# Patient Record
Sex: FEMALE | Race: WHITE | Employment: OTHER | URBAN - METROPOLITAN AREA
[De-identification: names, ages, dates, MRNs, and addresses within clinical notes are randomized per-mention and may not be internally consistent; named-entity substitution may affect disease eponyms.]

---

## 2017-03-01 RX ORDER — METOPROLOL SUCCINATE 50 MG/1
TABLET, EXTENDED RELEASE ORAL
Qty: 30 TABLET | Refills: 11 | Status: SHIPPED | OUTPATIENT
Start: 2017-03-01 | End: 2017-03-20

## 2017-03-22 RX ORDER — AMLODIPINE BESYLATE 5 MG/1
TABLET ORAL
Qty: 90 TABLET | Refills: 2 | Status: SHIPPED | OUTPATIENT
Start: 2017-03-22 | End: 2017-08-20 | Stop reason: SDUPTHER

## 2017-05-17 RX ORDER — CARBAMAZEPINE 200 MG
TABLET ORAL
Qty: 180 TABLET | Refills: 3 | Status: SHIPPED | OUTPATIENT
Start: 2017-05-17 | End: 2017-05-22 | Stop reason: SDUPTHER

## 2017-05-22 RX ORDER — CARBAMAZEPINE 200 MG
TABLET ORAL
Qty: 180 TABLET | Refills: 3 | Status: SHIPPED | OUTPATIENT
Start: 2017-05-22 | End: 2018-07-18 | Stop reason: SDUPTHER

## 2017-06-27 RX ORDER — ALUMINUM ZIRCONIUM TRICHLOROHYDREX GLY 0.19 G/G
STICK TOPICAL
Qty: 84 TABLET | Refills: 1 | Status: SHIPPED | OUTPATIENT
Start: 2017-06-27 | End: 2018-02-02

## 2017-08-29 RX ORDER — FLUOXETINE HYDROCHLORIDE 40 MG/1
CAPSULE ORAL
Qty: 90 CAPSULE | Refills: 3 | Status: SHIPPED | OUTPATIENT
Start: 2017-08-29 | End: 2018-09-30 | Stop reason: SDUPTHER

## 2017-09-10 RX ORDER — ATORVASTATIN CALCIUM 80 MG/1
TABLET, FILM COATED ORAL
Qty: 90 TABLET | Refills: 2 | Status: SHIPPED | OUTPATIENT
Start: 2017-09-10 | End: 2020-03-23

## 2017-09-19 RX ORDER — METHOCARBAMOL 750 MG/1
TABLET ORAL
Qty: 12 CAPSULE | Refills: 3 | Status: SHIPPED | OUTPATIENT
Start: 2017-09-19 | End: 2018-10-08 | Stop reason: SDUPTHER

## 2017-09-21 RX ORDER — METHOCARBAMOL 750 MG/1
TABLET ORAL
Qty: 12 CAPSULE | Refills: 3 | Status: SHIPPED | OUTPATIENT
Start: 2017-09-21 | End: 2018-02-02

## 2017-11-02 ENCOUNTER — TELEPHONE (OUTPATIENT)
Dept: INTERNAL MEDICINE | Age: 67
End: 2017-11-02

## 2017-11-02 NOTE — TELEPHONE ENCOUNTER
Since her pulmonary doctor never sent me any information about her it would be more appropriate for him to order it.which he can easily do by himself.

## 2017-11-13 RX ORDER — LEVOTHYROXINE SODIUM 0.1 MG/1
TABLET ORAL
Qty: 90 TABLET | Refills: 3 | Status: SHIPPED | OUTPATIENT
Start: 2017-11-13 | End: 2018-12-06 | Stop reason: SDUPTHER

## 2017-12-18 ENCOUNTER — TELEPHONE (OUTPATIENT)
Dept: INTERNAL MEDICINE | Age: 67
End: 2017-12-18

## 2017-12-21 RX ORDER — ATORVASTATIN CALCIUM 80 MG/1
TABLET, FILM COATED ORAL
Qty: 90 TABLET | Refills: 3 | Status: SHIPPED | OUTPATIENT
Start: 2017-12-21 | End: 2018-02-02

## 2018-02-01 NOTE — PROGRESS NOTES
Awake and orientated to person, place and time. PSYCH: Calm affect. SKIN: Warm and dry. HEENT: Sclera non-icteric, normocephalic, neck supple, no elevation of JVP, normal carotid pulses with no bruits and thyroid normal size. LUNGS:  No increased work of breathing and clear to auscultation, no crackles or wheezing. CARDIOVASCULAR:  Regular rate and rhythm with no murmurs, gallops, rubs, or abnormal heart sounds, normal PMI. The apical impulses not displaced. Heart tones are crisp and normal                                                                                            Cervical veins are not engorged                 JVP less than 8 cm H2O                                                                              The carotid upstroke is normal in amplitude and contour without delay or bruit    ABDOMEN:  Normal bowel sounds, non-distended and non-tender to palpation   EXT: No edema, no calf tenderness. Pulses are present bilaterally.     DATA:    Lab Results   Component Value Date    ALT 18 07/22/2016    AST 23 07/22/2016    ALKPHOS 95 10/28/2015    BILITOT 0.3 10/28/2015     Lab Results   Component Value Date    CREATININE 0.7 09/16/2016    BUN 19 09/16/2016     09/16/2016    K 3.9 09/16/2016     09/16/2016    CO2 26 09/16/2016     Lab Results   Component Value Date    TSH 0.83 10/28/2015    G8DJSKE 5.7 01/17/2012     Lab Results   Component Value Date    WBC 6.0 09/16/2016    HGB 13.6 09/16/2016    HCT 41.6 09/16/2016    MCV 91.3 09/16/2016     09/16/2016     No components found for: CHLPL  Lab Results   Component Value Date    TRIG 157 07/22/2016    TRIG 148 10/28/2015    TRIG 113 08/14/2012     Lab Results   Component Value Date    HDL 90 (A) 07/22/2016     (H) 10/28/2015    HDL 90 (H) 08/14/2012     Lab Results   Component Value Date    LDLCALC 82 07/22/2016    LDLCALC 98 10/28/2015    LDLCALC 93 08/14/2012     Lab Results   Component Value Date    LABVLDL 30 10/28/2015    LABVLDL 23 08/14/2012    LABVLDL 18 01/17/2012       LABS:  2/9/16: Na+ 139 K+ 3.6 chl 103 CO2 28 BUN 19 creatinine 0.78 glu 104    Mg+ 1.9    Radiology Review:  Pertinent images / reports were reviewed as a part of this visit and reveals the following:      Last Angiogram: '04: mid-LAD 40-50%    Last Echo: Feb '05: normal LV systolic function, EF 59%. Trace MR. Early diastolic dysfunction       Last Stress Test: 7/26/16:  Summary    There is normal isotope uptake at stress and rest. There is no clear    evidence of myocardial ischemia or scar. Abnormal clinical response to    Lexiscan.    Minimal anterior soft tissue attenuation reduces the sensitivity of this    test.       Last Angiogram: 8/1/16:  60% LAD at D1 bifurcation--IFR 0.97, FFR 0.91  50% mid cx  Normal LV FXN  IMP: Noncardiac cause for sx likely  RE: BP control, pulmonary evaluation, PFT    8/19/16: PFTs:  INDICATION: Dyspnea. INTERPRETATION: Spirometry showed decreased FVC of 2.09 L or 69% predicted  and decreased FEV1 of 1.83 L or 79% predicted. FEV1/FVC ratio was normal.    Lung volumes showed decreased total lung capacity of 63% predicted.    Diffusion capacity showed decreased DLCO of 67% predicted. Methylcholine  challenge test was a negative study.    IMPRESSION: No obstructive defect on spirometry with negative methylcholine  challenge test. There was some restrictive component seen on lung volumes in  a mild degree. Mild decrease in diffusion capacity also noted.        8/19/16: CT chest:  1. Subtle bilateral upper lobe ground-glass opacities favor atelectasis   rather than an infectious/inflammatory process. 2. 4 mm left lower lobe pulmonary nodule. Assessment:     1. Atherosclerosis of native coronary artery of native heart without angina pectoris   stable: LAD/diag bifurcation 60%, FFR 0.91 - 0.97      2. Shortness of breath   ILD--Dr Chapa    3.  Essential hypertension, benign   ~/78 (Site: Left Arm,

## 2018-02-02 ENCOUNTER — OFFICE VISIT (OUTPATIENT)
Dept: CARDIOLOGY CLINIC | Age: 68
End: 2018-02-02

## 2018-02-02 VITALS
HEIGHT: 63 IN | WEIGHT: 203 LBS | OXYGEN SATURATION: 97 % | SYSTOLIC BLOOD PRESSURE: 122 MMHG | DIASTOLIC BLOOD PRESSURE: 78 MMHG | BODY MASS INDEX: 35.97 KG/M2 | HEART RATE: 73 BPM

## 2018-02-02 DIAGNOSIS — I25.10 ATHEROSCLEROSIS OF NATIVE CORONARY ARTERY OF NATIVE HEART WITHOUT ANGINA PECTORIS: Primary | ICD-10-CM

## 2018-02-02 DIAGNOSIS — E78.5 HYPERLIPIDEMIA, UNSPECIFIED HYPERLIPIDEMIA TYPE: ICD-10-CM

## 2018-02-02 DIAGNOSIS — I10 ESSENTIAL HYPERTENSION, BENIGN: ICD-10-CM

## 2018-02-02 DIAGNOSIS — J84.9 ILD (INTERSTITIAL LUNG DISEASE) (HCC): ICD-10-CM

## 2018-02-02 PROCEDURE — G8598 ASA/ANTIPLAT THER USED: HCPCS | Performed by: INTERNAL MEDICINE

## 2018-02-02 PROCEDURE — G8427 DOCREV CUR MEDS BY ELIG CLIN: HCPCS | Performed by: INTERNAL MEDICINE

## 2018-02-02 PROCEDURE — G8399 PT W/DXA RESULTS DOCUMENT: HCPCS | Performed by: INTERNAL MEDICINE

## 2018-02-02 PROCEDURE — 99214 OFFICE O/P EST MOD 30 MIN: CPT | Performed by: INTERNAL MEDICINE

## 2018-02-02 PROCEDURE — 1036F TOBACCO NON-USER: CPT | Performed by: INTERNAL MEDICINE

## 2018-02-02 PROCEDURE — 4040F PNEUMOC VAC/ADMIN/RCVD: CPT | Performed by: INTERNAL MEDICINE

## 2018-02-02 PROCEDURE — G8417 CALC BMI ABV UP PARAM F/U: HCPCS | Performed by: INTERNAL MEDICINE

## 2018-02-02 PROCEDURE — G8484 FLU IMMUNIZE NO ADMIN: HCPCS | Performed by: INTERNAL MEDICINE

## 2018-02-02 PROCEDURE — 3017F COLORECTAL CA SCREEN DOC REV: CPT | Performed by: INTERNAL MEDICINE

## 2018-02-02 PROCEDURE — 3014F SCREEN MAMMO DOC REV: CPT | Performed by: INTERNAL MEDICINE

## 2018-02-02 PROCEDURE — 1123F ACP DISCUSS/DSCN MKR DOCD: CPT | Performed by: INTERNAL MEDICINE

## 2018-02-02 PROCEDURE — 1090F PRES/ABSN URINE INCON ASSESS: CPT | Performed by: INTERNAL MEDICINE

## 2018-02-02 RX ORDER — MYCOPHENOLATE MOFETIL 250 MG/1
250 CAPSULE ORAL 2 TIMES DAILY
COMMUNITY
Start: 2017-10-26 | End: 2018-10-29 | Stop reason: ALTCHOICE

## 2018-02-08 ENCOUNTER — HOSPITAL ENCOUNTER (OUTPATIENT)
Dept: ULTRASOUND IMAGING | Age: 68
Discharge: HOME | End: 2018-02-08
Attending: INTERNAL MEDICINE | Admitting: INTERNAL MEDICINE

## 2018-02-08 DIAGNOSIS — I25.10 ATHEROSCLEROTIC HEART DISEASE OF NATIVE CORONARY ARTERY WITHOUT ANGINA PECTORIS: ICD-10-CM

## 2018-02-08 DIAGNOSIS — Z13.6 SCREENING FOR AAA (ABDOMINAL AORTIC ANEURYSM): ICD-10-CM

## 2018-02-08 DIAGNOSIS — E78.5 HYPERLIPIDEMIA, UNSPECIFIED HYPERLIPIDEMIA TYPE: ICD-10-CM

## 2018-02-08 DIAGNOSIS — I25.10 ATHEROSCLEROSIS OF NATIVE CORONARY ARTERY OF NATIVE HEART WITHOUT ANGINA PECTORIS: ICD-10-CM

## 2018-02-08 DIAGNOSIS — I10 ESSENTIAL HYPERTENSION, BENIGN: ICD-10-CM

## 2018-03-19 RX ORDER — METOPROLOL SUCCINATE 50 MG/1
TABLET, EXTENDED RELEASE ORAL
Qty: 30 TABLET | Refills: 0 | Status: SHIPPED | OUTPATIENT
Start: 2018-03-19 | End: 2018-03-20 | Stop reason: SDUPTHER

## 2018-03-20 ENCOUNTER — OFFICE VISIT (OUTPATIENT)
Dept: CARDIOLOGY CLINIC | Age: 68
End: 2018-03-20

## 2018-03-20 VITALS
WEIGHT: 204 LBS | HEIGHT: 63 IN | SYSTOLIC BLOOD PRESSURE: 112 MMHG | DIASTOLIC BLOOD PRESSURE: 64 MMHG | BODY MASS INDEX: 36.14 KG/M2 | HEART RATE: 83 BPM | OXYGEN SATURATION: 91 %

## 2018-03-20 DIAGNOSIS — E78.2 MIXED HYPERLIPIDEMIA: ICD-10-CM

## 2018-03-20 DIAGNOSIS — I25.10 ATHEROSCLEROSIS OF NATIVE CORONARY ARTERY OF NATIVE HEART WITHOUT ANGINA PECTORIS: Primary | ICD-10-CM

## 2018-03-20 DIAGNOSIS — R06.02 SHORTNESS OF BREATH: ICD-10-CM

## 2018-03-20 DIAGNOSIS — I10 ESSENTIAL HYPERTENSION, BENIGN: ICD-10-CM

## 2018-03-20 PROCEDURE — 1036F TOBACCO NON-USER: CPT | Performed by: NURSE PRACTITIONER

## 2018-03-20 PROCEDURE — 1090F PRES/ABSN URINE INCON ASSESS: CPT | Performed by: NURSE PRACTITIONER

## 2018-03-20 PROCEDURE — G8598 ASA/ANTIPLAT THER USED: HCPCS | Performed by: NURSE PRACTITIONER

## 2018-03-20 PROCEDURE — 93000 ELECTROCARDIOGRAM COMPLETE: CPT | Performed by: NURSE PRACTITIONER

## 2018-03-20 PROCEDURE — 3017F COLORECTAL CA SCREEN DOC REV: CPT | Performed by: NURSE PRACTITIONER

## 2018-03-20 PROCEDURE — 99214 OFFICE O/P EST MOD 30 MIN: CPT | Performed by: NURSE PRACTITIONER

## 2018-03-20 PROCEDURE — 1123F ACP DISCUSS/DSCN MKR DOCD: CPT | Performed by: NURSE PRACTITIONER

## 2018-03-20 PROCEDURE — G8427 DOCREV CUR MEDS BY ELIG CLIN: HCPCS | Performed by: NURSE PRACTITIONER

## 2018-03-20 PROCEDURE — G8417 CALC BMI ABV UP PARAM F/U: HCPCS | Performed by: NURSE PRACTITIONER

## 2018-03-20 PROCEDURE — 3014F SCREEN MAMMO DOC REV: CPT | Performed by: NURSE PRACTITIONER

## 2018-03-20 PROCEDURE — G8399 PT W/DXA RESULTS DOCUMENT: HCPCS | Performed by: NURSE PRACTITIONER

## 2018-03-20 PROCEDURE — 4040F PNEUMOC VAC/ADMIN/RCVD: CPT | Performed by: NURSE PRACTITIONER

## 2018-03-20 PROCEDURE — G8482 FLU IMMUNIZE ORDER/ADMIN: HCPCS | Performed by: NURSE PRACTITIONER

## 2018-03-20 RX ORDER — METOPROLOL SUCCINATE 50 MG/1
TABLET, EXTENDED RELEASE ORAL
Qty: 30 TABLET | Refills: 5 | Status: SHIPPED | OUTPATIENT
Start: 2018-03-20 | End: 2018-08-01 | Stop reason: SDUPTHER

## 2018-03-23 ENCOUNTER — TELEPHONE (OUTPATIENT)
Dept: CARDIOLOGY CLINIC | Age: 68
End: 2018-03-23

## 2018-03-23 DIAGNOSIS — E78.5 HYPERLIPIDEMIA, UNSPECIFIED HYPERLIPIDEMIA TYPE: ICD-10-CM

## 2018-03-23 DIAGNOSIS — Z01.810 PREOP CARDIOVASCULAR EXAM: ICD-10-CM

## 2018-03-23 DIAGNOSIS — I25.10 ATHEROSCLEROSIS OF NATIVE CORONARY ARTERY OF NATIVE HEART WITHOUT ANGINA PECTORIS: Primary | ICD-10-CM

## 2018-03-23 NOTE — TELEPHONE ENCOUNTER
----- Message from Milka Forrester, [address] [place] sent at 3/23/2018 12:04 PM EDT -----      ----- Message -----  From: Hilaria Espinoza MD  Sent: 3/23/2018  11:34 AM  To: [facility] Staff    Please schedule her for stress echo to evaluate pre-op clearance--See if she is able to walk on treadmilll with modified protocol. If not, could do a dobutamine stress Echo.   OK to proceed if testing is normal.    ----- Message -----  From: Joshua Freire NP  Sent: 3/20/2018   3:59 PM  To: Hilaria Espinoza MD    DB: she's seeking clearance for a Rt THR in April . thanks

## 2018-03-26 ENCOUNTER — TELEPHONE (OUTPATIENT)
Dept: CARDIOLOGY CLINIC | Age: 68
End: 2018-03-26

## 2018-03-26 NOTE — TELEPHONE ENCOUNTER
Pt calling wants to know why she has to schedule for an echo even though she just has one recently. Pt states she doesn't know if insurance will cover another one. Please call to advise,Thank You!

## 2018-04-03 ENCOUNTER — HOSPITAL ENCOUNTER (OUTPATIENT)
Dept: NON INVASIVE DIAGNOSTICS | Age: 68
Discharge: HOME | End: 2018-04-03
Attending: INTERNAL MEDICINE | Admitting: INTERNAL MEDICINE

## 2018-04-03 ENCOUNTER — TELEPHONE (OUTPATIENT)
Dept: CARDIOLOGY CLINIC | Age: 68
End: 2018-04-03

## 2018-04-03 VITALS — WEIGHT: 203.93 LBS | BODY MASS INDEX: 36.12 KG/M2

## 2018-04-03 DIAGNOSIS — I25.10 ATHEROSCLEROTIC HEART DISEASE OF NATIVE CORONARY ARTERY WITHOUT ANGINA PECTORIS: ICD-10-CM

## 2018-04-03 RX ORDER — DOBUTAMINE HYDROCHLORIDE 200 MG/100ML
10 INJECTION INTRAVENOUS CONTINUOUS
Status: DISCONTINUED | OUTPATIENT
Start: 2018-04-03 | End: 2018-04-03 | Stop reason: ALTCHOICE

## 2018-04-03 RX ADMIN — DOBUTAMINE HYDROCHLORIDE 10 MCG/KG/MIN: 200 INJECTION INTRAVENOUS at 15:05

## 2018-04-05 ENCOUNTER — TELEPHONE (OUTPATIENT)
Dept: CARDIOLOGY CLINIC | Age: 68
End: 2018-04-05

## 2018-04-10 ENCOUNTER — OFFICE VISIT (OUTPATIENT)
Dept: INTERNAL MEDICINE | Age: 68
End: 2018-04-10

## 2018-04-10 VITALS
SYSTOLIC BLOOD PRESSURE: 110 MMHG | WEIGHT: 202 LBS | HEIGHT: 63 IN | BODY MASS INDEX: 35.79 KG/M2 | HEART RATE: 66 BPM | DIASTOLIC BLOOD PRESSURE: 70 MMHG

## 2018-04-10 DIAGNOSIS — Z01.818 PRE-OP EXAM: Primary | ICD-10-CM

## 2018-04-10 PROCEDURE — 3014F SCREEN MAMMO DOC REV: CPT | Performed by: INTERNAL MEDICINE

## 2018-04-10 PROCEDURE — 1090F PRES/ABSN URINE INCON ASSESS: CPT | Performed by: INTERNAL MEDICINE

## 2018-04-10 PROCEDURE — G8417 CALC BMI ABV UP PARAM F/U: HCPCS | Performed by: INTERNAL MEDICINE

## 2018-04-10 PROCEDURE — G8427 DOCREV CUR MEDS BY ELIG CLIN: HCPCS | Performed by: INTERNAL MEDICINE

## 2018-04-10 PROCEDURE — 99214 OFFICE O/P EST MOD 30 MIN: CPT | Performed by: INTERNAL MEDICINE

## 2018-04-10 PROCEDURE — 3017F COLORECTAL CA SCREEN DOC REV: CPT | Performed by: INTERNAL MEDICINE

## 2018-04-10 RX ORDER — CIPROFLOXACIN 250 MG/1
250 TABLET, FILM COATED ORAL 2 TIMES DAILY
Qty: 15 TABLET | Refills: 0 | Status: SHIPPED | OUTPATIENT
Start: 2018-04-10 | End: 2018-10-22 | Stop reason: ALTCHOICE

## 2018-04-10 RX ORDER — CIPROFLOXACIN 250 MG/1
250 TABLET, FILM COATED ORAL 2 TIMES DAILY
COMMUNITY
End: 2018-04-10 | Stop reason: SDUPTHER

## 2018-04-10 ASSESSMENT — PATIENT HEALTH QUESTIONNAIRE - PHQ9
1. LITTLE INTEREST OR PLEASURE IN DOING THINGS: 0
SUM OF ALL RESPONSES TO PHQ9 QUESTIONS 1 & 2: 0
2. FEELING DOWN, DEPRESSED OR HOPELESS: 0
SUM OF ALL RESPONSES TO PHQ QUESTIONS 1-9: 0

## 2018-07-18 RX ORDER — CARBAMAZEPINE 200 MG
TABLET ORAL
Qty: 180 TABLET | Refills: 12 | Status: SHIPPED | OUTPATIENT
Start: 2018-07-18 | End: 2019-08-17 | Stop reason: SDUPTHER

## 2018-08-03 ENCOUNTER — OFFICE VISIT (OUTPATIENT)
Dept: CARDIOLOGY CLINIC | Age: 68
End: 2018-08-03

## 2018-08-03 VITALS
DIASTOLIC BLOOD PRESSURE: 82 MMHG | HEART RATE: 98 BPM | HEIGHT: 63 IN | BODY MASS INDEX: 35.44 KG/M2 | WEIGHT: 200 LBS | SYSTOLIC BLOOD PRESSURE: 138 MMHG

## 2018-08-03 DIAGNOSIS — J84.9 ILD (INTERSTITIAL LUNG DISEASE) (HCC): ICD-10-CM

## 2018-08-03 DIAGNOSIS — E78.5 HYPERLIPIDEMIA, UNSPECIFIED HYPERLIPIDEMIA TYPE: ICD-10-CM

## 2018-08-03 DIAGNOSIS — I10 ESSENTIAL HYPERTENSION, BENIGN: ICD-10-CM

## 2018-08-03 DIAGNOSIS — I25.10 ATHEROSCLEROSIS OF NATIVE CORONARY ARTERY OF NATIVE HEART WITHOUT ANGINA PECTORIS: Primary | ICD-10-CM

## 2018-08-03 PROCEDURE — G8598 ASA/ANTIPLAT THER USED: HCPCS | Performed by: INTERNAL MEDICINE

## 2018-08-03 PROCEDURE — 4040F PNEUMOC VAC/ADMIN/RCVD: CPT | Performed by: INTERNAL MEDICINE

## 2018-08-03 PROCEDURE — 1090F PRES/ABSN URINE INCON ASSESS: CPT | Performed by: INTERNAL MEDICINE

## 2018-08-03 PROCEDURE — 1123F ACP DISCUSS/DSCN MKR DOCD: CPT | Performed by: INTERNAL MEDICINE

## 2018-08-03 PROCEDURE — 3017F COLORECTAL CA SCREEN DOC REV: CPT | Performed by: INTERNAL MEDICINE

## 2018-08-03 PROCEDURE — 1036F TOBACCO NON-USER: CPT | Performed by: INTERNAL MEDICINE

## 2018-08-03 PROCEDURE — 1101F PT FALLS ASSESS-DOCD LE1/YR: CPT | Performed by: INTERNAL MEDICINE

## 2018-08-03 PROCEDURE — G8417 CALC BMI ABV UP PARAM F/U: HCPCS | Performed by: INTERNAL MEDICINE

## 2018-08-03 PROCEDURE — G8427 DOCREV CUR MEDS BY ELIG CLIN: HCPCS | Performed by: INTERNAL MEDICINE

## 2018-08-03 PROCEDURE — 99213 OFFICE O/P EST LOW 20 MIN: CPT | Performed by: INTERNAL MEDICINE

## 2018-08-03 PROCEDURE — G8399 PT W/DXA RESULTS DOCUMENT: HCPCS | Performed by: INTERNAL MEDICINE

## 2018-08-03 RX ORDER — METOPROLOL SUCCINATE 50 MG/1
TABLET, EXTENDED RELEASE ORAL
Qty: 30 TABLET | Refills: 4 | Status: SHIPPED | OUTPATIENT
Start: 2018-08-03 | End: 2020-01-07 | Stop reason: SDUPTHER

## 2018-08-03 NOTE — PROGRESS NOTES
0.78 glu 104    Mg+ 1.9    Radiology Review:  Pertinent images / reports were reviewed as a part of this visit and reveals the following:      Last Angiogram: '04: mid-LAD 40-50%    Last Echo: Feb '05: normal LV systolic function, EF 91%. Trace MR. Early diastolic dysfunction       Last Stress Test: 7/26/16:  Summary    There is normal isotope uptake at stress and rest. There is no clear    evidence of myocardial ischemia or scar. Abnormal clinical response to    Lexiscan.    Minimal anterior soft tissue attenuation reduces the sensitivity of this    test.       Last Angiogram: 8/1/16:  60% LAD at D1 bifurcation--IFR 0.97, FFR 0.91  50% mid cx  Normal LV FXN  IMP: Noncardiac cause for sx likely  RE: BP control, pulmonary evaluation, PFT    8/19/16: PFTs:  INDICATION: Dyspnea. INTERPRETATION: Spirometry showed decreased FVC of 2.09 L or 69% predicted  and decreased FEV1 of 1.83 L or 79% predicted. FEV1/FVC ratio was normal.    Lung volumes showed decreased total lung capacity of 63% predicted.    Diffusion capacity showed decreased DLCO of 67% predicted. Methylcholine  challenge test was a negative study.    IMPRESSION: No obstructive defect on spirometry with negative methylcholine  challenge test. There was some restrictive component seen on lung volumes in  a mild degree. Mild decrease in diffusion capacity also noted.        8/19/16: CT chest:  1. Subtle bilateral upper lobe ground-glass opacities favor atelectasis   rather than an infectious/inflammatory process. 2. 4 mm left lower lobe pulmonary nodule. abd ultrasound 2/8/18 no aaa  Assessment:     1. Atherosclerosis of native coronary artery of native heart without angina pectoris   stable: LAD/diag bifurcation 60%, FFR 0.91 - 0.97  4/3/18 stress echo--nl      2. Shortness of breath   ILD--Dr Chapa    3.  Essential hypertension, benign   ~/82 (Site: Right Arm, Position: Sitting, Cuff Size: Medium Adult)   Pulse 98   Ht 5' 3\" (1.6 m)   Wt 200 lb (90.7 kg)   BMI 35.43 kg/m²     4. Mixed hyperlipidemia   ~7/2016 HDL  90 LDL 82. On Lipitor 80 mg        Plan:   LLL  Patient has been advised on the importance of regular exercise of at least 20-30 minutes daily alternating with aerobic and isometric activities. OV X 12 months    Berenda Moritz, M.D., Donna Asper      Scribe Attestation:  I, Kristian Ordonez, am scribing for and in the presence of Allie Macario MD.   Aristeo Bettencourt 08/03/18 2:03 PM   Provider Augie Christopher is working as a scribe for and in the presence of zafar Macario MD). Working as a scribe, Kristian Ordonez may have prepopulated components of this note with my historical  intellectual property under my direct supervision. Any additions to this intellectual property were performed in my presence and at my direction. Furthermore, the content and accuracy of this note have been reviewed by me Allie Macario MD).

## 2018-08-10 LAB
ORGANISM: ABNORMAL
URINE CULTURE, ROUTINE: ABNORMAL
URINE CULTURE, ROUTINE: ABNORMAL

## 2018-09-20 RX ORDER — AMLODIPINE BESYLATE 5 MG/1
TABLET ORAL
Qty: 180 TABLET | Refills: 2 | Status: SHIPPED | OUTPATIENT
Start: 2018-09-20 | End: 2019-08-13 | Stop reason: SDUPTHER

## 2018-09-30 RX ORDER — FLUOXETINE HYDROCHLORIDE 40 MG/1
CAPSULE ORAL
Qty: 90 CAPSULE | Refills: 2 | Status: SHIPPED | OUTPATIENT
Start: 2018-09-30 | End: 2019-08-30 | Stop reason: SDUPTHER

## 2018-10-08 RX ORDER — CHOLECALCIFEROL (VITAMIN D3) 1250 MCG
CAPSULE ORAL
Qty: 12 CAPSULE | Refills: 3 | Status: SHIPPED | OUTPATIENT
Start: 2018-10-08 | End: 2020-01-03

## 2018-10-22 RX ORDER — SODIUM CHLORIDE, SODIUM LACTATE, POTASSIUM CHLORIDE, CALCIUM CHLORIDE 600; 310; 30; 20 MG/100ML; MG/100ML; MG/100ML; MG/100ML
INJECTION, SOLUTION INTRAVENOUS CONTINUOUS
Status: CANCELLED | OUTPATIENT
Start: 2018-10-22

## 2018-10-22 RX ORDER — CEFAZOLIN SODIUM 2 G/100ML
2 INJECTION, SOLUTION INTRAVENOUS
Status: CANCELLED | OUTPATIENT
Start: 2018-10-22 | End: 2018-10-22

## 2018-10-22 RX ORDER — ACETAMINOPHEN 500 MG
1000 TABLET ORAL ONCE
Status: CANCELLED | OUTPATIENT
Start: 2018-10-22

## 2018-10-22 RX ORDER — LIDOCAINE HYDROCHLORIDE 10 MG/ML
0.5 INJECTION, SOLUTION EPIDURAL; INFILTRATION; INTRACAUDAL; PERINEURAL ONCE
Status: CANCELLED | OUTPATIENT
Start: 2018-10-22 | End: 2018-10-22

## 2018-10-22 NOTE — PROGRESS NOTES
Name_______________________________________Printed:____________________  Date and time of [id number]_______________________Kk[place]xua Time:_0600 MAIN_______________   1. Do not eat or drink anything after 12 midnight (or____hours) prior to surgery. This includes no water, chewing gum or mints. Endoscopy patients follow your doctors bowel prep instructions,which may include taking part of prep after midnight. 2. Take the following pills with a small sip of water on the morning of surgery_AMLODIPINE, FLUOXETINE, METOPROLOL, TEGRETOL, LEVOTHYROXINE____________________________________________________   3. Aspirin, Ibuprofen, Advil, Naproxen, Vitamin E and other Anti-inflammatory products should be stopped for 5 days before surgery or as directed by your physician. 4. Check with your Doctor regarding stopping Plavix, Coumadin,Eliquis, Lovenox,Effient,Pradaxa,Xarelto, Fragmin or other blood thinners and follow their instructions. 5. Do not smoke, and do not drink any alcoholic beverages 24 hours prior to surgery. This includes NA Beer. Refrain from the usage of any recreational drugs. 6. You may brush your teeth and gargle the morning of surgery. DO NOT SWALLOW WATER   7. You MUST make arrangements for a responsible adult to stay on site while you are here and take you home after your surgery. You will not be allowed to leave alone or drive yourself home. It is strongly suggested someone stay with you the first 24 hrs. Your surgery will be cancelled if you do not have a ride home. 8. A parent/legal guardian must accompany a child scheduled for surgery and plan to stay at the hospital until the child is discharged. Please do not bring other children with you. 9. Please wear simple, loose fitting clothing to the hospital.  Mandie Perez not bring valuables (money, credit cards, checkbooks, etc.) Do not wear any makeup (including no eye makeup) or nail polish on your fingers or toes.              10. DO NOT wear any

## 2018-10-29 ENCOUNTER — OFFICE VISIT (OUTPATIENT)
Dept: INTERNAL MEDICINE CLINIC | Age: 68
End: 2018-10-29
Payer: MEDICARE

## 2018-10-29 ENCOUNTER — HOSPITAL ENCOUNTER (OUTPATIENT)
Dept: PREADMISSION TESTING | Age: 68
Discharge: HOME | End: 2018-11-02
Payer: MEDICARE

## 2018-10-29 ENCOUNTER — ANESTHESIA EVENT (OUTPATIENT)
Dept: OPERATING ROOM | Age: 68
End: 2018-10-29
Payer: MEDICARE

## 2018-10-29 VITALS
HEIGHT: 63 IN | HEART RATE: 60 BPM | SYSTOLIC BLOOD PRESSURE: 122 MMHG | DIASTOLIC BLOOD PRESSURE: 66 MMHG | WEIGHT: 201 LBS | RESPIRATION RATE: 14 BRPM | BODY MASS INDEX: 35.61 KG/M2

## 2018-10-29 DIAGNOSIS — I10 ESSENTIAL HYPERTENSION, BENIGN: ICD-10-CM

## 2018-10-29 DIAGNOSIS — R73.01 IMPAIRED FASTING GLUCOSE: ICD-10-CM

## 2018-10-29 DIAGNOSIS — R10.2 PELVIC PAIN: ICD-10-CM

## 2018-10-29 DIAGNOSIS — Z12.31 SCREENING MAMMOGRAM, ENCOUNTER FOR: ICD-10-CM

## 2018-10-29 DIAGNOSIS — E78.5 HYPERLIPIDEMIA, UNSPECIFIED HYPERLIPIDEMIA TYPE: ICD-10-CM

## 2018-10-29 DIAGNOSIS — Z01.818 PRE-OP EXAMINATION: Primary | ICD-10-CM

## 2018-10-29 DIAGNOSIS — G40.909 SEIZURE DISORDER (HCC): ICD-10-CM

## 2018-10-29 LAB
A/G RATIO: 1.6 (ref 1.1–2.2)
ABO/RH: NORMAL
ALBUMIN SERPL-MCNC: 4.4 G/DL (ref 3.4–5)
ALP BLD-CCNC: 90 U/L (ref 40–129)
ALT SERPL-CCNC: 25 U/L (ref 10–40)
ANION GAP SERPL CALCULATED.3IONS-SCNC: 13 MMOL/L (ref 3–16)
ANTIBODY SCREEN: NORMAL
AST SERPL-CCNC: 23 U/L (ref 15–37)
BILIRUB SERPL-MCNC: 0.3 MG/DL (ref 0–1)
BUN BLDV-MCNC: 25 MG/DL (ref 7–20)
CALCIUM SERPL-MCNC: 9.2 MG/DL (ref 8.3–10.6)
CHLORIDE BLD-SCNC: 100 MMOL/L (ref 99–110)
CO2: 27 MMOL/L (ref 21–32)
CREAT SERPL-MCNC: 0.7 MG/DL (ref 0.6–1.2)
GFR AFRICAN AMERICAN: >60
GFR NON-AFRICAN AMERICAN: >60
GLOBULIN: 2.8 G/DL
GLUCOSE BLD-MCNC: 121 MG/DL (ref 70–99)
HCT VFR BLD CALC: 42.6 % (ref 36–48)
HEMOGLOBIN: 13.8 G/DL (ref 12–16)
MCH RBC QN AUTO: 30 PG (ref 26–34)
MCHC RBC AUTO-ENTMCNC: 32.5 G/DL (ref 31–36)
MCV RBC AUTO: 92.2 FL (ref 80–100)
PDW BLD-RTO: 16.1 % (ref 12.4–15.4)
PLATELET # BLD: 242 K/UL (ref 135–450)
PMV BLD AUTO: 8.1 FL (ref 5–10.5)
POTASSIUM SERPL-SCNC: 4.5 MMOL/L (ref 3.5–5.1)
RBC # BLD: 4.61 M/UL (ref 4–5.2)
SODIUM BLD-SCNC: 140 MMOL/L (ref 136–145)
TOTAL PROTEIN: 7.2 G/DL (ref 6.4–8.2)
WBC # BLD: 5.6 K/UL (ref 4–11)

## 2018-10-29 PROCEDURE — 86901 BLOOD TYPING SEROLOGIC RH(D): CPT

## 2018-10-29 PROCEDURE — 36415 COLL VENOUS BLD VENIPUNCTURE: CPT

## 2018-10-29 PROCEDURE — G8417 CALC BMI ABV UP PARAM F/U: HCPCS | Performed by: INTERNAL MEDICINE

## 2018-10-29 PROCEDURE — 86850 RBC ANTIBODY SCREEN: CPT

## 2018-10-29 PROCEDURE — 85027 COMPLETE CBC AUTOMATED: CPT

## 2018-10-29 PROCEDURE — 86900 BLOOD TYPING SEROLOGIC ABO: CPT

## 2018-10-29 PROCEDURE — G8482 FLU IMMUNIZE ORDER/ADMIN: HCPCS | Performed by: INTERNAL MEDICINE

## 2018-10-29 PROCEDURE — 80053 COMPREHEN METABOLIC PANEL: CPT

## 2018-10-29 PROCEDURE — 3017F COLORECTAL CA SCREEN DOC REV: CPT | Performed by: INTERNAL MEDICINE

## 2018-10-29 PROCEDURE — 1090F PRES/ABSN URINE INCON ASSESS: CPT | Performed by: INTERNAL MEDICINE

## 2018-10-29 PROCEDURE — 1101F PT FALLS ASSESS-DOCD LE1/YR: CPT | Performed by: INTERNAL MEDICINE

## 2018-10-29 PROCEDURE — 99213 OFFICE O/P EST LOW 20 MIN: CPT | Performed by: INTERNAL MEDICINE

## 2018-10-29 PROCEDURE — G8427 DOCREV CUR MEDS BY ELIG CLIN: HCPCS | Performed by: INTERNAL MEDICINE

## 2018-10-29 PROCEDURE — 93000 ELECTROCARDIOGRAM COMPLETE: CPT | Performed by: INTERNAL MEDICINE

## 2018-10-29 RX ORDER — MYCOPHENOLATE MOFETIL 500 MG/1
1000 TABLET ORAL 2 TIMES DAILY
COMMUNITY
End: 2020-09-17

## 2018-10-29 NOTE — ANESTHESIA PRE PROCEDURE
2    metoprolol succinate (TOPROL XL) 50 MG extended release tablet TAKE ONE TABLET BY MOUTH DAILY 30 tablet 4    TEGRETOL 200 MG tablet TAKE ONE TABLET BY MOUTH TWICE A  tablet 12    mycophenolate (CELLCEPT) 250 MG capsule Take 1,000 mg by mouth 2 times daily      levothyroxine (SYNTHROID) 100 MCG tablet TAKE ONE TABLET BY MOUTH DAILY 90 tablet 3    atorvastatin (LIPITOR) 80 MG tablet TAKE ONE TABLET BY MOUTH DAILY 90 tablet 2    omeprazole (PRILOSEC) 20 MG capsule Take 1 capsule by mouth daily 84 capsule 2    aspirin 81 MG tablet Take 81 mg by mouth daily. Allergies: Allergies   Allergen Reactions    Tramadol      Avoid because Pt is on Tegretol.     Penicillins Rash     Patient states she can take Keflex       Problem List:    Patient Active Problem List   Diagnosis Code    HLD (hyperlipidemia) E78.5    Essential hypertension, benign I10    Coronary atherosclerosis of native coronary artery I25.10    Seizure disorder ([facility] G40.909    Impaired fasting glucose R73.01    Osteoarthritis of hip M16.9    Vitamin D deficiency E55.9    Interstitial lung disease ([facility] J84.9       Past Medical History:        Diagnosis Date    Abnormal stress test *Aug.1, 2016    Coronary angiography by Dr. Laurel Hooper revealed a 60% stenosis of the LAD and  a 50% mid circumflex     Arthritis     Chronic headaches     Eczema     Fibromyalgia     GERD (gastroesophageal reflux disease)     Hyperlipidemia     Hypertension     Impaired fasting glucose     Interstitial lung disease ([facility] 2016    Dr. Rohit Landeros Interstitial lung disease ([facility]     Migraine headache     Osteopenia DEXA - March, 2016    Lumbar T score +1.7 and Hip -1.0 FRAX 7.6/0.6    Osteopenia DEXA-Dec., 2017    Lumbar T score 2.3 and hip T score 0.1    Other screening mammogram July 8, 2013    Benign    Other screening mammogram *March 4, 2016    Benign    Oxygen dependent     O2 @ 3.5 L CONTINUOUS    Pulmonary hypertension

## 2018-10-29 NOTE — PROGRESS NOTES
Thyroid disease     Vitamin D deficiency Oct., 2015    Level - 17    Wears glasses         Past Surgical History:   Procedure Laterality Date    ABDOMINOPLASTY  Jan., 2012    Dr. Namita Lane Bilateral     BRONCHOSCOPY  **Sept.16, 2016    Dr. Francisco Robles  09/16/2016    BRONCHOSCOPY  *Febr.24, 2017    Dr. Marko Morrison - No endobronchial lesions    CARDIAC CATHETERIZATION  Oct., 2004    LAD 50%, circumflex normal, R coronary normal, LVEF 60%.  CARDIAC CATHETERIZATION  *Sept.24, 2018    Dr. Екатерина Abdalla - [facility] - mild pulmonary hypertension    COLONOSCOPY  Oct., 2003 ( 2013 )    Dr. Aislinn Stevens - Negative - repeat 2013    COLONOSCOPY  Jan., 2014 ( 2024 )    Dr. Sherif Najera - moderate diverticulosis.     COSMETIC SURGERY      inner thighs    LIPOSUCTION Bilateral     knees    TOE SURGERY  Oct., 2015    Dr. Augusta Goldstein - left 2nd and 3rd    TOTAL HIP ARTHROPLASTY  July 31, 2013    Dr. Tonja Zurita - [facility]  *April 13, 2018    Dr. Magdalena Renee - right    TUBAL LIGATION          Current Outpatient Prescriptions   Medication Sig Dispense Refill    mycophenolate (CELLCEPT) 500 MG tablet Take 1,000 mg by mouth 2 times daily      Cholecalciferol (VITAMIN D3) 90928 units CAPS TAKE ONE CAPSULE BY MOUTH ONCE WEEKLY 12 capsule 3    FLUoxetine (PROZAC) 40 MG capsule TAKE ONE CAPSULE BY MOUTH DAILY 90 capsule 2    amLODIPine (NORVASC) 5 MG tablet TAKE ONE TABLET BY MOUTH TWICE A  tablet 2    metoprolol succinate (TOPROL XL) 50 MG extended release tablet TAKE ONE TABLET BY MOUTH DAILY 30 tablet 4    TEGRETOL 200 MG tablet TAKE ONE TABLET BY MOUTH TWICE A  tablet 12    levothyroxine (SYNTHROID) 100 MCG tablet TAKE ONE TABLET BY MOUTH DAILY 90 tablet 3    atorvastatin (LIPITOR) 80 MG tablet TAKE ONE TABLET BY MOUTH DAILY 90 tablet 2    omeprazole (PRILOSEC) 20 MG capsule Take 1 capsule by mouth daily 84 capsule 2    aspirin 81 MG tablet Take 81 mg by mouth

## 2018-11-16 ENCOUNTER — ANESTHESIA (OUTPATIENT)
Dept: OPERATING ROOM | Age: 68
End: 2018-11-16
Payer: MEDICARE

## 2018-11-16 ENCOUNTER — HOSPITAL ENCOUNTER (OUTPATIENT)
Age: 68
Discharge: HOME | End: 2018-11-17
Attending: OBSTETRICS & GYNECOLOGY | Admitting: OBSTETRICS & GYNECOLOGY
Payer: MEDICARE

## 2018-11-16 VITALS
SYSTOLIC BLOOD PRESSURE: 142 MMHG | DIASTOLIC BLOOD PRESSURE: 65 MMHG | OXYGEN SATURATION: 100 % | RESPIRATION RATE: 9 BRPM | TEMPERATURE: 97.7 F

## 2018-11-16 DIAGNOSIS — Z90.721 S/P HYSTERECTOMY WITH OOPHORECTOMY: Primary | ICD-10-CM

## 2018-11-16 DIAGNOSIS — Z90.710 S/P HYSTERECTOMY WITH OOPHORECTOMY: Primary | ICD-10-CM

## 2018-11-16 LAB
ABO/RH: NORMAL
ANTIBODY SCREEN: NORMAL

## 2018-11-16 PROCEDURE — 6370000000 HC RX 637 (ALT 250 FOR IP): Performed by: OBSTETRICS & GYNECOLOGY

## 2018-11-16 PROCEDURE — 2580000003 HC RX 258: Performed by: NURSE ANESTHETIST, CERTIFIED REGISTERED

## 2018-11-16 PROCEDURE — 7100000000 HC PACU RECOVERY - FIRST 15 MIN: Performed by: OBSTETRICS & GYNECOLOGY

## 2018-11-16 PROCEDURE — 86900 BLOOD TYPING SEROLOGIC ABO: CPT

## 2018-11-16 PROCEDURE — 3700000001 HC ADD 15 MINUTES (ANESTHESIA): Performed by: OBSTETRICS & GYNECOLOGY

## 2018-11-16 PROCEDURE — 3700000000 HC ANESTHESIA ATTENDED CARE: Performed by: OBSTETRICS & GYNECOLOGY

## 2018-11-16 PROCEDURE — 2709999900 HC NON-CHARGEABLE SUPPLY: Performed by: OBSTETRICS & GYNECOLOGY

## 2018-11-16 PROCEDURE — 2580000003 HC RX 258: Performed by: OBSTETRICS & GYNECOLOGY

## 2018-11-16 PROCEDURE — 3600000009 HC SURGERY ROBOT BASE: Performed by: OBSTETRICS & GYNECOLOGY

## 2018-11-16 PROCEDURE — 86850 RBC ANTIBODY SCREEN: CPT

## 2018-11-16 PROCEDURE — 2500000003 HC RX 250 WO HCPCS: Performed by: OBSTETRICS & GYNECOLOGY

## 2018-11-16 PROCEDURE — 6360000002 HC RX W HCPCS: Performed by: OBSTETRICS & GYNECOLOGY

## 2018-11-16 PROCEDURE — 2500000003 HC RX 250 WO HCPCS: Performed by: NURSE ANESTHETIST, CERTIFIED REGISTERED

## 2018-11-16 PROCEDURE — 2580000003 HC RX 258: Performed by: ANESTHESIOLOGY

## 2018-11-16 PROCEDURE — 3600000019 HC SURGERY ROBOT ADDTL 15MIN: Performed by: OBSTETRICS & GYNECOLOGY

## 2018-11-16 PROCEDURE — S2900 ROBOTIC SURGICAL SYSTEM: HCPCS | Performed by: OBSTETRICS & GYNECOLOGY

## 2018-11-16 PROCEDURE — 6360000002 HC RX W HCPCS: Performed by: NURSE ANESTHETIST, CERTIFIED REGISTERED

## 2018-11-16 PROCEDURE — 6370000000 HC RX 637 (ALT 250 FOR IP)

## 2018-11-16 PROCEDURE — 86901 BLOOD TYPING SEROLOGIC RH(D): CPT

## 2018-11-16 PROCEDURE — 7100000001 HC PACU RECOVERY - ADDTL 15 MIN: Performed by: OBSTETRICS & GYNECOLOGY

## 2018-11-16 PROCEDURE — 88307 TISSUE EXAM BY PATHOLOGIST: CPT

## 2018-11-16 RX ORDER — OXYCODONE HYDROCHLORIDE AND ACETAMINOPHEN 5; 325 MG/1; MG/1
1 TABLET ORAL EVERY 4 HOURS PRN
Status: DISCONTINUED | OUTPATIENT
Start: 2018-11-16 | End: 2018-11-17 | Stop reason: HOSPADM

## 2018-11-16 RX ORDER — MEPERIDINE HYDROCHLORIDE 25 MG/ML
12.5 INJECTION INTRAMUSCULAR; INTRAVENOUS; SUBCUTANEOUS EVERY 5 MIN PRN
Status: DISCONTINUED | OUTPATIENT
Start: 2018-11-16 | End: 2018-11-16 | Stop reason: HOSPADM

## 2018-11-16 RX ORDER — LIDOCAINE HYDROCHLORIDE 10 MG/ML
1 INJECTION, SOLUTION EPIDURAL; INFILTRATION; INTRACAUDAL; PERINEURAL
Status: DISCONTINUED | OUTPATIENT
Start: 2018-11-16 | End: 2018-11-16 | Stop reason: HOSPADM

## 2018-11-16 RX ORDER — CEFAZOLIN SODIUM 2 G/100ML
2 INJECTION, SOLUTION INTRAVENOUS EVERY 8 HOURS
Status: COMPLETED | OUTPATIENT
Start: 2018-11-16 | End: 2018-11-17

## 2018-11-16 RX ORDER — LIDOCAINE HYDROCHLORIDE 20 MG/ML
INJECTION, SOLUTION INFILTRATION; PERINEURAL PRN
Status: DISCONTINUED | OUTPATIENT
Start: 2018-11-16 | End: 2018-11-16 | Stop reason: SDUPTHER

## 2018-11-16 RX ORDER — ACETAMINOPHEN 325 MG/1
650 TABLET ORAL EVERY 4 HOURS PRN
Status: DISCONTINUED | OUTPATIENT
Start: 2018-11-16 | End: 2018-11-17 | Stop reason: HOSPADM

## 2018-11-16 RX ORDER — HYDROCODONE BITARTRATE AND ACETAMINOPHEN 5; 325 MG/1; MG/1
1 TABLET ORAL PRN
Status: DISCONTINUED | OUTPATIENT
Start: 2018-11-16 | End: 2018-11-16 | Stop reason: HOSPADM

## 2018-11-16 RX ORDER — KETOROLAC TROMETHAMINE 30 MG/ML
30 INJECTION, SOLUTION INTRAMUSCULAR; INTRAVENOUS EVERY 6 HOURS PRN
Status: DISCONTINUED | OUTPATIENT
Start: 2018-11-16 | End: 2018-11-17 | Stop reason: HOSPADM

## 2018-11-16 RX ORDER — SODIUM CHLORIDE 9 MG/ML
INJECTION, SOLUTION INTRAVENOUS CONTINUOUS
Status: DISCONTINUED | OUTPATIENT
Start: 2018-11-16 | End: 2018-11-17 | Stop reason: HOSPADM

## 2018-11-16 RX ORDER — HYDROMORPHONE HCL 110MG/55ML
1 PATIENT CONTROLLED ANALGESIA SYRINGE INTRAVENOUS
Status: DISCONTINUED | OUTPATIENT
Start: 2018-11-16 | End: 2018-11-17 | Stop reason: HOSPADM

## 2018-11-16 RX ORDER — FENTANYL CITRATE 50 UG/ML
INJECTION, SOLUTION INTRAMUSCULAR; INTRAVENOUS PRN
Status: DISCONTINUED | OUTPATIENT
Start: 2018-11-16 | End: 2018-11-16 | Stop reason: SDUPTHER

## 2018-11-16 RX ORDER — HYDROCODONE BITARTRATE AND ACETAMINOPHEN 5; 325 MG/1; MG/1
2 TABLET ORAL PRN
Status: DISCONTINUED | OUTPATIENT
Start: 2018-11-16 | End: 2018-11-16 | Stop reason: HOSPADM

## 2018-11-16 RX ORDER — HYDROMORPHONE HCL 110MG/55ML
0.25 PATIENT CONTROLLED ANALGESIA SYRINGE INTRAVENOUS EVERY 5 MIN PRN
Status: DISCONTINUED | OUTPATIENT
Start: 2018-11-16 | End: 2018-11-16 | Stop reason: HOSPADM

## 2018-11-16 RX ORDER — ONDANSETRON 2 MG/ML
INJECTION INTRAMUSCULAR; INTRAVENOUS PRN
Status: DISCONTINUED | OUTPATIENT
Start: 2018-11-16 | End: 2018-11-16 | Stop reason: SDUPTHER

## 2018-11-16 RX ORDER — SODIUM CHLORIDE, SODIUM LACTATE, POTASSIUM CHLORIDE, AND CALCIUM CHLORIDE .6; .31; .03; .02 G/100ML; G/100ML; G/100ML; G/100ML
IRRIGANT IRRIGATION
Status: COMPLETED | OUTPATIENT
Start: 2018-11-16 | End: 2018-11-16

## 2018-11-16 RX ORDER — DEXAMETHASONE SODIUM PHOSPHATE 10 MG/ML
INJECTION, SOLUTION INTRAMUSCULAR; INTRAVENOUS PRN
Status: DISCONTINUED | OUTPATIENT
Start: 2018-11-16 | End: 2018-11-16 | Stop reason: SDUPTHER

## 2018-11-16 RX ORDER — OXYCODONE HYDROCHLORIDE AND ACETAMINOPHEN 5; 325 MG/1; MG/1
2 TABLET ORAL EVERY 4 HOURS PRN
Status: DISCONTINUED | OUTPATIENT
Start: 2018-11-16 | End: 2018-11-17 | Stop reason: HOSPADM

## 2018-11-16 RX ORDER — FENTANYL CITRATE 50 UG/ML
50 INJECTION, SOLUTION INTRAMUSCULAR; INTRAVENOUS EVERY 5 MIN PRN
Status: DISCONTINUED | OUTPATIENT
Start: 2018-11-16 | End: 2018-11-16 | Stop reason: HOSPADM

## 2018-11-16 RX ORDER — ONDANSETRON 2 MG/ML
4 INJECTION INTRAMUSCULAR; INTRAVENOUS EVERY 8 HOURS PRN
Status: DISCONTINUED | OUTPATIENT
Start: 2018-11-16 | End: 2018-11-17 | Stop reason: HOSPADM

## 2018-11-16 RX ORDER — SODIUM CHLORIDE 0.9 % (FLUSH) 0.9 %
10 SYRINGE (ML) INJECTION PRN
Status: DISCONTINUED | OUTPATIENT
Start: 2018-11-16 | End: 2018-11-17 | Stop reason: HOSPADM

## 2018-11-16 RX ORDER — SODIUM CHLORIDE 0.9 % (FLUSH) 0.9 %
10 SYRINGE (ML) INJECTION PRN
Status: DISCONTINUED | OUTPATIENT
Start: 2018-11-16 | End: 2018-11-16 | Stop reason: HOSPADM

## 2018-11-16 RX ORDER — SODIUM CHLORIDE, SODIUM LACTATE, POTASSIUM CHLORIDE, CALCIUM CHLORIDE 600; 310; 30; 20 MG/100ML; MG/100ML; MG/100ML; MG/100ML
INJECTION, SOLUTION INTRAVENOUS CONTINUOUS
Status: DISCONTINUED | OUTPATIENT
Start: 2018-11-16 | End: 2018-11-17 | Stop reason: HOSPADM

## 2018-11-16 RX ORDER — DOCUSATE SODIUM 100 MG/1
100 CAPSULE, LIQUID FILLED ORAL 2 TIMES DAILY
Status: DISCONTINUED | OUTPATIENT
Start: 2018-11-16 | End: 2018-11-17 | Stop reason: HOSPADM

## 2018-11-16 RX ORDER — FENTANYL CITRATE 50 UG/ML
25 INJECTION, SOLUTION INTRAMUSCULAR; INTRAVENOUS EVERY 5 MIN PRN
Status: DISCONTINUED | OUTPATIENT
Start: 2018-11-16 | End: 2018-11-16 | Stop reason: HOSPADM

## 2018-11-16 RX ORDER — EPHEDRINE SULFATE 50 MG/ML
INJECTION INTRAVENOUS PRN
Status: DISCONTINUED | OUTPATIENT
Start: 2018-11-16 | End: 2018-11-16 | Stop reason: SDUPTHER

## 2018-11-16 RX ORDER — DEXTROSE AND SODIUM CHLORIDE 5; .45 G/100ML; G/100ML
INJECTION, SOLUTION INTRAVENOUS CONTINUOUS
Status: DISCONTINUED | OUTPATIENT
Start: 2018-11-16 | End: 2018-11-17 | Stop reason: HOSPADM

## 2018-11-16 RX ORDER — SODIUM CHLORIDE 0.9 % (FLUSH) 0.9 %
10 SYRINGE (ML) INJECTION EVERY 12 HOURS SCHEDULED
Status: DISCONTINUED | OUTPATIENT
Start: 2018-11-16 | End: 2018-11-17 | Stop reason: HOSPADM

## 2018-11-16 RX ORDER — SODIUM CHLORIDE 9 MG/ML
INJECTION, SOLUTION INTRAVENOUS CONTINUOUS PRN
Status: DISCONTINUED | OUTPATIENT
Start: 2018-11-16 | End: 2018-11-16 | Stop reason: SDUPTHER

## 2018-11-16 RX ORDER — DIPHENHYDRAMINE HYDROCHLORIDE 50 MG/ML
12.5 INJECTION INTRAMUSCULAR; INTRAVENOUS
Status: DISCONTINUED | OUTPATIENT
Start: 2018-11-16 | End: 2018-11-16 | Stop reason: HOSPADM

## 2018-11-16 RX ORDER — CEFAZOLIN SODIUM 2 G/100ML
2 INJECTION, SOLUTION INTRAVENOUS
Status: COMPLETED | OUTPATIENT
Start: 2018-11-16 | End: 2018-11-16

## 2018-11-16 RX ORDER — BUPIVACAINE HYDROCHLORIDE AND EPINEPHRINE 5; 5 MG/ML; UG/ML
INJECTION, SOLUTION EPIDURAL; INTRACAUDAL; PERINEURAL
Status: COMPLETED | OUTPATIENT
Start: 2018-11-16 | End: 2018-11-16

## 2018-11-16 RX ORDER — PROPOFOL 10 MG/ML
INJECTION, EMULSION INTRAVENOUS PRN
Status: DISCONTINUED | OUTPATIENT
Start: 2018-11-16 | End: 2018-11-16 | Stop reason: SDUPTHER

## 2018-11-16 RX ORDER — ACETAMINOPHEN 500 MG
1000 TABLET ORAL ONCE
Status: COMPLETED | OUTPATIENT
Start: 2018-11-16 | End: 2018-11-16

## 2018-11-16 RX ORDER — ATROPA BELLADONNA AND OPIUM 16.2; 3 MG/1; MG/1
30 SUPPOSITORY RECTAL EVERY 8 HOURS PRN
Status: DISCONTINUED | OUTPATIENT
Start: 2018-11-16 | End: 2018-11-17 | Stop reason: HOSPADM

## 2018-11-16 RX ORDER — PROMETHAZINE HYDROCHLORIDE 25 MG/ML
6.25 INJECTION, SOLUTION INTRAMUSCULAR; INTRAVENOUS EVERY 30 MIN PRN
Status: DISCONTINUED | OUTPATIENT
Start: 2018-11-16 | End: 2018-11-16 | Stop reason: HOSPADM

## 2018-11-16 RX ORDER — SUCCINYLCHOLINE CHLORIDE 20 MG/ML
INJECTION INTRAMUSCULAR; INTRAVENOUS PRN
Status: DISCONTINUED | OUTPATIENT
Start: 2018-11-16 | End: 2018-11-16 | Stop reason: SDUPTHER

## 2018-11-16 RX ORDER — VECURONIUM BROMIDE 1 MG/ML
INJECTION, POWDER, LYOPHILIZED, FOR SOLUTION INTRAVENOUS PRN
Status: DISCONTINUED | OUTPATIENT
Start: 2018-11-16 | End: 2018-11-16 | Stop reason: SDUPTHER

## 2018-11-16 RX ORDER — NEOSTIGMINE METHYLSULFATE 5 MG/5 ML
SYRINGE (ML) INTRAVENOUS PRN
Status: DISCONTINUED | OUTPATIENT
Start: 2018-11-16 | End: 2018-11-16 | Stop reason: SDUPTHER

## 2018-11-16 RX ORDER — IBUPROFEN 800 MG/1
800 TABLET ORAL EVERY 6 HOURS PRN
Qty: 50 TABLET | Refills: 3 | Status: SHIPPED | OUTPATIENT
Start: 2018-11-16 | End: 2021-08-05

## 2018-11-16 RX ORDER — SODIUM CHLORIDE 0.9 % (FLUSH) 0.9 %
10 SYRINGE (ML) INJECTION EVERY 12 HOURS SCHEDULED
Status: DISCONTINUED | OUTPATIENT
Start: 2018-11-16 | End: 2018-11-16 | Stop reason: HOSPADM

## 2018-11-16 RX ORDER — OXYCODONE HYDROCHLORIDE AND ACETAMINOPHEN 5; 325 MG/1; MG/1
1-2 TABLET ORAL EVERY 6 HOURS PRN
Qty: 28 TABLET | Refills: 0 | Status: SHIPPED | OUTPATIENT
Start: 2018-11-16 | End: 2018-11-23

## 2018-11-16 RX ORDER — MAGNESIUM HYDROXIDE 1200 MG/15ML
LIQUID ORAL CONTINUOUS PRN
Status: COMPLETED | OUTPATIENT
Start: 2018-11-16 | End: 2018-11-16

## 2018-11-16 RX ORDER — HYDROMORPHONE HCL 110MG/55ML
0.5 PATIENT CONTROLLED ANALGESIA SYRINGE INTRAVENOUS EVERY 5 MIN PRN
Status: DISCONTINUED | OUTPATIENT
Start: 2018-11-16 | End: 2018-11-16 | Stop reason: HOSPADM

## 2018-11-16 RX ORDER — GLYCOPYRROLATE 0.2 MG/ML
INJECTION INTRAMUSCULAR; INTRAVENOUS PRN
Status: DISCONTINUED | OUTPATIENT
Start: 2018-11-16 | End: 2018-11-16 | Stop reason: SDUPTHER

## 2018-11-16 RX ORDER — LIDOCAINE HYDROCHLORIDE 10 MG/ML
0.5 INJECTION, SOLUTION EPIDURAL; INFILTRATION; INTRACAUDAL; PERINEURAL ONCE
Status: DISCONTINUED | OUTPATIENT
Start: 2018-11-16 | End: 2018-11-16 | Stop reason: HOSPADM

## 2018-11-16 RX ADMIN — FENTANYL CITRATE 100 MCG: 50 INJECTION, SOLUTION INTRAMUSCULAR; INTRAVENOUS at 09:02

## 2018-11-16 RX ADMIN — PROPOFOL 100 MG: 10 INJECTION, EMULSION INTRAVENOUS at 09:02

## 2018-11-16 RX ADMIN — ONDANSETRON HYDROCHLORIDE 4 MG: 2 SOLUTION INTRAMUSCULAR; INTRAVENOUS at 09:15

## 2018-11-16 RX ADMIN — CEFAZOLIN SODIUM 2 G: 2 INJECTION, SOLUTION INTRAVENOUS at 08:55

## 2018-11-16 RX ADMIN — PHENYLEPHRINE HYDROCHLORIDE 80 MCG: 10 INJECTION INTRAVENOUS at 09:10

## 2018-11-16 RX ADMIN — FENTANYL CITRATE 50 MCG: 50 INJECTION, SOLUTION INTRAMUSCULAR; INTRAVENOUS at 09:35

## 2018-11-16 RX ADMIN — DEXAMETHASONE SODIUM PHOSPHATE 10 MG: 10 INJECTION, SOLUTION INTRAMUSCULAR; INTRAVENOUS at 09:15

## 2018-11-16 RX ADMIN — GLYCOPYRROLATE 0.8 MG: 0.2 INJECTION INTRAMUSCULAR; INTRAVENOUS at 10:04

## 2018-11-16 RX ADMIN — VECURONIUM BROMIDE 5 MG: 1 INJECTION, POWDER, LYOPHILIZED, FOR SOLUTION INTRAVENOUS at 09:11

## 2018-11-16 RX ADMIN — DOCUSATE SODIUM 100 MG: 100 CAPSULE, LIQUID FILLED ORAL at 14:10

## 2018-11-16 RX ADMIN — SUCCINYLCHOLINE CHLORIDE 120 MG: 20 INJECTION, SOLUTION INTRAMUSCULAR; INTRAVENOUS at 09:02

## 2018-11-16 RX ADMIN — PHENYLEPHRINE HYDROCHLORIDE 80 MCG: 10 INJECTION INTRAVENOUS at 09:17

## 2018-11-16 RX ADMIN — PHENYLEPHRINE HYDROCHLORIDE 160 MCG: 10 INJECTION INTRAVENOUS at 09:19

## 2018-11-16 RX ADMIN — SODIUM CHLORIDE: 9 INJECTION, SOLUTION INTRAVENOUS at 07:36

## 2018-11-16 RX ADMIN — PHENYLEPHRINE HYDROCHLORIDE 100 MCG: 10 INJECTION INTRAVENOUS at 10:00

## 2018-11-16 RX ADMIN — HYDROMORPHONE HYDROCHLORIDE 1 MG: 2 INJECTION INTRAMUSCULAR; INTRAVENOUS; SUBCUTANEOUS at 14:09

## 2018-11-16 RX ADMIN — LIDOCAINE HYDROCHLORIDE 80 MG: 20 INJECTION, SOLUTION INFILTRATION; PERINEURAL at 09:02

## 2018-11-16 RX ADMIN — PHENYLEPHRINE HYDROCHLORIDE 80 MCG: 10 INJECTION INTRAVENOUS at 09:08

## 2018-11-16 RX ADMIN — PHENYLEPHRINE HYDROCHLORIDE 100 MCG: 10 INJECTION INTRAVENOUS at 09:57

## 2018-11-16 RX ADMIN — SODIUM CHLORIDE: 9 INJECTION, SOLUTION INTRAVENOUS at 08:30

## 2018-11-16 RX ADMIN — PHENYLEPHRINE HYDROCHLORIDE 80 MCG: 10 INJECTION INTRAVENOUS at 09:15

## 2018-11-16 RX ADMIN — CEFAZOLIN SODIUM 2 G: 2 INJECTION, SOLUTION INTRAVENOUS at 16:40

## 2018-11-16 RX ADMIN — ACETAMINOPHEN 1000 MG: 500 TABLET, FILM COATED ORAL at 07:37

## 2018-11-16 RX ADMIN — DEXTROSE AND SODIUM CHLORIDE: 5; 450 INJECTION, SOLUTION INTRAVENOUS at 22:39

## 2018-11-16 RX ADMIN — GLYCOPYRROLATE 0.2 MG: 0.2 INJECTION INTRAMUSCULAR; INTRAVENOUS at 09:19

## 2018-11-16 RX ADMIN — GLYCOPYRROLATE 0.2 MG: 0.2 INJECTION INTRAMUSCULAR; INTRAVENOUS at 09:02

## 2018-11-16 RX ADMIN — Medication 5 MG: at 10:04

## 2018-11-16 RX ADMIN — Medication 10 ML: at 20:32

## 2018-11-16 RX ADMIN — DEXTROSE AND SODIUM CHLORIDE: 5; 450 INJECTION, SOLUTION INTRAVENOUS at 14:10

## 2018-11-16 RX ADMIN — FENTANYL CITRATE 50 MCG: 50 INJECTION, SOLUTION INTRAMUSCULAR; INTRAVENOUS at 09:25

## 2018-11-16 RX ADMIN — EPHEDRINE SULFATE 10 MG: 50 INJECTION INTRAVENOUS at 09:18

## 2018-11-16 RX ADMIN — EPHEDRINE SULFATE 10 MG: 50 INJECTION INTRAVENOUS at 09:21

## 2018-11-16 RX ADMIN — OXYCODONE AND ACETAMINOPHEN 2 TABLET: 5; 325 TABLET ORAL at 20:30

## 2018-11-16 RX ADMIN — PHENYLEPHRINE HYDROCHLORIDE 200 MCG: 10 INJECTION INTRAVENOUS at 10:03

## 2018-11-16 RX ADMIN — DOCUSATE SODIUM 100 MG: 100 CAPSULE, LIQUID FILLED ORAL at 20:30

## 2018-11-16 ASSESSMENT — PAIN DESCRIPTION - PAIN TYPE
TYPE: SURGICAL PAIN

## 2018-11-16 ASSESSMENT — PULMONARY FUNCTION TESTS
PIF_VALUE: 35
PIF_VALUE: 40
PIF_VALUE: 24
PIF_VALUE: 24
PIF_VALUE: 2
PIF_VALUE: 18
PIF_VALUE: 35
PIF_VALUE: 40
PIF_VALUE: 40
PIF_VALUE: 26
PIF_VALUE: 35
PIF_VALUE: 25
PIF_VALUE: 40
PIF_VALUE: 40
PIF_VALUE: 26
PIF_VALUE: 22
PIF_VALUE: 26
PIF_VALUE: 40
PIF_VALUE: 40
PIF_VALUE: 1
PIF_VALUE: 40
PIF_VALUE: 35
PIF_VALUE: 40
PIF_VALUE: 40
PIF_VALUE: 15
PIF_VALUE: 1
PIF_VALUE: 40
PIF_VALUE: 24
PIF_VALUE: 40
PIF_VALUE: 3
PIF_VALUE: 35
PIF_VALUE: 40
PIF_VALUE: 1
PIF_VALUE: 22
PIF_VALUE: 35
PIF_VALUE: 35
PIF_VALUE: 3
PIF_VALUE: 23
PIF_VALUE: 40
PIF_VALUE: 24
PIF_VALUE: 40
PIF_VALUE: 40
PIF_VALUE: 16
PIF_VALUE: 40
PIF_VALUE: 40
PIF_VALUE: 23
PIF_VALUE: 40
PIF_VALUE: 40
PIF_VALUE: 35
PIF_VALUE: 40
PIF_VALUE: 0
PIF_VALUE: 40
PIF_VALUE: 24
PIF_VALUE: 34
PIF_VALUE: 35
PIF_VALUE: 15
PIF_VALUE: 40
PIF_VALUE: 26
PIF_VALUE: 35
PIF_VALUE: 35
PIF_VALUE: 6
PIF_VALUE: 21
PIF_VALUE: 2
PIF_VALUE: 35
PIF_VALUE: 23
PIF_VALUE: 25
PIF_VALUE: 22
PIF_VALUE: 36
PIF_VALUE: 1
PIF_VALUE: 35
PIF_VALUE: 35
PIF_VALUE: 29
PIF_VALUE: 0
PIF_VALUE: 40
PIF_VALUE: 40
PIF_VALUE: 35
PIF_VALUE: 30
PIF_VALUE: 1
PIF_VALUE: 40
PIF_VALUE: 24
PIF_VALUE: 35
PIF_VALUE: 24
PIF_VALUE: 5
PIF_VALUE: 40
PIF_VALUE: 40

## 2018-11-16 ASSESSMENT — PAIN - FUNCTIONAL ASSESSMENT: PAIN_FUNCTIONAL_ASSESSMENT: 0-10

## 2018-11-16 ASSESSMENT — PAIN SCALES - GENERAL
PAINLEVEL_OUTOF10: 4
PAINLEVEL_OUTOF10: 8
PAINLEVEL_OUTOF10: 8
PAINLEVEL_OUTOF10: 0
PAINLEVEL_OUTOF10: 7
PAINLEVEL_OUTOF10: 5

## 2018-11-16 ASSESSMENT — PAIN DESCRIPTION - LOCATION
LOCATION: ABDOMEN

## 2018-11-16 NOTE — ANESTHESIA POSTPROCEDURE EVALUATION
Department of Anesthesiology  Postprocedure Note    Patient: Lauryn Staley  MRN: [id number]  YOB: 1950  Date of evaluation: 11/16/2018  Time:  10:47 AM     Procedure Summary     Date:  11/16/18 Room / Location:  [facility] OR 08 / [facility] OR    Anesthesia Start:  3645 Anesthesia Stop:  1024    Procedure:  ROBOTIC TOTAL HYSTERECTOMY AND BILATERAL SALPINGO-OOPHORECTOMY (N/A Abdomen) Diagnosis:       (R10.2 PELVIC PAIN)      (D25.9 RIGHT CALCIFIED Thedora Donovan, DESIRES DEFINITVE THERAPY)    Surgeon:  Mimi Milian MD Responsible Provider:  Letitia Stiles MD    Anesthesia Type:  general ASA Status:  2          Anesthesia Type: general    Angelo Phase I: Angelo Score: 8    Angelo Phase II:      Last vitals: Reviewed and per EMR flowsheets.        Anesthesia Post Evaluation    Patient location during evaluation: PACU  Patient participation: complete - patient participated  Level of consciousness: awake and alert  Pain score: 2  Airway patency: patent  Nausea & Vomiting: no vomiting  Complications: no  Cardiovascular status: blood pressure returned to baseline  Respiratory status: acceptable  Hydration status: euvolemic

## 2018-11-16 NOTE — DISCHARGE SUMMARY
every 6 hours as needed for Pain for up to 7 days. .             TEGRETOL 200 MG tablet  TAKE ONE TABLET BY MOUTH TWICE A DAY                  Follow-up with Jaren Diggs in 2 weeks    Signed:  Jaren Diggs  11/16/2018  10:09 AM

## 2018-11-16 NOTE — OP NOTE
Preoperative Diagnosis:pelvic pain, uterine fibroid. Postoperative Diagnosis:  same  Procedure:  Robotic TLh BSO  Anesthesia:  general  EBL:less than 50 ml  UO:clear    Findings: large uterine fibroid on right lower uterine aspect    Indication:    Pt with right lower pelvic pain and back pain radiating down leg desires definitive therapy. Pt aware of risks of the procedure. Procedure in Detail:  Pt was taken to the OR and given anesthesia. She was prepped and draped in the routine sterile fashion in dorsal lithotomy position using Tanner stirrups. Anterior cervical lip was grasped with a single tooth tenaculum and the uterus was sounded to  6 Cm. Cervix was then dilated. Arch uterine manipulator was then inserted into the uterine cavity. Once the uterine manipulator was place, dunn catheter was placed to gravity. Attention was turned to the abdomen. Half marcaine with epinephrine was injected into the 4 incision sites prior to the incision. 8mm incision was made with a blade above the umbilicus and veres needle was inserted into the abominal cavity using the saline hanging drop technique. CO2 gas was insufflated to a pressure of 18.  8mm disposable trocar was then inserted into the abdominal cavity and camera was inserted to confirm proper placement. A 7mm incision was made approx. 10cm lateral to the initial midline incision in the right abdomen, slightly below the unbilicus and the trocar was placed under direct visualization. Same was done for the left side of the abdomen. A 8mm incision was made in the right lateral abdomenapprox 5cm lateral to the midline incision and a disposal trocar was inserted, under direct visualization. The patient was placed under steep trendelenburg position. The robot was then docked without difficulty and the procedure was started at the surgeon's console.     The round ligament was cut with the monopolar cautery and the broad ligament was opened up on both sides.  The IP ligament was then transected with good hemostasis. Uterine vessels were skeletonized on both sides. The bladder peritoneum was cut and taken down from the lower uterine segment. The uterine vessels were then cauterized and cut. Vagina was then entered with the monopolar cautery and the uterus with the cervix was transected from the vaginal.  The uterus and the adnexa were then removed from the pelvis and placed in the vagina to maintain pneumoperitoneum. The vaginal cuff was then closed with a barbed wire suture using care to plicate the cardinal and uterosacral ligaments. Pelvis was carefully irrigated and suctioned. Surgipowder was applied to the cuff. Once hemostasis was obtained the instruments were removed from the abdomen. the skin edges were closed with 4-0 vicryl. Dermabond was applied to the skin edges. The patient tolerated procedure well and was taken to the  in good condition. She received prophylactic antibiotic prior to the procedure.

## 2018-11-17 VITALS
WEIGHT: 211.3 LBS | SYSTOLIC BLOOD PRESSURE: 170 MMHG | OXYGEN SATURATION: 99 % | DIASTOLIC BLOOD PRESSURE: 97 MMHG | HEART RATE: 58 BPM | TEMPERATURE: 98.6 F | HEIGHT: 63 IN | BODY MASS INDEX: 37.44 KG/M2 | RESPIRATION RATE: 14 BRPM

## 2018-11-17 LAB
BASOPHILS ABSOLUTE: 0 K/UL (ref 0–0.2)
BASOPHILS RELATIVE PERCENT: 0.5 %
EOSINOPHILS ABSOLUTE: 0.1 K/UL (ref 0–0.6)
EOSINOPHILS RELATIVE PERCENT: 1.3 %
HCT VFR BLD CALC: 36.2 % (ref 36–48)
HEMOGLOBIN: 11.8 G/DL (ref 12–16)
LYMPHOCYTES ABSOLUTE: 2.2 K/UL (ref 1–5.1)
LYMPHOCYTES RELATIVE PERCENT: 28 %
MCH RBC QN AUTO: 30.7 PG (ref 26–34)
MCHC RBC AUTO-ENTMCNC: 32.5 G/DL (ref 31–36)
MCV RBC AUTO: 94.4 FL (ref 80–100)
MONOCYTES ABSOLUTE: 0.8 K/UL (ref 0–1.3)
MONOCYTES RELATIVE PERCENT: 10 %
NEUTROPHILS ABSOLUTE: 4.8 K/UL (ref 1.7–7.7)
NEUTROPHILS RELATIVE PERCENT: 60.2 %
PDW BLD-RTO: 16.5 % (ref 12.4–15.4)
PLATELET # BLD: 243 K/UL (ref 135–450)
PMV BLD AUTO: 7.3 FL (ref 5–10.5)
RBC # BLD: 3.84 M/UL (ref 4–5.2)
WBC # BLD: 7.9 K/UL (ref 4–11)

## 2018-11-17 PROCEDURE — 2700000000 HC OXYGEN THERAPY PER DAY

## 2018-11-17 PROCEDURE — 36415 COLL VENOUS BLD VENIPUNCTURE: CPT

## 2018-11-17 PROCEDURE — 6360000002 HC RX W HCPCS: Performed by: OBSTETRICS & GYNECOLOGY

## 2018-11-17 PROCEDURE — 85025 COMPLETE CBC W/AUTO DIFF WBC: CPT

## 2018-11-17 PROCEDURE — 6370000000 HC RX 637 (ALT 250 FOR IP): Performed by: OBSTETRICS & GYNECOLOGY

## 2018-11-17 RX ADMIN — HYDROMORPHONE HYDROCHLORIDE 1 MG: 2 INJECTION INTRAMUSCULAR; INTRAVENOUS; SUBCUTANEOUS at 01:11

## 2018-11-17 RX ADMIN — CEFAZOLIN SODIUM 2 G: 2 INJECTION, SOLUTION INTRAVENOUS at 01:05

## 2018-11-17 RX ADMIN — OXYCODONE AND ACETAMINOPHEN 2 TABLET: 5; 325 TABLET ORAL at 08:11

## 2018-11-17 RX ADMIN — DOCUSATE SODIUM 100 MG: 100 CAPSULE, LIQUID FILLED ORAL at 08:08

## 2018-11-17 ASSESSMENT — PAIN DESCRIPTION - PAIN TYPE: TYPE: SURGICAL PAIN

## 2018-11-17 ASSESSMENT — PAIN DESCRIPTION - LOCATION: LOCATION: ABDOMEN

## 2018-11-17 ASSESSMENT — PAIN SCALES - GENERAL
PAINLEVEL_OUTOF10: 8
PAINLEVEL_OUTOF10: 8

## 2018-11-17 NOTE — PROGRESS NOTES
8:12 AM: Morning assessment completed, patient denies needs at this time, call light in reach, will continue to monitor. The care plan and education has been reviewed and mutually agreed upon with the patient. Educated patient on the potential for falls during their hospital stay. Reviewed current fall safety protocol. Reviewed indication for use of bed alarm. Patient verbalized understanding. Patient voiding well. 10:20 AM: Gave d/c instructions with list of active meds and when they are next due. Reviewed discharge instructions at bedside and provided printed copy of same. Pt verbalized understanding of all instructions. Denied additional questions. To home as passenger in private vehicle, taken to vehicle by staff transporter.

## 2018-11-17 NOTE — PROGRESS NOTES
Assessment complete, see flow sheets. Medicated for pain and assisted up to bedside chair. Moving well. Call light in reach.

## 2018-12-06 RX ORDER — LEVOTHYROXINE SODIUM 0.1 MG/1
TABLET ORAL
Qty: 90 TABLET | Refills: 3 | Status: SHIPPED | OUTPATIENT
Start: 2018-12-06 | End: 2020-02-14

## 2018-12-10 RX ORDER — LEVOTHYROXINE SODIUM 0.1 MG/1
TABLET ORAL
Qty: 90 TABLET | Refills: 3 | Status: SHIPPED | OUTPATIENT
Start: 2018-12-10 | End: 2020-02-14 | Stop reason: SDUPTHER

## 2019-01-31 DIAGNOSIS — Z12.31 SCREENING MAMMOGRAM, ENCOUNTER FOR: Primary | ICD-10-CM

## 2019-02-19 RX ORDER — ATORVASTATIN CALCIUM 80 MG/1
TABLET, FILM COATED ORAL
Qty: 90 TABLET | Refills: 3 | Status: SHIPPED | OUTPATIENT
Start: 2019-02-19 | End: 2020-02-18

## 2019-04-22 RX ORDER — METOPROLOL SUCCINATE 50 MG/1
TABLET, EXTENDED RELEASE ORAL
Qty: 90 TABLET | Refills: 1 | Status: SHIPPED | OUTPATIENT
Start: 2019-04-22 | End: 2019-12-04 | Stop reason: SDUPTHER

## 2019-04-24 RX ORDER — METOPROLOL SUCCINATE 50 MG/1
TABLET, EXTENDED RELEASE ORAL
Qty: 90 TABLET | Refills: 1 | Status: SHIPPED | OUTPATIENT
Start: 2019-04-24 | End: 2020-01-07 | Stop reason: SDUPTHER

## 2019-06-25 ENCOUNTER — TELEPHONE (OUTPATIENT)
Dept: CARDIOLOGY CLINIC | Age: 69
End: 2019-06-25

## 2019-08-13 RX ORDER — AMLODIPINE BESYLATE 5 MG/1
TABLET ORAL
Qty: 180 TABLET | Refills: 1 | Status: SHIPPED | OUTPATIENT
Start: 2019-08-13 | End: 2020-02-18

## 2019-08-18 RX ORDER — CARBAMAZEPINE 200 MG
TABLET ORAL
Qty: 60 TABLET | Refills: 11 | Status: SHIPPED | OUTPATIENT
Start: 2019-08-18 | End: 2020-09-05

## 2019-08-27 ENCOUNTER — TELEPHONE (OUTPATIENT)
Dept: INTERNAL MEDICINE CLINIC | Age: 69
End: 2019-08-27

## 2019-08-27 NOTE — TELEPHONE ENCOUNTER
She said we gave her lab orders last year but she lost them and never got lab work done   What labs would you like her to do?

## 2019-08-31 RX ORDER — FLUOXETINE HYDROCHLORIDE 40 MG/1
CAPSULE ORAL
Qty: 90 CAPSULE | Refills: 1 | Status: SHIPPED | OUTPATIENT
Start: 2019-08-31 | End: 2020-02-13

## 2019-12-04 RX ORDER — METOPROLOL SUCCINATE 50 MG/1
TABLET, EXTENDED RELEASE ORAL
Qty: 30 TABLET | Refills: 0 | Status: SHIPPED | OUTPATIENT
Start: 2019-12-04 | End: 2020-01-07 | Stop reason: SDUPTHER

## 2020-01-07 ENCOUNTER — TELEPHONE (OUTPATIENT)
Dept: CARDIOLOGY CLINIC | Age: 70
End: 2020-01-07

## 2020-01-07 RX ORDER — METOPROLOL SUCCINATE 50 MG/1
TABLET, EXTENDED RELEASE ORAL
Qty: 90 TABLET | Refills: 3 | Status: CANCELLED | OUTPATIENT
Start: 2020-01-07

## 2020-01-07 RX ORDER — METOPROLOL SUCCINATE 50 MG/1
TABLET, EXTENDED RELEASE ORAL
Qty: 30 TABLET | Refills: 0 | OUTPATIENT
Start: 2020-01-07

## 2020-01-07 RX ORDER — METOPROLOL SUCCINATE 50 MG/1
TABLET, EXTENDED RELEASE ORAL
Qty: 90 TABLET | Refills: 3 | Status: SHIPPED | OUTPATIENT
Start: 2020-01-07 | End: 2021-02-17 | Stop reason: SDUPTHER

## 2020-02-11 ENCOUNTER — HOSPITAL ENCOUNTER (OUTPATIENT)
Dept: WOMENS IMAGING | Age: 70
Discharge: HOME | End: 2020-02-11
Payer: MEDICARE

## 2020-02-11 PROCEDURE — 77063 BREAST TOMOSYNTHESIS BI: CPT

## 2020-02-13 RX ORDER — FLUOXETINE HYDROCHLORIDE 40 MG/1
CAPSULE ORAL
Qty: 90 CAPSULE | Refills: 3 | Status: SHIPPED | OUTPATIENT
Start: 2020-02-13 | End: 2020-02-17

## 2020-02-14 RX ORDER — LEVOTHYROXINE SODIUM 0.1 MG/1
TABLET ORAL
Qty: 90 TABLET | Refills: 3 | Status: SHIPPED | OUTPATIENT
Start: 2020-02-14 | End: 2021-03-10 | Stop reason: SDUPTHER

## 2020-02-17 RX ORDER — FLUOXETINE HYDROCHLORIDE 40 MG/1
CAPSULE ORAL
Qty: 90 CAPSULE | Refills: 3 | Status: SHIPPED | OUTPATIENT
Start: 2020-02-17 | End: 2020-02-18

## 2020-02-18 ENCOUNTER — OFFICE VISIT (OUTPATIENT)
Dept: CARDIOLOGY CLINIC | Age: 70
End: 2020-02-18
Payer: MEDICARE

## 2020-02-18 VITALS
RESPIRATION RATE: 18 BRPM | HEART RATE: 83 BPM | OXYGEN SATURATION: 90 % | WEIGHT: 213 LBS | SYSTOLIC BLOOD PRESSURE: 138 MMHG | DIASTOLIC BLOOD PRESSURE: 74 MMHG | BODY MASS INDEX: 37.74 KG/M2 | HEIGHT: 63 IN

## 2020-02-18 PROCEDURE — 93000 ELECTROCARDIOGRAM COMPLETE: CPT | Performed by: INTERNAL MEDICINE

## 2020-02-18 PROCEDURE — 3017F COLORECTAL CA SCREEN DOC REV: CPT | Performed by: INTERNAL MEDICINE

## 2020-02-18 PROCEDURE — 1036F TOBACCO NON-USER: CPT | Performed by: INTERNAL MEDICINE

## 2020-02-18 PROCEDURE — G8484 FLU IMMUNIZE NO ADMIN: HCPCS | Performed by: INTERNAL MEDICINE

## 2020-02-18 PROCEDURE — 1123F ACP DISCUSS/DSCN MKR DOCD: CPT | Performed by: INTERNAL MEDICINE

## 2020-02-18 PROCEDURE — 4040F PNEUMOC VAC/ADMIN/RCVD: CPT | Performed by: INTERNAL MEDICINE

## 2020-02-18 PROCEDURE — G8427 DOCREV CUR MEDS BY ELIG CLIN: HCPCS | Performed by: INTERNAL MEDICINE

## 2020-02-18 PROCEDURE — 1090F PRES/ABSN URINE INCON ASSESS: CPT | Performed by: INTERNAL MEDICINE

## 2020-02-18 PROCEDURE — G8399 PT W/DXA RESULTS DOCUMENT: HCPCS | Performed by: INTERNAL MEDICINE

## 2020-02-18 PROCEDURE — G8417 CALC BMI ABV UP PARAM F/U: HCPCS | Performed by: INTERNAL MEDICINE

## 2020-02-18 PROCEDURE — 99214 OFFICE O/P EST MOD 30 MIN: CPT | Performed by: INTERNAL MEDICINE

## 2020-02-18 RX ORDER — FLUOXETINE HYDROCHLORIDE 40 MG/1
CAPSULE ORAL
Qty: 90 CAPSULE | Refills: 3 | Status: SHIPPED
Start: 2020-02-18 | End: 2020-09-17

## 2020-02-18 RX ORDER — LISINOPRIL 10 MG/1
10 TABLET ORAL DAILY
Qty: 90 TABLET | Refills: 3 | Status: SHIPPED | OUTPATIENT
Start: 2020-02-18 | End: 2020-03-19

## 2020-02-18 RX ORDER — FUROSEMIDE 20 MG/1
20 TABLET ORAL SEE ADMIN INSTRUCTIONS
Qty: 30 TABLET | Refills: 5 | Status: SHIPPED | OUTPATIENT
Start: 2020-02-18 | End: 2020-03-19 | Stop reason: SDUPTHER

## 2020-02-18 NOTE — PATIENT INSTRUCTIONS
Start lisinopril 10 mg  daily  Stop norvasc  Lasix 20 mg   Monday Wed Friday  BMP in 2 weeks  Echo  Support hose  Follow up in  4-6 weeks

## 2020-02-18 NOTE — PROGRESS NOTES
[address]   Cardiac Followup    Referring Provider:  Marylee Hertz, MD     Chief Complaint   Patient presents with    Hypertension     swelling in ankles     Medication Refill        History of Present Illness:   Ms Efrain Ayala is a 72 y.o. female seen today with complaints of edema. She has a history of CAD, htn, hchol. On oxgen 24/7 for ILD    She complains of a month of increasing edema, worsening as the day goes on. Has unchanged exertional sob, wears oxygen 24/7. No fever, chills, nonproductive dry cough. No chest pain,palpitations dizziness   Recently was on a cruise and gained weight and has some edema. Patient is compliant  with medication and is tolerating them well without side effects      Past Medical History:   has a past medical history of Abnormal stress test, Arthritis, Chronic headaches, Eczema, Fibromyalgia, GERD (gastroesophageal reflux disease), Hyperlipidemia, Hypertension, Impaired fasting glucose, Interstitial lung disease ([address] Interstitial lung disease ([address] Migraine headache, Osteopenia, Osteopenia, Other screening mammogram, Other screening mammogram, Oxygen dependent, Pulmonary hypertension ([address] Retention of urine, unspecified, Screening mammogram, encounter for, Seizure disorder ([address] Thyroid disease, Vitamin D deficiency, and Wears glasses. Surgical History:   has a past surgical history that includes Appendectomy; Tubal ligation; Colonoscopy (Oct., 2003 ( 2013 )); Abdominoplasty (Jan., 2012); Cosmetic surgery; Liposuction (Bilateral); Cardiac catheterization (Oct., 2004); Breast reduction surgery (Bilateral); Total hip arthroplasty (July 31, 2013); Colonoscopy (Jan., 2014 ( 2024 )); Toe Surgery (Oct., 2015); bronchoscopy (**Sept.16, 2016); bronchoscopy (09/16/2016); bronchoscopy (*Febr.24, 2017); Total hip arthroplasty (*April 13, 2018);  Cardiac catheterization (*Sept.24, 2018); neto and bso (cervix removed) (*Nov., 2018); and pr laparoscopy w tot hysterectuterus <=250 gram  w tube/ovary (N/A, 11/16/2018). Social History:   reports that she has never smoked. She has never used smokeless tobacco. She reports current alcohol use. She reports that she does not use drugs. Family History:  family history includes Cancer (age of onset: 58) in her mother; Heart Disease (age of onset: 76) in her father. Home Medications:  Current Outpatient Medications   Medication Sig Dispense Refill    OXYGEN Inhale 4-5 L/day into the lungs      lisinopril (PRINIVIL;ZESTRIL) 10 MG tablet Take 1 tablet by mouth daily 90 tablet 3    furosemide (LASIX) 20 MG tablet Take 1 tablet by mouth See Admin Instructions Monday Wed Fri 30 tablet 5    FLUoxetine (PROZAC) 40 MG capsule TAKE ONE CAPSULE BY MOUTH DAILY 90 capsule 3    levothyroxine (SYNTHROID) 100 MCG tablet TAKE ONE TABLET BY MOUTH DAILY 90 tablet 3    metoprolol succinate (TOPROL XL) 50 MG extended release tablet Take one tablet by mouth daily 90 tablet 3    Cholecalciferol (VITAMIN D3) 1.25 MG (91681 UT) CAPS TAKE ONE CAPSULE BY MOUTH ONCE WEEKLY 12 capsule 3    TEGRETOL 200 MG tablet TAKE ONE TABLET BY MOUTH TWICE A DAY 60 tablet 11    ibuprofen (ADVIL;MOTRIN) 800 MG tablet Take 1 tablet by mouth every 6 hours as needed for Pain 50 tablet 3    mycophenolate (CELLCEPT) 500 MG tablet Take 1,000 mg by mouth 2 times daily      atorvastatin (LIPITOR) 80 MG tablet TAKE ONE TABLET BY MOUTH DAILY 90 tablet 2    omeprazole (PRILOSEC) 20 MG capsule Take 1 capsule by mouth daily 84 capsule 2    aspirin 81 MG tablet Take 81 mg by mouth daily. No current facility-administered medications for this visit. Allergies:  Tramadol and Penicillins     Review of Systems:   · Constitutional: there has been no unanticipated weight loss. There's been no change in energy level, sleep pattern, or activity level. · Eyes: No visual changes or diplopia. No scleral icterus.   · ENT: No Headaches, hearing loss or

## 2020-03-05 ENCOUNTER — HOSPITAL ENCOUNTER (OUTPATIENT)
Dept: NON INVASIVE DIAGNOSTICS | Age: 70
Discharge: HOME | End: 2020-03-05
Payer: MEDICARE

## 2020-03-05 LAB
LV EF: 53 %
LVEF MODALITY: NORMAL

## 2020-03-05 PROCEDURE — 93306 TTE W/DOPPLER COMPLETE: CPT

## 2020-03-09 ENCOUNTER — TELEPHONE (OUTPATIENT)
Dept: INTERNAL MEDICINE CLINIC | Age: 70
End: 2020-03-09

## 2020-03-09 NOTE — TELEPHONE ENCOUNTER
No I do not. The best thing would be to call the mental health number on the back of her card and they can give her names of people in her neighborhood. The other alternative would be to call the [facility] [place].

## 2020-03-10 LAB
ALBUMIN SERPL-MCNC: 4.3 G/DL (ref 3.5–5.7)
ALP BLD-CCNC: 80 U/L (ref 36–125)
ALT SERPL-CCNC: 16 U/L (ref 7–52)
ANION GAP SERPL CALCULATED.3IONS-SCNC: 9 MMOL/L (ref 3–16)
AST SERPL-CCNC: 16 U/L (ref 13–39)
BILIRUB SERPL-MCNC: 0.4 MG/DL (ref 0–1.5)
BILIRUBIN DIRECT: 0.04 MG/DL (ref 0–0.4)
BILIRUBIN, INDIRECT: 0.36 MG/DL (ref 0–1.1)
BUN BLDV-MCNC: 17 MG/DL (ref 7–25)
CALCIUM SERPL-MCNC: 9.8 MG/DL (ref 8.6–10.3)
CHLORIDE BLD-SCNC: 102 MMOL/L (ref 98–110)
CHOLESTEROL, TOTAL: 210 MG/DL (ref 0–200)
CO2: 29 MMOL/L (ref 21–33)
CREAT SERPL-MCNC: 0.7 MG/DL (ref 0.6–1.3)
GFR, ESTIMATED: 88 SEE NOTE.
GFR, ESTIMATED: >90 SEE NOTE.
GLUCOSE BLD-MCNC: 138 MG/DL (ref 70–100)
HDLC SERPL-MCNC: 74 MG/DL (ref 60–92)
LDL CHOLESTEROL CALCULATED: 102 MG/DL
OSMOLALITY CALCULATION: 294 MOSM/KG (ref 278–305)
POTASSIUM SERPL-SCNC: 4.2 MMOL/L (ref 3.5–5.3)
SODIUM BLD-SCNC: 140 MMOL/L (ref 133–146)
TOTAL PROTEIN: 6.7 G/DL (ref 6.4–8.9)
TRIGL SERPL-MCNC: 172 MG/DL (ref 10–149)

## 2020-03-12 ENCOUNTER — TELEPHONE (OUTPATIENT)
Dept: CARDIOLOGY CLINIC | Age: 70
End: 2020-03-12

## 2020-03-12 RX ORDER — EZETIMIBE 10 MG/1
10 TABLET ORAL DAILY
Qty: 90 TABLET | Refills: 3 | Status: SHIPPED | OUTPATIENT
Start: 2020-03-12 | End: 2020-09-17

## 2020-03-13 RX ORDER — EZETIMIBE 10 MG/1
10 TABLET ORAL DAILY
Qty: 30 TABLET | Refills: 3 | Status: SHIPPED | OUTPATIENT
Start: 2020-03-13 | End: 2020-09-17

## 2020-03-19 ENCOUNTER — TELEPHONE (OUTPATIENT)
Dept: CARDIOLOGY CLINIC | Age: 70
End: 2020-03-19

## 2020-03-19 RX ORDER — FUROSEMIDE 20 MG/1
20 TABLET ORAL DAILY
Qty: 90 TABLET | Refills: 3 | Status: SHIPPED | OUTPATIENT
Start: 2020-03-19 | End: 2021-02-17 | Stop reason: SDUPTHER

## 2020-03-19 RX ORDER — LISINOPRIL 20 MG/1
20 TABLET ORAL DAILY
Qty: 90 TABLET | Refills: 3 | Status: SHIPPED | OUTPATIENT
Start: 2020-03-19 | End: 2021-01-14

## 2020-03-19 NOTE — TELEPHONE ENCOUNTER
Patient called to cancel appointment due to threat of corona virus. Since  Last visit, she has noticed peripheral edema on the days she does not take lasix. No increase in sob. She remains  On oxygen 4lpm fir ILD. Wearing support hose and elevating legs to control edema. Since stopping norvasc and starting lisinopril she had not checked her bp.  However, she did check it while on the phone today and it was 165/82  Per DGB  Increase lisinpril to 20 mg  Daily  Take lasix 20 mg daily  Renal panel in one month  Monitor blood pressure at home, and call next week with readings  Call for any questions, avoid any exposure

## 2020-03-23 ENCOUNTER — TELEPHONE (OUTPATIENT)
Dept: INTERNAL MEDICINE CLINIC | Age: 70
End: 2020-03-23

## 2020-03-23 DIAGNOSIS — R73.09 ELEVATED GLUCOSE: ICD-10-CM

## 2020-03-23 LAB
ANION GAP SERPL CALCULATED.3IONS-SCNC: 13 MMOL/L (ref 3–16)
BUN BLDV-MCNC: 24 MG/DL (ref 7–20)
CALCIUM SERPL-MCNC: 9.5 MG/DL (ref 8.3–10.6)
CHLORIDE BLD-SCNC: 99 MMOL/L (ref 99–110)
CO2: 27 MMOL/L (ref 21–32)
CREAT SERPL-MCNC: 0.6 MG/DL (ref 0.6–1.2)
GFR AFRICAN AMERICAN: >60
GFR NON-AFRICAN AMERICAN: >60
GLUCOSE BLD-MCNC: 137 MG/DL (ref 70–99)
POTASSIUM SERPL-SCNC: 4.3 MMOL/L (ref 3.5–5.1)
SODIUM BLD-SCNC: 139 MMOL/L (ref 136–145)

## 2020-03-23 RX ORDER — ATORVASTATIN CALCIUM 80 MG/1
TABLET, FILM COATED ORAL
Qty: 90 TABLET | Refills: 2 | Status: SHIPPED | OUTPATIENT
Start: 2020-03-23 | End: 2020-09-17 | Stop reason: SDUPTHER

## 2020-03-23 NOTE — TELEPHONE ENCOUNTER
Called and talked to Fidelina. This high blood sugar came about from a lab draw from the cardiologist.  Belia Gutierres if it was fasting. He ws given the message that we would order fasting blood work and then could discuss over the phone the result. Her appointment was cancelled.

## 2020-03-24 LAB
ESTIMATED AVERAGE GLUCOSE: 145.6 MG/DL
HBA1C MFR BLD: 6.7 %

## 2020-03-27 ENCOUNTER — TELEPHONE (OUTPATIENT)
Dept: INTERNAL MEDICINE CLINIC | Age: 70
End: 2020-03-27

## 2020-04-13 ENCOUNTER — TELEPHONE (OUTPATIENT)
Dept: INTERNAL MEDICINE CLINIC | Age: 70
End: 2020-04-13

## 2020-04-13 RX ORDER — METFORMIN HYDROCHLORIDE 500 MG/1
500 TABLET, EXTENDED RELEASE ORAL
COMMUNITY
End: 2020-05-11 | Stop reason: ALTCHOICE

## 2020-05-11 ENCOUNTER — TELEPHONE (OUTPATIENT)
Dept: INTERNAL MEDICINE CLINIC | Age: 70
End: 2020-05-11

## 2020-05-11 RX ORDER — GLIMEPIRIDE 1 MG/1
1 TABLET ORAL
COMMUNITY
End: 2020-09-17 | Stop reason: SDUPTHER

## 2020-05-11 NOTE — TELEPHONE ENCOUNTER
On Metformin  mgs qd  Still having rumbling stomach/diarrhea with it. Is there something else she can take?

## 2020-06-01 LAB — DIABETIC RETINOPATHY: NEGATIVE

## 2020-07-07 LAB
ALBUMIN SERPL-MCNC: 4 G/DL
ALP BLD-CCNC: 70 U/L
ALT SERPL-CCNC: 4.8 U/L
ANION GAP SERPL CALCULATED.3IONS-SCNC: 9 MMOL/L
AST SERPL-CCNC: 21 U/L
BASOPHILS ABSOLUTE: 60 /ΜL
BASOPHILS RELATIVE PERCENT: 0.9 %
BILIRUB SERPL-MCNC: 0.3 MG/DL (ref 0.1–1.4)
BUN BLDV-MCNC: 26 MG/DL
CALCIUM SERPL-MCNC: 9.6 MG/DL
CHLORIDE BLD-SCNC: 102 MMOL/L
CO2: 32 MMOL/L
CREAT SERPL-MCNC: 1.04 MG/DL
EOSINOPHILS ABSOLUTE: 469 /ΜL
EOSINOPHILS RELATIVE PERCENT: 7 %
GFR CALCULATED: NORMAL
GLUCOSE BLD-MCNC: 123 MG/DL
HBA1C MFR BLD: 6.8 % (ref 4–5.6)
HCT VFR BLD CALC: 36.4 % (ref 36–46)
HEMOGLOBIN: 12.2 G/DL (ref 12–16)
LYMPHOCYTES ABSOLUTE: 2573 /ΜL
LYMPHOCYTES RELATIVE PERCENT: 38.4 %
MCH RBC QN AUTO: 31.3 PG
MCHC RBC AUTO-ENTMCNC: 33.6 G/DL
MCV RBC AUTO: 93 FL
MONOCYTES ABSOLUTE: 509 /ΜL
MONOCYTES RELATIVE PERCENT: 7.6 %
NEUTROPHILS ABSOLUTE: 3089 /ΜL
NEUTROPHILS RELATIVE PERCENT: 46.1 %
PLATELET # BLD: 290 K/ΜL
PMV BLD AUTO: 7.7 FL
POTASSIUM SERPL-SCNC: 4.6 MMOL/L
RBC # BLD: 3.91 10^6/ΜL
SODIUM BLD-SCNC: 143 MMOL/L
TOTAL PROTEIN: 6.6
WBC # BLD: 6.7 10^3/ML

## 2020-09-05 RX ORDER — CARBAMAZEPINE 200 MG
TABLET ORAL
Qty: 60 TABLET | Refills: 10 | Status: SHIPPED | OUTPATIENT
Start: 2020-09-05 | End: 2021-05-12 | Stop reason: SDUPTHER

## 2020-09-17 ENCOUNTER — OFFICE VISIT (OUTPATIENT)
Dept: PRIMARY CARE CLINIC | Age: 70
End: 2020-09-17
Payer: MEDICARE

## 2020-09-17 VITALS
OXYGEN SATURATION: 97 % | BODY MASS INDEX: 37.25 KG/M2 | TEMPERATURE: 96.4 F | HEIGHT: 63 IN | DIASTOLIC BLOOD PRESSURE: 82 MMHG | WEIGHT: 210.2 LBS | RESPIRATION RATE: 16 BRPM | SYSTOLIC BLOOD PRESSURE: 124 MMHG | HEART RATE: 70 BPM

## 2020-09-17 PROCEDURE — G0444 DEPRESSION SCREEN ANNUAL: HCPCS | Performed by: NURSE PRACTITIONER

## 2020-09-17 PROCEDURE — 99214 OFFICE O/P EST MOD 30 MIN: CPT | Performed by: NURSE PRACTITIONER

## 2020-09-17 PROCEDURE — G8431 POS CLIN DEPRES SCRN F/U DOC: HCPCS | Performed by: NURSE PRACTITIONER

## 2020-09-17 RX ORDER — GLIMEPIRIDE 1 MG/1
1 TABLET ORAL
Qty: 90 TABLET | Refills: 1 | Status: SHIPPED | OUTPATIENT
Start: 2020-09-17 | End: 2021-03-10 | Stop reason: SDUPTHER

## 2020-09-17 RX ORDER — ATORVASTATIN CALCIUM 80 MG/1
80 TABLET, FILM COATED ORAL DAILY
Qty: 90 TABLET | Refills: 1 | Status: SHIPPED | OUTPATIENT
Start: 2020-09-17 | End: 2021-03-10 | Stop reason: SDUPTHER

## 2020-09-17 RX ORDER — EZETIMIBE 10 MG/1
10 TABLET ORAL DAILY
Qty: 90 TABLET | Refills: 1 | Status: SHIPPED | OUTPATIENT
Start: 2020-09-17 | End: 2021-03-10 | Stop reason: SDUPTHER

## 2020-09-17 RX ORDER — DULOXETIN HYDROCHLORIDE 30 MG/1
30 CAPSULE, DELAYED RELEASE ORAL DAILY
Qty: 30 CAPSULE | Refills: 1 | Status: SHIPPED | OUTPATIENT
Start: 2020-09-17 | End: 2020-10-21 | Stop reason: DRUGHIGH

## 2020-09-17 RX ORDER — CHOLECALCIFEROL (VITAMIN D3) 1250 MCG
CAPSULE ORAL
Qty: 12 CAPSULE | Refills: 3 | Status: SHIPPED | OUTPATIENT
Start: 2020-09-17 | End: 2021-02-15 | Stop reason: SDUPTHER

## 2020-09-17 RX ORDER — AZATHIOPRINE 50 MG/1
50 TABLET ORAL DAILY
COMMUNITY
End: 2021-02-08 | Stop reason: DRUGHIGH

## 2020-09-17 ASSESSMENT — ENCOUNTER SYMPTOMS
DIARRHEA: 0
ABDOMINAL DISTENTION: 0
BLOOD IN STOOL: 0
VOMITING: 0
COUGH: 0
CONSTIPATION: 0
NAUSEA: 0
SHORTNESS OF BREATH: 1
CHEST TIGHTNESS: 0
WHEEZING: 0

## 2020-09-17 ASSESSMENT — PATIENT HEALTH QUESTIONNAIRE - PHQ9
6. FEELING BAD ABOUT YOURSELF - OR THAT YOU ARE A FAILURE OR HAVE LET YOURSELF OR YOUR FAMILY DOWN: 0
9. THOUGHTS THAT YOU WOULD BE BETTER OFF DEAD, OR OF HURTING YOURSELF: 0
SUM OF ALL RESPONSES TO PHQ QUESTIONS 1-9: 12
3. TROUBLE FALLING OR STAYING ASLEEP: 2
2. FEELING DOWN, DEPRESSED OR HOPELESS: 3
4. FEELING TIRED OR HAVING LITTLE ENERGY: 1
SUM OF ALL RESPONSES TO PHQ9 QUESTIONS 1 & 2: 6
8. MOVING OR SPEAKING SO SLOWLY THAT OTHER PEOPLE COULD HAVE NOTICED. OR THE OPPOSITE, BEING SO FIGETY OR RESTLESS THAT YOU HAVE BEEN MOVING AROUND A LOT MORE THAN USUAL: 0
1. LITTLE INTEREST OR PLEASURE IN DOING THINGS: 3
SUM OF ALL RESPONSES TO PHQ QUESTIONS 1-9: 12
5. POOR APPETITE OR OVEREATING: 0
7. TROUBLE CONCENTRATING ON THINGS, SUCH AS READING THE NEWSPAPER OR WATCHING TELEVISION: 3
10. IF YOU CHECKED OFF ANY PROBLEMS, HOW DIFFICULT HAVE THESE PROBLEMS MADE IT FOR YOU TO DO YOUR WORK, TAKE CARE OF THINGS AT HOME, OR GET ALONG WITH OTHER PEOPLE: 1

## 2020-09-17 ASSESSMENT — COLUMBIA-SUICIDE SEVERITY RATING SCALE - C-SSRS
2. HAVE YOU ACTUALLY HAD ANY THOUGHTS OF KILLING YOURSELF?: NO
6. HAVE YOU EVER DONE ANYTHING, STARTED TO DO ANYTHING, OR PREPARED TO DO ANYTHING TO END YOUR LIFE?: NO

## 2020-09-17 NOTE — PROGRESS NOTES
9/17/2020    Chief Complaint   Patient presents with    New Patient       Yoan Pham is a 72 y.o. female, presents today to establish as a new patient, her PCP is retiring at the end of the month. Patient has a long hx of depression and feels Prozac 40 mg is \"no longer helping her after taking for so many years\". Reports sad everyday, no longer enjoys sewing, poor sleep, frequently keila. Feels lung disease contributes to a lot of her depression and anxiety. Denies homicidal/suicidal ideation/intent. Hx of DM type 2, vitamin D deficiency, hyperlipidemia, GERD, HTN, seizure disorder, interstitial lung disease, hypothyroidism, moderate depression. Requesting medication refills for the following glimepiride (AMARYL) 1 MG tablet, Vitamin D3, ZETIA 10 mg, atorvastatin 80 mg. States she has enough of other prescriptions for now, and will follow up in the future for refills when needed. Takes all medications as prescribed, tolerating well, denies any adverse effects. Reports she's planning to have influenza vaccine administered mid-Oct 2020. Review of Systems   Constitutional: Positive for fatigue. Negative for appetite change (reports decreased intake due to SOB with overeating.) and fever. Respiratory: Positive for shortness of breath. Negative for cough, chest tightness and wheezing. Cardiovascular: Negative for chest pain, palpitations and leg swelling. Gastrointestinal: Negative for abdominal distention, blood in stool, constipation, diarrhea, nausea and vomiting. Endocrine: Negative for cold intolerance, heat intolerance, polydipsia, polyphagia and polyuria. Genitourinary: Negative. Musculoskeletal: Negative for arthralgias and myalgias. Skin: Negative. Neurological: Negative for dizziness, light-headedness, numbness and headaches. Psychiatric/Behavioral: Positive for decreased concentration, dysphoric mood and sleep disturbance.  Negative for agitation, behavioral problems, 77        , ASHD, S/P CAB, NIDDM, hyperlipidemia.  Cancer Mother 58        , lung cancer. Vitals:    20 1203   BP: 124/82   Site: Left Upper Arm   Position: Sitting   Cuff Size: Medium Adult   Pulse: 70   Resp: 16   Temp: 96.4 °F (35.8 °C)   SpO2: 97%   Weight: 210 lb 3.2 oz (95.3 kg)   Height: 5' 2.5\" (1.588 m)     Estimated body mass index is 37.83 kg/m² as calculated from the following:    Height as of this encounter: 5' 2.5\" (1.588 m). Weight as of this encounter: 210 lb 3.2 oz (95.3 kg). Physical Exam  Vitals signs and nursing note reviewed. Constitutional:       General: She is not in acute distress. Appearance: Normal appearance. HENT:      Head: Normocephalic. Neck:      Musculoskeletal: Normal range of motion and neck supple. Vascular: No carotid bruit. Cardiovascular:      Rate and Rhythm: Normal rate and regular rhythm. Pulses: Normal pulses. Heart sounds: Normal heart sounds. Pulmonary:      Effort: Pulmonary effort is normal. No respiratory distress. Breath sounds: Normal breath sounds. Comments: Oxygen 5 liters, nasal canula. Abdominal:      General: Bowel sounds are normal. There is no distension. Palpations: Abdomen is soft. Tenderness: There is no right CVA tenderness or left CVA tenderness. Musculoskeletal: Normal range of motion. Right lower leg: No edema. Left lower leg: No edema. Lymphadenopathy:      Cervical: No cervical adenopathy. Skin:     General: Skin is warm and dry. Neurological:      Mental Status: She is alert and oriented to person, place, and time. Psychiatric:         Attention and Perception: Attention normal. She does not perceive auditory or visual hallucinations. Mood and Affect: Affect normal. Mood is anxious and depressed. Speech: Speech normal.         Behavior: Behavior normal. Behavior is cooperative. Thought Content:  Thought content normal. Cognition and Memory: Cognition normal.         ASSESSMENT/PLAN:  1. Type 2 diabetes mellitus without complication, without long-term current use of insulin (HCC)  - Stable. - Continue glimepiride (AMARYL) 1 MG tablet; Take 1 tablet by mouth every morning (before breakfast)  Dispense: 90 tablet; Refill: 1  - Hemoglobin A1C; Future  -  Microalbumin / Creatinine Urine Ratio at next appointment. 2. Seizure disorder ([facility]  - Stable. - Continue Tegretol 200 mg.    3. Vitamin D deficiency  - Stable. - Cholecalciferol (VITAMIN D3) 1.25 MG (00690 UT) CAPS; 1 capsule every 7 days  Dispense: 12 capsule; Refill: 3    4. Hyperlipidemia, unspecified hyperlipidemia type  - Stable. - ezetimibe (ZETIA) 10 MG tablet; Take 1 tablet by mouth daily  Dispense: 90 tablet; Refill: 1  - atorvastatin (LIPITOR) 80 MG tablet; Take 1 tablet by mouth daily  Dispense: 90 tablet; Refill: 1  - Lipid, Fasting; Future    5. Essential hypertension, benign  - Stable. - Continue Lisinopril 20 mg daily. - Continue Lasix 20 mg daily. - Continue Metoprolol Succinate 50 mg daily. - Comprehensive Metabolic Panel, Fasting; Future  - CBC Auto Differential; Future    6. Interstitial lung disease ([facility]  - Stable. - Continue oxygen 4-5 L/day. - Continue Azathioprine (IMURAN) 50 mg (4 tablets daily for a total of 200 mg/day)  - Managed by Dr. Seb Manley    7. Hypothyroidism, unspecified type  - Continue Levothyroxine 100 mcg.  - TSH WITH REFLEX TO FT4; Future    8. Moderate episode of recurrent major depressive disorder (HCC)  - Worsening.  - Positive Screen for Clinical Depression with a Documented Follow-up Plan   - DULoxetine (CYMBALTA) 30 MG extended release capsule; Take 1 capsule by mouth daily  Dispense: 30 capsule; Refill: 1  - Stop Prozac 40 mg. Take one capsule every other day x 1 week. Than one capsule every two days x 1 week, than quit.      9. Positive depression screening-  - See # 8  - Positive Screen for Clinical Depression with a Documented Follow-up Plan     10. Anxiety  -Worsening.  - See # 8  (Cymbalta)    11. GERD  - Stable. - Continue Omeprazole 20 mg daily    Instructed to call office (or send message through [address]) with medication refill request; addressed all medications at appointment today. Return in about 1 month (around 10/17/2020) for depression follow up. -  Microalbumin / Creatinine Urine Ratio at next appointment. - Diabetic foot exam at next appointment  - Annual Wellness Visit at next appointment. Discussed use, benefit, and side effects of prescribed medications. Patient's questions answered and concerns addressed. Patient agrees to plan of care. My scheduled days in the office reviewed with patient, and same day appointments available. Encouraged to use [employer] for communication as needed.      Electronically signed by CESAR Hyman CNP on 9/17/2020 at 5:48 PM

## 2020-09-17 NOTE — PATIENT INSTRUCTIONS
Patient Education        Recovering From Depression: Care Instructions  Your Care Instructions     Taking good care of yourself is important as you recover from depression. In time, your symptoms will fade as your treatment takes hold. Do not give up. Instead, focus your energy on getting better. Your mood will improve. It just takes some time. Focus on things that can help you feel better, such as being with friends and family, eating well, and getting enough rest. But take things slowly. Do not do too much too soon. You will begin to feel better gradually. Follow-up care is a key part of your treatment and safety. Be sure to make and go to all appointments, and call your doctor if you are having problems. It's also a good idea to know your test results and keep a list of the medicines you take. How can you care for yourself at home? Be realistic  · If you have a large task to do, break it up into smaller steps you can handle, and just do what you can. · You may want to put off important decisions until your depression has lifted. If you have plans that will have a major impact on your life, such as marriage, divorce, or a job change, try to wait a bit. Talk it over with friends and loved ones who can help you look at the overall picture first.  · Reaching out to people for help is important. Do not isolate yourself. Let your family and friends help you. Find someone you can trust and confide in, and talk to that person. · Be patient, and be kind to yourself. Remember that depression is not your fault and is not something you can overcome with willpower alone. Treatment is necessary for depression, just like for any other illness. Feeling better takes time, and your mood will improve little by little. Stay active  · Stay busy and get outside. Take a walk, or try some other light exercise. · Talk with your doctor about an exercise program. Exercise can help with mild depression.   · Go to a movie or concert. Take part in a Nond[employer]ational activity or other social gathering. Go to a [employer] game. · Ask a friend to have dinner with you. Take care of yourself  · Eat a balanced diet with plenty of fresh fruits and vegetables, whole grains, and lean protein. If you have lost your appetite, eat small snacks rather than large meals. · Avoid drinking alcohol or using illegal drugs. Do not take medicines that have not been prescribed for you. They may interfere with medicines you may be taking for depression, or they may make your depression worse. · Take your medicines exactly as they are prescribed. You may start to feel better within 1 to 3 weeks of taking antidepressant medicine. But it can take as many as 6 to 8 weeks to see more improvement. If you have questions or concerns about your medicines, or if you do not notice any improvement by 3 weeks, talk to your doctor. · If you have any side effects from your medicine, tell your doctor. Antidepressants can make you feel tired, dizzy, or nervous. Some people have dry mouth, constipation, headaches, sexual problems, or diarrhea. Many of these side effects are mild and will go away on their own after you have been taking the medicine for a few weeks. Some may last longer. Talk to your doctor if side effects are bothering you too much. You might be able to try a different medicine. · Get enough sleep. If you have problems sleeping:  ? Go to bed at the same time every night, and get up at the same time every morning. ? Keep your bedroom dark and quiet. ? Do not exercise after 5:00 p.m.  ? Avoid drinks with caffeine after 5:00 p.m. · Avoid sleeping pills unless they are prescribed by the doctor treating your depression. Sleeping pills may make you groggy during the day, and they may interact with other medicine you are taking. · If you have any other illnesses, such as diabetes, heart disease, or high blood pressure, make sure to continue with your treatment.  Tell your doctor about all of the medicines you take, including those with or without a prescription. · Keep the numbers for these national suicide hotlines: 7-409-320-TALK (4-664.988.6628) and 4-955-YNFEUJA (3-862.836.4758). If you or someone you know talks about suicide or feeling hopeless, get help right away. When should you call for help? [id number] anytime you think you may need emergency care. For example, call if:  · You feel like hurting yourself or someone else. · Someone you know has depression and is about to attempt or is attempting suicide. Call your doctor now or seek immediate medical care if:  · You hear voices. · Someone you know has depression and:  ? Starts to give away his or her possessions. ? Uses illegal drugs or drinks alcohol heavily. ? Talks or writes about death, including writing suicide notes or talking about guns, knives, or pills. ? Starts to spend a lot of time alone. ? Acts very aggressively or suddenly appears calm. Watch closely for changes in your health, and be sure to contact your doctor if:  · You do not get better as expected. Where can you learn more? Go to https://Eat.TrackaPhone. org and sign in to your Radico account. Enter U364 in the [facility] box to learn more about \"Recovering From Depression: Care Instructions. \"     If you do not have an account, please click on the \"Sign Up Now\" link. Current as of: January 31, 2020               Content Version: 12.5  © 2006-2020 Healthwise, Incorporated. Care instructions adapted under license by [facility] ([facility]). If you have questions about a medical condition or this instruction, always ask your healthcare professional. [place] [address] any warranty or liability for your use of this information. Patient Education        Noninsulin Medicines for Type 2 Diabetes: Care Instructions  Overview     There are different types of noninsulin medicines for diabetes.  Each works in a care is a key part of your treatment and safety. Be sure to make and go to all appointments, and call your doctor if you are having problems. It's also a good idea to know your test results and keep a list of the medicines you take. How can you care for yourself at home? · Eat a healthy diet. Get some exercise each day. This may help you to reduce how much medicine you need. · Do not take other prescription or over-the-counter medicines, vitamins, herbal products, or supplements without talking to your doctor first. Some medicines for type 2 diabetes can cause problems with other medicines or supplements. · Tell your doctor if you plan to get pregnant. Some of these drugs are not safe for pregnant women. · Be safe with medicines. Take your medicines exactly as prescribed. Meglitinides and sulfonylureas can cause your blood sugar to drop very low. Call your doctor if you think you are having a problem with your medicine. · Check your blood sugar often. You can use a glucose monitor. Keeping track can help you know how certain foods, activities, and medicines affect your blood sugar. And it can help you keep your blood sugar from getting so low that it's not safe. When should you call for help? [id number] anytime you think you may need emergency care. For example, call if:  · You passed out (lost consciousness). · You are confused or cannot think clearly. · Your blood sugar is very high or very low. Watch closely for changes in your health, and be sure to contact your doctor if:  · Your blood sugar stays outside the level your doctor set for you. · You have any problems. Where can you learn more? Go to https://LoveSpacetong.Zeta Interactive. org and sign in to your Capstory account. Enter H153 in the [employer] box to learn more about \"Noninsulin Medicines for Type 2 Diabetes: Care Instructions. \"     If you do not have an account, please click on the \"Sign Up Now\" link.   Current as of: December 20, 2019               Content Version: 12.5  © 3186-2633 Healthwise, Incorporated. Care instructions adapted under license by [facility] ([facility]). If you have questions about a medical condition or this instruction, always ask your healthcare professional. [address] any warranty or liability for your use of this information. Patient Education        Learning About Meal Planning for Diabetes  Why plan your meals? Meal planning can be a key part of managing diabetes. Planning meals and snacks with the right balance of carbohydrate, protein, and fat can help you keep your blood sugar at the target level you set with your doctor. You don't have to eat special foods. You can eat what your family eats, including sweets once in a while. But you do have to pay attention to how often you eat and how much you eat of certain foods. You may want to work with a dietitian or a certified diabetes educator. He or she can give you tips and meal ideas and can answer your questions about meal planning. This health professional can also help you reach a healthy weight if that is one of your goals. What plan is right for you? Your dietitian or diabetes educator may suggest that you start with the plate format or carbohydrate counting. The plate format  The plate format is a simple way to help you manage how you eat. You plan meals by learning how much space each food should take on a plate. Using the plate format helps you spread carbohydrate throughout the day. It can make it easier to keep your blood sugar level within your target range. It also helps you see if you're eating healthy portion sizes. To use the plate format, you put non-starchy vegetables on half your plate. Add meat or meat substitutes on one-quarter of the plate. Put a grain or starchy vegetable (such as brown rice or a potato) on the final quarter of the plate.  You can add a small piece of fruit and some low-fat or fat-free milk or yogurt, depending on your carbohydrate goal for each meal.  Here are some tips for using the plate format:  · Make sure that you are not using an oversized plate. A 9-inch plate is best. Many restaurants use larger plates. · Get used to using the plate format at home. Then you can use it when you eat out. · Write down your questions about using the plate format. Talk to your doctor, a dietitian, or a diabetes educator about your concerns. Carbohydrate counting  With carbohydrate counting, you plan meals based on the amount of carbohydrate in each food. Carbohydrate raises blood sugar higher and more quickly than any other nutrient. It is found in desserts, breads and cereals, and fruit. It's also found in starchy vegetables such as potatoes and corn, grains such as rice and pasta, and milk and yogurt. Spreading carbohydrate throughout the day helps keep your blood sugar levels within your target range. Your daily amount depends on several things, including your weight, how active you are, which diabetes medicines you take, and what your goals are for your blood sugar levels. A registered dietitian or diabetes educator can help you plan how much carbohydrate to include in each meal and snack. A guideline for your daily amount of carbohydrate is:  · 45 to 60 grams at each meal. That's about the same as 3 to 4 carbohydrate servings. · 15 to 20 grams at each snack. That's about the same as 1 carbohydrate serving. The Nutrition Facts label on packaged foods tells you how much carbohydrate is in a serving of the food. First, look at the serving size on the food label. Is that the amount you eat in a serving? All of the nutrition information on a food label is based on that serving size. So if you eat more or less than that, you'll need to adjust the other numbers. Total carbohydrate is the next thing you need to look for on the label.  If you count carbohydrate servings, one serving of carbohydrate is 15 grams.  For foods that don't come with labels, such as fresh fruits and vegetables, you'll need a guide that lists carbohydrate in these foods. Ask your doctor, dietitian, or diabetes educator about books or other nutrition guides you can use. If you take insulin, you need to know how many grams of carbohydrate are in a meal. This lets you know how much rapid-acting insulin to take before you eat. If you use an insulin pump, you get a constant rate of insulin during the day. So the pump must be programmed at meals to give you extra insulin to cover the rise in blood sugar after meals. When you know how much carbohydrate you will eat, you can take the right amount of insulin. Or, if you always use the same amount of insulin, you need to make sure that you eat the same amount of carbohydrate at meals. If you need more help to understand carbohydrate counting and food labels, ask your doctor, dietitian, or diabetes educator. How do you get started with meal planning? Here are some tips to get started:  · Plan your meals a week at a time. Don't forget to include snacks too. · Use cookbooks or online recipes to plan several main meals. Plan some quick meals for busy nights. You also can double some recipes that freeze well. Then you can save half for other busy nights when you don't have time to cook. · Make sure you have the ingredients you need for your recipes. If you're running low on basic items, put these items on your shopping list too. · List foods that you use to make breakfasts, lunches, and snacks. List plenty of fruits and vegetables. · Post this list on the refrigerator. Add to it as you think of more things you need. · Take the list to the store to do your weekly shopping. Follow-up care is a key part of your treatment and safety. Be sure to make and go to all appointments, and call your doctor if you are having problems.  It's also a good idea to know your test results and keep a list of the questions about a medical condition or this instruction, always ask your healthcare professional. [address] any warranty or liability for your use of this information. Patient Education        Type 2 Diabetes: Care Instructions  Your Care Instructions    Type 2 diabetes is a disease that develops when the body's tissues cannot use insulin properly. Over time, the pancreas cannot make enough insulin. Insulin is a hormone that helps the body's cells use sugar (glucose) for energy. It also helps the body store extra sugar in muscle, fat, and liver cells. Without insulin, the sugar cannot get into the cells to do its work. It stays in the blood instead. This can cause high blood sugar levels. A person has diabetes when the blood sugar stays too high too much of the time. Over time, diabetes can lead to diseases of the heart, blood vessels, nerves, kidneys, and eyes. You may be able to control your blood sugar by losing weight, eating a healthy diet, and getting daily exercise. You may also have to take insulin or other diabetes medicine. Follow-up care is a key part of your treatment and safety. Be sure to make and go to all appointments. Call your doctor if you are having problems. It's also a good idea to know your test results and keep a list of the medicines you take. How can you care for yourself at home? · Keep your blood sugar at a target level (which you set with your doctor). ? Eat a good diet that spreads carbohydrate throughout the day. Carbohydrate--the body's main source of fuel--affects blood sugar more than any other nutrient. Carbohydrate is in fruits, vegetables, milk, and yogurt. It also is in breads, cereals, vegetables such as potatoes and corn, and sugary foods such as candy and cakes. ? Aim for 30 minutes of exercise on most, preferably all, days of the week. Walking is a good choice.  You also may want to do other activities, such as running, swimming, cycling, or playing tennis or team sports. If your doctor says it's okay, do muscle-strengthening exercises at least 2 times a week. ? Take your medicines exactly as prescribed. Call your doctor if you think you are having a problem with your medicine. You will get more details on the specific medicines your doctor prescribes. · Check your blood sugar as often as your doctor recommends. It is important to keep track of any symptoms you have, such as low blood sugar. Also tell your doctor if you have any changes in your activities, diet, or insulin use. · Talk to your doctor before you start taking aspirin every day. Aspirin can help certain people lower their risk of a heart attack or stroke. But taking aspirin isn't right for everyone, because it can cause serious bleeding. · Do not smoke. If you need help quitting, talk to your doctor about stop-smoking programs and medicines. These can increase your chances of quitting for good. · Keep your cholesterol and blood pressure at normal levels. You may need to take one or more medicines to reach your goals. Take them exactly as directed. Do not stop or change a medicine without talking to your doctor first.  When should you call for help? [id number] anytime you think you may need emergency care. For example, call if:  · You passed out (lost consciousness), or you suddenly become very sleepy or confused. (You may have very low blood sugar.)  Call your doctor now or seek immediate medical care if:  · Your blood sugar is 300 mg/dL or is higher than the level your doctor has set for you. · You have symptoms of low blood sugar, such as:  ? Sweating. ? Feeling nervous, shaky, and weak. ? Extreme hunger and slight nausea. ? Dizziness and headache.  ? Blurred vision. ? Confusion. Watch closely for changes in your health, and be sure to contact your doctor if:  · You often have problems controlling your blood sugar.   · You have symptoms of long-term diabetes problems, such as:  ? New vision changes. ? New pain, numbness, or tingling in your hands or feet. ? Skin problems. Where can you learn more? Go to https://ArcSightpekirbyChoozOn (d.b.a. Blue Kangaroo).WizIQ. org and sign in to your [employer] account. Enter C553 in the [facility] box to learn more about \"Type 2 Diabetes: Care Instructions. \"     If you do not have an account, please click on the \"Sign Up Now\" link. Current as of: December 20, 2019               Content Version: 12.5  © 8913-3588 [employer]. Care instructions adapted under license by [facility] [place]note 2] [address] If you have questions about a medical condition or this instruction, always ask your healthcare professional. [address] any warranty or liability for your use of this information. Patient Education        DASH Diet: Care Instructions  Your Care Instructions     The DASH diet is an eating plan that can help lower your blood pressure. DASH stands for Dietary Approaches to Stop Hypertension. Hypertension is high blood pressure. The DASH diet focuses on eating foods that are high in calcium, potassium, and magnesium. These nutrients can lower blood pressure. The foods that are highest in these nutrients are fruits, vegetables, low-fat dairy products, nuts, seeds, and legumes. But taking calcium, potassium, and magnesium supplements instead of eating foods that are high in those nutrients does not have the same effect. The DASH diet also includes whole grains, fish, and poultry. The DASH diet is one of several lifestyle changes your doctor may recommend to lower your high blood pressure. Your doctor may also want you to decrease the amount of sodium in your diet. Lowering sodium while following the DASH diet can lower blood pressure even further than just the DASH diet alone. Follow-up care is a key part of your treatment and safety. Be sure to make and go to all appointments, and call your doctor if you are having problems. It's also a good idea to know your test results and keep a list of the medicines you take. How can you care for yourself at home? Following the DASH diet  · Eat 4 to 5 servings of fruit each day. A serving is 1 medium-sized piece of fruit, ½ cup chopped or canned fruit, 1/4 cup dried fruit, or 4 ounces (½ cup) of fruit juice. Choose fruit more often than fruit juice. · Eat 4 to 5 servings of vegetables each day. A serving is 1 cup of lettuce or raw leafy vegetables, ½ cup of chopped or cooked vegetables, or 4 ounces (½ cup) of vegetable juice. Choose vegetables more often than vegetable juice. · Get 2 to 3 servings of low-fat and fat-free dairy each day. A serving is 8 ounces of milk, 1 cup of yogurt, or 1 ½ ounces of cheese. · Eat 6 to 8 servings of grains each day. A serving is 1 slice of bread, 1 ounce of dry cereal, or ½ cup of cooked rice, pasta, or cooked cereal. Try to choose whole-grain products as much as possible. · Limit lean meat, poultry, and fish to 2 servings each day. A serving is 3 ounces, about the size of a deck of cards. · Eat 4 to 5 servings of nuts, seeds, and legumes (cooked dried beans, lentils, and split peas) each week. A serving is 1/3 cup of nuts, 2 tablespoons of seeds, or ½ cup of cooked beans or peas. · Limit fats and oils to 2 to 3 servings each day. A serving is 1 teaspoon of vegetable oil or 2 tablespoons of salad dressing. · Limit sweets and added sugars to 5 servings or less a week. A serving is 1 tablespoon jelly or jam, ½ cup sorbet, or 1 cup of lemonade. · Eat less than 2,300 milligrams (mg) of sodium a day. If you limit your sodium to 1,500 mg a day, you can lower your blood pressure even more. Tips for success  · Start small. Do not try to make dramatic changes to your diet all at once. You might feel that you are missing out on your favorite foods and then be more likely to not follow the plan. Make small changes, and stick with them.  Once those changes become habit, add a few more changes. · Try some of the following:  ? Make it a goal to eat a fruit or vegetable at every meal and at snacks. This will make it easy to get the recommended amount of fruits and vegetables each day. ? Try yogurt topped with fruit and nuts for a snack or healthy dessert. ? Add lettuce, tomato, cucumber, and onion to sandwiches. ? Combine a ready-made pizza crust with low-fat mozzarella cheese and lots of vegetable toppings. Try using tomatoes, squash, spinach, broccoli, carrots, cauliflower, and onions. ? Have a variety of cut-up vegetables with a low-fat dip as an appetizer instead of chips and dip. ? Sprinkle sunflower seeds or chopped almonds over salads. Or try adding chopped walnuts or almonds to cooked vegetables. ? Try some vegetarian meals using beans and peas. Add garbanzo or kidney beans to salads. Make burritos and tacos with mashed mcneal beans or black beans. Where can you learn more? Go to https://SmarTotspepicewmxHero.Green & Grow. org and sign in to your [employer] account. Enter D430 in the [employer] box to learn more about \"DASH Diet: Care Instructions. \"     If you do not have an account, please click on the \"Sign Up Now\" link. Current as of: December 16, 2019               Content Version: 12.5  © 9832-1507 [email or web address]. Care instructions adapted under license by [facility] ([facility]). If you have questions about a medical condition or this instruction, always ask your healthcare professional. [place] [address] any warranty or liability for your use of this information. Patient Education        High Cholesterol: Care Instructions  Your Care Instructions     Cholesterol is a type of fat in your blood. It is needed for many body functions, such as making new cells. Cholesterol is made by your body. It also comes from food you eat. High cholesterol means that you have too much of the fat in your blood.  This raises your risk of a heart attack and stroke. LDL and HDL are part of your total cholesterol. LDL is the \"bad\" cholesterol. High LDL can raise your risk for heart disease, heart attack, and stroke. HDL is the \"good\" cholesterol. It helps clear bad cholesterol from the body. High HDL is linked with a lower risk of heart disease, heart attack, and stroke. Your cholesterol levels help your doctor find out your risk for having a heart attack or stroke. You and your doctor can talk about whether you need to lower your risk and what treatment is best for you. A heart-healthy lifestyle along with medicines can help lower your cholesterol and your risk. The way you choose to lower your risk will depend on how high your risk is for heart attack and stroke. It will also depend on how you feel about taking medicines. Follow-up care is a key part of your treatment and safety. Be sure to make and go to all appointments, and call your doctor if you are having problems. It's also a good idea to know your test results and keep a list of the medicines you take. How can you care for yourself at home? · Eat a variety of foods every day. Good choices include fruits, vegetables, whole grains (like oatmeal), dried beans and peas, nuts and seeds, soy products (like tofu), and fat-free or low-fat dairy products. · Replace butter, margarine, and hydrogenated or partially hydrogenated oils with olive and canola oils. (Canola oil margarine without trans fat is fine.)  · Replace red meat with fish, poultry, and soy protein (like tofu). · Limit processed and packaged foods like chips, crackers, and cookies. · Bake, broil, or steam foods. Don't navarro them. · Be physically active. Get at least 30 minutes of exercise on most days of the week. Walking is a good choice. You also may want to do other activities, such as running, swimming, cycling, or playing tennis or team sports.   · Stay at a healthy weight or lose weight by making the changes in eating and physical activity listed above. Losing just a small amount of weight, even 5 to 10 pounds, can reduce your risk for having a heart attack or stroke. · Do not smoke. When should you call for help? Watch closely for changes in your health, and be sure to contact your doctor if:  · You need help making lifestyle changes. · You have questions about your medicine. Where can you learn more? Go to https://AIT BiosciencepeZephyr HealthewPolarizonics.iRise. org and sign in to your what3words account. Enter Z118 in the [employer] box to learn more about \"High Cholesterol: Care Instructions. \"     If you do not have an account, please click on the \"Sign Up Now\" link. Current as of: December 16, 2019               Content Version: 12.5  © 3920-8966 Healthwise, WeLab. Care instructions adapted under license by [facility] ([facility]). If you have questions about a medical condition or this instruction, always ask your healthcare professional. [place] [address] any warranty or liability for your use of this information. Patient Education        High Blood Pressure: Care Instructions  Overview     It's normal for blood pressure to go up and down throughout the day. But if it stays up, you have high blood pressure. Another name for high blood pressure is hypertension. Despite what a lot of people think, high blood pressure usually doesn't cause headaches or make you feel dizzy or lightheaded. It usually has no symptoms. But it does increase your risk of stroke, heart attack, and other problems. You and your doctor will talk about your risks of these problems based on your blood pressure. Your doctor will give you a goal for your blood pressure. Your goal will be based on your health and your age. Lifestyle changes, such as eating healthy and being active, are always important to help lower blood pressure.  You might also take medicine to reach your blood pressure goal.  Follow-up care is a key part of your treatment and safety. Be sure to make and go to all appointments, and call your doctor if you are having problems. It's also a good idea to know your test results and keep a list of the medicines you take. How can you care for yourself at home? Medical treatment  · If you stop taking your medicine, your blood pressure will go back up. You may take one or more types of medicine to lower your blood pressure. Be safe with medicines. Take your medicine exactly as prescribed. Call your doctor if you think you are having a problem with your medicine. · Talk to your doctor before you start taking aspirin every day. Aspirin can help certain people lower their risk of a heart attack or stroke. But taking aspirin isn't right for everyone, because it can cause serious bleeding. · See your doctor regularly. You may need to see the doctor more often at first or until your blood pressure comes down. · If you are taking blood pressure medicine, talk to your doctor before you take decongestants or anti-inflammatory medicine, such as ibuprofen. Some of these medicines can raise blood pressure. · Learn how to check your blood pressure at home. Lifestyle changes  · Stay at a healthy weight. This is especially important if you put on weight around the waist. Losing even 10 pounds can help you lower your blood pressure. · If your doctor recommends it, get more exercise. Walking is a good choice. Bit by bit, increase the amount you walk every day. Try for at least 30 minutes on most days of the week. You also may want to swim, bike, or do other activities. · Avoid or limit alcohol. Talk to your doctor about whether you can drink any alcohol. · Try to limit how much sodium you eat to less than 2,300 milligrams (mg) a day. Your doctor may ask you to try to eat less than 1,500 mg a day. · Eat plenty of fruits (such as bananas and oranges), vegetables, legumes, whole grains, and low-fat dairy products.   · Lower the amount of saturated fat in your diet. Saturated fat is found in animal products such as milk, cheese, and meat. Limiting these foods may help you lose weight and also lower your risk for heart disease. · Do not smoke. Smoking increases your risk for heart attack and stroke. If you need help quitting, talk to your doctor about stop-smoking programs and medicines. These can increase your chances of quitting for good. When should you call for help? Call  911 anytime you think you may need emergency care. This may mean having symptoms that suggest that your blood pressure is causing a serious heart or blood vessel problem. Your blood pressure may be over 180/120. For example, call 911 if:  · You have symptoms of a heart attack. These may include:  ? Chest pain or pressure, or a strange feeling in the chest.  ? Sweating. ? Shortness of breath. ? Nausea or vomiting. ? Pain, pressure, or a strange feeling in the back, neck, jaw, or upper belly or in one or both shoulders or arms. ? Lightheadedness or sudden weakness. ? A fast or irregular heartbeat. · You have symptoms of a stroke. These may include:  ? Sudden numbness, tingling, weakness, or loss of movement in your face, arm, or leg, especially on only one side of your body. ? Sudden vision changes. ? Sudden trouble speaking. ? Sudden confusion or trouble understanding simple statements. ? Sudden problems with walking or balance. ? A sudden, severe headache that is different from past headaches. · You have severe back or belly pain. Do not wait until your blood pressure comes down on its own. Get help right away. Call your doctor now or seek immediate care if:  · Your blood pressure is much higher than normal (such as 180/120 or higher), but you don't have symptoms. · You think high blood pressure is causing symptoms, such as:  ? Severe headache.  ? Blurry vision.   Watch closely for changes in your health, and be sure to contact your doctor if:  · Your blood pressure measures higher than your doctor recommends at least 2 times. That means the top number is higher or the bottom number is higher, or both. · You think you may be having side effects from your blood pressure medicine. Where can you learn more? Go to https://luis m.Vocus Communications. org and sign in to your Tablefinder account. Enter Z288 in the Reimage box to learn more about \"High Blood Pressure: Care Instructions. \"     If you do not have an account, please click on the \"Sign Up Now\" link. Current as of: December 16, 2019               Content Version: 12.5  © 0015-4971 [employer]. Care instructions adapted under license by [facility] ([facility]). If you have questions about a medical condition or this instruction, always ask your healthcare professional. [address] any warranty or liability for your use of this information. Patient Education        Hypothyroidism: Care Instructions  Your Care Instructions     When you have hypothyroidism, your body doesn't make enough thyroid hormone. This hormone helps your body use energy. If your thyroid level is low, you may feel tired, be constipated, have an increase in your blood pressure, or have dry skin or memory problems. You may also get cold easily, even when it is warm. Women with low thyroid levels may have heavy menstrual periods. A blood test to find your thyroid-stimulating hormone (TSH) level is used to check for hypothyroidism. A high TSH level may mean that you have it. The treatment for hypothyroidism is thyroid hormone pills. You should start to feel better in 1 to 2 weeks. Most people need treatment for the rest of their lives. You will need regular visits with your doctor to make sure you are doing well and that you have the right dose of medicine. Follow-up care is a key part of your treatment and safety. Be sure to make and go to all appointments, and call your doctor if you are having problems.  It's also a good idea to know your test results and keep a list of the medicines you take. How can you care for yourself at home? · Take your thyroid hormone medicine exactly as prescribed. Call your doctor if you think you are having a problem with your medicine. Most people do not have side effects if they take the right amount of medicine regularly. ? Take the medicine 30 minutes before breakfast, and do not take it with calcium, vitamins, or iron. ? Do not take extra doses of your thyroid medicine. It will not help you get better any faster, and it may cause side effects. ? If you forget to take a dose, do NOT take a double dose of medicine. Take your usual dose the next day. · Tell your doctor about all prescription, herbal, or over-the-counter products you take. · Take care of yourself. Eat a healthy diet, get enough sleep, and get regular exercise. When should you call for help? [id number] anytime you think you may need emergency care. For example, call if:  · You passed out (lost consciousness). · You have severe trouble breathing. · You have a very slow heartbeat (less than 60 beats a minute). · You have a low body temperature (95°F or below). Call your doctor now or seek immediate medical care if:  · You feel tired, sluggish, or weak. · You have trouble remembering things or concentrating. · You do not begin to feel better 2 weeks after starting your medicine. Watch closely for changes in your health, and be sure to contact your doctor if you have any problems. Where can you learn more? Go to https://Mayberry MediagauriSkeleton Technologies.Handle. org and sign in to your Locish account. Enter X948 in the [facility] box to learn more about \"Hypothyroidism: Care Instructions. \"     If you do not have an account, please click on the \"Sign Up Now\" link. Current as of: July 29, 2019               Content Version: 12.5  © 8402-8310 Healthwise, Incorporated.    Care instructions adapted under license by [facility] ([facility]). If you have questions about a medical condition or this instruction, always ask your healthcare professional. [place] [address] any warranty or liability for your use of this information.

## 2020-09-28 ENCOUNTER — HOSPITAL ENCOUNTER (OUTPATIENT)
Age: 70
Setting detail: OBSERVATION
Discharge: HOME | End: 2020-09-29
Attending: EMERGENCY MEDICINE | Admitting: INTERNAL MEDICINE
Payer: MEDICARE

## 2020-09-28 ENCOUNTER — APPOINTMENT (OUTPATIENT)
Dept: GENERAL RADIOLOGY | Age: 70
End: 2020-09-28
Payer: MEDICARE

## 2020-09-28 ENCOUNTER — TELEPHONE (OUTPATIENT)
Dept: CARDIOLOGY CLINIC | Age: 70
End: 2020-09-28

## 2020-09-28 PROBLEM — R07.9 CHEST PAIN, RULE OUT ACUTE MYOCARDIAL INFARCTION: Status: ACTIVE | Noted: 2020-09-28

## 2020-09-28 LAB
ANION GAP SERPL CALCULATED.3IONS-SCNC: 11 MMOL/L (ref 3–16)
BASOPHILS ABSOLUTE: 0 K/UL (ref 0–0.2)
BASOPHILS RELATIVE PERCENT: 0.8 %
BUN BLDV-MCNC: 21 MG/DL (ref 7–20)
CALCIUM SERPL-MCNC: 9.5 MG/DL (ref 8.3–10.6)
CHLORIDE BLD-SCNC: 102 MMOL/L (ref 99–110)
CO2: 27 MMOL/L (ref 21–32)
CREAT SERPL-MCNC: 0.9 MG/DL (ref 0.6–1.2)
EOSINOPHILS ABSOLUTE: 0.5 K/UL (ref 0–0.6)
EOSINOPHILS RELATIVE PERCENT: 8.2 %
GFR AFRICAN AMERICAN: >60
GFR NON-AFRICAN AMERICAN: >60
GLUCOSE BLD-MCNC: 129 MG/DL (ref 70–99)
GLUCOSE BLD-MCNC: 131 MG/DL (ref 70–99)
HCT VFR BLD CALC: 39.2 % (ref 36–48)
HEMOGLOBIN: 12.8 G/DL (ref 12–16)
LYMPHOCYTES ABSOLUTE: 2.5 K/UL (ref 1–5.1)
LYMPHOCYTES RELATIVE PERCENT: 40.5 %
MCH RBC QN AUTO: 30.1 PG (ref 26–34)
MCHC RBC AUTO-ENTMCNC: 32.7 G/DL (ref 31–36)
MCV RBC AUTO: 92.2 FL (ref 80–100)
MONOCYTES ABSOLUTE: 0.6 K/UL (ref 0–1.3)
MONOCYTES RELATIVE PERCENT: 9.8 %
NEUTROPHILS ABSOLUTE: 2.5 K/UL (ref 1.7–7.7)
NEUTROPHILS RELATIVE PERCENT: 40.7 %
PDW BLD-RTO: 13.5 % (ref 12.4–15.4)
PERFORMED ON: ABNORMAL
PLATELET # BLD: 274 K/UL (ref 135–450)
PMV BLD AUTO: 7.8 FL (ref 5–10.5)
POTASSIUM SERPL-SCNC: 4.1 MMOL/L (ref 3.5–5.1)
RBC # BLD: 4.25 M/UL (ref 4–5.2)
SODIUM BLD-SCNC: 140 MMOL/L (ref 136–145)
TROPONIN: <0.01 NG/ML
TROPONIN: <0.01 NG/ML
WBC # BLD: 6.1 K/UL (ref 4–11)

## 2020-09-28 PROCEDURE — 85025 COMPLETE CBC W/AUTO DIFF WBC: CPT

## 2020-09-28 PROCEDURE — G0378 HOSPITAL OBSERVATION PER HR: HCPCS

## 2020-09-28 PROCEDURE — 36415 COLL VENOUS BLD VENIPUNCTURE: CPT

## 2020-09-28 PROCEDURE — 93005 ELECTROCARDIOGRAM TRACING: CPT | Performed by: EMERGENCY MEDICINE

## 2020-09-28 PROCEDURE — 94761 N-INVAS EAR/PLS OXIMETRY MLT: CPT

## 2020-09-28 PROCEDURE — 84484 ASSAY OF TROPONIN QUANT: CPT

## 2020-09-28 PROCEDURE — 71046 X-RAY EXAM CHEST 2 VIEWS: CPT

## 2020-09-28 PROCEDURE — 2700000000 HC OXYGEN THERAPY PER DAY

## 2020-09-28 PROCEDURE — 99285 EMERGENCY DEPT VISIT HI MDM: CPT

## 2020-09-28 PROCEDURE — 6370000000 HC RX 637 (ALT 250 FOR IP): Performed by: INTERNAL MEDICINE

## 2020-09-28 PROCEDURE — 6370000000 HC RX 637 (ALT 250 FOR IP): Performed by: NURSE PRACTITIONER

## 2020-09-28 PROCEDURE — 83036 HEMOGLOBIN GLYCOSYLATED A1C: CPT

## 2020-09-28 PROCEDURE — 80048 BASIC METABOLIC PNL TOTAL CA: CPT

## 2020-09-28 RX ORDER — CARBAMAZEPINE 200 MG/1
200 TABLET ORAL 2 TIMES DAILY
Status: DISCONTINUED | OUTPATIENT
Start: 2020-09-28 | End: 2020-09-29 | Stop reason: HOSPADM

## 2020-09-28 RX ORDER — DEXTROSE MONOHYDRATE 50 MG/ML
100 INJECTION, SOLUTION INTRAVENOUS PRN
Status: DISCONTINUED | OUTPATIENT
Start: 2020-09-28 | End: 2020-09-29 | Stop reason: HOSPADM

## 2020-09-28 RX ORDER — FLUOXETINE HYDROCHLORIDE 20 MG/1
40 CAPSULE ORAL DAILY
Status: DISCONTINUED | OUTPATIENT
Start: 2020-09-29 | End: 2020-09-29 | Stop reason: HOSPADM

## 2020-09-28 RX ORDER — PANTOPRAZOLE SODIUM 40 MG/1
40 TABLET, DELAYED RELEASE ORAL
Status: DISCONTINUED | OUTPATIENT
Start: 2020-09-29 | End: 2020-09-29 | Stop reason: HOSPADM

## 2020-09-28 RX ORDER — NITROGLYCERIN 0.4 MG/1
0.4 TABLET SUBLINGUAL EVERY 5 MIN PRN
Status: DISCONTINUED | OUTPATIENT
Start: 2020-09-28 | End: 2020-09-29 | Stop reason: HOSPADM

## 2020-09-28 RX ORDER — NICOTINE POLACRILEX 4 MG
15 LOZENGE BUCCAL PRN
Status: DISCONTINUED | OUTPATIENT
Start: 2020-09-28 | End: 2020-09-29 | Stop reason: HOSPADM

## 2020-09-28 RX ORDER — AZITHROMYCIN 500 MG/1
500 TABLET, FILM COATED ORAL DAILY
COMMUNITY
End: 2021-11-17 | Stop reason: SDUPTHER

## 2020-09-28 RX ORDER — METOPROLOL SUCCINATE 50 MG/1
50 TABLET, EXTENDED RELEASE ORAL DAILY
Status: DISCONTINUED | OUTPATIENT
Start: 2020-09-29 | End: 2020-09-29

## 2020-09-28 RX ORDER — EZETIMIBE 10 MG/1
10 TABLET ORAL DAILY
Status: DISCONTINUED | OUTPATIENT
Start: 2020-09-29 | End: 2020-09-28 | Stop reason: RX

## 2020-09-28 RX ORDER — ASPIRIN 81 MG/1
81 TABLET, CHEWABLE ORAL DAILY
Status: DISCONTINUED | OUTPATIENT
Start: 2020-09-29 | End: 2020-09-29 | Stop reason: HOSPADM

## 2020-09-28 RX ORDER — PREDNISONE 1 MG/1
5 TABLET ORAL DAILY
COMMUNITY

## 2020-09-28 RX ORDER — ACETAMINOPHEN 650 MG/1
650 SUPPOSITORY RECTAL EVERY 6 HOURS PRN
Status: DISCONTINUED | OUTPATIENT
Start: 2020-09-28 | End: 2020-09-29 | Stop reason: HOSPADM

## 2020-09-28 RX ORDER — INSULIN LISPRO 100 [IU]/ML
0-6 INJECTION, SOLUTION INTRAVENOUS; SUBCUTANEOUS
Status: DISCONTINUED | OUTPATIENT
Start: 2020-09-29 | End: 2020-09-29 | Stop reason: HOSPADM

## 2020-09-28 RX ORDER — POLYETHYLENE GLYCOL 3350 17 G/17G
17 POWDER, FOR SOLUTION ORAL DAILY PRN
Status: DISCONTINUED | OUTPATIENT
Start: 2020-09-28 | End: 2020-09-29 | Stop reason: HOSPADM

## 2020-09-28 RX ORDER — ATORVASTATIN CALCIUM 80 MG/1
80 TABLET, FILM COATED ORAL DAILY
Status: DISCONTINUED | OUTPATIENT
Start: 2020-09-29 | End: 2020-09-29 | Stop reason: HOSPADM

## 2020-09-28 RX ORDER — LISINOPRIL 20 MG/1
20 TABLET ORAL DAILY
Status: DISCONTINUED | OUTPATIENT
Start: 2020-09-29 | End: 2020-09-28

## 2020-09-28 RX ORDER — FUROSEMIDE 20 MG/1
20 TABLET ORAL DAILY
Status: DISCONTINUED | OUTPATIENT
Start: 2020-09-29 | End: 2020-09-29 | Stop reason: HOSPADM

## 2020-09-28 RX ORDER — ONDANSETRON 2 MG/ML
4 INJECTION INTRAMUSCULAR; INTRAVENOUS EVERY 6 HOURS PRN
Status: DISCONTINUED | OUTPATIENT
Start: 2020-09-28 | End: 2020-09-29 | Stop reason: HOSPADM

## 2020-09-28 RX ORDER — DEXTROSE MONOHYDRATE 25 G/50ML
12.5 INJECTION, SOLUTION INTRAVENOUS PRN
Status: DISCONTINUED | OUTPATIENT
Start: 2020-09-28 | End: 2020-09-29 | Stop reason: HOSPADM

## 2020-09-28 RX ORDER — SODIUM CHLORIDE 0.9 % (FLUSH) 0.9 %
10 SYRINGE (ML) INJECTION PRN
Status: DISCONTINUED | OUTPATIENT
Start: 2020-09-28 | End: 2020-09-29 | Stop reason: HOSPADM

## 2020-09-28 RX ORDER — INSULIN LISPRO 100 [IU]/ML
0-3 INJECTION, SOLUTION INTRAVENOUS; SUBCUTANEOUS NIGHTLY
Status: DISCONTINUED | OUTPATIENT
Start: 2020-09-28 | End: 2020-09-29 | Stop reason: HOSPADM

## 2020-09-28 RX ORDER — MULTIVITAMIN WITH IRON
250 TABLET ORAL DAILY
COMMUNITY

## 2020-09-28 RX ORDER — PREDNISONE 1 MG/1
5 TABLET ORAL NIGHTLY
Status: DISCONTINUED | OUTPATIENT
Start: 2020-09-28 | End: 2020-09-29 | Stop reason: HOSPADM

## 2020-09-28 RX ORDER — LEVOTHYROXINE SODIUM 0.1 MG/1
100 TABLET ORAL
Status: DISCONTINUED | OUTPATIENT
Start: 2020-09-29 | End: 2020-09-28

## 2020-09-28 RX ORDER — ACETAMINOPHEN 325 MG/1
650 TABLET ORAL EVERY 6 HOURS PRN
Status: DISCONTINUED | OUTPATIENT
Start: 2020-09-28 | End: 2020-09-29 | Stop reason: HOSPADM

## 2020-09-28 RX ORDER — DULOXETIN HYDROCHLORIDE 30 MG/1
30 CAPSULE, DELAYED RELEASE ORAL DAILY
Status: DISCONTINUED | OUTPATIENT
Start: 2020-09-29 | End: 2020-09-29

## 2020-09-28 RX ORDER — FLUOXETINE HYDROCHLORIDE 40 MG/1
40 CAPSULE ORAL DAILY
Status: ON HOLD | COMMUNITY
End: 2020-09-28

## 2020-09-28 RX ORDER — LEVOTHYROXINE SODIUM 0.1 MG/1
100 TABLET ORAL NIGHTLY
Status: DISCONTINUED | OUTPATIENT
Start: 2020-09-28 | End: 2020-09-29 | Stop reason: HOSPADM

## 2020-09-28 RX ORDER — PREDNISONE 1 MG/1
5 TABLET ORAL DAILY
Status: DISCONTINUED | OUTPATIENT
Start: 2020-09-29 | End: 2020-09-28

## 2020-09-28 RX ORDER — SODIUM CHLORIDE 0.9 % (FLUSH) 0.9 %
10 SYRINGE (ML) INJECTION EVERY 12 HOURS SCHEDULED
Status: DISCONTINUED | OUTPATIENT
Start: 2020-09-28 | End: 2020-09-29 | Stop reason: HOSPADM

## 2020-09-28 RX ORDER — PROMETHAZINE HYDROCHLORIDE 25 MG/1
12.5 TABLET ORAL EVERY 6 HOURS PRN
Status: DISCONTINUED | OUTPATIENT
Start: 2020-09-28 | End: 2020-09-29 | Stop reason: HOSPADM

## 2020-09-28 RX ORDER — LISINOPRIL 20 MG/1
20 TABLET ORAL NIGHTLY
Status: DISCONTINUED | OUTPATIENT
Start: 2020-09-28 | End: 2020-09-29 | Stop reason: HOSPADM

## 2020-09-28 RX ADMIN — PREDNISONE 5 MG: 5 TABLET ORAL at 22:40

## 2020-09-28 RX ADMIN — LISINOPRIL 20 MG: 20 TABLET ORAL at 22:40

## 2020-09-28 RX ADMIN — CARBAMAZEPINE 200 MG: 200 TABLET ORAL at 22:40

## 2020-09-28 RX ADMIN — LEVOTHYROXINE SODIUM 100 MCG: 0.1 TABLET ORAL at 22:40

## 2020-09-28 ASSESSMENT — PAIN SCALES - GENERAL: PAINLEVEL_OUTOF10: 0

## 2020-09-28 ASSESSMENT — ENCOUNTER SYMPTOMS
COUGH: 1
GASTROINTESTINAL NEGATIVE: 1
SHORTNESS OF BREATH: 1
EYES NEGATIVE: 1

## 2020-09-28 ASSESSMENT — HEART SCORE: ECG: 1

## 2020-09-28 NOTE — ED NOTES
Pt in from home with complaints of chest pain that woke her from a sleep on Saturday. Pt states the pain was sharp and went throughout her chest to her back and up into her jaw/face. Pt states she also felt SOB when it happened. Pt states she is on 4. 5LPM via [place] which is home oxygen. Pt denies any current CP SOB or fevers. Pt alert and oriented upon arrival to room, placed on telemetry monitoring with ST segment capabilities, states no further needs, will continue to monitor.       175 Jaja Sung RN  09/28/20 2641

## 2020-09-28 NOTE — ED PROVIDER NOTES
[address] [facility] PROVIDER NOTE    Patient Identification  Pt Name: Leopoldo Cora  MRN: [id number]  Vera 1950  Date of evaluation: 9/28/2020  Provider: Jacek Guy MD  PCP: CESAR Capps - CNP    Chief Complaint  Chest Pain (chest pain on Saturday morning.)      HPI  (History provided by patient)  This is a 72 y.o. female who was brought in by self for chest pain. The patient is intermittent chest pain since Saturday morning. Pain is localized to her chest, radiating to her face and jaw. Patient felt lightheaded. She did not have associated shortness of breath or sweating. She describes the pain as sharp. She has seen a cardiologist and had a catheterization in the past.  It has been a few years, although she is not sure how long ago. Her symptoms have improved since onset and she reports feeling better at this time. She denies recent illness. She has not had fever or chills. ROS  10 systems reviewed, pertinent positives/negatives per HPI otherwise noted to be negative.     I have reviewed the following nursing documentation:  Allergies: Tramadol and Penicillins    Past medical history:   Past Medical History:   Diagnosis Date    Abnormal stress test *Aug.1, 2016    Coronary angiography by Dr. Sharon Delgado revealed a 60% stenosis of the LAD and  a 50% mid circumflex     Arthritis     Chronic headaches     Eczema     Fibromyalgia     GERD (gastroesophageal reflux disease)     Hyperlipidemia     Hypertension     Impaired fasting glucose     Interstitial lung disease ([address] 2016    Dr. Jessica Jackson Interstitial lung disease ([address]     Migraine headache     Osteopenia DEXA - March, 2016    Lumbar T score +1.7 and Hip -1.0 FRAX 7.6/0.6    Osteopenia DEXA-Dec., 2017    Lumbar T score 2.3 and hip T score 0.1    Other screening mammogram July 8, 2013    Benign    Other screening mammogram *March 4, 2016    Benign    Oxygen dependent     O2 @ 3.5 L CONTINUOUS    Pulmonary hypertension [facility]     MILD    Retention of urine, unspecified     Screening mammogram, encounter for *February 11, 2020    Benign    Seizure disorder [facility])     Thyroid disease     Type 2 diabetes mellitus without complication [facility] *DZB.59, 2020    FBS - 137 and A 1 C of 6.7    Vitamin D deficiency Oct., 2015    Level - 16    Wears glasses      Past surgical history:   Past Surgical History:   Procedure Laterality Date    ABDOMINOPLASTY  Jan., 2012    Dr. Myles Salazar Bilateral     BRONCHOSCOPY  **Sept.16, 2016    Dr. Witt Beverage  09/16/2016    BRONCHOSCOPY  *Febr.24, 2017    Dr. Luciana Waters - No endobronchial lesions    CARDIAC CATHETERIZATION  Oct., 2004    LAD 50%, circumflex normal, R coronary normal, LVEF 60%.  CARDIAC CATHETERIZATION  *Sept.24, 2018    Dr. Amador Khanna - [facility] - mild pulmonary hypertension    COLONOSCOPY  Oct., 2003 ( 2013 )    Dr. Travis Franklin - Negative - repeat 2013    COLONOSCOPY  Jan., 2014 ( 2024 )    Dr. Kirsty Leroy - moderate diverticulosis.  COSMETIC SURGERY      inner thighs    LIPOSUCTION Bilateral     knees    WI LAPAROSCOPY W TOT HYSTERECTUTERUS <=250 GRAM  W TUBE/OVARY N/A 11/16/2018    ROBOTIC TOTAL HYSTERECTOMY AND BILATERAL SALPINGO-OOPHORECTOMY performed by Poli Harding MD at [address] [place] [address]  *Nov., 2018    Dr. Catherine Lundberg  Oct., 2015    Dr. Dom Gonsalez - left 2nd and 3rd    TOTAL HIP ARTHROPLASTY  July 31, 2013    Dr. Brady Forrest  *April 13, 2018    Dr. Daria Vera - right    TUBAL LIGATION         Home medications:   Previous Medications    ASPIRIN 81 MG TABLET    Take 81 mg by mouth daily.       ATORVASTATIN (LIPITOR) 80 MG TABLET    Take 1 tablet by mouth daily    AZATHIOPRINE (IMURAN) 50 MG TABLET    Take 50 mg by mouth daily 4 tablets daily, for a total of 200 mg daily    AZITHROMYCIN (ZITHROMAX) 500 MG TABLET    Take 500 mg by mouth daily CHOLECALCIFEROL (VITAMIN D3) 1.25 MG (13015 UT) CAPS    1 capsule every 7 days    DICLOFENAC PO    Take 75 mg by mouth 2 times daily    DULOXETINE (CYMBALTA) 30 MG EXTENDED RELEASE CAPSULE    Take 1 capsule by mouth daily    EZETIMIBE (ZETIA) 10 MG TABLET    Take 1 tablet by mouth daily    FLUOXETINE (PROZAC) 40 MG CAPSULE    Take 40 mg by mouth daily    FUROSEMIDE (LASIX) 20 MG TABLET    Take 1 tablet by mouth daily    GLIMEPIRIDE (AMARYL) 1 MG TABLET    Take 1 tablet by mouth every morning (before breakfast)    IBUPROFEN (ADVIL;MOTRIN) 800 MG TABLET    Take 1 tablet by mouth every 6 hours as needed for Pain    LEVOTHYROXINE (SYNTHROID) 100 MCG TABLET    TAKE ONE TABLET BY MOUTH DAILY    LISINOPRIL (PRINIVIL;ZESTRIL) 20 MG TABLET    Take 1 tablet by mouth daily    MAGNESIUM (MAGNESIUM-OXIDE) 250 MG TABS TABLET    Take 250 mg by mouth daily    METOPROLOL SUCCINATE (TOPROL XL) 50 MG EXTENDED RELEASE TABLET    Take one tablet by mouth daily    OMEPRAZOLE (PRILOSEC) 20 MG CAPSULE    Take 1 capsule by mouth daily    OXYGEN    Inhale 4-5 L/day into the lungs    PREDNISONE (DELTASONE) 5 MG TABLET    Take 5 mg by mouth daily    TEGRETOL 200 MG TABLET    TAKE ONE TABLET BY MOUTH TWICE A DAY       Social history:  reports that she has never smoked. She has never used smokeless tobacco. She reports current alcohol use. She reports that she does not use drugs. Family history:    Family History   Problem Relation Age of Onset    Heart Disease Father 76        , ASHD, S/P CAB, NIDDM, hyperlipidemia.  Cancer Mother 58        , lung cancer. Exam  ED Triage Vitals [20 1708]   BP Temp Temp Source Pulse Resp SpO2 Height Weight   (!) 145/82 98.1 °F (36.7 °C) Oral 81 16 93 % 5' 2.5\" (1.588 m) 204 lb (92.5 kg)     Nursing note and vitals reviewed. Constitutional: Well developed, well nourished. Non-toxic in appearance. HENT:      Head: Normocephalic and atraumatic.       Ears: External ears normal.      Nose: Nose normal.     Mouth: Membrane mucosa moist and pink. Eyes: Anicteric sclera. No discharge. Neck: Supple. Trachea midline. Cardiovascular: RRR; no murmurs, rubs, or gallops. Pulmonary/Chest: Effort normal. No respiratory distress. CTAB. No stridor. No wheezes. No rales. Abdominal: Soft. No distension. Musculoskeletal: Moves all extremities. No gross deformity. No lower extremity edema  Neurological: Alert and oriented. Face symmetric. Speech is clear. Skin: Warm and dry. No rash. Psychiatric: Normal mood and affect. Behavior is normal.    EKG  The Ekg interpreted by me in the absence of a cardiologist shows. normal sinus rhythm with a rate of 80  Axis is   Left axis deviation  QTc is  normal  Intervals and Durations are unremarkable. No specific ST-T wave changes appreciated. No evidence of acute ischemia. No significant change from prior EKG dated 2/18/2020      Radiology  XR CHEST (2 VW)   Final Result   Linear opacity in left lung base, which could be due to atelectasis or   pneumonia.              Labs  Results for orders placed or performed during the hospital encounter of 83/11/60   Basic Metabolic Panel   Result Value Ref Range    Sodium 140 136 - 145 mmol/L    Potassium 4.1 3.5 - 5.1 mmol/L    Chloride 102 99 - 110 mmol/L    CO2 27 21 - 32 mmol/L    Anion Gap 11 3 - 16    Glucose 131 (H) 70 - 99 mg/dL    BUN 21 (H) 7 - 20 mg/dL    CREATININE 0.9 0.6 - 1.2 mg/dL    GFR Non-African American >60 >60    GFR African American >60 >60    Calcium 9.5 8.3 - 10.6 mg/dL   Troponin   Result Value Ref Range    Troponin <0.01 <0.01 ng/mL   CBC Auto Differential   Result Value Ref Range    WBC 6.1 4.0 - 11.0 K/uL    RBC 4.25 4.00 - 5.20 M/uL    Hemoglobin 12.8 12.0 - 16.0 g/dL    Hematocrit 39.2 36.0 - 48.0 %    MCV 92.2 80.0 - 100.0 fL    MCH 30.1 26.0 - 34.0 pg    MCHC 32.7 31.0 - 36.0 g/dL    RDW 13.5 12.4 - 15.4 %    Platelets 192 643 - 732 K/uL    MPV 7.8 5.0 - 10.5 fL    Neutrophils % 40.7 % Lymphocytes % 40.5 %    Monocytes % 9.8 %    Eosinophils % 8.2 %    Basophils % 0.8 %    Neutrophils Absolute 2.5 1.7 - 7.7 K/uL    Lymphocytes Absolute 2.5 1.0 - 5.1 K/uL    Monocytes Absolute 0.6 0.0 - 1.3 K/uL    Eosinophils Absolute 0.5 0.0 - 0.6 K/uL    Basophils Absolute 0.0 0.0 - 0.2 K/uL     Previous Records - Cath Report from 8/1/2016  60% LAD at D1 bifurcation--IFR 0.97,  FFR 0.91  50% mid cx  Normal LV FXN    Screenings  Heart Score for chest pain patients  History: Moderately Suspicious  ECG: Non-Specifc repolarization disturbance/LBTB/PM  Patient Age: > 65 years  *Risk factors for Atherosclerotic disease: Hypertension, Hypercholesterolemia, Obesity, Coronary Artery Disease  Risk Factors: > 3 Risk factors or history of atherosclerotic disease*  Troponin: < 1X normal limit  Heart Score Total: 6     MDM and ED Course  Reviewed the patient's records and found her cath report from 2016. She did not have a stent placed at the time, but she did have developing coronary artery disease with 50 to 60% blockage in 2 vessels. With a heart score of 6 and no cardiac work-up since then, the patient needs admission for further evaluation for possible cardiogenic chest pain. I spoke with Brit Spencer, hospitalist. We thoroughly discussed the history, physical exam, laboratory and imaging studies, as well as, emergency department course. Based upon that discussion, we've decided to admit Zulma Banerjee for further observation and evaluation of Ziggy Mckeon's chest pain. As I have deemed necessary from their history, physical, and studies, I have considered and evaluated Zulma Banerjee for the following diagnoses:  PULMONARY EMBOLISM, ACUTE CORONARY SYNDROME, CARDIAC TAMPONADE, PNEUMOTHORAX, PNEUMONIA, PERICARDITIS, AORTIC DISSECTION/ANEURYSM, HEMOTHORAX    Final Impression  1.  Chest pain, unspecified type        Blood pressure 125/74, pulse 69, temperature 98.1 °F (36.7 °C), temperature source Oral, resp. rate 18, height 5' 2.5\" (1.588 m), weight 204 lb (92.5 kg), SpO2 100 %, not currently breastfeeding. Disposition:  Admit      This chart was generated using the [address] [place] [address] dictation system. I created this record but it may contain dictation errors given the limitations of this technology.        Landy Foote MD  10/20/20 7220

## 2020-09-28 NOTE — TELEPHONE ENCOUNTER
Called and spoke to the patient about the message below she stated that she is having no symptoms  Now and is feeling fine.

## 2020-09-28 NOTE — ED PROVIDER NOTES
EKG reviewed by self. Dated today 1. Rate 80. Normal sinus rhythm. Left axis deviation. No change from 2/18/2020.      Hoyle Angelucci, MD  09/28/20 [id number]

## 2020-09-28 NOTE — TELEPHONE ENCOUNTER
states pt has had 2 episodes, last one was Friday 9/25/20 it lasted about 5 min. She had chest pain that radiated to back and jaw. No symptoms today. Please call  to advise.

## 2020-09-28 NOTE — H&P
HOSPITALISTS HISTORY AND PHYSICAL    9/28/2020 7:49 PM    Patient Information:  Irving Nogueira is a 72 y.o. female [id number]  PCP:  CESAR Durham CNP (Tel: [phone] )    Chief complaint:    Chief Complaint   Patient presents with    Chest Pain     chest pain on Saturday morning. History of Present Illness:  Rosendo Sierra is a 72 y.o. female with hx of HLD, DM, and Interstitial Lung Disease on chronic oxygen. Pt presents in ER for chest pain. She states she woke up from sleep at 2am on Saturday morning. She states pain was sharp mid sternum and radiated to her back. The pain then radiated to her jaw bilaterally. Pain last 3 to 5 min and resolved spontaneously. She denies any nausea or diaphoresis with pain. She has not had pain since. She states her  called cardiology and was instructed to go to ER to be evaluated. She had a normal dobutamine stress ECHO 4/3/18      History obtained from patient. REVIEW OF SYSTEMS:   Review of Systems   Constitutional: Negative. HENT: Negative. Eyes: Negative. Respiratory: Positive for cough and shortness of breath. Chronic cough and shortness of breath, no change   Cardiovascular: Positive for chest pain. Gastrointestinal: Negative. Endocrine: Negative. Genitourinary: Negative. Musculoskeletal: Negative. Skin: Negative. Neurological: Negative. Hematological: Negative. Psychiatric/Behavioral: Negative.         Past Medical History:   has a past medical history of Abnormal stress test, Arthritis, Chronic headaches, Eczema, Fibromyalgia, GERD (gastroesophageal reflux disease), Hyperlipidemia, Hypertension, Impaired fasting glucose, Interstitial lung disease ([address] Interstitial lung disease ([address] Migraine headache, Osteopenia, Osteopenia, Other screening mammogram, Other screening mammogram, Oxygen dependent, Pulmonary hypertension ([facility] Retention of urine, unspecified, Screening mammogram, encounter for, Seizure disorder ([facility] [address] Thyroid disease, Type 2 diabetes mellitus without complication ([facility] [address] Vitamin D deficiency, and Wears glasses. Past Surgical History:   has a past surgical history that includes Appendectomy; Tubal ligation; Colonoscopy (Oct., 2003 ( 2013 )); Abdominoplasty (Jan., 2012); Cosmetic surgery; Liposuction (Bilateral); Cardiac catheterization (Oct., 2004); Breast reduction surgery (Bilateral); Total hip arthroplasty (July 31, 2013); Colonoscopy (Jan., 2014 ( 2024 )); Toe Surgery (Oct., 2015); bronchoscopy (**Sept.16, 2016); bronchoscopy (09/16/2016); bronchoscopy (*Febr.24, 2017); Total hip arthroplasty (*April 13, 2018); Cardiac catheterization (*Sept.24, 2018); neto and bso (cervix removed) (*Nov., 2018); and pr laparoscopy w tot hysterectuterus <=250 gram  w tube/ovary (N/A, 11/16/2018). Medications:  No current facility-administered medications on file prior to encounter.       Current Outpatient Medications on File Prior to Encounter   Medication Sig Dispense Refill    DICLOFENAC PO Take 75 mg by mouth 2 times daily      FLUoxetine (PROZAC) 40 MG capsule Take 40 mg by mouth daily      predniSONE (DELTASONE) 5 MG tablet Take 5 mg by mouth daily      azithromycin (ZITHROMAX) 500 MG tablet Take 500 mg by mouth daily      magnesium (MAGNESIUM-OXIDE) 250 MG TABS tablet Take 250 mg by mouth daily      azaTHIOprine (IMURAN) 50 MG tablet Take 50 mg by mouth daily 4 tablets daily, for a total of 200 mg daily      ezetimibe (ZETIA) 10 MG tablet Take 1 tablet by mouth daily 90 tablet 1    atorvastatin (LIPITOR) 80 MG tablet Take 1 tablet by mouth daily 90 tablet 1    Cholecalciferol (VITAMIN D3) 1.25 MG (11484 UT) CAPS 1 capsule every 7 days 12 capsule 3    glimepiride (AMARYL) 1 MG tablet Take 1 tablet by mouth every morning (before breakfast) 90 tablet 1    TEGRETOL 200 MG tablet TAKE ONE TABLET BY MOUTH TWICE A DAY 60 tablet 10    lisinopril (PRINIVIL;ZESTRIL) 20 MG tablet Take 1 tablet by mouth daily 90 tablet 3    furosemide (LASIX) 20 MG tablet Take 1 tablet by mouth daily 90 tablet 3    OXYGEN Inhale 4-5 L/day into the lungs      levothyroxine (SYNTHROID) 100 MCG tablet TAKE ONE TABLET BY MOUTH DAILY 90 tablet 3    metoprolol succinate (TOPROL XL) 50 MG extended release tablet Take one tablet by mouth daily 90 tablet 3    ibuprofen (ADVIL;MOTRIN) 800 MG tablet Take 1 tablet by mouth every 6 hours as needed for Pain 50 tablet 3    omeprazole (PRILOSEC) 20 MG capsule Take 1 capsule by mouth daily 84 capsule 2    aspirin 81 MG tablet Take 81 mg by mouth daily.  DULoxetine (CYMBALTA) 30 MG extended release capsule Take 1 capsule by mouth daily 30 capsule 1       Allergies: Allergies   Allergen Reactions    Tramadol      Avoid because Pt is on Tegretol.  Penicillins Rash     Patient states she can take Keflex        Social History:  Patient Lives with spouse   reports that she has never smoked. She has never used smokeless tobacco. She reports current alcohol use. She reports that she does not use drugs. Family History:  family history includes Cancer (age of onset: 58) in her mother; Heart Disease (age of onset: 76) in her father. ,    Physical Exam:  BP (!) 147/68   Pulse 66   Temp 98.1 °F (36.7 °C) (Oral)   Resp 20   Ht 5' 2.5\" (1.588 m)   Wt 204 lb (92.5 kg)   SpO2 100%   BMI 36.72 kg/m²   Physical Exam  Vitals signs and nursing note reviewed. Constitutional:       General: She is not in acute distress. Appearance: Normal appearance. HENT:      Head: Normocephalic and atraumatic. Mouth/Throat:      Mouth: Mucous membranes are dry. Eyes:      Pupils: Pupils are equal, round, and reactive to light. Neck:      Musculoskeletal: Normal range of motion. Cardiovascular:      Rate and Rhythm: Normal rate and regular rhythm. Pulses: Normal pulses. Heart sounds: Normal heart sounds. No murmur. Pulmonary:      Effort: Pulmonary effort is normal. No respiratory distress. Breath sounds: Normal breath sounds. Abdominal:      General: Abdomen is flat. Bowel sounds are normal. There is no distension. Palpations: Abdomen is soft. Musculoskeletal: Normal range of motion. Right lower leg: Edema present. Left lower leg: Edema present. Skin:     General: Skin is warm and dry. Neurological:      General: No focal deficit present. Mental Status: She is alert and oriented to person, place, and time. Psychiatric:         Mood and Affect: Mood normal.         Behavior: Behavior normal.         Labs:  CBC:   Lab Results   Component Value Date    WBC 6.1 09/28/2020    RBC 4.25 09/28/2020    HGB 12.8 09/28/2020    HCT 39.2 09/28/2020    MCV 92.2 09/28/2020    MCH 30.1 09/28/2020    MCHC 32.7 09/28/2020    RDW 13.5 09/28/2020     09/28/2020    MPV 7.8 09/28/2020     BMP:    Lab Results   Component Value Date     09/28/2020    K 4.1 09/28/2020     09/28/2020    CO2 27 09/28/2020    BUN 21 09/28/2020    CREATININE 0.9 09/28/2020    CALCIUM 9.5 09/28/2020    GFRAA >60 09/28/2020    GFRAA >60 08/14/2012    LABGLOM >60 09/28/2020    GLUCOSE 131 09/28/2020    GLUCOSE 106 01/17/2012     XR CHEST (2 VW)   Final Result   Linear opacity in left lung base, which could be due to atelectasis or   pneumonia. Chest Xray:   EKG:    I visualized CXR images and EKG strips      Problem List  Active Problems:    Chest pain, rule out acute myocardial infarction  Resolved Problems:    * No resolved hospital problems. *        Assessment/Plan:   1. Chest Pain R/O Cardiac source    Pt will be monitored on telemetry overnight. No chest pain at this time. Daily  asa, ACE, BB. Cardiology consult given patient's hx and established  cardiologist.  Serial troponin.      2. Type II DM   Hold oral agents,  Check A1c.   Sliding scale insulin. 3. Hyperlipidemia   Continue statin    4. Interstitial Lung Disease   Stable, continue steroids and imuran. Follows with Dr. Nina Lott      DVT prophylaxis Lovenox  Code status Full  Diet Cardiac, NPO midnight  IV access peripheral   Perez Catheter none    Admit as Observation. I anticipate hospitalization spanning less than two midnights for investigation and treatment of the above medically necessary diagnoses. Please note that some part of this chart was generated using Dragon dictation software. Although every effort was made to ensure the accuracy of this automated transcription, some errors in transcription may have occurred inadvertently. If you may need any clarification, please do not hesitate to contact me through [facility].        CESAR Del Valle CNP    9/28/2020 7:49 PM

## 2020-09-28 NOTE — TELEPHONE ENCOUNTER
She has had two episodes of chest pain, one episode a month ago, one on Friday night  She was awakened with  sharp anterior chest pain, that radiated to her back, then face and jaw, lasting 3-5 minutes, no associated sob, diaphoresis, palpitations. Did feel a little dizzy. This resolved on its own.  She has not been awakened since at night, but does notice this feeling if she walks up steps  She has ILD, has oxygen at 4.5 liters and reports her pulse ox is 95%  Discussed with Dr Duyen Peters to come to the Er  Notified patient--she will come to the ER for further evaluation

## 2020-09-29 VITALS
RESPIRATION RATE: 18 BRPM | SYSTOLIC BLOOD PRESSURE: 126 MMHG | WEIGHT: 209.6 LBS | TEMPERATURE: 97.4 F | HEART RATE: 67 BPM | HEIGHT: 63 IN | DIASTOLIC BLOOD PRESSURE: 82 MMHG | BODY MASS INDEX: 37.14 KG/M2 | OXYGEN SATURATION: 99 %

## 2020-09-29 LAB
ANION GAP SERPL CALCULATED.3IONS-SCNC: 8 MMOL/L (ref 3–16)
BASOPHILS ABSOLUTE: 0 K/UL (ref 0–0.2)
BASOPHILS RELATIVE PERCENT: 0.8 %
BUN BLDV-MCNC: 22 MG/DL (ref 7–20)
CALCIUM SERPL-MCNC: 9.7 MG/DL (ref 8.3–10.6)
CHLORIDE BLD-SCNC: 103 MMOL/L (ref 99–110)
CO2: 31 MMOL/L (ref 21–32)
CREAT SERPL-MCNC: 1 MG/DL (ref 0.6–1.2)
EKG ATRIAL RATE: 80 BPM
EKG DIAGNOSIS: NORMAL
EKG P AXIS: 22 DEGREES
EKG P-R INTERVAL: 158 MS
EKG Q-T INTERVAL: 392 MS
EKG QRS DURATION: 90 MS
EKG QTC CALCULATION (BAZETT): 452 MS
EKG R AXIS: -18 DEGREES
EKG T AXIS: 7 DEGREES
EKG VENTRICULAR RATE: 80 BPM
EOSINOPHILS ABSOLUTE: 0.3 K/UL (ref 0–0.6)
EOSINOPHILS RELATIVE PERCENT: 6.2 %
ESTIMATED AVERAGE GLUCOSE: 139.9 MG/DL
GFR AFRICAN AMERICAN: >60
GFR NON-AFRICAN AMERICAN: 55
GLUCOSE BLD-MCNC: 107 MG/DL (ref 70–99)
GLUCOSE BLD-MCNC: 129 MG/DL (ref 70–99)
GLUCOSE BLD-MCNC: 166 MG/DL (ref 70–99)
HBA1C MFR BLD: 6.5 %
HCT VFR BLD CALC: 37.4 % (ref 36–48)
HEMOGLOBIN: 12.3 G/DL (ref 12–16)
LV EF: 76 %
LVEF MODALITY: NORMAL
LYMPHOCYTES ABSOLUTE: 1.6 K/UL (ref 1–5.1)
LYMPHOCYTES RELATIVE PERCENT: 29.9 %
MCH RBC QN AUTO: 30.4 PG (ref 26–34)
MCHC RBC AUTO-ENTMCNC: 33 G/DL (ref 31–36)
MCV RBC AUTO: 92.3 FL (ref 80–100)
MONOCYTES ABSOLUTE: 0.3 K/UL (ref 0–1.3)
MONOCYTES RELATIVE PERCENT: 5.3 %
NEUTROPHILS ABSOLUTE: 3.1 K/UL (ref 1.7–7.7)
NEUTROPHILS RELATIVE PERCENT: 57.8 %
PDW BLD-RTO: 13.4 % (ref 12.4–15.4)
PERFORMED ON: ABNORMAL
PERFORMED ON: ABNORMAL
PLATELET # BLD: 260 K/UL (ref 135–450)
PMV BLD AUTO: 8 FL (ref 5–10.5)
POTASSIUM REFLEX MAGNESIUM: 5.1 MMOL/L (ref 3.5–5.1)
RBC # BLD: 4.05 M/UL (ref 4–5.2)
SODIUM BLD-SCNC: 142 MMOL/L (ref 136–145)
TROPONIN: <0.01 NG/ML
WBC # BLD: 5.5 K/UL (ref 4–11)

## 2020-09-29 PROCEDURE — 84484 ASSAY OF TROPONIN QUANT: CPT

## 2020-09-29 PROCEDURE — A9502 TC99M TETROFOSMIN: HCPCS | Performed by: INTERNAL MEDICINE

## 2020-09-29 PROCEDURE — 36415 COLL VENOUS BLD VENIPUNCTURE: CPT

## 2020-09-29 PROCEDURE — 85025 COMPLETE CBC W/AUTO DIFF WBC: CPT

## 2020-09-29 PROCEDURE — 2580000003 HC RX 258: Performed by: NURSE PRACTITIONER

## 2020-09-29 PROCEDURE — 94760 N-INVAS EAR/PLS OXIMETRY 1: CPT

## 2020-09-29 PROCEDURE — 93017 CV STRESS TEST TRACING ONLY: CPT | Performed by: INTERNAL MEDICINE

## 2020-09-29 PROCEDURE — 6370000000 HC RX 637 (ALT 250 FOR IP): Performed by: NURSE PRACTITIONER

## 2020-09-29 PROCEDURE — 3430000000 HC RX DIAGNOSTIC RADIOPHARMACEUTICAL: Performed by: INTERNAL MEDICINE

## 2020-09-29 PROCEDURE — 80048 BASIC METABOLIC PNL TOTAL CA: CPT

## 2020-09-29 PROCEDURE — 6360000002 HC RX W HCPCS: Performed by: INTERNAL MEDICINE

## 2020-09-29 PROCEDURE — 78452 HT MUSCLE IMAGE SPECT MULT: CPT | Performed by: INTERNAL MEDICINE

## 2020-09-29 PROCEDURE — 93010 ELECTROCARDIOGRAM REPORT: CPT | Performed by: INTERNAL MEDICINE

## 2020-09-29 PROCEDURE — 2700000000 HC OXYGEN THERAPY PER DAY

## 2020-09-29 PROCEDURE — G0378 HOSPITAL OBSERVATION PER HR: HCPCS

## 2020-09-29 PROCEDURE — 99214 OFFICE O/P EST MOD 30 MIN: CPT | Performed by: INTERNAL MEDICINE

## 2020-09-29 RX ADMIN — ASPIRIN 81 MG: 81 TABLET, CHEWABLE ORAL at 07:53

## 2020-09-29 RX ADMIN — ATORVASTATIN CALCIUM 80 MG: 80 TABLET, FILM COATED ORAL at 07:53

## 2020-09-29 RX ADMIN — FLUOXETINE 40 MG: 20 CAPSULE ORAL at 07:53

## 2020-09-29 RX ADMIN — TETROFOSMIN 30 MILLICURIE: 1.38 INJECTION, POWDER, LYOPHILIZED, FOR SOLUTION INTRAVENOUS at 10:47

## 2020-09-29 RX ADMIN — CARBAMAZEPINE 200 MG: 200 TABLET ORAL at 07:53

## 2020-09-29 RX ADMIN — Medication 10 ML: at 07:53

## 2020-09-29 RX ADMIN — PANTOPRAZOLE SODIUM 40 MG: 40 TABLET, DELAYED RELEASE ORAL at 06:15

## 2020-09-29 RX ADMIN — REGADENOSON 0.4 MG: 0.08 INJECTION, SOLUTION INTRAVENOUS at 10:43

## 2020-09-29 RX ADMIN — TETROFOSMIN 10 MILLICURIE: 1.38 INJECTION, POWDER, LYOPHILIZED, FOR SOLUTION INTRAVENOUS at 09:53

## 2020-09-29 ASSESSMENT — PAIN SCALES - GENERAL
PAINLEVEL_OUTOF10: 0

## 2020-09-29 NOTE — PROGRESS NOTES
Discharge order received, pt verbalized agreement to discharge, disposition to previous residence, no needs for HHC/DME. Action- discharge instructions prepared and given to patient, pt verbalized understanding. Medication information packet given r/t NEW and/or CHANGED prescriptions emphasizing name/purpose/side effects, pt verbalized understanding. Discharge instruction summary: Diet- Cardiac, Activity- as tolerated, Primary Care Physician as followsCESAR Reyna -[phone] f/u appointment within 1 week, immunizations reviewed and N/A, prescription medications filled N/A. Inpatient surgical procedure precautions reviewed: N/A     Pt belongings gathered, IV removed. Disposition is home (no HHC/DME needs), transported with RN, taken to lobby via w/c w/ all personal belongings, no complications.

## 2020-09-29 NOTE — PLAN OF CARE
Problem: Falls - Risk of:  Goal: Will remain free from falls  Description: Will remain free from falls  Outcome: Ongoing     Problem: Safety:  Intervention: Keep Call Light within reach  Note: Encouraged to use call light for assistance as needed     Problem: Daily Care:  Goal: Daily care needs are met  Description: Daily care needs are met  Outcome: Ongoing

## 2020-09-29 NOTE — PROGRESS NOTES
[facility]    9/29/2020 9:36 AM        Name: Faiza Krishnamurthy . Admitted: 9/28/2020  Primary Care Provider: CESAR Ware CNP (Tel: [phone])                        Subjective:  . No acute events overnight. Resting well. Pain control. Diet ok. Labs reviewed  Denies any chest pain sob.      Reviewed interval ancillary notes    Current Medications  regadenoson (LEXISCAN) injection 0.4 mg, ONCE PRN  atorvastatin (LIPITOR) tablet 80 mg, Daily  FLUoxetine (PROZAC) capsule 40 mg, Daily  pantoprazole (PROTONIX) tablet 40 mg, QAM AC  carBAMazepine (TEGRETOL) tablet 200 mg, BID  aspirin chewable tablet 81 mg, Daily  furosemide (LASIX) tablet 20 mg, Daily  sodium chloride flush 0.9 % injection 10 mL, 2 times per day  sodium chloride flush 0.9 % injection 10 mL, PRN  acetaminophen (TYLENOL) tablet 650 mg, Q6H PRN    Or  acetaminophen (TYLENOL) suppository 650 mg, Q6H PRN  polyethylene glycol (GLYCOLAX) packet 17 g, Daily PRN  promethazine (PHENERGAN) tablet 12.5 mg, Q6H PRN    Or  ondansetron (ZOFRAN) injection 4 mg, Q6H PRN  enoxaparin (LOVENOX) injection 40 mg, Daily  nitroGLYCERIN (NITROSTAT) SL tablet 0.4 mg, Q5 Min PRN  insulin lispro (1 Unit Dial) 0-6 Units, TID WC  insulin lispro (1 Unit Dial) 0-3 Units, Nightly  glucose (GLUTOSE) 40 % oral gel 15 g, PRN  dextrose 50 % IV solution, PRN  glucagon (rDNA) injection 1 mg, PRN  dextrose 5 % solution, PRN  levothyroxine (SYNTHROID) tablet 100 mcg, Nightly  lisinopril (PRINIVIL;ZESTRIL) tablet 20 mg, Nightly  predniSONE (DELTASONE) tablet 5 mg, Nightly        Objective:  BP (!) 151/82   Pulse 65   Temp 98.5 °F (36.9 °C) (Oral)   Resp 16   Ht 5' 2.5\" (1.588 m)   Wt 209 lb 9.6 oz (95.1 kg)   SpO2 98%   BMI 37.73 kg/m²     Intake/Output Summary (Last 24 hours) at 9/29/2020 9742  Last data filed at 9/29/2020 0749  Gross per 24 hour   Intake 10 ml   Output --   Net 10 ml      Wt Readings from Last 3 Encounters:   09/28/20 209 lb 9.6 oz (95.1 kg)   09/17/20 210 lb 3.2 oz (95.3 kg)   02/18/20 213 lb (96.6 kg)       General appearance:  Appears comfortable  Eyes: Sclera clear. Pupils equal.  ENT: Moist oral mucosa. Trachea midline, no adenopathy. Cardiovascular: Regular rhythm, normal S1, S2. No murmur. No edema in lower extremities  Respiratory: Not using accessory muscles. Good inspiratory effort. Clear to auscultation bilaterally, no wheeze or crackles. GI: Abdomen soft, no tenderness, not distended, normal bowel sounds  Musculoskeletal: No cyanosis in digits, neck supple  Neurology: CN 2-12 grossly intact. No speech or motor deficits  Psych: Normal affect. Alert and oriented in time, place and person  Skin: Warm, dry, normal turgor    Labs and Tests:  CBC:   Recent Labs     09/28/20  1725 09/29/20  0459   WBC 6.1 5.5   HGB 12.8 12.3    260     BMP:    Recent Labs     09/28/20  1725 09/29/20  0500    142   K 4.1 5.1    103   CO2 27 31   BUN 21* 22*   CREATININE 0.9 1.0   GLUCOSE 131* 166*     Hepatic: No results for input(s): AST, ALT, ALB, BILITOT, ALKPHOS in the last 72 hours. Discussed care with family and patient             Spent 30  minutes with patient and family at bedside and on unit reviewing medical records and labs, spent greater than 50% time counseling patient and family on diagnosis and plan   Problem List  Active Problems:    Chest pain, rule out acute myocardial infarction  Resolved Problems:    * No resolved hospital problems. *       Assessment & Plan:   1. Chest pain  -Await cardiology consult.  -Patient negative.     Diabetes  Hyperlipidemia      Diet: Diet NPO, After Midnight  Code:Full Code  DVT PPX lovenox       Nelia Tian MD   9/29/2020 9:36 AM

## 2020-09-29 NOTE — ED NOTES
Pt report called to Alirio Jeter RN, states no questions or concerns at this time.       [address] [facility], RN  09/28/20 2004

## 2020-09-29 NOTE — CONSULTS
[address] [facility]  [phone]      Chief Complaint   Patient presents with    Chest Pain     chest pain on Saturday morning. History of Present Illness:  Stephani Short is a 72 y.o. patient who presented to the hospital with complaints of chest pain. She awoke yesterday morning with substernal CP, sharp, severe radiating to her back and jaw. Pain was gone after a few minutes. She came to the ED for further evaluation. CP free since arrival. This did happen once before in the past few months. I have been asked to provide consultation regarding further management and testing. She has h/o CAD, Interstitial lung disease requiring continuous oxygen. Past Medical History:   has a past medical history of Abnormal stress test, Arthritis, Chronic headaches, Eczema, Fibromyalgia, GERD (gastroesophageal reflux disease), Hyperlipidemia, Hypertension, Impaired fasting glucose, Interstitial lung disease ([address] Interstitial lung disease ([address] Migraine headache, Osteopenia, Osteopenia, Other screening mammogram, Other screening mammogram, Oxygen dependent, Pulmonary hypertension ([address] Retention of urine, unspecified, Screening mammogram, encounter for, Seizure disorder ([address] Thyroid disease, Type 2 diabetes mellitus without complication ([address] Vitamin D deficiency, and Wears glasses. Surgical History:   has a past surgical history that includes Appendectomy; Tubal ligation; Colonoscopy (Oct., 2003 ( 2013 )); Abdominoplasty (Jan., 2012); Cosmetic surgery; Liposuction (Bilateral); Cardiac catheterization (Oct., 2004); Breast reduction surgery (Bilateral); Total hip arthroplasty (July 31, 2013); Colonoscopy (Jan., 2014 ( 2024 )); Toe Surgery (Oct., 2015); bronchoscopy (**Sept.16, 2016); bronchoscopy (09/16/2016); bronchoscopy (*Febr.24, 2017); Total hip arthroplasty (*April 13, 2018);  Cardiac catheterization (*Sept.24, 2018); neto and bso (cervix removed) (*Nov., 2018); and pr laparoscopy w tot hysterectuterus <=250 gram  w tube/ovary (N/A, 11/16/2018). Social History:   reports that she has never smoked. She has never used smokeless tobacco. She reports current alcohol use. She reports that she does not use drugs. Family History:  family history includes Cancer (age of onset: 58) in her mother; Heart Disease (age of onset: 76) in her father. Home Medications:  Were reviewed and are listed in nursing record. and/or listed below  Prior to Admission medications    Medication Sig Start Date End Date Taking?  Authorizing Provider   predniSONE (DELTASONE) 5 MG tablet Take 5 mg by mouth daily   Yes Historical Provider, MD   azithromycin (ZITHROMAX) 500 MG tablet Take 500 mg by mouth daily   Yes Historical Provider, MD   magnesium (MAGNESIUM-OXIDE) 250 MG TABS tablet Take 250 mg by mouth daily   Yes Historical Provider, MD   azaTHIOprine (IMURAN) 50 MG tablet Take 50 mg by mouth daily 4 tablets daily, for a total of 200 mg daily   Yes Historical Provider, MD   ezetimibe (ZETIA) 10 MG tablet Take 1 tablet by mouth daily 9/17/20  Yes CESAR Sarmiento CNP   atorvastatin (LIPITOR) 80 MG tablet Take 1 tablet by mouth daily 9/17/20 12/16/20 Yes CESAR Sarmiento CNP   Cholecalciferol (VITAMIN D3) 1.25 MG (55846 UT) CAPS 1 capsule every 7 days 9/17/20  Yes CESAR Sarmiento CNP   glimepiride (AMARYL) 1 MG tablet Take 1 tablet by mouth every morning (before breakfast) 9/17/20 12/16/20 Yes CESAR Sarmiento CNP   DULoxetine (CYMBALTA) 30 MG extended release capsule Take 1 capsule by mouth daily 9/17/20  Yes CESAR Sarmiento CNP   TEGRETOL 200 MG tablet TAKE ONE TABLET BY MOUTH TWICE A DAY 9/5/20  Yes Zahira Das MD   lisinopril (PRINIVIL;ZESTRIL) 20 MG tablet Take 1 tablet by mouth daily 3/19/20  Yes Ngozi Boothe MD   furosemide (LASIX) 20 MG tablet Take 1 tablet by mouth daily 3/19/20  Yes Ngozi Boothe MD   OXYGEN Inhale 4-5 L/day into the lungs   Yes Historical Provider, MD   levothyroxine (SYNTHROID) 100 MCG tablet TAKE ONE TABLET BY MOUTH DAILY 2/14/20  Yes DARBY Wilson MD   metoprolol succinate (TOPROL XL) 50 MG extended release tablet Take one tablet by mouth daily 1/7/20  Yes Zakiya Montemayor MD   omeprazole (PRILOSEC) 20 MG capsule Take 1 capsule by mouth daily 10/2/15  Yes DARBY Wilson MD   aspirin 81 MG tablet Take 81 mg by mouth daily.      Yes Historical Provider, MD   DICLOFENAC PO Take 75 mg by mouth 2 times daily    Historical Provider, MD   ibuprofen (ADVIL;MOTRIN) 800 MG tablet Take 1 tablet by mouth every 6 hours as needed for Pain 11/16/18   Shadi Springer MD        Current Medications:  Current Facility-Administered Medications   Medication Dose Route Frequency Provider Last Rate Last Dose    regadenoson (LEXISCAN) injection 0.4 mg  0.4 mg Intravenous ONCE PRN Yumiko Madera MD        atorvastatin (LIPITOR) tablet 80 mg  80 mg Oral Daily Minoo Minialyse, APRN - CNP   80 mg at 09/29/20 0753    FLUoxetine (PROZAC) capsule 40 mg  40 mg Oral Daily Minoo Minick, APRN - CNP   40 mg at 09/29/20 0753    pantoprazole (PROTONIX) tablet 40 mg  40 mg Oral QAM AC Minoo Minick, APRN - CNP   40 mg at 09/29/20 0615    carBAMazepine (TEGRETOL) tablet 200 mg  200 mg Oral BID Minoo Minialyse, APRN - CNP   200 mg at 09/29/20 0753    aspirin chewable tablet 81 mg  81 mg Oral Daily Minoo Minick, APRN - CNP   81 mg at 09/29/20 0753    furosemide (LASIX) tablet 20 mg  20 mg Oral Daily Noe Barba, APRN - CNP   Stopped at 09/29/20 0754    sodium chloride flush 0.9 % injection 10 mL  10 mL Intravenous 2 times per day Minoo Minick, APRN - CNP   10 mL at 09/29/20 0753    sodium chloride flush 0.9 % injection 10 mL  10 mL Intravenous PRN Minoo Minialyse, APRN - CNP        acetaminophen (TYLENOL) tablet 650 mg  650 mg Oral Q6H PRN Minoo Minialyse, APRN - CNP        Or    acetaminophen (TYLENOL) suppository 650 mg  650 mg Rectal Q6H PRN Minoo Minick, APRN - CNP        polyethylene glycol (GLYCOLAX) packet 17 g  17 g Oral Daily PRN Minoo Minick, APRN - CNP        promethazine (PHENERGAN) tablet 12.5 mg  12.5 mg Oral Q6H PRN Minoo Minick, APRN - CNP        Or    ondansetron (ZOFRAN) injection 4 mg  4 mg Intravenous Q6H PRN Minoo Minick, APRN - CNP        enoxaparin (LOVENOX) injection 40 mg  40 mg Subcutaneous Daily Nilo Dominguez, APRN - CNP   Stopped at 09/29/20 0754    nitroGLYCERIN (NITROSTAT) SL tablet 0.4 mg  0.4 mg Sublingual Q5 Min PRN Minoo Minick, APRN - CNP        insulin lispro (1 Unit Dial) 0-6 Units  0-6 Units Subcutaneous TID WC Minoo Minick, APRN - CNP        insulin lispro (1 Unit Dial) 0-3 Units  0-3 Units Subcutaneous Nightly Miono Minick, APRN - CNP        glucose (GLUTOSE) 40 % oral gel 15 g  15 g Oral PRN Minoo Minick, APRN - CNP        dextrose 50 % IV solution  12.5 g Intravenous PRN Minoo Minick, APRN - CNP        glucagon (rDNA) injection 1 mg  1 mg Intramuscular PRN Minoo Minick, APRN - CNP        dextrose 5 % solution  100 mL/hr Intravenous PRN Minoo Minick, APRN - CNP        levothyroxine (SYNTHROID) tablet 100 mcg  100 mcg Oral Nightly Cruz Mendez MD   100 mcg at 09/28/20 2240    lisinopril (PRINIVIL;ZESTRIL) tablet 20 mg  20 mg Oral Nightly Cruz Mendez MD   20 mg at 09/28/20 2240    predniSONE (DELTASONE) tablet 5 mg  5 mg Oral Nightly Cruz Mendez MD   5 mg at 09/28/20 2240        Allergies:  Tramadol and Penicillins     Review of Systems:     · Constitutional: there has been no unanticipated weight loss. There's been no change in energy level, sleep pattern, or activity level. · Eyes: No visual changes or diplopia. No scleral icterus. · ENT: No Headaches, hearing loss or vertigo. No mouth sores or sore throat. · Cardiovascular: Reviewed in HPI  · Respiratory: No cough or wheezing, no sputum production. No hematemesis.     · Gastrointestinal: No abdominal pain, appetite loss, blood in stools. No change in bowel or bladder habits. · Genitourinary: No dysuria, trouble voiding, or hematuria. · Musculoskeletal:  No gait disturbance, weakness or joint complaints. · Integumentary: No rash or pruritis. · Neurological: No headache, diplopia, change in muscle strength, numbness or tingling. No change in gait, balance, coordination, mood, affect, memory, mentation, behavior. · Psychiatric: No anxiety, no depression. · Endocrine: No malaise, fatigue or temperature intolerance. No excessive thirst, fluid intake, or urination. No tremor. · Hematologic/Lymphatic: No abnormal bruising or bleeding, blood clots or swollen lymph nodes. · Allergic/Immunologic: No nasal congestion or hives.   ·     Physical Examination:    Vitals:    09/29/20 0749   BP: (!) 151/82   Pulse: 65   Resp: 16   Temp: 98.5 °F (36.9 °C)   SpO2: 98%    Weight: 209 lb 9.6 oz (95.1 kg)         General Appearance:  Alert, cooperative, no distress, appears stated age   Head:  Normocephalic, without obvious abnormality, atraumatic   Eyes:  PERRL, conjunctiva/corneas clear       Nose: Nares normal, no drainage or sinus tenderness   Throat: Lips, mucosa, and tongue normal   Neck: Supple, symmetrical, trachea midline, no adenopathy, thyroid: not enlarged, symmetric, no tenderness/mass/nodules, no carotid bruit or JVD       Lungs:   Clear to auscultation bilaterally, respirations unlabored   Chest Wall:  No tenderness or deformity   Heart:  Regular rate and rhythm, S1, S2 normal, no murmur, rub or gallop   Abdomen:   Soft, non-tender, bowel sounds active all four quadrants,  no masses, no organomegaly           Extremities: Extremities normal, atraumatic, no cyanosis or edema   Pulses: 2+ and symmetric   Skin: Skin color, texture, turgor normal, no rashes or lesions   Pysch: Normal mood and affect   Neurologic: Normal gross motor and sensory exam.         Labs  CBC:   Lab Results   Component Value Date    WBC 5.5 09/29/2020    RBC Active Problem List   Diagnosis    HLD (hyperlipidemia)    Essential hypertension, benign    Coronary atherosclerosis of native coronary artery    Seizure disorder (HCC)    Impaired fasting glucose    Osteoarthritis of hip    Vitamin D deficiency    Inter[facility]    S/P hysterectomy with oophorectomy    Type 2 diabetes mellitus without complication ([facility]    Chest pain, rule out acute myocardial infarction         Plan:    I had the opportunity to review the clinical symptoms and presentation of Chacho Powell. Assessment/Plan:  Active Problems:    Chest pain, rule out acute myocardial infarction  Plan: atypical angina, h/o nonobstructive CAD by cath. Nml troponin and ecg. Normal nuclear stress test today. Chest pain not cardiac in origin. Cont aspirin, statin, toprol xl. If CP persists consider LH cath in the future. Normal LV size with hyperdynamic systolic function.     Left ventricular ejection fraction of 76%.     There is normal isotope uptake at stress and rest.     There is no evidence of myocardial ischemia or scar. HTN: well controlled. Cont toprol xl, lisinopril, lasix    OK to discharge home today. FU as outpatient. I will address the patient's cardiac risk factors and adjusted pharmacologic treatment as needed. In addition, I have reinforced the need for patient directed risk factor modification. Tobacco use was discussed with the patient and educated on the negative effects. I have asked the patient to not utilize these agents. Thank you for allowing to us to participate in the care or Chacho Powell. Further evaluation will be based upon the patient's clinical course and testing results. All questions and concerns were addressed to the patient/family. Alternatives to my treatment were discussed. The note was completed using EMR.  Every effort was made to ensure accuracy; however, inadvertent computerized transcription errors may be present.     Buffy Barlow MD 9/29/2020 9:00 AM

## 2020-09-29 NOTE — ED NOTES
PT transported to floor by ED tech via wheelchair on portable telemetry and 4.5 LPM via NC. Telemetry confirmed with Eugenio gutierrez, 3A.       [address], RN  09/28/20 2009

## 2020-10-05 NOTE — DISCHARGE SUMMARY
[facility] DISCHARGE SUMMARY    Patient Demographics    Patient. Duy Contreras  Date of Birth. 1950  MRN. [id number]     Primary care provider. CESAR Couch CNP  (Tel: [phone])    Admit date: 9/28/2020    Discharge date (blank if same as Note Date): 9/29/2020  Note Date: 10/5/2020     Reason for Hospitalization. Chief Complaint   Patient presents with    Chest Pain     chest pain on Saturday morning. [facility]. Chest pain   Patient admitted for chest pain. Initial evaluation w as negative for any acute ischemia.  evalauted by cardiology   troponin and ekg negative  Pt was discharged stable       Consults. IP CONSULT TO CARDIOLOGY    Physical examination on discharge day. /82   Pulse 67   Temp 97.4 °F (36.3 °C) (Oral)   Resp 18   Ht 5' 2.5\" (1.588 m)   Wt 209 lb 9.6 oz (95.1 kg)   SpO2 99%   BMI 37.73 kg/m²   General appearance. Alert. Looks comfortable. HEENT. Sclera clear. Moist mucus membranes. Cardiovascular. Regular rate and rhythm, normal S1, S2. No murmur. Respiratory. Not using accessory muscles. Clear to auscultation bilaterally, no wheeze. Gastrointestinal. Abdomen soft, non-tender, not distended, normal bowel sounds  Neurology. Facial symmetry. No speech deficits. Moving all extremities equally. Extremities. No edema in lower extremities. Skin. Warm, dry, normal turgor    Condition at time of discharge stable     Medication instructions provided to patient at discharge. Medication List      CONTINUE taking these medications    aspirin 81 MG tablet  Notes to patient:  Use: Aspirin is used to treat pain, and reduce fever or inflammation. It is sometimes used to treat or prevent heart attacks, strokes, and chest pain (angina).     Side effects: Abdominal or stomach pain, cramping, or burning black, tarry stools bloody or cloudy urine,  constipation atorvastatin 80 MG tablet  Commonly known as:  LIPITOR  Take 1 tablet by mouth daily  Notes to patient:  Use: is used along with a proper diet to help lower \"bad\" cholesterol and fats (such as LDL, triglycerides) and raise \"good\" cholesterol (HDL) in the blood. Side effects:dizziness, fast heartbeat, swelling, or weakness, puffiness or swelling of the eyelids or around the eyes, face, lips, or tongue.       azaTHIOprine 50 MG tablet  Commonly known as:  IMURAN     azithromycin 500 MG tablet  Commonly known as:  ZITHROMAX     DICLOFENAC PO     DULoxetine 30 MG extended release capsule  Commonly known as:  Cymbalta  Take 1 capsule by mouth daily     ezetimibe 10 MG tablet  Commonly known as:  Zetia  Take 1 tablet by mouth daily     furosemide 20 MG tablet  Commonly known as:  LASIX  Take 1 tablet by mouth daily     glimepiride 1 MG tablet  Commonly known as:  AMARYL  Take 1 tablet by mouth every morning (before breakfast)     ibuprofen 800 MG tablet  Commonly known as:  ADVIL;MOTRIN  Take 1 tablet by mouth every 6 hours as needed for Pain     levothyroxine 100 MCG tablet  Commonly known as:  SYNTHROID  TAKE ONE TABLET BY MOUTH DAILY  Notes to patient:  Use: increases thyroid stimulating hormone  Side effects: weight loss, nervousness   For best results: take on an empty stomach, 30 minutes prior to eating     lisinopril 20 MG tablet  Commonly known as:  PRINIVIL;ZESTRIL  Take 1 tablet by mouth daily  Notes to patient:  Use: used to treat high blood pressure    Side effects: Dizziness, lightheadedness, tiredness, headache     magnesium 250 MG Tabs tablet  Commonly known as:  MAGNESIUM-OXIDE     metoprolol succinate 50 MG extended release tablet  Commonly known as:  TOPROL XL  Take one tablet by mouth daily     omeprazole 20 MG delayed release capsule  Commonly known as:  PriLOSEC  Take 1 capsule by mouth daily     OXYGEN     predniSONE 5 MG tablet  Commonly known as:  DELTASONE  Notes to patient:  Use: to treat conditions such as arthritis, blood disorders, breathing problems, severe allergies, skin diseases, cancer, eye problems, and immune system disorders. Side effects: Nausea, vomiting, loss of appetite, heartburn, trouble sleeping, increased sweating, or acne may occur     TEGretol 200 MG tablet  Generic drug:  carBAMazepine  TAKE ONE TABLET BY MOUTH TWICE A DAY  Notes to patient:  Use: used to prevent and control seizures. Side effects: Nausea, vomiting, dizziness, drowsiness, constipation, dry mouth, or unsteadiness may occur     Vitamin D3 1.25 MG (33043 UT) Caps  1 capsule every 7 days            Discharge recommendations given to patient. Follow Up. pcp in 1 week   Disposition. home  Activity. activity as tolerated  Diet: No diet orders on file      Spent 45  minutes in discharge process.     Signed:  Darren Mathur MD     10/5/2020 11:29 AM

## 2020-10-21 ENCOUNTER — OFFICE VISIT (OUTPATIENT)
Dept: PRIMARY CARE CLINIC | Age: 70
End: 2020-10-21
Payer: MEDICARE

## 2020-10-21 VITALS
HEIGHT: 62 IN | BODY MASS INDEX: 38.83 KG/M2 | HEART RATE: 97 BPM | SYSTOLIC BLOOD PRESSURE: 132 MMHG | TEMPERATURE: 98.1 F | OXYGEN SATURATION: 96 % | RESPIRATION RATE: 16 BRPM | DIASTOLIC BLOOD PRESSURE: 82 MMHG | WEIGHT: 211 LBS

## 2020-10-21 PROCEDURE — G0008 ADMIN INFLUENZA VIRUS VAC: HCPCS | Performed by: NURSE PRACTITIONER

## 2020-10-21 PROCEDURE — 90662 IIV NO PRSV INCREASED AG IM: CPT | Performed by: NURSE PRACTITIONER

## 2020-10-21 PROCEDURE — 99214 OFFICE O/P EST MOD 30 MIN: CPT | Performed by: NURSE PRACTITIONER

## 2020-10-21 RX ORDER — BUSPIRONE HYDROCHLORIDE 10 MG/1
10 TABLET ORAL 3 TIMES DAILY PRN
Qty: 90 TABLET | Refills: 1 | Status: SHIPPED | OUTPATIENT
Start: 2020-10-21 | End: 2020-11-20

## 2020-10-21 RX ORDER — DULOXETINE 40 MG/1
40 CAPSULE, DELAYED RELEASE ORAL DAILY
Qty: 30 CAPSULE | Refills: 1 | Status: SHIPPED | OUTPATIENT
Start: 2020-10-21 | End: 2020-11-09 | Stop reason: SDUPTHER

## 2020-10-21 ASSESSMENT — ENCOUNTER SYMPTOMS
CHEST TIGHTNESS: 0
SHORTNESS OF BREATH: 1
WHEEZING: 0

## 2020-10-21 NOTE — PROGRESS NOTES
10/21/2020    Chief Complaint   Patient presents with    1 Month Follow-Up     follow up for depression        Faiza Krishnamurthy is a 72 y.o. female, presents today for depression follow up. Started Cymbalta 30 mg once daily two weeks ago. Patient reports no change in depressive symptoms. Feels the dosage needs to be increased. Plans to start a new quilt for 3year old granddaughter as a self care activity. Requests influenza vaccine today. Review of Systems   Constitutional: Negative for activity change, appetite change and unexpected weight change. Respiratory: Positive for shortness of breath (chronic. Oxygen dependant, 2L). Negative for chest tightness and wheezing. Cardiovascular: Negative for chest pain, palpitations and leg swelling. Musculoskeletal: Negative for arthralgias and myalgias. Neurological: Negative for dizziness, weakness and light-headedness. Psychiatric/Behavioral: Positive for dysphoric mood and sleep disturbance (poor sleep). Negative for agitation, decreased concentration, hallucinations, self-injury and suicidal ideas. The patient is nervous/anxious. The patient is not hyperactive.         Current Outpatient Medications on File Prior to Visit   Medication Sig Dispense Refill    Cymbalta HCI 30 mg  Take 30 mg, 1 capsule daily by mouth      DICLOFENAC PO Take 75 mg by mouth 2 times daily      predniSONE (DELTASONE) 5 MG tablet Take 5 mg by mouth daily      azithromycin (ZITHROMAX) 500 MG tablet Take 500 mg by mouth daily      magnesium (MAGNESIUM-OXIDE) 250 MG TABS tablet Take 250 mg by mouth daily      azaTHIOprine (IMURAN) 50 MG tablet Take 50 mg by mouth daily 4 tablets daily, for a total of 200 mg daily      ezetimibe (ZETIA) 10 MG tablet Take 1 tablet by mouth daily 90 tablet 1    atorvastatin (LIPITOR) 80 MG tablet Take 1 tablet by mouth daily 90 tablet 1    Cholecalciferol (VITAMIN D3) 1.25 MG (68752 UT) CAPS 1 capsule every 7 days 12 capsule 3    mammogram, encounter for *2020    Benign    Seizure disorder [facility])     Thyroid disease     Type 2 diabetes mellitus without complication ([facility] [address] *KSL.2020    FBS - 137 and A 1 C of 6.7    Vitamin D deficiency Oct., 2015    Level - 17    Wears glasses      Past Surgical History:   Procedure Laterality Date    ABDOMINOPLASTY  2012    Dr. Noreen Gonsalez Bilateral     BRONCHOSCOPY  **2016    Dr. Tien Landin  2016    BRONCHOSCOPY  *2017    Dr. Malia Reese - No endobronchial lesions    CARDIAC CATHETERIZATION  Oct., 2004    LAD 50%, circumflex normal, R coronary normal, LVEF 60%.  CARDIAC CATHETERIZATION  *2018    Dr. Huyen Flanagan - [facility] - mild pulmonary hypertension    COLONOSCOPY  Oct., 2003 (  )    Dr. Jonnie Ott - Negative - repeat     COLONOSCOPY  2014 (  )    Dr. Hang Sinha - moderate diverticulosis.  COSMETIC SURGERY      inner thighs    LIPOSUCTION Bilateral     knees    AL LAPAROSCOPY W TOT HYSTERECTUTERUS <=250 GRAM  W TUBE/OVARY N/A 2018    ROBOTIC TOTAL HYSTERECTOMY AND BILATERAL SALPINGO-OOPHORECTOMY performed by Jackie Wylie MD at [place] [address]  *2018    Dr. Nicanor Rodriguez  Oct., 2015    Dr. Jerome Lyn - left 2nd and 3rd    TOTAL HIP ARTHROPLASTY  2013    Dr. Elaina Matias - [facility]  *2018    Dr. Josue Monaco - right    TUBAL LIGATION        Social History     Tobacco Use    Smoking status: Never Smoker    Smokeless tobacco: Never Used   Substance Use Topics    Alcohol use: Yes     Alcohol/week: 0.0 standard drinks     Comment: occasional      Family History   Problem Relation Age of Onset    Heart Disease Father 76        , ASHD, S/P CAB, NIDDM, hyperlipidemia.  Cancer Mother 58        , lung cancer.          Vitals:    10/21/20 0853   BP: 132/82   Site: Left Upper Arm   Position: Sitting   Cuff Size: Large Adult   Pulse: 97   Resp: 16   Temp: 98.1 °F (36.7 °C)   SpO2: 96%   Weight: 211 lb (95.7 kg)   Height: 5' 2\" (1.575 m)     Estimated body mass index is 38.59 kg/m² as calculated from the following:    Height as of this encounter: 5' 2\" (1.575 m). Weight as of this encounter: 211 lb (95.7 kg). Physical Exam  Vitals signs and nursing note reviewed. Constitutional:       General: She is not in acute distress. Appearance: Normal appearance. She is obese. HENT:      Head: Normocephalic. Neck:      Musculoskeletal: Normal range of motion and neck supple. Vascular: No carotid bruit. Cardiovascular:      Rate and Rhythm: Normal rate and regular rhythm. Pulses: Normal pulses. Heart sounds: Normal heart sounds. Pulmonary:      Effort: Pulmonary effort is normal. Prolonged expiration present. No respiratory distress. Comments: Wearing oxygen  Musculoskeletal: Normal range of motion. Right lower leg: No edema. Left lower leg: No edema. Lymphadenopathy:      Cervical: No cervical adenopathy. Skin:     General: Skin is warm and dry. Neurological:      Mental Status: She is alert and oriented to person, place, and time. Psychiatric:         Attention and Perception: Attention normal.         Mood and Affect: Mood is depressed. Speech: Speech normal.         Thought Content: Thought content normal.         Cognition and Memory: Cognition normal.         ASSESSMENT/PLAN:  1. Moderate episode of recurrent major depressive disorder (HCC)  - No change  - Increase DULoxetine HCl 40 MG CPEP; Take 40 mg by mouth daily  Dispense: 30 capsule; Refill: 1  - Instructed patient to hold Duloxetine 30 mg capsule in the event the dosage needs to be increased to 60 mg- would be able to take two (2) 30 mg capsules; patient verbalized understanding. 2. Anxiety  - No change  - Start busPIRone (BUSPAR) 10 MG tablet;  Take 1 tablet by mouth 3 times daily as needed (anxiety)  Dispense: 90 tablet; Refill: 1    3. Influenza vaccine needed  - Administered high dose influenza (see vaccine record). 4. At high risk for falls  - The 30 Second Chair Stand Test was positive for increased falls risk, home safety tips provided. Return in about 1 month (around 11/21/2020) for annual wellness visit and depression and anxiety 1 mo follow up. .     Discussed use, benefit, and side effects of prescribed medications. Patient's questions answered and concerns addressed. Patient agrees to plan of care. My scheduled days in the office reviewed with patient, and same day appointments available. Encouraged to use [employer] for communication as needed.      Electronically signed by CESAR Capps CNP on 10/21/2020 at 9:27 AM

## 2020-10-21 NOTE — PATIENT INSTRUCTIONS
Patient Education        Depression and Chronic Disease: Care Instructions  Your Care Instructions     A chronic disease is one that you have for a long time. Some chronic diseases can be controlled, but they usually cannot be cured. Depression is common in people with chronic diseases, but it often goes unnoticed. Many people have concerns about seeking treatment for a mental health problem. You may think it's a sign of weakness, or you don't want people to know about it. It's important to overcome these reasons for not seeking treatment. Treating depression or anxiety is good for your health. Follow-up care is a key part of your treatment and safety. Be sure to make and go to all appointments, and call your doctor if you are having problems. It's also a good idea to know your test results and keep a list of the medicines you take. How can you care for yourself at home? Watch for symptoms of depression  The symptoms of depression are often subtle at first. You may think they are caused by your disease rather than depression. Or you may think it is normal to be depressed when you have a chronic disease. If you are depressed you may:  · Feel sad or hopeless. · Feel guilty or worthless. · Not enjoy the things you used to enjoy. · Feel hopeless, as though life is not worth living. · Have trouble thinking or remembering. · Have low energy, and you may not eat or sleep well. · Pull away from others. · Think often about death or killing yourself. (Keep the numbers for these national suicide hotlines: 2-704-368-TALK [1-195.741.6627] and 2-815-YTAFKKE [1-210.253.9444]. )  Get treatment  By treating your depression, you can feel more hopeful and have more energy. If you feel better, you may take better care of yourself, so your health may improve. · Talk to your doctor if you have any changes in mood during treatment for your disease. · Ask your doctor for help.  Counseling, antidepressant medicine, or a meals.  · Avoid using illegal drugs or marijuana and drinking alcohol. Do not take medicines that have not been prescribed for you. They may interfere with medicines you may be taking for depression, or they may make your depression worse. · Take your medicines exactly as they are prescribed. You may start to feel better within 1 to 3 weeks of taking antidepressant medicine. But it can take as many as 6 to 8 weeks to see more improvement. If you have questions or concerns about your medicines, or if you do not notice any improvement by 3 weeks, talk to your doctor. · Continue to take your medicine after your symptoms improve. Taking your medicine for at least 6 months after you feel better can help keep you from getting depressed again. If this isn't the first time you have been depressed, your doctor may recommend you to take medicine even longer. · If you have any side effects from your medicine, tell your doctor. Many side effects are mild and will go away on their own after you have been taking the medicine for a few weeks. Some may last longer. Talk to your doctor if side effects are bothering you too much. You might be able to try a different medicine. · Continue counseling. It may help prevent depression from returning, especially if you've had multiple episodes of depression. Talk with your counselor if you are having a hard time attending your sessions or you think the sessions aren't working. Don't just stop going. · Get enough sleep. Talk to your doctor if you are having problems sleeping. · Avoid sleeping pills unless they are prescribed by the doctor treating your depression. Sleeping pills may make you groggy during the day, and they may interact with other medicine you are taking. · If you have any other illnesses, such as diabetes, heart disease, or high blood pressure, make sure to continue with your treatment.  Tell your doctor about all of the medicines you take, including those with or without a prescription. · If you or someone you know talks about suicide, self-harm, or feeling hopeless, get help right away. Call the [address] at 7-018-395-RFTZ (5-122.822.5181) or text HOME to 373794 to access the Crisis Text Line. Consider saving these numbers in your phone. When should you call for help? Call 911 anytime you think you may need emergency care. For example, call if:    · You feel like hurting yourself or someone else.     · Someone you know has depression and is about to attempt or is attempting suicide. Call your doctor now or seek immediate medical care if:    · You hear voices.     · Someone you know has depression and:  ? Starts to give away his or her possessions. ? Uses illegal drugs or drinks alcohol heavily. ? Talks or writes about death, including writing suicide notes or talking about guns, knives, or pills. ? Starts to spend a lot of time alone. ? Acts very aggressively or suddenly appears calm. Watch closely for changes in your health, and be sure to contact your doctor if:    · You do not get better as expected. Where can you learn more? Go to https://FullCircle GeoSocial Networkspepiceweb.Adchemy. org and sign in to your [employer] account. Enter Z369 in the [facility] box to learn more about \"Recovering From Depression: Care Instructions. \"     If you do not have an account, please click on the \"Sign Up Now\" link. Current as of: January 31, 2020               Content Version: 12.6  © 2006-2020 DeskActive, Incorporated. Care instructions adapted under license by [facility] [place] ([facility]). If you have questions about a medical condition or this instruction, always ask your healthcare professional. [place] [address] any warranty or liability for your use of this information.          Patient Education        Preventing Falls: Care Instructions  Your Care Instructions     Getting around your home safely can be a challenge if you have injuries or health problems that make it easy for you to fall. Loose rugs and furniture in walkways are among the dangers for many older people who have problems walking or who have poor eyesight. People who have conditions such as arthritis, osteoporosis, or dementia also have to be careful not to fall. You can make your home safer with a few simple measures. Follow-up care is a key part of your treatment and safety. Be sure to make and go to all appointments, and call your doctor if you are having problems. It's also a good idea to know your test results and keep a list of the medicines you take. How can you care for yourself at home? Taking care of yourself  · You may get dizzy if you do not drink enough water. To prevent dehydration, drink plenty of fluids, enough so that your urine is light yellow or clear like water. Choose water and other caffeine-free clear liquids. If you have kidney, heart, or liver disease and have to limit fluids, talk with your doctor before you increase the amount of fluids you drink. · Exercise regularly to improve your strength, muscle tone, and balance. Walk if you can. Swimming may be a good choice if you cannot walk easily. · Have your vision and hearing checked each year or any time you notice a change. If you have trouble seeing and hearing, you might not be able to avoid objects and could lose your balance. · Know the side effects of the medicines you take. Ask your doctor or pharmacist whether the medicines you take can affect your balance. Sleeping pills or sedatives can affect your balance. · Limit the amount of alcohol you drink. Alcohol can impair your balance and other senses. · Ask your doctor whether calluses or corns on your feet need to be removed. If you wear loose-fitting shoes because of calluses or corns, you can lose your balance and fall. · Talk to your doctor if you have numbness in your feet.   Preventing falls at home  · Remove raised doorway thresholds, throw first.  · Repair loose toilet seats and consider installing a raised toilet seat to make getting on and off the toilet easier. · Keep your bathroom door unlocked while you are in the shower. Where can you learn more? Go to https://chpekarla.WizMeta. org and sign in to your [facility] account. Enter [id number] in the [facility] box to learn more about \"Preventing Falls: Care Instructions. \"     If you do not have an account, please click on the \"Sign Up Now\" link. Current as of: April 15, 2020               Content Version: 12.6  © 5254-0044 Ininal, Fieldglass. Care instructions adapted under license by [facility] ([facility]). If you have questions about a medical condition or this instruction, always ask your healthcare professional. [address] any warranty or liability for your use of this information. Patient Education        Preventing Outdoor Falls: Care Instructions  Your Care Instructions     Worries about falls don't need to keep you indoors. Outdoor activities like walking have big benefits for your health. You will need to watch your step and learn a few safety measures. If you are worried about having a fall outdoors, ask your doctor about exercises, classes, or physical therapy that may help. You can learn ways to gain strength, flexibility, and balance. Ask if it might help to use a cane or walker. You can make your time outdoors safer with a few simple measures. Follow-up care is a key part of your treatment and safety. Be sure to make and go to all appointments, and call your doctor if you are having problems. It's also a good idea to know your test results and keep a list of the medicines you take. How can you prevent falls outdoors? · Wear shoes with firm soles and low heels. If you have to walk on an icy surface, use grippers that can be worn over your shoes in bad weather. · Be extra careful if weather is bad.  Walk on the grass when the sidewalks are slick. If you live in a place that gets snow and ice in the winter, sprinkle salt on slippery stairs and sidewalks. · Be careful getting on or off buses and trains or getting in and out of cars. If handrails are available, use them. · Be careful when you cross the street. Look for crosswalks or places where curb cuts or ramps are present. · Try not to hurry, especially if you are carrying something. · Be cautious in parking lots or garages. There may be curbs or changes in pavement, or the height of the pavement may vary. · Make sure to wear the correct eyeglasses, if you need them. Reading glasses or bifocals can make it harder to see hazards that might be in your way. · If you are walking outdoors for exercise, try to:  ? Walk in well-lighted, well-maintained areas. These include high school or college tracks, shopping malls, and public spaces. ? Walk with a partner. ? Watch out for cracked sidewalks, curbs, changes in the height of the pavement, exposed tree roots, and debris such as fallen leaves or branches. Where can you learn more? Go to https://Real Intent.Spitfire Pharma. org and sign in to your [employer] account. Enter G202 in the [facility] box to learn more about \"Preventing Outdoor Falls: Care Instructions. \"     If you do not have an account, please click on the \"Sign Up Now\" link. Current as of: April 15, 2020               Content Version: 12.6  © 2006-2020 Orbiter, Incorporated. Care instructions adapted under license by [facility] ([facility]). If you have questions about a medical condition or this instruction, always ask your healthcare professional. [place] [address] any warranty or liability for your use of this information.          Patient Education        How to Get Up Safely After a Fall: Care Instructions  Your Care Instructions     If you have injuries, health problems, or other reasons that may make it easy for you to fall at home, it is a good idea to learn how to get up safely after a fall. Learning how to get up correctly can help you avoid making an injury worse. Also, knowing what to do if you cannot get up can help you stay safe until help arrives. Follow-up care is a key part of your treatment and safety. Be sure to make and go to all appointments, and call your doctor if you are having problems. It's also a good idea to know your test results and keep a list of the medicines you take. How can you care for yourself after a fall? If you think you can get up  First lie still for a few minutes and think about how you feel. If your body feels okay and you think you can get up safely, follow the rest of the steps below:  1. Look for a chair or other piece of furniture that is close to you. 2. Roll onto your side and rest. Roll by turning your head in the direction you want to roll, move your shoulder and arm, then hip and leg in the same direction. 3. Lie still for a moment to let your blood pressure adjust.  4. Slowly push your upper body up, lift your head, and take a moment to rest.  5. Slowly get up on your hands and knees, and crawl to the chair or other stable piece of furniture. 6. Put your hands on the chair. 7. Move one foot forward, and place it flat on the floor. Your other leg should be bent with the knee on the floor. 8. Rise slowly, turn your body, and sit in the chair. Stay seated for a bit and think about how you feel. Call for help. Even if you feel okay, let someone know what happened to you. You might not know that you have a serious injury. If you cannot get up  1. If you think you are injured after a fall or you cannot get up, try not to panic. 2. Call out for help. 3. If you have a phone within reach or you have an emergency call device, use it to call for help. 4. If you do not have a phone within reach, try to slide yourself toward it.  If you cannot get to the phone, try to slide toward a door or window or a place where you think you can be heard. 5. Crook or use an object to make noise so someone might hear you. 6. If you can reach something that you can use for a pillow, place it under your head. Try to stay warm by covering yourself with a blanket or clothing while you wait for help. When should you call for help? Call 911 anytime you think you may need emergency care. For example, call if:    · You passed out (lost consciousness).     · You cannot get up after a fall.     · You have severe pain. Call your doctor now or seek immediate medical care if:    · You have new or worse pain.     · You are dizzy or lightheaded.     · You hit your head. Watch closely for changes in your health, and be sure to contact your doctor if:    · You do not get better as expected. Where can you learn more? Go to https://EBS Technologiespepiceweb.Tagent. org and sign in to your ReadOz account. Enter R021 in the [facility] box to learn more about \"How to Get Up Safely After a Fall: Care Instructions. \"     If you do not have an account, please click on the \"Sign Up Now\" link. Current as of: April 15, 2020               Content Version: 12.6  © 2006-2020 Keduo, Incorporated. Care instructions adapted under license by [facility] ([facility]). If you have questions about a medical condition or this instruction, always ask your healthcare professional. [place] [address] any warranty or liability for your use of this information. Patient Education        Anxiety Disorder: Care Instructions  Your Care Instructions     Anxiety is a normal reaction to stress. Difficult situations can cause you to have symptoms such as sweaty palms and a nervous feeling. In an anxiety disorder, the symptoms are far more severe. Constant worry, muscle tension, trouble sleeping, nausea and diarrhea, and other symptoms can make normal daily activities difficult or impossible.  These symptoms may occur for no reason, and they in your house that you are going to do your relaxation exercises. Ask them not to disturb you. · Find a comfortable place, away from all distractions and noise. · Lie down on your back, or sit with your back straight. · Focus on your breathing. Make it slow and steady. · Breathe in through your nose. Breathe out through either your nose or mouth. · Breathe deeply, filling up the area between your navel and your rib cage. Breathe so that your belly goes up and down. · Do not hold your breath. · Breathe like this for 5 to 10 minutes. Notice the feeling of calmness throughout your whole body. As you continue to breathe slowly and deeply, relax by doing the following for another 5 to 10 minutes:  · Tighten and relax each muscle group in your body. You can begin at your toes and work your way up to your head. · Imagine your muscle groups relaxing and becoming heavy. · Empty your mind of all thoughts. · Let yourself relax more and more deeply. · Become aware of the state of calmness that surrounds you. · When your relaxation time is over, you can bring yourself back to alertness by moving your fingers and toes and then your hands and feet and then stretching and moving your entire body. Sometimes people fall asleep during relaxation, but they usually wake up shortly afterward. · Always give yourself time to return to full alertness before you drive a car or do anything that might cause an accident if you are not fully alert. Never play a relaxation tape while you drive a car. When should you call for help? Call 911 anytime you think you may need emergency care. For example, call if:    · You feel you cannot stop from hurting yourself or someone else. Keep the numbers for these national suicide hotlines: 3-309-889-TALK (9-655.954.5864) and 6-217-HCFMXAY (3-144.928.4998). If you or someone you know talks about suicide or feeling hopeless, get help right away.   Watch closely for changes in your health, and be sure to contact your doctor if:    · You have anxiety or fear that affects your life.     · You have symptoms of anxiety that are new or different from those you had before. Where can you learn more? Go to https://Snagstapepicmisterbnb.Bad Donkey Social Company. org and sign in to your Dr. Scribbles account. Enter P754 in the [facility] box to learn more about \"Anxiety Disorder: Care Instructions. \"     If you do not have an account, please click on the \"Sign Up Now\" link. Current as of: January 31, 2020               Content Version: 12.6  © 7730-5322 Udacity, Healcerion. Care instructions adapted under license by [facility] ([facility]). If you have questions about a medical condition or this instruction, always ask your healthcare professional. [address] any warranty or liability for your use of this information. Vaccine Information Sheet, \"Influenza - Inactivated\"  given to Summer Burciaga, or parent/legal guardian of  Summer Burciaga and verbalized understanding. Patient responses:    Have you ever had a reaction to a flu vaccine? No  Are you able to eat eggs without adverse effects? Yes and No  Do you have any current illness? No  Have you ever had Guillian [place] Syndrome? No    Flu vaccine given per order. Please see immunization tab. [place] [facility]

## 2020-10-24 RX ORDER — DULOXETIN HYDROCHLORIDE 20 MG/1
40 CAPSULE, DELAYED RELEASE ORAL DAILY
Qty: 60 CAPSULE | Refills: 0 | Status: SHIPPED
Start: 2020-10-24 | End: 2020-11-09 | Stop reason: DRUGHIGH

## 2020-11-09 RX ORDER — DULOXETINE 40 MG/1
40 CAPSULE, DELAYED RELEASE ORAL DAILY
Qty: 30 CAPSULE | Refills: 1 | Status: SHIPPED
Start: 2020-11-09 | End: 2020-11-11 | Stop reason: DRUGHIGH

## 2020-11-11 ENCOUNTER — OFFICE VISIT (OUTPATIENT)
Dept: PRIMARY CARE CLINIC | Age: 70
End: 2020-11-11
Payer: MEDICARE

## 2020-11-11 VITALS
OXYGEN SATURATION: 91 % | SYSTOLIC BLOOD PRESSURE: 110 MMHG | WEIGHT: 211.6 LBS | HEIGHT: 62 IN | TEMPERATURE: 98.1 F | BODY MASS INDEX: 38.94 KG/M2 | DIASTOLIC BLOOD PRESSURE: 80 MMHG | HEART RATE: 86 BPM

## 2020-11-11 PROCEDURE — 99214 OFFICE O/P EST MOD 30 MIN: CPT | Performed by: NURSE PRACTITIONER

## 2020-11-11 RX ORDER — DULOXETIN HYDROCHLORIDE 60 MG/1
60 CAPSULE, DELAYED RELEASE ORAL DAILY
Qty: 30 CAPSULE | Refills: 2 | Status: SHIPPED | OUTPATIENT
Start: 2020-11-11 | End: 2020-12-11 | Stop reason: SDUPTHER

## 2020-11-11 NOTE — PROGRESS NOTES
11/11/2020    Chief Complaint   Patient presents with    Other     Forgetfulness for 6 mos and getting worse       Summer Burciaga is a 72 y.o. female, presents today accompanied by her  with concern of memory loss and frequently asking the same questions over and over. Also reports \"forgetting what she wants to say\". Concerned memory loss could be related to lack of oxygen, currently wears 5 liters continuously.  noticed changes 6-8 months ago, and has gotten progressively worse over the past couple months. Asking same questions that he initially contributed to \"normal aging\". Last week threw out a new prescription and  had to refill medication. States \"she doesn't have attention to detail\" as she once did. Hx of depression, currents takes Cymbalta 40 mg (20 mg tablet x 2) daily, however reports no change in depressive symptoms. Decreased in interest or pleasure in doing things. Wants to increase dosage again today. Taking medication as prescribed, tolerating well without adverse effects. Poor sleep however patient reports sleep disturbances are more related to the anxiety of note being able to breath than to depression. Review of Systems   Constitutional: Negative for activity change, appetite change and unexpected weight change. Respiratory: Positive for shortness of breath (chronic. Wears 5 liters O2 via nasal cannula. ). Negative for cough, chest tightness and wheezing. Cardiovascular: Negative for chest pain, palpitations and leg swelling. Gastrointestinal: Negative for constipation, diarrhea, nausea and vomiting. Neurological: Negative for dizziness, seizures, light-headedness and headaches. Psychiatric/Behavioral: Positive for confusion, decreased concentration, dysphoric mood and sleep disturbance. Negative for agitation, behavioral problems, hallucinations, self-injury and suicidal ideas. The patient is nervous/anxious. The patient is not hyperactive. Current Outpatient Medications on File Prior to Visit   Medication Sig Dispense Refill    Cymbalta 40 mg Take 1 tablet by mouth daily      busPIRone (BUSPAR) 10 MG tablet Take 1 tablet by mouth 3 times daily as needed (anxiety) 90 tablet 1    DICLOFENAC PO Take 75 mg by mouth 2 times daily      predniSONE (DELTASONE) 5 MG tablet Take 5 mg by mouth daily      azithromycin (ZITHROMAX) 500 MG tablet Take 500 mg by mouth daily      magnesium (MAGNESIUM-OXIDE) 250 MG TABS tablet Take 250 mg by mouth daily      azaTHIOprine (IMURAN) 50 MG tablet Take 50 mg by mouth daily 4 tablets daily, for a total of 200 mg daily      ezetimibe (ZETIA) 10 MG tablet Take 1 tablet by mouth daily 90 tablet 1    atorvastatin (LIPITOR) 80 MG tablet Take 1 tablet by mouth daily 90 tablet 1    Cholecalciferol (VITAMIN D3) 1.25 MG (58062 UT) CAPS 1 capsule every 7 days 12 capsule 3    glimepiride (AMARYL) 1 MG tablet Take 1 tablet by mouth every morning (before breakfast) 90 tablet 1    TEGRETOL 200 MG tablet TAKE ONE TABLET BY MOUTH TWICE A DAY 60 tablet 10    lisinopril (PRINIVIL;ZESTRIL) 20 MG tablet Take 1 tablet by mouth daily 90 tablet 3    furosemide (LASIX) 20 MG tablet Take 1 tablet by mouth daily 90 tablet 3    OXYGEN Inhale 4-5 L/day into the lungs      levothyroxine (SYNTHROID) 100 MCG tablet TAKE ONE TABLET BY MOUTH DAILY 90 tablet 3    metoprolol succinate (TOPROL XL) 50 MG extended release tablet Take one tablet by mouth daily 90 tablet 3    ibuprofen (ADVIL;MOTRIN) 800 MG tablet Take 1 tablet by mouth every 6 hours as needed for Pain 50 tablet 3    omeprazole (PRILOSEC) 20 MG capsule Take 1 capsule by mouth daily 84 capsule 2    aspirin 81 MG tablet Take 81 mg by mouth daily. No current facility-administered medications on file prior to visit. Allergies   Allergen Reactions    Tramadol      Avoid because Pt is on Tegretol.     Penicillins Rash     Patient states she can take Keflex Past Medical History:   Diagnosis Date    Abnormal stress test *Aug.1, 2016    Coronary angiography by Dr. Claudetta Netters revealed a 60% stenosis of the LAD and  a 50% mid circumflex     Arthritis     Chronic headaches     Eczema     Fibromyalgia     GERD (gastroesophageal reflux disease)     Hyperlipidemia     Hypertension     Impaired fasting glucose     Interstitial lung disease ([facility] 2016    Dr. Susan Phipps Interstitial lung disease ([facility]     Migraine headache     Osteopenia DEXA - March, 2016    Lumbar T score +1.7 and Hip -1.0 FRAX 7.6/0.6    Osteopenia DEXA-Dec., 2017    Lumbar T score 2.3 and hip T score 0.1    Other screening mammogram July 8, 2013    Benign    Other screening mammogram *March 4, 2016    Benign    Oxygen dependent     O2 @ 3.5 L CONTINUOUS    Pulmonary hypertension ([facility]     MILD    Retention of urine, unspecified     Screening mammogram, encounter for *February 11, 2020    Benign    Seizure disorder [facility])     Thyroid disease     Type 2 diabetes mellitus without complication ([facility] *YAO.56, 2020    FBS - 137 and A 1 C of 6.7    Vitamin D deficiency Oct., 2015    Level - 17    Wears glasses      Past Surgical History:   Procedure Laterality Date    ABDOMINOPLASTY  Jan., 2012    Dr. Rene Yadav Bilateral     BRONCHOSCOPY  **Sept.16, 2016    Dr. Romero Mccracken  09/16/2016    BRONCHOSCOPY  *Febr.24, 2017    Dr. Marciano Matt - No endobronchial lesions    CARDIAC CATHETERIZATION  Oct., 2004    LAD 50%, circumflex normal, R coronary normal, LVEF 60%.  CARDIAC CATHETERIZATION  *Sept.24, 2018    Dr. Lennie Mancia - [facility] - mild pulmonary hypertension    COLONOSCOPY  Oct., 2003 ( 2013 )    Dr. Chapincito Zuñiga - Negative - repeat 2013    COLONOSCOPY  Jan., 2014 ( 2024 )    Dr. Mikey Rosenberg - moderate diverticulosis.     COSMETIC SURGERY      inner thighs    LIPOSUCTION Bilateral     knees    HI LAPAROSCOPY W TOT HYSTERECTUTERUS <=250 GRAM W TUBE/OVARY N/A 2018    ROBOTIC TOTAL HYSTERECTOMY AND BILATERAL SALPINGO-OOPHORECTOMY performed by Nirali Amin MD at /[facility]  *2018    Dr. Jostin Robledo  Oct., 2015    Dr. Luanne Campbell - left 2nd and 3rd    TOTAL HIP ARTHROPLASTY  2013    Dr. Leida Kingston - [facility]  *2018    Dr. Elaine Ochoa - right    TUBAL LIGATION        Social History     Tobacco Use    Smoking status: Never Smoker    Smokeless tobacco: Never Used   Substance Use Topics    Alcohol use: Yes     Alcohol/week: 0.0 standard drinks     Comment: occasional      Family History   Problem Relation Age of Onset    Heart Disease Father 76        , ASHD, S/P CAB, NIDDM, hyperlipidemia.  Cancer Mother 58        , lung cancer. Vitals:    20 0918   BP: 110/80   Pulse: 86   Temp: 98.1 °F (36.7 °C)   SpO2: 91%   Weight: 211 lb 9.6 oz (96 kg)   Height: 5' 2\" (1.575 m)     Estimated body mass index is 38.7 kg/m² as calculated from the following:    Height as of this encounter: 5' 2\" (1.575 m). Weight as of this encounter: 211 lb 9.6 oz (96 kg). Physical Exam  Vitals signs and nursing note reviewed. Constitutional:       General: She is not in acute distress. Appearance: Normal appearance. HENT:      Head: Normocephalic and atraumatic. Neck:      Musculoskeletal: Normal range of motion and neck supple. Vascular: No carotid bruit. Cardiovascular:      Rate and Rhythm: Normal rate and regular rhythm. Pulses: Normal pulses. Heart sounds: Normal heart sounds. No murmur. No friction rub. No gallop. Pulmonary:      Effort: Pulmonary effort is normal.      Breath sounds: Normal breath sounds. Comments: 5 liters O2  Lymphadenopathy:      Cervical: No cervical adenopathy. Neurological:      Mental Status: She is alert. ASSESSMENT/PLAN:  1. Memory loss  - Juan J Spears MD, Neurology, [place] [address].   - Instructed patient (and ) to call Dr. Ramirez Silence office to schedule an appointment if she does not receive a call from his office. 2. Moderate episode of recurrent major depressive disorder (HCC)  - No change  - Increase DULoxetine (CYMBALTA) from 40 mg to 60 MG extended release capsule; Take 1 capsule by mouth daily  Dispense: 30 capsule; Refill: 2    - Practice self care and daily fresh air for 20 minutes. - Regular exercise program  - Practice self relaxation with music or meditation etc. Phone apps such as [employer]: Mediatation and Relaxation, or Calm. - Call 9-1-1 or go to closest emergency room with any thoughts or intent of harming self or others; Patient verbalizes understanding and agrees to plan. 3. Anxiety  - Stable  - Continue Buspar 10 mg, 1 tablet every 8 hours PRN    At next visit:  - Diabetic foot exam  - Annual Wellness Visit. Return in about 4 weeks (around 12/9/2020) for anxiety and depression follow up & annual wellness visit. Discussed use, benefit, and side effects of prescribed medications. Patient's questions answered and concerns addressed. Patient agrees to plan of care. My scheduled days in the office reviewed with patient, and same day appointments available. Encouraged to use [employer] for communication as needed.      Electronically signed by CESAR Adhikari CNP on 11/12/2020 at 2:52 PM

## 2020-11-11 NOTE — PATIENT INSTRUCTIONS
Patient Education        Recovering From Depression: Care Instructions  Your Care Instructions     Taking good care of yourself is important as you recover from depression. In time, your symptoms will fade as your treatment takes hold. Do not give up. Instead, focus your energy on getting better. Your mood will improve. It just takes some time. Focus on things that can help you feel better, such as being with friends and family, eating well, and getting enough rest. But take things slowly. Do not do too much too soon. You will begin to feel better gradually. Follow-up care is a key part of your treatment and safety. Be sure to make and go to all appointments, and call your doctor if you are having problems. It's also a good idea to know your test results and keep a list of the medicines you take. How can you care for yourself at home? Be realistic  · If you have a large task to do, break it up into smaller steps you can handle, and just do what you can. · You may want to put off important decisions until your depression has lifted. If you have plans that will have a major impact on your life, such as marriage, divorce, or a job change, try to wait a bit. Talk it over with friends and loved ones who can help you look at the overall picture first.  · Reaching out to people for help is important. Do not isolate yourself. Let your family and friends help you. Find someone you can trust and confide in, and talk to that person. · Be patient, and be kind to yourself. Remember that depression is not your fault and is not something you can overcome with willpower alone. Treatment is important for depression, just like for any other illness. Feeling better takes time, and your mood will improve little by little. Stay active  · Stay busy and get outside. Take a walk, or try some other light exercise. · Talk with your doctor about an exercise program. Exercise can help with mild depression.   · Go to a movie or concert. Take part in a Holiness activity or other social gathering. Go to a [employer] game. · Ask a friend to have dinner with you. Take care of yourself  · Eat a balanced diet with plenty of fresh fruits and vegetables, whole grains, and lean protein. If you have lost your appetite, eat small snacks rather than large meals. · Avoid using illegal drugs or marijuana and drinking alcohol. Do not take medicines that have not been prescribed for you. They may interfere with medicines you may be taking for depression, or they may make your depression worse. · Take your medicines exactly as they are prescribed. You may start to feel better within 1 to 3 weeks of taking antidepressant medicine. But it can take as many as 6 to 8 weeks to see more improvement. If you have questions or concerns about your medicines, or if you do not notice any improvement by 3 weeks, talk to your doctor. · Continue to take your medicine after your symptoms improve. Taking your medicine for at least 6 months after you feel better can help keep you from getting depressed again. If this isn't the first time you have been depressed, your doctor may recommend you to take medicine even longer. · If you have any side effects from your medicine, tell your doctor. Many side effects are mild and will go away on their own after you have been taking the medicine for a few weeks. Some may last longer. Talk to your doctor if side effects are bothering you too much. You might be able to try a different medicine. · Continue counseling. It may help prevent depression from returning, especially if you've had multiple episodes of depression. Talk with your counselor if you are having a hard time attending your sessions or you think the sessions aren't working. Don't just stop going. · Get enough sleep. Talk to your doctor if you are having problems sleeping. · Avoid sleeping pills unless they are prescribed by the doctor treating your depression.  Sleeping pills may make you groggy during the day, and they may interact with other medicine you are taking. · If you have any other illnesses, such as diabetes, heart disease, or high blood pressure, make sure to continue with your treatment. Tell your doctor about all of the medicines you take, including those with or without a prescription. · If you or someone you know talks about suicide, self-harm, or feeling hopeless, get help right away. Call the [address] [place] [address] at 9-637-164-IBLR (1-247.697.7053) or text HOME to 644104 to access the Crisis Text Line. Consider saving these numbers in your phone. When should you call for help? Call 291 anytime you think you may need emergency care. For example, call if:    · You feel like hurting yourself or someone else.     · Someone you know has depression and is about to attempt or is attempting suicide. Call your doctor now or seek immediate medical care if:    · You hear voices.     · Someone you know has depression and:  ? Starts to give away his or her possessions. ? Uses illegal drugs or drinks alcohol heavily. ? Talks or writes about death, including writing suicide notes or talking about guns, knives, or pills. ? Starts to spend a lot of time alone. ? Acts very aggressively or suddenly appears calm. Watch closely for changes in your health, and be sure to contact your doctor if:    · You do not get better as expected. Where can you learn more? Go to https://NOVASYS MEDICALeulalioPico-Tesla Magnetic Therapies.HomeAway. org and sign in to your [employer] account. Enter G605 in the [facility] box to learn more about \"Recovering From Depression: Care Instructions. \"     If you do not have an account, please click on the \"Sign Up Now\" link. Current as of: January 31, 2020               Content Version: 12.6  © 5528-8448 [employer]. Care instructions adapted under license by [facility].  If you have questions about a medical condition or this instruction, always ask your healthcare professional. [address] any warranty or liability for your use of this information. Patient Education        Preventing Depression From Coming Back: Care Instructions  Overview     Some people have depression symptoms that come back. Depression often comes and goes during a lifetime. But there are many things you can do to keep it from coming back. Follow-up care is a key part of your treatment and safety. Be sure to make and go to all appointments, and call your doctor if you are having problems. It's also a good idea to know your test results and keep a list of the medicines you take. What do you need to know? Know your risk of depression coming back  Many things can make a person more likely to have depression again. These include having depression symptoms that continue after treatment, a previous episode of depression, and a history of childhood abuse or neglect. It is important to know your risk and to recognize warning signs of depression symptoms returning. Once you know these things, you will be better able to keep it from happening to you. Know the warning signs of depression returning  The two most common signs of depression coming back are:  · Feeling sad or hopeless. · Losing interest in your daily activities. You may have other symptoms, such as:  · You eat more or less than usual.  · You sleep too much or not enough. · You feel restless and unable to sit still. · You feel unable to move. · You feel tired all the time. · You feel unworthy or guilty without an obvious reason. · You have problems concentrating, remembering, or making decisions. · You think often about death or suicide. · You feel angry or have panic attacks. How can you care for yourself at home? · Take your medicine as prescribed. Call your doctor if you have any problems with your medicine.   · Continue to take your medicine after your symptoms improve. Taking your medicine for at least 6 months after you feel better can help keep you from getting depressed again. If your depression keeps coming back, your doctor may recommend you take medicine even longer. · Continue counseling. It may help prevent depression from returning, especially if you've had multiple episodes of depression. Talk with your counselor if you are having a hard time attending your sessions or you think the sessions aren't working. Don't just stop going. · Eat healthy foods. Include fruits, vegetables, beans, and whole grains in your diet each day. · Get regular exercise. Go for a walk or jog, ride your bike, or play sports with friends. · See your doctor right away if you have new symptoms or feel that your depression is coming back. · Keep a regular sleep schedule. Try for 8 hours of sleep a night. · Avoid using illegal drugs or marijuana and drinking alcohol. · If you or someone you know talks about suicide, self-harm, or feeling hopeless, get help right away. Call the [facility] at 1-800-273-talk (2-609.288.2909) or text HOME to 366448 to access the Crisis Text Line. Consider saving these numbers in your phone. When should you call for help? Call 799 anytime you think you may need emergency care. For example, call if:    · You are thinking about suicide or are threatening suicide.     · You feel you cannot stop from hurting yourself or someone else.     · You hear or see things that aren't real.     · You think or speak in a bizarre way that is not like your usual behavior. Call your doctor now or seek immediate medical care if:    · You are drinking a lot of alcohol or using illegal drugs.     · You are talking or writing about death.    Watch closely for changes in your health, and be sure to contact your doctor if:    · You find it hard or it's getting harder to deal with school, a job, family, or friends.     · You think your treatment is not helping or you are not getting better.     · Your symptoms get worse or you get new symptoms.     · You have any problems with your antidepressant medicines, such as side effects, or you are thinking about stopping your medicine.     · You are having manic behavior, such as having very high energy, needing less sleep than normal, or showing risky behavior such as spending money you don't have or abusing others verbally or physically. Where can you learn more? Go to https://[employer]ape[employer]ments.[employer]). org and sign in to your [employer] account. Enter C796 in the [employer] box to learn more about \"Preventing Depression From Coming Back: Care Instructions. \"     If you do not have an account, please click on the \"Sign Up Now\" link. Current as of: January 31, 2020               Content Version: 12.6  © 7265-5644 [employer]. Care instructions adapted under license by [facility] ([facility]). If you have questions about a medical condition or this instruction, always ask your healthcare professional. [place] [address] any warranty or liability for your use of this information.

## 2020-11-12 ASSESSMENT — ENCOUNTER SYMPTOMS
WHEEZING: 0
SHORTNESS OF BREATH: 1
COUGH: 0
DIARRHEA: 0
NAUSEA: 0
CONSTIPATION: 0
VOMITING: 0
CHEST TIGHTNESS: 0

## 2020-12-11 ENCOUNTER — VIRTUAL VISIT (OUTPATIENT)
Dept: PRIMARY CARE CLINIC | Age: 70
End: 2020-12-11
Payer: MEDICARE

## 2020-12-11 PROCEDURE — 1123F ACP DISCUSS/DSCN MKR DOCD: CPT | Performed by: NURSE PRACTITIONER

## 2020-12-11 PROCEDURE — G0439 PPPS, SUBSEQ VISIT: HCPCS | Performed by: NURSE PRACTITIONER

## 2020-12-11 PROCEDURE — 3017F COLORECTAL CA SCREEN DOC REV: CPT | Performed by: NURSE PRACTITIONER

## 2020-12-11 PROCEDURE — 4040F PNEUMOC VAC/ADMIN/RCVD: CPT | Performed by: NURSE PRACTITIONER

## 2020-12-11 RX ORDER — BUSPIRONE HYDROCHLORIDE 10 MG/1
10 TABLET ORAL 3 TIMES DAILY PRN
Qty: 90 TABLET | Refills: 2 | Status: SHIPPED | OUTPATIENT
Start: 2020-12-11 | End: 2021-01-10

## 2020-12-11 RX ORDER — DULOXETIN HYDROCHLORIDE 20 MG/1
CAPSULE, DELAYED RELEASE ORAL
Qty: 30 CAPSULE | Refills: 2 | Status: SHIPPED
Start: 2020-12-11 | End: 2021-01-11 | Stop reason: DRUGHIGH

## 2020-12-11 RX ORDER — DULOXETIN HYDROCHLORIDE 60 MG/1
CAPSULE, DELAYED RELEASE ORAL
Qty: 30 CAPSULE | Refills: 2 | Status: SHIPPED | OUTPATIENT
Start: 2020-12-11 | End: 2021-01-11 | Stop reason: SDUPTHER

## 2020-12-11 ASSESSMENT — PATIENT HEALTH QUESTIONNAIRE - PHQ9
SUM OF ALL RESPONSES TO PHQ QUESTIONS 1-9: 0
SUM OF ALL RESPONSES TO PHQ QUESTIONS 1-9: 0
2. FEELING DOWN, DEPRESSED OR HOPELESS: 0
SUM OF ALL RESPONSES TO PHQ QUESTIONS 1-9: 0
SUM OF ALL RESPONSES TO PHQ9 QUESTIONS 1 & 2: 0
1. LITTLE INTEREST OR PLEASURE IN DOING THINGS: 0
DEPRESSION UNABLE TO ASSESS: FUNCTIONAL CAPACITY MOTIVATION LIMITS ACCURACY

## 2020-12-11 NOTE — PATIENT INSTRUCTIONS
Personalized Preventive Plan for Faiza Krishnamurthy - 12/11/2020  Medicare offers a range of preventive health benefits. Some of the tests and screenings are paid in full while other may be subject to a deductible, co-insurance, and/or copay. Some of these benefits include a comprehensive review of your medical history including lifestyle, illnesses that may run in your family, and various assessments and screenings as appropriate. After reviewing your medical record and screening and assessments performed today your provider may have ordered immunizations, labs, imaging, and/or referrals for you. A list of these orders (if applicable) as well as your Preventive Care list are included within your After Visit Summary for your review. Other Preventive Recommendations:    · A preventive eye exam performed by an eye specialist is recommended every 1-2 years to screen for glaucoma; cataracts, macular degeneration, and other eye disorders. · A preventive dental visit is recommended every 6 months. · Try to get at least 150 minutes of exercise per week or 10,000 steps per day on a pedometer . · Order or download the FREE \"Exercise & Physical Activity: Your Everyday Guide\" from [employer]. Call 0-489.510.2484 or search [employer] online. · You need 7802-6933 mg of calcium and 7605-0519 IU of vitamin D per day. It is possible to meet your calcium requirement with diet alone, but a vitamin D supplement is usually necessary to meet this goal.  · When exposed to the sun, use a sunscreen that protects against both UVA and UVB radiation with an SPF of 30 or greater. Reapply every 2 to 3 hours or after sweating, drying off with a towel, or swimming. · Always wear a seat belt when traveling in a car. Always wear a helmet when riding a bicycle or motorcycle.   Patient Education        Anxiety Disorder: Care Instructions  Your Care Instructions     Anxiety is a normal reaction to stress. Difficult situations can cause you to have symptoms such as sweaty palms and a nervous feeling. In an anxiety disorder, the symptoms are far more severe. Constant worry, muscle tension, trouble sleeping, nausea and diarrhea, and other symptoms can make normal daily activities difficult or impossible. These symptoms may occur for no reason, and they can affect your work, school, or social life. Medicines, counseling, and self-care can all help. Follow-up care is a key part of your treatment and safety. Be sure to make and go to all appointments, and call your doctor if you are having problems. It's also a good idea to know your test results and keep a list of the medicines you take. How can you care for yourself at home? Take medicines exactly as directed. Call your doctor if you think you are having a problem with your medicine. Go to your counseling sessions and follow-up appointments. Recognize and accept your anxiety. Then, when you are in a situation that makes you anxious, say to yourself, \"This is not an emergency. I feel uncomfortable, but I am not in danger. I can keep going even if I feel anxious. \"  Be kind to your body:  Relieve tension with exercise or a massage. Get enough rest.  Avoid alcohol, caffeine, nicotine, and illegal drugs. They can increase your anxiety level and cause sleep problems. Learn and do relaxation techniques. See below for more about these techniques. Engage your mind. Get out and do something you enjoy. Go to a funny movie, or take a walk or hike. Plan your day. Having too much or too little to do can make you anxious. Keep a record of your symptoms. Discuss your fears with a good friend or family member, or join a support group for people with similar problems. Talking to others sometimes relieves stress. Get involved in social groups, or volunteer to help others. Being alone sometimes makes things seem worse than they are.   Get at least 30 minutes of exercise on most days of the week to relieve stress. Walking is a good choice. You also may want to do other activities, such as running, swimming, cycling, or playing tennis or team sports. Relaxation techniques  Do relaxation exercises 10 to 20 minutes a day. You can play soothing, relaxing music while you do them, if you wish. Tell others in your house that you are going to do your relaxation exercises. Ask them not to disturb you. Find a comfortable place, away from all distractions and noise. Lie down on your back, or sit with your back straight. Focus on your breathing. Make it slow and steady. Breathe in through your nose. Breathe out through either your nose or mouth. Breathe deeply, filling up the area between your navel and your rib cage. Breathe so that your belly goes up and down. Do not hold your breath. Breathe like this for 5 to 10 minutes. Notice the feeling of calmness throughout your whole body. As you continue to breathe slowly and deeply, relax by doing the following for another 5 to 10 minutes:  Tighten and relax each muscle group in your body. You can begin at your toes and work your way up to your head. Imagine your muscle groups relaxing and becoming heavy. Empty your mind of all thoughts. Let yourself relax more and more deeply. Become aware of the state of calmness that surrounds you. When your relaxation time is over, you can bring yourself back to alertness by moving your fingers and toes and then your hands and feet and then stretching and moving your entire body. Sometimes people fall asleep during relaxation, but they usually wake up shortly afterward. Always give yourself time to return to full alertness before you drive a car or do anything that might cause an accident if you are not fully alert. Never play a relaxation tape while you drive a car. When should you call for help? Call 911 anytime you think you may need emergency care.  For example, call if:    You feel you cannot stop from hurting yourself or someone else. Keep the numbers for these national suicide hotlines: 3-810-400-TALK (6-250.259.6208) and 4-768-CQTINMF (3-601.894.7348). If you or someone you know talks about suicide or feeling hopeless, get help right away. Watch closely for changes in your health, and be sure to contact your doctor if:    You have anxiety or fear that affects your life.     You have symptoms of anxiety that are new or different from those you had before. Where can you learn more? Go to https://Bikantapepiceweb.Spaceport.io [employer] org and sign in to your MiaSol[employer]© account. Enter P754 in the [employer] box to learn more about \"Anxiety Disorder: Care Instructions. \"     If you do not have an account, please click on the \"Sign Up Now\" link. Current as of: January 31, 2020               Content Version: 12.6  © 2006-2020 [employer]. Care instructions adapted under license by [facility] ([facility]). If you have questions about a medical condition or this instruction, always ask your healthcare professional. [place] [address] any warranty or liability for your use of this information. Patient Education        Recovering From Depression: Care Instructions  Your Care Instructions     Taking good care of yourself is important as you recover from depression. In time, your symptoms will fade as your treatment takes hold. Do not give up. Instead, focus your energy on getting better. Your mood will improve. It just takes some time. Focus on things that can help you feel better, such as being with friends and family, eating well, and getting enough rest. But take things slowly. Do not do too much too soon. You will begin to feel better gradually. Follow-up care is a key part of your treatment and safety. Be sure to make and go to all appointments, and call your doctor if you are having problems.  It's also a good idea to know your test results and keep a list of the you do not notice any improvement by 3 weeks, talk to your doctor. Continue to take your medicine after your symptoms improve. Taking your medicine for at least 6 months after you feel better can help keep you from getting depressed again. If this isn't the first time you have been depressed, your doctor may recommend you to take medicine even longer. If you have any side effects from your medicine, tell your doctor. Many side effects are mild and will go away on their own after you have been taking the medicine for a few weeks. Some may last longer. Talk to your doctor if side effects are bothering you too much. You might be able to try a different medicine. Continue counseling. It may help prevent depression from returning, especially if you've had multiple episodes of depression. Talk with your counselor if you are having a hard time attending your sessions or you think the sessions aren't working. Don't just stop going. Get enough sleep. Talk to your doctor if you are having problems sleeping. Avoid sleeping pills unless they are prescribed by the doctor treating your depression. Sleeping pills may make you groggy during the day, and they may interact with other medicine you are taking. If you have any other illnesses, such as diabetes, heart disease, or high blood pressure, make sure to continue with your treatment. Tell your doctor about all of the medicines you take, including those with or without a prescription. If you or someone you know talks about suicide, self-harm, or feeling hopeless, get help right away. Call the [address] [place] [address] at 1-800-273-talk (0-725.266.1886) or text HOME to 743705 to access the Crisis Text Line. Consider saving these numbers in your phone. When should you call for help? Call 414 anytime you think you may need emergency care.  For example, call if:    You feel like hurting yourself or someone else.     Someone you know has depression and is about to attempt or is attempting suicide. Call your doctor now or seek immediate medical care if:    You hear voices.     Someone you know has depression and:  Starts to give away his or her possessions. Uses illegal drugs or drinks alcohol heavily. Talks or writes about death, including writing suicide notes or talking about guns, knives, or pills. Starts to spend a lot of time alone. Acts very aggressively or suddenly appears calm. Watch closely for changes in your health, and be sure to contact your doctor if:    You do not get better as expected. Where can you learn more? Go to [email or web address] MobilepeMGT [employer].[email or web address]-Careâ€™s. org and sign in to your Bottlenose account. Enter N516 in the [employer] box to learn more about \"Recovering From Depression: Care Instructions. \"     If you do not have an account, please click on the \"Sign Up Now\" link. Current as of: January 31, 2020               Content Version: 12.6  © 4440-2526 [employer]. Care instructions adapted under license by [facility] ([facility]). If you have questions about a medical condition or this instruction, always ask your healthcare professional. [place] [address] any warranty or liability for your use of this information.

## 2020-12-11 NOTE — PROGRESS NOTES
Medicare Annual Wellness Visit  Name: Abel Nevarez Date: 2020   MRN: [id number] Sex: Female   Age: 72 y.o. Ethnicity: Non-/Non    : 1950 Race: Enoc Abler is here for Medicare AWV and follow up for anxiety and depression. Patient is taking Cymbalta 60 mg extended release, 1 capsule daily. At last visit on 2020 Cymbalta dosage was increased from 40 mg daily to 60 mg once daily. Patient states she feels a little bit better, with less depression but feels dosage needs to be increased.  is noticing depression is improving. Patient reports she has more interest and pleasure in doing things she once enjoyed- has a quilt she is working on for 3year old granddaughter. Denies homicidal / suicidal ideation or intent. Poor sleep due to difficulty breathing with hx of interstitial lung disease; 5 liters oxygen continuous via nasal canula. Anxiety has improved with taking Buspar 10 mg, takes BID (was prescribed TID). Tolerating medication well without adverse effects. Requests refill today. Screenings for behavioral, psychosocial and functional/safety risks, and cognitive dysfunction are all negative except as indicated below. These results, as well as other patient data from the [address] form, are documented in Flowsheets linked to this Encounter. Patient has not had diabetic foot exam within the past year; states  has appointment with podiatrist in 2021 and will schedule her visit on same day. Allergies   Allergen Reactions    Tramadol      Avoid because Pt is on Tegretol.  Penicillins Rash     Patient states she can take Keflex       Prior to Visit Medications    Medication Sig Taking?  Authorizing Provider   Buspar 10 mg Take 1 tablet by mouth three times daily     DULoxetine (CYMBALTA) 60 MG extended release capsule Take 1 capsule by mouth daily  CESAR Bloom CNP   DICLOFENAC PO Take 75 mg by mouth 2 (gastroesophageal reflux disease)     Hyperlipidemia     Hypertension     Impaired fasting glucose     Interstitial lung disease ([facility] 2016    Dr. Cydney Lazaro Interstitial lung disease ([facility]     Migraine headache     Osteopenia DEXA - March, 2016    Lumbar T score +1.7 and Hip -1.0 FRAX 7.6/0.6    Osteopenia DEXA-Dec., 2017    Lumbar T score 2.3 and hip T score 0.1    Other screening mammogram July 8, 2013    Benign    Other screening mammogram *March 4, 2016    Benign    Oxygen dependent     O2 @ 3.5 L CONTINUOUS    Pulmonary hypertension ([facility]     MILD    Retention of urine, unspecified     Screening mammogram, encounter for *February 11, 2020    Benign    Seizure disorder [facility])     Thyroid disease     Type 2 diabetes mellitus without complication ([facility] *QTB.55, 2020    FBS - 137 and A 1 C of 6.7    Vitamin D deficiency Oct., 2015    Level - 17    Wears glasses        Past Surgical History:   Procedure Laterality Date    ABDOMINOPLASTY  Jan., 2012    Dr. Rani Hernández Bilateral     BRONCHOSCOPY  **Sept.16, 2016    Dr. Shari Harper  09/16/2016    BRONCHOSCOPY  *Febr.24, 2017    Dr. Colonel Saldivar - No endobronchial lesions    CARDIAC CATHETERIZATION  Oct., 2004    LAD 50%, circumflex normal, R coronary normal, LVEF 60%.  CARDIAC CATHETERIZATION  *Sept.24, 2018    Dr. Claire Dixon - [facility] - mild pulmonary hypertension    COLONOSCOPY  Oct., 2003 ( 2013 )    Dr. Mann Organ - Negative - repeat 2013    COLONOSCOPY  Jan., 2014 ( 2024 )    Dr. Mely Luna - moderate diverticulosis.     COSMETIC SURGERY      inner thighs    LIPOSUCTION Bilateral     knees    ME LAPAROSCOPY W TOT HYSTERECTUTERUS <=250 GRAM  W TUBE/OVARY N/A 11/16/2018    ROBOTIC TOTAL HYSTERECTOMY AND BILATERAL SALPINGO-OOPHORECTOMY performed by Sameer Yousif MD at [facility]  *Nov., 2018    Dr. Leandro Tirado  Oct., 2015    Dr. Uma Bravo - left 2nd and 3rd   [address] [place] [address] ARTHROPLASTY  2013    Dr. Celio Hernandez - Yohan Sood  *2018    Dr. Barbi Harris - right    TUBAL LIGATION           Family History   Problem Relation Age of Onset    Heart Disease Father 76        , ASHD, S/P CAB, NIDDM, hyperlipidemia.  Cancer Mother 58        , lung cancer. CareTeam (Including outside providers/suppliers regularly involved in providing care):   Patient Care Team:  CESAR James CNP as PCP - General (Nurse Practitioner)  CESAR James CNP as PCP - [facility] Empaneled Provider  Nanci Murillo RN as Registered Nurse (General Surgery)  Akhil Barbosa RN as Nurse Navigator    Wt Readings from Last 3 Encounters:   20 211 lb 9.6 oz (96 kg)   10/21/20 211 lb (95.7 kg)   20 209 lb 9.6 oz (95.1 kg)     Patient-Reported Vitals 2020   Patient-Reported Temperature 97.5      There is no height or weight on file to calculate BMI. Based upon direct observation of the patient, evaluation of cognition reveals global memory impairment noted. Patient was referred to [address] [place] [address] [address] Neurology on 2020 and was evaluated by Dr. Dave Monteiro for further evaluation. MRI & EEG performed on 12/10/2020. Memory test scheduled in 2021. Dr. Dave Monteiro is managing. Patient's complete Health Risk Assessment and screening values have been reviewed and are found in Flowsheets. The following problems were reviewed today and where indicated follow up appointments were made and/or referrals ordered. Positive Risk Factor Screenings with Interventions:     Fall Risk:  Timed Up and Go Test > 12 seconds?  (Complete if either Fall Risk answers are Yes): (video visit)  2 or more falls in past year?: (!) yes  Fall with injury in past year?: no  Fall Risk Interventions:    · Home safety tips provided  · Patient declines any further evaluation/treatment for this issue    Depression:  Depression Unable to Assess: Functional capacity motivation limits accuracy  PHQ-2 Score: 0  PHQ-9 Total Score: 0    Severity:1-4 = minimal depression, 5-9 = mild depression, 10-14 = moderate depression, 15-19 = moderately severe depression, 20-27 = severe depression      General Health and ACP:  General  In general, how would you say your health is?: (!) Poor (\"Depression, not feeling well upon waking however is improving with Cymbalta\"). In the past 7 days, have you experienced any of the following? New or Increased Pain, New or Increased Fatigue, Loneliness, Social Isolation, Stress or Anger?: None of These  Do you get the social and emotional support that you need?: Yes  Do you have a Living Will?: Yes  Advance Directives     Power of [facility] Will ACP-Advance Directive ACP-Power of     Not on File [place] on 08/02/16 Filed [address] [place] Risk Interventions:  · Poor self-assessment of health status: patient declines any further evaluation/treatment for this issue    Health Habits/Nutrition:  Health Habits/Nutrition  Do you exercise for at least 20 minutes 2-3 times per week?: (!) No, Unable to exercise due to chronic shortness of breath with interstitial lung disease (wears 5 liters oxygen continuously)   Have you lost any weight without trying in the past 3 months?: No  Do you eat fewer than 2 meals per day?: No  Have you seen a dentist within the past year?: Yes     Health Habits/Nutrition Interventions:  · Nutritional issues:  patient agrees to work toward a weight loss goal of 10 pounds over the next 3 month(s) using the following plan: low carbohydrate diet, educational materials to promote weight loss provided    Safety:  Safety  Do you have working smoke detectors?: Yes  Have all throw rugs been removed or fastened?: (!) No all mat and rugs have rubber bottom.   Do you have non-slip mats or surfaces in all bathtubs/showers?: Yes  Do all of your stairways have a railing or banister?: Yes  Are your doorways, halls and stairs free of clutter?: Yes  Do you always fasten your seatbelt when you are in a car?: Yes  Safety Interventions:  · Home safety tips provided    ADL:  ADLs  In the past 7 days, did you need help from others to perform any of the following everyday activities? Eating, dressing, grooming, bathing, toileting, or walking/balance?: None  In the past 7 days, did you need help from others to take care of any of the following?  Laundry, housekeeping, banking/finances, shopping, telephone use, food preparation, transportation, or taking medications?: (!) Food Preparation  ADL Interventions:  · , Reyna Womack prepares all meals    Personalized Preventive Plan   Current Health Maintenance Status  Immunization History   Administered Date(s) Administered    Influenza, High Dose (Fluzone 65 yrs and older) 09/22/2017, 10/21/2018    Influenza, High-dose, Quadv, 65 yrs +, IM (Fluzone) 10/21/2020    Pneumococcal Conjugate 13-valent ([id number]) 08/03/2016    Pneumococcal Polysaccharide ([id number]) 11/10/2008, 09/22/2017    Td, unspecified formulation 04/07/2011    Zoster Recombinant (Shingrix) 04/09/2019, 06/22/2019        Health Maintenance   Topic Date Due    Diabetic foot exam  01[birth year]20/1961    Lipid screen  07/22/2017    Annual Wellness Visit (AWV)  06/19/2019    Diabetic microalbuminuria test  01/01/2021 (Originally 1/20/1968)    DTaP/Tdap/Td vaccine (1 - Tdap) 04/07/2021 (Originally 1/20/1970)    Breast cancer screen  02/11/2021    A1C test (Diabetic or Prediabetic)  09/28/2021    Potassium monitoring  09/29/2021    Creatinine monitoring  09/29/2021    Diabetic retinal exam  06/01/2022    Colon cancer screen colonoscopy  01/07/2024    DEXA (modify frequency per FRAX score)  Completed    Flu vaccine  Completed    Shingles Vaccine  Completed    Pneumococcal 65+ years Vaccine  Completed    Hepatitis C screen  Completed    Hepatitis A vaccine  Aged Out    Hib vaccine  Aged Out    Meningococcal (ACWY) vaccine  Aged Out Recommendations for Preventive Services Due: see orders and patient instructions/AVS.  . Recommended screening schedule for the next 5-10 years is provided to the patient in written form: see Patient Instructions/AVS.    Carlos was seen today for medicare awv. Diagnoses and all orders for this visit:    Encounter for annual wellness visit (AWV) in Medicare patient    Moderate episode of recurrent major depressive disorder ([facility]  - Improving.  -     Start DULoxetine (CYMBALTA) 20 MG extended release capsule; Take 1 capsule once daily, and 1 capsule of Cymbalta 60 mg for a total of 80 mg per day  -    Continue  DULoxetine (CYMBALTA) 60 MG extended release capsule; Take 1 capsule once daily, and 1 capsule of Cymbalta 20 mg for a total of 80 mg per day  - Consider Cymbalta dosage increase at one month follow up with minimal improvement with daily dose increased to 80 mg daily (max daily dose is 120 mg/day). - Practice self care and daily fresh air for 20 minutes. - Regular exercise program as tolerated. - Practice self relaxation with music or meditation etc. Phone apps such as Lawyer[employer]id: Mediatation and Relaxation, or Calm. - Call 9-1-1 or go to closest emergency room with any thoughts or intent of harming self or others; Patient verbalizes understanding and agrees to plan. Anxiety  - Stable  -     Continue busPIRone (BUSPAR) 10 MG tablet; Take 1 tablet by mouth 3 times daily as needed (anxiety). Patient taking BID, may take TID as needed. Guilherme Noonan is a 72 y.o. female being evaluated by a Virtual Visit (video and audio) encounter to address concerns as mentioned above. A caregiver was present when appropriate. Due to this being a TeleHealth encounter (During [id number]-27 public health emergency), evaluation of the following organ systems was limited: Vitals/Constitutional/EENT/Resp/CV/GI//MS/Neuro/Skin/Heme-Lymph-Imm.   Pursuant to the emergency declaration under the [employer] and the [address] [facility] authority and the Coronavirus Preparedness and [employer] Act, this Virtual Visit was conducted with patient's (and/or legal guardian's) consent, to reduce the patient's risk of exposure to COVID-19 and provide necessary medical care. The patient (and/or legal guardian) has also been advised to contact this office for worsening conditions or problems, and seek emergency medical treatment and/or call 911 if deemed necessary. Patient identification was verified at the start of the visit: Yes    Services were provided through a video synchronous discussion virtually to substitute for in-person clinic visit. Patient and provider were located at their individual homes. --CESAR Ritter CNP on 12/11/2020 at 9:58 AM    An electronic signature was used to authenticate this note.

## 2020-12-17 ENCOUNTER — TELEPHONE (OUTPATIENT)
Dept: PRIMARY CARE CLINIC | Age: 70
End: 2020-12-17

## 2020-12-30 ENCOUNTER — VIRTUAL VISIT (OUTPATIENT)
Dept: PRIMARY CARE CLINIC | Age: 70
End: 2020-12-30
Payer: MEDICARE

## 2020-12-30 PROCEDURE — 99442 PR PHYS/QHP TELEPHONE EVALUATION 11-20 MIN: CPT | Performed by: NURSE PRACTITIONER

## 2020-12-30 PROCEDURE — 4040F PNEUMOC VAC/ADMIN/RCVD: CPT | Performed by: NURSE PRACTITIONER

## 2020-12-30 PROCEDURE — 1123F ACP DISCUSS/DSCN MKR DOCD: CPT | Performed by: NURSE PRACTITIONER

## 2020-12-30 PROCEDURE — G8427 DOCREV CUR MEDS BY ELIG CLIN: HCPCS | Performed by: NURSE PRACTITIONER

## 2020-12-30 PROCEDURE — 1090F PRES/ABSN URINE INCON ASSESS: CPT | Performed by: NURSE PRACTITIONER

## 2020-12-30 PROCEDURE — 3017F COLORECTAL CA SCREEN DOC REV: CPT | Performed by: NURSE PRACTITIONER

## 2020-12-30 PROCEDURE — G8399 PT W/DXA RESULTS DOCUMENT: HCPCS | Performed by: NURSE PRACTITIONER

## 2020-12-30 NOTE — PROGRESS NOTES
 atorvastatin (LIPITOR) 80 MG tablet Take 1 tablet by mouth daily 90 tablet 1    Cholecalciferol (VITAMIN D3) 1.25 MG (47630 UT) CAPS 1 capsule every 7 days 12 capsule 3    glimepiride (AMARYL) 1 MG tablet Take 1 tablet by mouth every morning (before breakfast) 90 tablet 1    TEGRETOL 200 MG tablet TAKE ONE TABLET BY MOUTH TWICE A DAY 60 tablet 10    lisinopril (PRINIVIL;ZESTRIL) 20 MG tablet Take 1 tablet by mouth daily 90 tablet 3    furosemide (LASIX) 20 MG tablet Take 1 tablet by mouth daily 90 tablet 3    OXYGEN Inhale 4-5 L/day into the lungs      levothyroxine (SYNTHROID) 100 MCG tablet TAKE ONE TABLET BY MOUTH DAILY 90 tablet 3    metoprolol succinate (TOPROL XL) 50 MG extended release tablet Take one tablet by mouth daily 90 tablet 3    ibuprofen (ADVIL;MOTRIN) 800 MG tablet Take 1 tablet by mouth every 6 hours as needed for Pain 50 tablet 3    omeprazole (PRILOSEC) 20 MG capsule Take 1 capsule by mouth daily 84 capsule 2    aspirin 81 MG tablet Take 81 mg by mouth daily.  azithromycin (ZITHROMAX) 500 MG tablet Take 500 mg by mouth daily       No current facility-administered medications on file prior to visit.          Past Medical History:   Diagnosis Date    Abnormal stress test *Aug.1, 2016    Coronary angiography by Dr. Lexei Hsu revealed a 60% stenosis of the LAD and  a 50% mid circumflex     Arthritis     Chronic headaches     Eczema     Fibromyalgia     GERD (gastroesophageal reflux disease)     Hyperlipidemia     Hypertension     Impaired fasting glucose     Interstitial lung disease ([facility] [address] 2016    Dr. Mercy Waller Interstitial lung disease ([facility] [address]     Migraine headache     Osteopenia DEXA - March, 2016    Lumbar T score +1.7 and Hip -1.0 FRAX 7.6/0.6    Osteopenia DEXA-Dec., 2017    Lumbar T score 2.3 and hip T score 0.1    Other screening mammogram July 8, 2013    Benign    Other screening mammogram *March 4, 2016    Benign    Oxygen dependent O2 @ 3.5 L CONTINUOUS    Pulmonary hypertension [facility] [address]     MILD    Retention of urine, unspecified     Screening mammogram, encounter for *2020    Benign    [facility])     Thyroid disease     Type 2 diabetes mellitus without complication ([facility] [address] *2020    FBS - 137 and A 1 C of 6.7    Vitamin D deficiency Oct., 2015    Level - 17    Wears glasses        Past Surgical History:   Procedure Laterality Date    ABDOMINOPLASTY  2012    Dr. Mile Pal Bilateral     BRONCHOSCOPY  **2016    Dr. Franky Singh  2016    BRONCHOSCOPY  *2017    Dr. Chika Pedro - No endobronchial lesions    CARDIAC CATHETERIZATION  Oct., 2004    LAD 50%, circumflex normal, R coronary normal, LVEF 60%.  CARDIAC CATHETERIZATION  *2018    Dr. Nadia Garrido - [facility] - mild pulmonary hypertension    COLONOSCOPY  Oct., 2003 (  )    Dr. Nidhi Dielh - Negative - repeat     COLONOSCOPY  2014 (  )    Dr. Key Yarbrough - moderate diverticulosis.  COSMETIC SURGERY      inner thighs    LIPOSUCTION Bilateral     knees    ME LAPAROSCOPY W TOT HYSTERECTUTERUS <=250 GRAM  W TUBE/OVARY N/A 2018    ROBOTIC TOTAL HYSTERECTOMY AND BILATERAL SALPINGO-OOPHORECTOMY performed by Parveen Caotes MD at [place] [address]  *2018    Dr. Wilma Yuan  Oct., 2015    Dr. Mckay Whitman - left 2nd and 3rd    TOTAL HIP ARTHROPLASTY  2013    Dr. Kell Pagan  *2018    Dr. Ramsey Mcdaniel - right    TUBAL LIGATION         Family History   Problem Relation Age of Onset    Heart Disease Father 76        , ASHD, S/P CAB, NIDDM, hyperlipidemia.  Cancer Mother 58        , lung cancer. Allergies   Allergen Reactions    Tramadol      Avoid because Pt is on Tegretol.     Penicillins Rash     Patient states she can take Keflex       Social History     Tobacco Use  Smoking status: Never Smoker    Smokeless tobacco: Never Used   Substance Use Topics    Alcohol use: Yes     Alcohol/week: 0.0 standard drinks     Comment: occasional    Drug use: No          PHYSICAL EXAMINATION:  Vital Signs: (As obtained by patient/caregiver or practitioner observation)  There were no vitals taken for this visit. Patient-Reported Vitals 12/30/2020   Patient-Reported Height 5'2   Patient-Reported Temperature 98.7      Unable to perform a limited examination via virtual visit due to auditory difficulties through D[employer]. me. Visit was conducted via telephone only. ASSESSMENT/PLAN:  1. Close exposure to 2019 novel coronavirus  - COVID-19 Ambulatory; Future   - Call Dr. Alesia Severin office to reschedule memory test, scheduled on 1/5/2020.   - Call 9-1-1 or go to closest emergency with difficulty breathing or worsening of shortness of breath (hx chronic shortness of breath, on continuous oxygen at 5L); patient verbalizes understanding. Return in 5 days (on 1/4/2021) for COVID-19 testing at [facility].     Current Outpatient Medications   Medication Sig Dispense Refill    DULoxetine (CYMBALTA) 20 MG extended release capsule Take 1 capsule once daily, and 1 capsule of Cymbalta 60 mg for a total of 80 mg per day 30 capsule 2    DULoxetine (CYMBALTA) 60 MG extended release capsule Take 1 capsule once daily, and 1 capsule of Cymbalta 20 mg for a total of 80 mg per day 30 capsule 2    busPIRone (BUSPAR) 10 MG tablet Take 1 tablet by mouth 3 times daily as needed (anxiety) 90 tablet 2    DICLOFENAC PO Take 75 mg by mouth 2 times daily      predniSONE (DELTASONE) 5 MG tablet Take 5 mg by mouth daily      magnesium (MAGNESIUM-OXIDE) 250 MG TABS tablet Take 250 mg by mouth daily      azaTHIOprine (IMURAN) 50 MG tablet Take 50 mg by mouth daily 4 tablets daily, for a total of 200 mg daily      ezetimibe (ZETIA) 10 MG tablet Take 1 tablet by mouth daily 90 tablet 1  atorvastatin (LIPITOR) 80 MG tablet Take 1 tablet by mouth daily 90 tablet 1    Cholecalciferol (VITAMIN D3) 1.25 MG (40342 UT) CAPS 1 capsule every 7 days 12 capsule 3    glimepiride (AMARYL) 1 MG tablet Take 1 tablet by mouth every morning (before breakfast) 90 tablet 1    TEGRETOL 200 MG tablet TAKE ONE TABLET BY MOUTH TWICE A DAY 60 tablet 10    lisinopril (PRINIVIL;ZESTRIL) 20 MG tablet Take 1 tablet by mouth daily 90 tablet 3    furosemide (LASIX) 20 MG tablet Take 1 tablet by mouth daily 90 tablet 3    OXYGEN Inhale 4-5 L/day into the lungs      levothyroxine (SYNTHROID) 100 MCG tablet TAKE ONE TABLET BY MOUTH DAILY 90 tablet 3    metoprolol succinate (TOPROL XL) 50 MG extended release tablet Take one tablet by mouth daily 90 tablet 3    ibuprofen (ADVIL;MOTRIN) 800 MG tablet Take 1 tablet by mouth every 6 hours as needed for Pain 50 tablet 3    omeprazole (PRILOSEC) 20 MG capsule Take 1 capsule by mouth daily 84 capsule 2    aspirin 81 MG tablet Take 81 mg by mouth daily.  azithromycin (ZITHROMAX) 500 MG tablet Take 500 mg by mouth daily       No current facility-administered medications for this visit. Shyam Chau is a 72 y.o. female being evaluated by a Virtual Visit (video visit) encounter to address concerns as mentioned above. A caregiver was present when appropriate. Due to this being a TeleHealth encounter (During SPSXA-65 public health emergency), evaluation of the following organ systems was limited: Vitals/Constitutional/EENT/Resp/CV/GI//MS/Neuro/Skin/Heme-Lymph-Imm. Pursuant to the emergency declaration under the [address] [place] [address] and the [employer] Act, this Virtual Visit was conducted with patient's (and/or legal guardian's) consent, to reduce the patient's risk of exposure to COVID-19 and provide necessary medical care. The patient (and/or legal guardian) has also been advised to contact this office for worsening conditions or problems, and seek emergency medical treatment and/or call 911 if deemed necessary. Patient identification was verified at the start of the visit: Yes    Total time spent on this encounter: 10 minutes. Services were provided through a video synchronous discussion virtually to substitute for in-person clinic visit. Patient was located in their home. Provider was located in the office. --CESAR Borges CNP on 12/30/2020 at 3:01 PM    An electronic signature was used to authenticate this note. Adryan Renee

## 2020-12-30 NOTE — PATIENT INSTRUCTIONS
Patient Education        9 Things To Do If You've Been Exposed to COVID-19    Stay home. If you've been exposed, you should stay in isolation for 14 days. Don't go to school, work, or public areas. And don't use public transportation, ride-shares, or taxis unless you have no choice. Leave your home only if you need to get medical care. But call the doctor's office first so they know you're coming, and wear a cloth face cover when you go. Call your doctor. Call your doctor or other health professional to let them know that you've been exposed. They might want you to be tested, or they may have other instructions for you. If you become sick, wear a face cover when you are around other people. It can help stop the spread of the virus when you cough or sneeze. Limit contact with people in your home. If possible, stay in a separate bedroom and use a separate bathroom. Avoid contact with pets and other animals. Cover your mouth and nose with a tissue when you cough or sneeze. Then throw it in the trash right away. Wash your hands often, especially after you cough or sneeze. Use soap and water, and scrub for at least 20 seconds. If soap and water aren't available, use an alcohol-based hand . Don't share personal household items. These include bedding, towels, cups and glasses, and eating utensils. Clean and disinfect your home every day. Use household  or disinfectant wipes or sprays. Take special care to clean things that you grab with your hands. These include doorknobs, remote controls, phones, and handles on your refrigerator and microwave. And don't forget countertops, tabletops, bathrooms, and computer keyboards. Current as of: July 10, 2020               Content Version: 12.6  © 5208-9460 [employer]. Care instructions adapted under license by [facility] ([facility]). If you have questions about a medical condition or this instruction, always ask your healthcare professional. [place] [address] any warranty or liability for your use of this information. Patient Education        10 Things to Do When You Have COVID-19    Stay home. Don't go to school, work, or public areas. And don't use public transportation, ride-shares, or taxis unless you have no choice. Leave your home only if you need to get medical care. But call the doctor's office first so they know you're coming. And wear a cloth face cover. Ask before leaving isolation. Talk with your doctor or other health professional about when it will be safe for you to leave isolation. Wear a cloth face cover when you are around other people. It can help stop the spread of the virus when you cough or sneeze. Limit contact with people in your home. If possible, stay in a separate bedroom and use a separate bathroom. Avoid contact with pets and other animals. If possible, have a friend or family member care for them while you're sick. Cover your mouth and nose with a tissue when you cough or sneeze. Then throw the tissue in the trash right away. Wash your hands often, especially after you cough or sneeze. Use soap and water, and scrub for at least 20 seconds. If soap and water aren't available, use an alcohol-based hand . Don't share personal household items. These include bedding, towels, cups and glasses, and eating utensils. Clean and disinfect your home every day. Use household  or disinfectant wipes or sprays. Take special care to clean things that you grab with your hands. These include doorknobs, remote controls, phones, and handles on your refrigerator and microwave. And don't forget countertops, tabletops, bathrooms, and computer keyboards. Take acetaminophen (Tylenol) to relieve fever and body aches. Read and follow all instructions on the label. Current as of: July 10, 2020               Content Version: 12.6  © 2006-2020 [employer]. Care instructions adapted under license by [facility] ([facility]). If you have questions about a medical condition or this instruction, always ask your healthcare professional. [facility] any warranty or liability for your use of this information. Patient Education        Learning How To Care for Someone Who Has COVID-19  Things to know  Most people who get COVID-19 will recover with time and home care. Here are some things to know if you're caring for someone who's sick. · Treat the symptoms. Common symptoms include a fever, coughing, and feeling short of breath. Urge the person to get extra rest and drink plenty of fluids to replace fluids lost from fever. To reduce a fever, offer acetaminophen (such as Tylenol). It may also help with muscle aches. Read and follow all instructions on the label. · Watch for signs that the illness is getting worse. The person may need medical care if they're getting sicker (for example, if it's hard to breathe). But call the doctor's office before you go. They can tell you what to do. Call 911 or emergency services if the person has any of these symptoms:  ? Severe trouble breathing or shortness of breath. ? Constant pain or pressure in their chest.  ? Confusion, or trouble thinking clearly. ? A blue tint to their lips or face. Some people are more likely to get very sick and need medical care. Call the doctor as soon as symptoms start if the person you're caring for is over 72, smokes, or has a serious health problem, like asthma, heart disease, diabetes, or an immune system problem. · Protect yourself and others. The virus spreads easily from person to person, so take extra care to avoid catching or spreading the infection. ? Keep the sick person away from others as much as you can. § Have the person stay in one room. If you can, give them their own bathroom to use. § Have only one person take care of them. Keep other peopleand petsout of the sickroom. § Have the person wear a cloth face cover around other people. This includes when anyone is in the room with them or if they leave their room (for example, to go to the bathroom). If the face cover makes it harder for the sick person to breathe, the other person should wear a face cover. § Don't share personal items. These include dishes, cups, towels, and bedding. ? Wash your hands often and well. Use soap and water, and scrub for at least 20 seconds. This is especially important after you've been around the sick person or touched things they've touched. If soap and water aren't handy, use a hand  with at least 60% alcohol. ? Avoid touching your mouth, nose, and eyes. ? Take care with the person's laundry. It's okay to wash the sick person's laundry with yours. If you have them, wear disposable gloves when handling their dirty laundry, and wash your hands well after you touch it. Wash items in the warmest water allowed for the fabric type, and dry them completely. ? Clean high-touch items every day and anytime the sick person touches them. These include doorknobs, light switches, toilets, counters, and remote controls. Use a household disinfectant or a homemade bleach solution. (Follow the directions on the label.) If the sick person has their own room, have them disinfect it every day. ? Limit visitors to your home. To help protect family and friends, stay in touch with them only by phone or computer. Current as of: July 10, 2020               Content Version: 12.6  © 2647-0594 [employer]. Care instructions adapted under license by [facility] ([facility]). If you have questions about a medical condition or this instruction, always ask your healthcare professional. [address] any warranty or liability for your use of this information.

## 2021-01-04 ENCOUNTER — OFFICE VISIT (OUTPATIENT)
Dept: PRIMARY CARE CLINIC | Age: 71
End: 2021-01-04
Payer: MEDICARE

## 2021-01-04 DIAGNOSIS — Z20.828 EXPOSURE TO SARS-ASSOCIATED CORONAVIRUS: Primary | ICD-10-CM

## 2021-01-04 PROCEDURE — G8417 CALC BMI ABV UP PARAM F/U: HCPCS | Performed by: NURSE PRACTITIONER

## 2021-01-04 PROCEDURE — 99211 OFF/OP EST MAY X REQ PHY/QHP: CPT | Performed by: NURSE PRACTITIONER

## 2021-01-04 PROCEDURE — G8428 CUR MEDS NOT DOCUMENT: HCPCS | Performed by: NURSE PRACTITIONER

## 2021-01-04 NOTE — PROGRESS NOTES
Mejia Pearson received a viral test for COVID-19. They were educated on isolation and quarantine as appropriate. For any symptoms, they were directed to seek care from their PCP, given contact information to establish with a doctor, directed to an urgent care or the emergency room.

## 2021-01-06 LAB — SARS-COV-2, NAA: NOT DETECTED

## 2021-01-11 ENCOUNTER — VIRTUAL VISIT (OUTPATIENT)
Dept: PRIMARY CARE CLINIC | Age: 71
End: 2021-01-11
Payer: MEDICARE

## 2021-01-11 DIAGNOSIS — F33.1 MODERATE EPISODE OF RECURRENT MAJOR DEPRESSIVE DISORDER (HCC): ICD-10-CM

## 2021-01-11 PROCEDURE — G8427 DOCREV CUR MEDS BY ELIG CLIN: HCPCS | Performed by: NURSE PRACTITIONER

## 2021-01-11 PROCEDURE — G8399 PT W/DXA RESULTS DOCUMENT: HCPCS | Performed by: NURSE PRACTITIONER

## 2021-01-11 PROCEDURE — 99213 OFFICE O/P EST LOW 20 MIN: CPT | Performed by: NURSE PRACTITIONER

## 2021-01-11 PROCEDURE — 3017F COLORECTAL CA SCREEN DOC REV: CPT | Performed by: NURSE PRACTITIONER

## 2021-01-11 PROCEDURE — 4040F PNEUMOC VAC/ADMIN/RCVD: CPT | Performed by: NURSE PRACTITIONER

## 2021-01-11 PROCEDURE — 1090F PRES/ABSN URINE INCON ASSESS: CPT | Performed by: NURSE PRACTITIONER

## 2021-01-11 PROCEDURE — 1123F ACP DISCUSS/DSCN MKR DOCD: CPT | Performed by: NURSE PRACTITIONER

## 2021-01-11 RX ORDER — DULOXETINE 40 MG/1
40 CAPSULE, DELAYED RELEASE ORAL DAILY
Qty: 30 CAPSULE | Refills: 1 | Status: SHIPPED | OUTPATIENT
Start: 2021-01-11 | End: 2021-03-08 | Stop reason: SDUPTHER

## 2021-01-11 RX ORDER — DULOXETIN HYDROCHLORIDE 60 MG/1
CAPSULE, DELAYED RELEASE ORAL
Qty: 30 CAPSULE | Refills: 2 | Status: SHIPPED | OUTPATIENT
Start: 2021-01-11 | End: 2021-03-08 | Stop reason: SDUPTHER

## 2021-01-11 ASSESSMENT — PATIENT HEALTH QUESTIONNAIRE - PHQ9
SUM OF ALL RESPONSES TO PHQ QUESTIONS 1-9: 2
2. FEELING DOWN, DEPRESSED OR HOPELESS: 1
SUM OF ALL RESPONSES TO PHQ QUESTIONS 1-9: 2
SUM OF ALL RESPONSES TO PHQ QUESTIONS 1-9: 2
SUM OF ALL RESPONSES TO PHQ9 QUESTIONS 1 & 2: 2
1. LITTLE INTEREST OR PLEASURE IN DOING THINGS: 1

## 2021-01-11 NOTE — PATIENT INSTRUCTIONS
Patient Education        Depression and Chronic Disease: Care Instructions  Your Care Instructions     A chronic disease is one that you have for a long time. Some chronic diseases can be controlled, but they usually cannot be cured. Depression is common in people with chronic diseases, but it often goes unnoticed. Many people have concerns about seeking treatment for a mental health problem. You may think it's a sign of weakness, or you don't want people to know about it. It's important to overcome these reasons for not seeking treatment. Treating depression or anxiety is good for your health. Follow-up care is a key part of your treatment and safety. Be sure to make and go to all appointments, and call your doctor if you are having problems. It's also a good idea to know your test results and keep a list of the medicines you take. How can you care for yourself at home? Watch for symptoms of depression  The symptoms of depression are often subtle at first. You may think they are caused by your disease rather than depression. Or you may think it is normal to be depressed when you have a chronic disease. If you are depressed you may:  · Feel sad or hopeless. · Feel guilty or worthless. · Not enjoy the things you used to enjoy. · Feel hopeless, as though life is not worth living. · Have trouble thinking or remembering. · Have low energy, and you may not eat or sleep well. · Pull away from others. · Think often about death or killing yourself. (Keep the numbers for these national suicide hotlines: 7-117-257-TALK [1-698.246.7674] and 8-055-BEDHEXR [1-980.471.1041]. )  Get treatment  By treating your depression, you can feel more hopeful and have more energy. If you feel better, you may take better care of yourself, so your health may improve. · Talk to your doctor if you have any changes in mood during treatment for your disease. · Ask your doctor for help.  Counseling, antidepressant medicine, or a combination of the two can help most people with depression. Often a combination works best. Counseling can also help you cope with having a chronic disease. When should you call for help? Call 911 anytime you think you may need emergency care. For example, call if:    · You feel like hurting yourself or someone else.     · Someone you know has depression and is about to attempt or is attempting suicide. Call your doctor now or seek immediate medical care if:    · You hear voices.     · Someone you know has depression and:  ? Starts to give away his or her possessions. ? Uses illegal drugs or drinks alcohol heavily. ? Talks or writes about death, including writing suicide notes or talking about guns, knives, or pills. ? Starts to spend a lot of time alone. ? Acts very aggressively or suddenly appears calm. Watch closely for changes in your health, and be sure to contact your doctor if:    · You do not get better as expected. Where can you learn more? Go to https://FoodEssentials.P&R Labpak. org and sign in to your [employer] account. Enter J391 in the [address] box to learn more about \"Depression and Chronic Disease: Care Instructions. \"     If you do not have an account, please click on the \"Sign Up Now\" link. Current as of: January 31, 2020               Content Version: 12.6  © 0220-8932 [employer]. Care instructions adapted under license by [facility] ([facility]). If you have questions about a medical condition or this instruction, always ask your healthcare professional. [place] [address] any warranty or liability for your use of this information. Patient Education        Recovering From Depression: Care Instructions  Your Care Instructions     Taking good care of yourself is important as you recover from depression. In time, your symptoms will fade as your treatment takes hold. Do not give up. Instead, focus your energy on getting better.   Your mood will improve. It just takes some time. Focus on things that can help you feel better, such as being with friends and family, eating well, and getting enough rest. But take things slowly. Do not do too much too soon. You will begin to feel better gradually. Follow-up care is a key part of your treatment and safety. Be sure to make and go to all appointments, and call your doctor if you are having problems. It's also a good idea to know your test results and keep a list of the medicines you take. How can you care for yourself at home? Be realistic  · If you have a large task to do, break it up into smaller steps you can handle, and just do what you can. · You may want to put off important decisions until your depression has lifted. If you have plans that will have a major impact on your life, such as marriage, divorce, or a job change, try to wait a bit. Talk it over with friends and loved ones who can help you look at the overall picture first.  · Reaching out to people for help is important. Do not isolate yourself. Let your family and friends help you. Find someone you can trust and confide in, and talk to that person. · Be patient, and be kind to yourself. Remember that depression is not your fault and is not something you can overcome with willpower alone. Treatment is important for depression, just like for any other illness. Feeling better takes time, and your mood will improve little by little. Stay active  · Stay busy and get outside. Take a walk, or try some other light exercise. · Talk with your doctor about an exercise program. Exercise can help with mild depression. · Go to a movie or concert. Take part in a Catholic activity or other social gathering. Go to a ball game. · Ask a friend to have dinner with you. Take care of yourself  · Eat a balanced diet with plenty of fresh fruits and vegetables, whole grains, and lean protein.  If you have lost your appetite, eat small snacks rather than large meals.  · Avoid using illegal drugs or marijuana and drinking alcohol. Do not take medicines that have not been prescribed for you. They may interfere with medicines you may be taking for depression, or they may make your depression worse. · Take your medicines exactly as they are prescribed. You may start to feel better within 1 to 3 weeks of taking antidepressant medicine. But it can take as many as 6 to 8 weeks to see more improvement. If you have questions or concerns about your medicines, or if you do not notice any improvement by 3 weeks, talk to your doctor. · Continue to take your medicine after your symptoms improve. Taking your medicine for at least 6 months after you feel better can help keep you from getting depressed again. If this isn't the first time you have been depressed, your doctor may recommend you to take medicine even longer. · If you have any side effects from your medicine, tell your doctor. Many side effects are mild and will go away on their own after you have been taking the medicine for a few weeks. Some may last longer. Talk to your doctor if side effects are bothering you too much. You might be able to try a different medicine. · Continue counseling. It may help prevent depression from returning, especially if you've had multiple episodes of depression. Talk with your counselor if you are having a hard time attending your sessions or you think the sessions aren't working. Don't just stop going. · Get enough sleep. Talk to your doctor if you are having problems sleeping. · Avoid sleeping pills unless they are prescribed by the doctor treating your depression. Sleeping pills may make you groggy during the day, and they may interact with other medicine you are taking. · If you have any other illnesses, such as diabetes, heart disease, or high blood pressure, make sure to continue with your treatment.  Tell your doctor about all of the medicines you take, including those with or without a prescription. · If you or someone you know talks about suicide, self-harm, or feeling hopeless, get help right away. Call the [address] at 7-363-134-IPNE (7-782.650.1303) or text HOME to 077546 to access the Crisis Text Line. Consider saving these numbers in your phone. When should you call for help? Call 911 anytime you think you may need emergency care. For example, call if:    · You feel like hurting yourself or someone else.     · Someone you know has depression and is about to attempt or is attempting suicide. Call your doctor now or seek immediate medical care if:    · You hear voices.     · Someone you know has depression and:  ? Starts to give away his or her possessions. ? Uses illegal drugs or drinks alcohol heavily. ? Talks or writes about death, including writing suicide notes or talking about guns, knives, or pills. ? Starts to spend a lot of time alone. ? Acts very aggressively or suddenly appears calm. Watch closely for changes in your health, and be sure to contact your doctor if:    · You do not get better as expected. Where can you learn more? Go to https://Bright FundspePOWWOW.DerbyJackpot. org and sign in to your [employer] account. Enter G843 in the [place] box to learn more about \"Recovering From Depression: Care Instructions. \"     If you do not have an account, please click on the \"Sign Up Now\" link. Current as of: January 31, 2020               Content Version: 12.6  © 2006-2020 [employer]. Care instructions adapted under license by [facility] ([facility]). If you have questions about a medical condition or this instruction, always ask your healthcare professional. [place] [address] any warranty or liability for your use of this information.

## 2021-01-11 NOTE — PROGRESS NOTES
2021    TELEHEALTH EVALUATION -- Audio/Visual (During IFTIW-97 public health emergency)    Chief Complaint   Patient presents with    Depression     1 month follow up        HPI:    Maximo Mejia (: 1950) has requested an audio/video evaluation for the following concern(s): One month follow up for Depression. Cymbalta dose was increased to 80 mg at last visit 4 weeks ago. Patient sas noticed some improvement of depression but  \"not quite there\". Reports she used sewing machine fir the first time in months with her  encouragement, stating she \"loved it\".  has noticed a change in over all mood, \"less negative\". Denies homicidal or suicidal ideation or intent. Review of Systems:  Gen: Denies fever, chills, headaches. No weight loss. Eating and drinking to baseline. CV:  Denies chest pain or tightness, palpitations. Pulm: Denies shortness of breath, cough. Abd: Denies abdominal pain, change in bowel habits. Psych: Denies tearful mood. Depression slightly improved.      Current Outpatient Medications on File Prior to Visit   Medication Sig Dispense Refill    DULoxetine (CYMBALTA) 20 MG extended release capsule Take 1 capsule once daily, and 1 capsule of Cymbalta 60 mg for a total of 80 mg per day 30 capsule 2    DULoxetine (CYMBALTA) 60 MG extended release capsule Take 1 capsule once daily, and 1 capsule of Cymbalta 20 mg for a total of 80 mg per day 30 capsule 2    DICLOFENAC PO Take 75 mg by mouth 2 times daily      predniSONE (DELTASONE) 5 MG tablet Take 5 mg by mouth daily      azithromycin (ZITHROMAX) 500 MG tablet Take 500 mg by mouth daily      magnesium (MAGNESIUM-OXIDE) 250 MG TABS tablet Take 250 mg by mouth daily      azaTHIOprine (IMURAN) 50 MG tablet Take 50 mg by mouth daily 4 tablets daily, for a total of 200 mg daily      ezetimibe (ZETIA) 10 MG tablet Take 1 tablet by mouth daily 90 tablet 1    Cholecalciferol (VITAMIN D3) 1.25 MG (72261 UT) CAPS 1 capsule every 7 days 12 capsule 3    TEGRETOL 200 MG tablet TAKE ONE TABLET BY MOUTH TWICE A DAY 60 tablet 10    lisinopril (PRINIVIL;ZESTRIL) 20 MG tablet Take 1 tablet by mouth daily 90 tablet 3    furosemide (LASIX) 20 MG tablet Take 1 tablet by mouth daily 90 tablet 3    OXYGEN Inhale 4-5 L/day into the lungs      levothyroxine (SYNTHROID) 100 MCG tablet TAKE ONE TABLET BY MOUTH DAILY 90 tablet 3    metoprolol succinate (TOPROL XL) 50 MG extended release tablet Take one tablet by mouth daily 90 tablet 3    ibuprofen (ADVIL;MOTRIN) 800 MG tablet Take 1 tablet by mouth every 6 hours as needed for Pain 50 tablet 3    omeprazole (PRILOSEC) 20 MG capsule Take 1 capsule by mouth daily 84 capsule 2    aspirin 81 MG tablet Take 81 mg by mouth daily.  atorvastatin (LIPITOR) 80 MG tablet Take 1 tablet by mouth daily 90 tablet 1    glimepiride (AMARYL) 1 MG tablet Take 1 tablet by mouth every morning (before breakfast) 90 tablet 1     No current facility-administered medications on file prior to visit.          Past Medical History:   Diagnosis Date    Abnormal stress test *Aug.1, 2016    Coronary angiography by Dr. Racheal Corado revealed a 60% stenosis of the LAD and  a 50% mid circumflex     Arthritis     Chronic headaches     Eczema     Fibromyalgia     GERD (gastroesophageal reflux disease)     Hyperlipidemia     Hypertension     Impaired fasting glucose     [facility]stitial lung disease [facility] [address] 2016    Dr. Katja Dean Interstitial lung disease ([facility] [address] [facility]     Migraine headache     Osteopenia DEXA - March, 2016    Lumbar T score +1.7 and Hip -1.0 FRAX 7.6/0.6    Osteopenia DEXA-Dec., 2017    Lumbar T score 2.3 and hip T score 0.1    Other screening mammogram July 8, 2013    Benign    Other screening mammogram *March 4, 2016    Benign    Oxygen dependent     O2 @ 3.5 L CONTINUOUS    Pulmonary hypertension ([facility] [address]     MILD    Retention of urine, unspecified     Screening mammogram, encounter for *2020    Benign    Seizure disorder [facility])     Thyroid disease     Type 2 diabetes mellitus without complication ([facility] [address] *DMM.2020    FBS - 137 and A 1 C of 6.7    Vitamin D deficiency Oct., 2015    Level - 17    Wears glasses        Past Surgical History:   Procedure Laterality Date    ABDOMINOPLASTY  2012    Dr. Carolina Cronin Bilateral     BRONCHOSCOPY  **2016    Dr. Macy Melo  2016    BRONCHOSCOPY  *2017    Dr. Abiodun Potter - No endobronchial lesions    CARDIAC CATHETERIZATION  Oct., 2004    LAD 50%, circumflex normal, R coronary normal, LVEF 60%.  CARDIAC CATHETERIZATION  *2018    Dr. Chantelle Georges - [facility] - mild pulmonary hypertension    COLONOSCOPY  Oct., 2003 (  )    Dr. Johnathan Vazquez - Negative - repeat     COLONOSCOPY  2014 (  )    Dr. Timbo Warner - moderate diverticulosis.  COSMETIC SURGERY      inner thighs    LIPOSUCTION Bilateral     knees    [place] LAPAROSCOPY W TOT HYSTERECTUTERUS <=250 GRAM  W TUBE/OVARY N/A 2018    ROBOTIC TOTAL HYSTERECTOMY AND BILATERAL SALPINGO-OOPHORECTOMY performed by Tristin Rodriguez MD at [place] [address]  *2018    Dr. Luisa Beach  Oct., 2015    Dr. Su Roman - left 2nd and 3rd    TOTAL HIP ARTHROPLASTY  2013    Dr. Ruth Edgar  *2018    Dr. Berny Gloria - right    TUBAL LIGATION         Family History   Problem Relation Age of Onset    Heart Disease Father 76        , ASHD, S/P CAB, NIDDM, hyperlipidemia.  Cancer Mother 58        , lung cancer. Allergies   Allergen Reactions    Tramadol      Avoid because Pt is on Tegretol.  Penicillins Rash     Patient states she can take Keflex       Social History     Tobacco Use    Smoking status: Never Smoker    Smokeless tobacco: Never Used   Substance Use Topics    Alcohol use:  Yes     Alcohol/week: 0.0 standard drinks Comment: occasional    Drug use: No          PHYSICAL EXAMINATION:  Vital Signs: (As obtained by patient/caregiver or practitioner observation)  There were no vitals taken for this visit. Patient-Reported Vitals 1/11/2021   Patient-Reported Weight 210lb   Patient-Reported Height 5'2   Patient-Reported Systolic 675   Patient-Reported Diastolic 78   Patient-Reported Pulse 96   Patient-Reported Temperature -        Respiratory rate appears normal    Constitutional: Appears well-developed and well-nourished. No apparent distress    Mental status: Alert and awake. Oriented to person/place/time. Able to follow commands    Eyes: EOM normal. Sclera normal. No discharge visible  HENT: Normocephalic, atraumatic. Neck: No visualized mass   Pulmonary/Chest: Respiratory effort normal.  No visualized signs of difficulty breathing or respiratory distress. Speaking in full sentences without difficulty. Musculoskeletal: Normal range of motion of neck  Neurological: No Facial Asymmetry (Cranial nerve 7 motor function) (limited exam to video visit). No gaze palsy   Skin: No significant exanthematous lesions or discoloration noted on facial skin       Psychiatric: Normal Affect. No Hallucinations. Denies homicidal/suicidal ideation or intent. ASSESSMENT/PLAN:  1. Moderate episode of recurrent major depressive disorder (HCC)  - Improving, not at goal.  - Increase Cymbalta to a total of 100 mg daily (Max daily dose 120 mg). - DULoxetine (CYMBALTA) 60 MG extended release capsule; Take 1 capsule once daily; Refill 2.  - DULoxetine (Cymbalta) 40 mg for a total of 100 mg per day  Dispense: 30 capsule; Refill: 2    -Continue daily self care (sewing)  - Practice self care and daily fresh air for 20 minutes. - Regular exercise program  - Practice self relaxation with music or meditation etc. Phone apps such as Chikka: Mediatation and Relaxation, or Calm.     - Call 9-1-1 or go to closest emergency room with any thoughts or intent of harming self or others; Patient verbalizes understanding and agrees to plan. Return in about 4 weeks (around 2/8/2021) for Depression follow up (Cymbalta increased from 80 mg to 100 mg daily). Current Outpatient Medications   Medication Sig Dispense Refill    DULoxetine (CYMBALTA) 60 MG extended release capsule Take 1 capsule once daily, and capsule of Cymbalta 40 mg for a total of 100 mg per day 30 capsule 2    DULoxetine 40 MG CPEP Take 40 mg by mouth daily 30 capsule 1    DICLOFENAC PO Take 75 mg by mouth 2 times daily      predniSONE (DELTASONE) 5 MG tablet Take 5 mg by mouth daily      azithromycin (ZITHROMAX) 500 MG tablet Take 500 mg by mouth daily      magnesium (MAGNESIUM-OXIDE) 250 MG TABS tablet Take 250 mg by mouth daily      azaTHIOprine (IMURAN) 50 MG tablet Take 50 mg by mouth daily 4 tablets daily, for a total of 200 mg daily      ezetimibe (ZETIA) 10 MG tablet Take 1 tablet by mouth daily 90 tablet 1    Cholecalciferol (VITAMIN D3) 1.25 MG (03482 UT) CAPS 1 capsule every 7 days 12 capsule 3    TEGRETOL 200 MG tablet TAKE ONE TABLET BY MOUTH TWICE A DAY 60 tablet 10    lisinopril (PRINIVIL;ZESTRIL) 20 MG tablet Take 1 tablet by mouth daily 90 tablet 3    furosemide (LASIX) 20 MG tablet Take 1 tablet by mouth daily 90 tablet 3    OXYGEN Inhale 4-5 L/day into the lungs      levothyroxine (SYNTHROID) 100 MCG tablet TAKE ONE TABLET BY MOUTH DAILY 90 tablet 3    metoprolol succinate (TOPROL XL) 50 MG extended release tablet Take one tablet by mouth daily 90 tablet 3    ibuprofen (ADVIL;MOTRIN) 800 MG tablet Take 1 tablet by mouth every 6 hours as needed for Pain 50 tablet 3    omeprazole (PRILOSEC) 20 MG capsule Take 1 capsule by mouth daily 84 capsule 2    aspirin 81 MG tablet Take 81 mg by mouth daily.         atorvastatin (LIPITOR) 80 MG tablet Take 1 tablet by mouth daily 90 tablet 1    glimepiride (AMARYL) 1 MG tablet Take 1 tablet by mouth every morning (before breakfast) 90 tablet 1     No current facility-administered medications for this visit. Shayna Avila is a 72 y.o. female being evaluated by a Virtual Visit (video visit) encounter to address concerns as mentioned above. A caregiver was present when appropriate. Due to this being a TeleHealth encounter ([facility]), evaluation of the following organ systems was limited: Vitals/Constitutional/EENT/Resp/CV/GI//MS/Neuro/Skin/Heme-Lymph-Imm. Pursuant to the emergency declaration under the [facility], [address] [place] [address] authority and the [employer] Act, this Virtual Visit was conducted with patient's (and/or legal guardian's) consent, to reduce the patient's risk of exposure to COVID-19 and provide necessary medical care. The patient (and/or legal guardian) has also been advised to contact this office for worsening conditions or problems, and seek emergency medical treatment and/or call 911 if deemed necessary. Patient identification was verified at the start of the visit: Yes    Total time spent on this encounter: 12 minutes. Services were provided through a video synchronous discussion virtually to substitute for in-person clinic visit. Patient was located in their home. Provider was located in the office. --CESAR Anna CNP on 1/11/2021 at 10:22 AM    An electronic signature was used to authenticate this note. Natacha Alexis

## 2021-01-12 DIAGNOSIS — I25.83 CORONARY ARTERY DISEASE DUE TO LIPID RICH PLAQUE: Primary | ICD-10-CM

## 2021-01-12 DIAGNOSIS — I25.10 CORONARY ARTERY DISEASE DUE TO LIPID RICH PLAQUE: Primary | ICD-10-CM

## 2021-01-13 ENCOUNTER — TELEPHONE (OUTPATIENT)
Dept: PRIMARY CARE CLINIC | Age: 71
End: 2021-01-13

## 2021-01-13 NOTE — TELEPHONE ENCOUNTER
I spoke with Ryann Rosales and he wanted to see if his wife's Matthew Ambrosio Lisinopril 20 mg was being refill and sent over to her pharmacy. Please call patient to let her know.

## 2021-01-14 RX ORDER — LISINOPRIL 20 MG/1
TABLET ORAL
Qty: 90 TABLET | Refills: 0 | Status: SHIPPED | OUTPATIENT
Start: 2021-01-14 | End: 2021-02-17 | Stop reason: SDUPTHER

## 2021-01-14 NOTE — TELEPHONE ENCOUNTER
Left message on voicemail regarding Lisinopril refill. Asked for pt to call office back. Patient needs fasting labs prior to OV with [facility] in Feb. Orders placed. Refill sent in.
Please advise as Patient  is calling back about this medication in another encounter.
Received refill request for lisinopril from [facility] pharmacy.     Last ov:2/18/2020 with D[facility]    Last labs:9/29/2020 bmp    Last Refill:3/19/2020 #90 with 3 refills    Next appointment:recall schedule for [facility] in [place]
[facility] ([facility]):

## 2021-01-21 ENCOUNTER — TELEPHONE (OUTPATIENT)
Dept: PRIMARY CARE CLINIC | Age: 71
End: 2021-01-21

## 2021-01-21 DIAGNOSIS — N28.9 DECREASED RENAL FUNCTION: Primary | ICD-10-CM

## 2021-01-21 NOTE — PROGRESS NOTES
Decreased renal function resulted on 1/13/2021.   - BUN 33 (7-25)  - Creatinine: 1.33 (0.60-1.30)  - GFR: 47     12/8/2020:  - BUN: 22  Creatinine: 0.85  GFR: 81    Renal panel within normal range on  7/7/2020. Plan:  1. Instructed to hydrate x 7 days (64 oz or more of water daily) than repeat renal panel. 2. Nephrology referral if renal panel is abnormal.    Patient and her , Thu Head notified.

## 2021-01-22 ENCOUNTER — HOSPITAL ENCOUNTER (OUTPATIENT)
Age: 71
Discharge: HOME | End: 2021-01-22
Payer: MEDICARE

## 2021-01-22 DIAGNOSIS — I25.10 CORONARY ARTERY DISEASE DUE TO LIPID RICH PLAQUE: ICD-10-CM

## 2021-01-22 DIAGNOSIS — N28.9 DECREASED RENAL FUNCTION: ICD-10-CM

## 2021-01-22 DIAGNOSIS — I25.83 CORONARY ARTERY DISEASE DUE TO LIPID RICH PLAQUE: ICD-10-CM

## 2021-01-22 LAB
ALBUMIN SERPL-MCNC: 4.4 G/DL (ref 3.4–5)
ALT SERPL-CCNC: 12 U/L (ref 10–40)
ANION GAP SERPL CALCULATED.3IONS-SCNC: 15 MMOL/L (ref 3–16)
AST SERPL-CCNC: 18 U/L (ref 15–37)
BUN BLDV-MCNC: 28 MG/DL (ref 7–20)
CALCIUM SERPL-MCNC: 9.6 MG/DL (ref 8.3–10.6)
CHLORIDE BLD-SCNC: 99 MMOL/L (ref 99–110)
CHOLESTEROL, FASTING: 176 MG/DL (ref 0–199)
CO2: 28 MMOL/L (ref 21–32)
CREAT SERPL-MCNC: 0.9 MG/DL (ref 0.6–1.2)
GFR AFRICAN AMERICAN: >60
GFR NON-AFRICAN AMERICAN: >60
GLUCOSE BLD-MCNC: 123 MG/DL (ref 70–99)
HCT VFR BLD CALC: 38 % (ref 36–48)
HDLC SERPL-MCNC: 87 MG/DL (ref 40–60)
HEMOGLOBIN: 12.5 G/DL (ref 12–16)
LDL CHOLESTEROL CALCULATED: 67 MG/DL
MCH RBC QN AUTO: 30 PG (ref 26–34)
MCHC RBC AUTO-ENTMCNC: 33 G/DL (ref 31–36)
MCV RBC AUTO: 91 FL (ref 80–100)
PDW BLD-RTO: 14.4 % (ref 12.4–15.4)
PHOSPHORUS: 4.4 MG/DL (ref 2.5–4.9)
PLATELET # BLD: 233 K/UL (ref 135–450)
PMV BLD AUTO: 8.2 FL (ref 5–10.5)
POTASSIUM SERPL-SCNC: 4.6 MMOL/L (ref 3.5–5.1)
RBC # BLD: 4.17 M/UL (ref 4–5.2)
SODIUM BLD-SCNC: 142 MMOL/L (ref 136–145)
TRIGLYCERIDE, FASTING: 112 MG/DL (ref 0–150)
VLDLC SERPL CALC-MCNC: 22 MG/DL
WBC # BLD: 6.8 K/UL (ref 4–11)

## 2021-01-22 PROCEDURE — 84460 ALANINE AMINO (ALT) (SGPT): CPT

## 2021-01-22 PROCEDURE — 84450 TRANSFERASE (AST) (SGOT): CPT

## 2021-01-22 PROCEDURE — 80061 LIPID PANEL: CPT

## 2021-01-22 PROCEDURE — 36415 COLL VENOUS BLD VENIPUNCTURE: CPT

## 2021-01-22 PROCEDURE — 80069 RENAL FUNCTION PANEL: CPT

## 2021-01-22 PROCEDURE — 85027 COMPLETE CBC AUTOMATED: CPT

## 2021-01-25 NOTE — RESULT ENCOUNTER NOTE
Reviewed. Plan discussed with patient. Hydrate x 1 week than repeat renal panel. Patient's pulmonlogist suggested 3 weeks to repeat; told patient and her  in [address] message she can wait 3 weeks. Will share results with Dr. Rufina Zepeda.

## 2021-02-08 ENCOUNTER — VIRTUAL VISIT (OUTPATIENT)
Dept: PRIMARY CARE CLINIC | Age: 71
End: 2021-02-08
Payer: MEDICARE

## 2021-02-08 DIAGNOSIS — F33.41 RECURRENT MAJOR DEPRESSIVE DISORDER, IN PARTIAL REMISSION (HCC): Primary | ICD-10-CM

## 2021-02-08 DIAGNOSIS — Z87.448 HISTORY OF DECREASED RENAL FUNCTION: ICD-10-CM

## 2021-02-08 PROCEDURE — 3017F COLORECTAL CA SCREEN DOC REV: CPT | Performed by: NURSE PRACTITIONER

## 2021-02-08 PROCEDURE — 1090F PRES/ABSN URINE INCON ASSESS: CPT | Performed by: NURSE PRACTITIONER

## 2021-02-08 PROCEDURE — 1123F ACP DISCUSS/DSCN MKR DOCD: CPT | Performed by: NURSE PRACTITIONER

## 2021-02-08 PROCEDURE — 99213 OFFICE O/P EST LOW 20 MIN: CPT | Performed by: NURSE PRACTITIONER

## 2021-02-08 PROCEDURE — 4040F PNEUMOC VAC/ADMIN/RCVD: CPT | Performed by: NURSE PRACTITIONER

## 2021-02-08 PROCEDURE — G8427 DOCREV CUR MEDS BY ELIG CLIN: HCPCS | Performed by: NURSE PRACTITIONER

## 2021-02-08 PROCEDURE — G8399 PT W/DXA RESULTS DOCUMENT: HCPCS | Performed by: NURSE PRACTITIONER

## 2021-02-08 RX ORDER — CHOLECALCIFEROL (VITAMIN D3) 125 MCG
500 CAPSULE ORAL DAILY
COMMUNITY

## 2021-02-08 RX ORDER — AZATHIOPRINE 50 MG/1
50 TABLET ORAL DAILY
Qty: 30 TABLET | Status: SHIPPED | COMMUNITY
Start: 2021-02-08 | End: 2021-10-13 | Stop reason: SINTOL

## 2021-02-08 NOTE — PROGRESS NOTES
2021    TELEHEALTH EVALUATION -- Audio/Visual (During FFDJV-15 public health emergency)    Chief Complaint   Patient presents with    Depression     4 weeks follow up    Discuss Labs     renal reasult        HPI:    Garland Rob (: 1950) has requested an audio/video evaluation for the following concern(s): 4 week follow up for depression and to discuss renal labs. Patient's , Fabian Ontiveros is with patient on video visit. Depression: Currently taking Cymbalta 100 mg daily (40 mg tablet, and 60 mg tablet). Dosage was increased 4 weeks ago stating she \"feels great! \". Denies depressive symptoms. Denies homicidal, suicidal ideation or intent. Patient is now reading, reporting she was not able to read prior to dosage increase as she would lose interest quickly. Patient has also been sewing more. Does not need refills today; will send [employer] message when needed. BUN, Creatinine, GFR labs discussed:  Decreased renal function on 2021; BUN 33, Creatinine 1.33, GFR: 47. No prior history of decreased renal function. Patient was instructed to hydrate (64 ounces daily of water/flavored water) and renal panel was rechecked. Last Renal Panel on 2021 was within normal range. BUN 24, Creatinine 1.05, GFR 62. All results were scanned into chart. Lab checked at [address] [place] [address] lab; closer to patient's home and faster process compared to a [address] [address] lab. , Fabian Ontiveros prefers the exact GFR reading compared to [address] [address] results of > or < 60. Patient reports history of not drinking enough fluids, but will try to increase daily intake. Discussed Pedialyte favored packets to add to bottle water since she doesn't care for plain water. Occasionally drinks Coke; instructed to avoid Coke and caffeine. Review of Systems:  Gen:  No weight loss. Eating and drinking to baseline. CV:  Denies chest pain or tightness, palpitations. Pulm: + shortness of breath (baseline), 6 liters O2 continuous via nasal cannula. Denies cough. Abd: Denies abdominal pain, change in bowel habits. Psych: Denies depression. + anxiety (patient contributes to chronic lung disease and shortness of breath). Current Outpatient Medications on File Prior to Visit   Medication Sig Dispense Refill    Vitamin B12 tablet Take one tablet by mouth daily      lisinopril (PRINIVIL;ZESTRIL) 20 MG tablet TAKE ONE TABLET BY MOUTH DAILY 90 tablet 0    DULoxetine (CYMBALTA) 60 MG extended release capsule Take 1 capsule once daily, and capsule of Cymbalta 40 mg for a total of 100 mg per day 30 capsule 2    DULoxetine 40 MG CPEP Take 40 mg by mouth daily 30 capsule 1    DICLOFENAC PO Take 75 mg by mouth 2 times daily      predniSONE (DELTASONE) 5 MG tablet Take 5 mg by mouth daily      azithromycin (ZITHROMAX) 500 MG tablet Take 500 mg by mouth daily      magnesium (MAGNESIUM-OXIDE) 250 MG TABS tablet Take 250 mg by mouth daily      azaTHIOprine (IMURAN) 50 MG tablet Take 50 mg by mouth daily 4 tablets daily, for a total of 200 mg daily      ezetimibe (ZETIA) 10 MG tablet Take 1 tablet by mouth daily 90 tablet 1    Cholecalciferol (VITAMIN D3) 1.25 MG (38803 UT) CAPS 1 capsule every 7 days 12 capsule 3    TEGRETOL 200 MG tablet TAKE ONE TABLET BY MOUTH TWICE A DAY 60 tablet 10    furosemide (LASIX) 20 MG tablet Take 1 tablet by mouth daily 90 tablet 3    OXYGEN Inhale 4-5 L/day into the lungs      levothyroxine (SYNTHROID) 100 MCG tablet TAKE ONE TABLET BY MOUTH DAILY 90 tablet 3    metoprolol succinate (TOPROL XL) 50 MG extended release tablet Take one tablet by mouth daily 90 tablet 3    ibuprofen (ADVIL;MOTRIN) 800 MG tablet Take 1 tablet by mouth every 6 hours as needed for Pain 50 tablet 3    omeprazole (PRILOSEC) 20 MG capsule Take 1 capsule by mouth daily 84 capsule 2    aspirin 81 MG tablet Take 81 mg by mouth daily.  atorvastatin (LIPITOR) 80 MG tablet Take 1 tablet by mouth daily 90 tablet 1    glimepiride (AMARYL) 1 MG tablet Take 1 tablet by mouth every morning (before breakfast) 90 tablet 1     No current facility-administered medications on file prior to visit. Past Medical History:   Diagnosis Date    Abnormal stress test *Aug.1, 2016    Coronary angiography by Dr. Olivier Mcclellan revealed a 60% stenosis of the LAD and  a 50% mid circumflex     Arthritis     Chronic headaches     Eczema     Fibromyalgia     GERD (gastroesophageal reflux disease)     Hyperlipidemia     Hypertension     Impaired fasting glucose     Interstitial lung disease ([facility] 2016    Dr. Leeann Paez Interstitial lung disease ([facility]     Migraine headache     Osteopenia DEXA - March, 2016    Lumbar T score +1.7 and Hip -1.0 FRAX 7.6/0.6    Osteopenia DEXA-Dec., 2017    Lumbar T score 2.3 and hip T score 0.1    Other screening mammogram July 8, 2013    Benign    Other screening mammogram *March 4, 2016    Benign    Oxygen dependent     O2 @ 3.5 L CONTINUOUS    Pulmonary hypertension ([facility]     MILD    Retention of urine, unspecified     Screening mammogram, encounter for *February 11, 2020    Benign    Seizure disorder [facility])     Thyroid disease     Type 2 diabetes mellitus without complication ([facility] *XSK.60, 2020    FBS - 137 and A 1 C of 6.7    Vitamin D deficiency Oct., 2015    Level - 17    Wears glasses        Past Surgical History:   Procedure Laterality Date    ABDOMINOPLASTY  Jan., 2012    Dr. Alexsandra Garcia Bilateral     BRONCHOSCOPY  **Sept.16, 2016    Dr. Boston Haskins  09/16/2016    BRONCHOSCOPY  *Febr.24, 2017    Dr. Shea Avalos - No endobronchial lesions    CARDIAC CATHETERIZATION  Oct., 2004    LAD 50%, circumflex normal, R coronary normal, LVEF 60%.      CARDIAC CATHETERIZATION  *Sept.24, 2018    Dr. Karine Gonzalez - [facility] - mild pulmonary hypertension  DULoxetine 40 MG CPEP Take 40 mg by mouth daily 30 capsule 1    predniSONE (DELTASONE) 5 MG tablet Take 5 mg by mouth daily      magnesium (MAGNESIUM-OXIDE) 250 MG TABS tablet Take 250 mg by mouth daily      atorvastatin (LIPITOR) 80 MG tablet Take 1 tablet by mouth daily 90 tablet 1    Cholecalciferol (VITAMIN D3) 1.25 MG (56425 UT) CAPS 1 capsule every 7 days 12 capsule 3    glimepiride (AMARYL) 1 MG tablet Take 1 tablet by mouth every morning (before breakfast) 90 tablet 1    TEGRETOL 200 MG tablet TAKE ONE TABLET BY MOUTH TWICE A DAY 60 tablet 10    furosemide (LASIX) 20 MG tablet Take 1 tablet by mouth daily 90 tablet 3    levothyroxine (SYNTHROID) 100 MCG tablet TAKE ONE TABLET BY MOUTH DAILY 90 tablet 3    metoprolol succinate (TOPROL XL) 50 MG extended release tablet Take one tablet by mouth daily 90 tablet 3    omeprazole (PRILOSEC) 20 MG capsule Take 1 capsule by mouth daily 84 capsule 2    aspirin 81 MG tablet Take 81 mg by mouth daily.  DICLOFENAC PO Take 75 mg by mouth 2 times daily      azithromycin (ZITHROMAX) 500 MG tablet Take 500 mg by mouth daily      ezetimibe (ZETIA) 10 MG tablet Take 1 tablet by mouth daily 90 tablet 1    ibuprofen (ADVIL;MOTRIN) 800 MG tablet Take 1 tablet by mouth every 6 hours as needed for Pain 50 tablet 3     No current facility-administered medications for this visit. Kesha Tran is a 80 y.o. female being evaluated by a Virtual Visit (video visit) encounter to address concerns as mentioned above. A caregiver was present when appropriate. Due to this being a TeleHealth encounter (During BLS-43 public health emergency), evaluation of the following organ systems was limited: Vitals/Constitutional/EENT/Resp/CV/GI//MS/Neuro/Skin/Heme-Lymph-Imm. Pursuant to the emergency declaration under the [address] [place] [address] and the [employer] Act, this Virtual Visit was conducted with patient's (and/or legal guardian's) consent, to reduce the patient's risk of exposure to COVID-19 and provide necessary medical care. The patient (and/or legal guardian) has also been advised to contact this office for worsening conditions or problems, and seek emergency medical treatment and/or call 911 if deemed necessary. Patient identification was verified at the start of the visit: Yes    Total time spent on this encounter: 20 minutes. Services were provided through a video synchronous discussion virtually to substitute for in-person clinic visit. Patient was located in their home. Provider was located in the office. --Rene Govea, CESAR - CNP on 2/8/2021 at 10:56 AM    An electronic signature was used to authenticate this note. Sheryl Shaw

## 2021-02-08 NOTE — PATIENT INSTRUCTIONS
Patient Education        Recovering From Depression: Care Instructions  Your Care Instructions     Taking good care of yourself is important as you recover from depression. In time, your symptoms will fade as your treatment takes hold. Do not give up. Instead, focus your energy on getting better. Your mood will improve. It just takes some time. Focus on things that can help you feel better, such as being with friends and family, eating well, and getting enough rest. But take things slowly. Do not do too much too soon. You will begin to feel better gradually. Follow-up care is a key part of your treatment and safety. Be sure to make and go to all appointments, and call your doctor if you are having problems. It's also a good idea to know your test results and keep a list of the medicines you take. How can you care for yourself at home? Be realistic  · If you have a large task to do, break it up into smaller steps you can handle, and just do what you can. · You may want to put off important decisions until your depression has lifted. If you have plans that will have a major impact on your life, such as marriage, divorce, or a job change, try to wait a bit. Talk it over with friends and loved ones who can help you look at the overall picture first.  · Reaching out to people for help is important. Do not isolate yourself. Let your family and friends help you. Find someone you can trust and confide in, and talk to that person. · Be patient, and be kind to yourself. Remember that depression is not your fault and is not something you can overcome with willpower alone. Treatment is important for depression, just like for any other illness. Feeling better takes time, and your mood will improve little by little. Stay active  · Stay busy and get outside. Take a walk, or try some other light exercise. · Talk with your doctor about an exercise program. Exercise can help with mild depression. · Avoid sleeping pills unless they are prescribed by the doctor treating your depression. Sleeping pills may make you groggy during the day, and they may interact with other medicine you are taking. · If you have any other illnesses, such as diabetes, heart disease, or high blood pressure, make sure to continue with your treatment. Tell your doctor about all of the medicines you take, including those with or without a prescription. · If you or someone you know talks about suicide, self-harm, or feeling hopeless, get help right away. Call the [address] [place] [address] at 8-913-891-YTSF ([phone]) or text HOME to 808597 to access the Crisis Text Line. Consider saving these numbers in your phone. When should you call for help? Call 401 anytime you think you may need emergency care. For example, call if:    · You feel like hurting yourself or someone else.     · Someone you know has depression and is about to attempt or is attempting suicide. Call your doctor now or seek immediate medical care if:    · You hear voices.     · Someone you know has depression and:  ? Starts to give away his or her possessions. ? Uses illegal drugs or drinks alcohol heavily. ? Talks or writes about death, including writing suicide notes or talking about guns, knives, or pills. ? Starts to spend a lot of time alone. ? Acts very aggressively or suddenly appears calm. Watch closely for changes in your health, and be sure to contact your doctor if:    · You do not get better as expected. Where can you learn more? Go to https://[employer]. org and sign in to your [employer] account. Enter J727 in the [employer] box to learn more about \"Recovering From Depression: Care Instructions. \"     If you do not have an account, please click on the \"Sign Up Now\" link. Current as of: January 31, 2020               Content Version: 12.6  © 4591-3919 [employer]. Care instructions adapted under license by [facility] ([facility]). If you have questions about a medical condition or this instruction, always ask your healthcare professional. [address] any warranty or liability for your use of this information.

## 2021-02-15 DIAGNOSIS — E55.9 VITAMIN D DEFICIENCY: ICD-10-CM

## 2021-02-15 RX ORDER — CHOLECALCIFEROL (VITAMIN D3) 1250 MCG
CAPSULE ORAL
Qty: 12 CAPSULE | Refills: 3 | Status: SHIPPED | OUTPATIENT
Start: 2021-02-15 | End: 2022-02-23

## 2021-02-15 NOTE — PROGRESS NOTES
Patient's , Adis Bhatia sent [employer] message requesting Vitamin D3, 1.25 mg ([id number]) to be refilled. Prescription was written by previous PCP, Dr. Denny Evangelista. Patient and her  notified via [employer] the prescription was refilled.

## 2021-02-17 ENCOUNTER — OFFICE VISIT (OUTPATIENT)
Dept: CARDIOLOGY CLINIC | Age: 71
End: 2021-02-17
Payer: MEDICARE

## 2021-02-17 VITALS
SYSTOLIC BLOOD PRESSURE: 118 MMHG | HEIGHT: 63 IN | BODY MASS INDEX: 38.45 KG/M2 | HEART RATE: 94 BPM | OXYGEN SATURATION: 98 % | DIASTOLIC BLOOD PRESSURE: 60 MMHG | WEIGHT: 217 LBS | RESPIRATION RATE: 22 BRPM

## 2021-02-17 DIAGNOSIS — I25.10 ATHEROSCLEROSIS OF NATIVE CORONARY ARTERY OF NATIVE HEART WITHOUT ANGINA PECTORIS: Primary | ICD-10-CM

## 2021-02-17 DIAGNOSIS — E78.5 HYPERLIPIDEMIA, UNSPECIFIED HYPERLIPIDEMIA TYPE: ICD-10-CM

## 2021-02-17 DIAGNOSIS — I10 ESSENTIAL HYPERTENSION, BENIGN: ICD-10-CM

## 2021-02-17 PROCEDURE — 1036F TOBACCO NON-USER: CPT | Performed by: INTERNAL MEDICINE

## 2021-02-17 PROCEDURE — 3017F COLORECTAL CA SCREEN DOC REV: CPT | Performed by: INTERNAL MEDICINE

## 2021-02-17 PROCEDURE — 4040F PNEUMOC VAC/ADMIN/RCVD: CPT | Performed by: INTERNAL MEDICINE

## 2021-02-17 PROCEDURE — G8399 PT W/DXA RESULTS DOCUMENT: HCPCS | Performed by: INTERNAL MEDICINE

## 2021-02-17 PROCEDURE — 1090F PRES/ABSN URINE INCON ASSESS: CPT | Performed by: INTERNAL MEDICINE

## 2021-02-17 PROCEDURE — G8427 DOCREV CUR MEDS BY ELIG CLIN: HCPCS | Performed by: INTERNAL MEDICINE

## 2021-02-17 PROCEDURE — G8417 CALC BMI ABV UP PARAM F/U: HCPCS | Performed by: INTERNAL MEDICINE

## 2021-02-17 PROCEDURE — 1123F ACP DISCUSS/DSCN MKR DOCD: CPT | Performed by: INTERNAL MEDICINE

## 2021-02-17 PROCEDURE — 99214 OFFICE O/P EST MOD 30 MIN: CPT | Performed by: INTERNAL MEDICINE

## 2021-02-17 PROCEDURE — G8484 FLU IMMUNIZE NO ADMIN: HCPCS | Performed by: INTERNAL MEDICINE

## 2021-02-17 RX ORDER — METOPROLOL SUCCINATE 50 MG/1
TABLET, EXTENDED RELEASE ORAL
Qty: 90 TABLET | Refills: 4 | Status: SHIPPED | OUTPATIENT
Start: 2021-02-17 | End: 2022-03-18

## 2021-02-17 RX ORDER — FUROSEMIDE 20 MG/1
20 TABLET ORAL DAILY
Qty: 90 TABLET | Refills: 4 | Status: SHIPPED | OUTPATIENT
Start: 2021-02-17 | End: 2022-02-27 | Stop reason: SDUPTHER

## 2021-02-17 RX ORDER — LISINOPRIL 20 MG/1
TABLET ORAL
Qty: 90 TABLET | Refills: 4 | Status: SHIPPED | OUTPATIENT
Start: 2021-02-17 | End: 2022-03-09 | Stop reason: ALTCHOICE

## 2021-02-17 NOTE — PROGRESS NOTES
[facility]  Cardiac Consult     Referring Provider:  Kindra Simpson APRN - CNP     Chief Complaint   Patient presents with   [facility] Cardiologist     Previously seen Dr Abnea Khoury 1 Year Follow Up    Coronary Artery Disease    Hypertension    Hyperlipidemia        History of Present Illness:   80 y.o. female formally followed by Dr. Jarred Vasquez seen in f/u for mild CAD, hyperlipidemia and HTN. She is on chronic O2 for interstitial lung disease. She was admitted 9/2020 with atypical chest pain. Stress myoview was normal. She is doing fairly well from a cardiac standpoint. She has chronic mild dyspnea with exertion. Some chronic chest tightness. Past Medical History:   has a past medical history of Abnormal stress test, Arthritis, Chronic headaches, Eczema, Fibromyalgia, GERD (gastroesophageal reflux disease), Hyperlipidemia, Hypertension, Impaired fasting glucose, Interstitial lung disease ([address] Interstitial lung disease ([address] Migraine headache, Osteopenia, Osteopenia, Other screening mammogram, Other screening mammogram, Oxygen dependent, Pulmonary hypertension ([address] Retention of urine, unspecified, Screening mammogram, encounter for, Seizure disorder ([address] Thyroid disease, Type 2 diabetes mellitus without complication ([address] Vitamin D deficiency, and Wears glasses. Surgical History:   has a past surgical history that includes Appendectomy; Tubal ligation; Colonoscopy (Oct., 2003 ( 2013 )); Abdominoplasty (Jan., 2012); Cosmetic surgery; Liposuction (Bilateral); Cardiac catheterization (Oct., 2004); Breast reduction surgery (Bilateral); Total hip arthroplasty (July 31, 2013); Colonoscopy (Jan., 2014 ( 2024 )); Toe Surgery (Oct., 2015); bronchoscopy (**Sept.16, 2016); bronchoscopy (09/16/2016); bronchoscopy (*Febr.24, 2017); Total hip arthroplasty (*April 13, 2018);  Cardiac catheterization (*Sept.24, 2018); neto and bso (cervix removed) (*Nov., 2018); and pr laparoscopy w tot hysterectuterus <=250 gram  w tube/ovary (N/A, 11/16/2018). Social History:  Social History     Tobacco Use    Smoking status: Never Smoker    Smokeless tobacco: Never Used   Substance Use Topics    Alcohol use: Yes     Frequency: Monthly or less     Drinks per session: 3 or 4     Comment: occasional        Family History:  family history includes Cancer (age of onset: 58) in her mother; Heart Disease (age of onset: 76) in her father. Allergies:  Tramadol and Penicillins     Home Medications:  Prior to Visit Medications    Medication Sig Taking? Authorizing Provider   lisinopril (PRINIVIL;ZESTRIL) 20 MG tablet TAKE ONE TABLET BY MOUTH DAILY Yes Destiny García MD   furosemide (LASIX) 20 MG tablet Take 1 tablet by mouth daily Yes Destiny García MD   metoprolol succinate (TOPROL XL) 50 MG extended release tablet Take one tablet by mouth daily Yes Destiny García MD   Cholecalciferol (VITAMIN D3) 1.25 MG (07186 UT) CAPS 1 capsule every 7 days Yes CESAR Oneal CNP   azaTHIOprine (IMURAN) 50 MG tablet Take 1 tablet by mouth daily 2 tablets daily, for a total of 100 mg daily.  Yes CESAR Oneal CNP   OXYGEN Inhale 6 L/day into the lungs Yes CESAR Oneal CNP   vitamin B-12 (CYANOCOBALAMIN) 500 MCG tablet Take 500 mcg by mouth daily Yes Historical Provider, MD   DULoxetine (CYMBALTA) 60 MG extended release capsule Take 1 capsule once daily, and capsule of Cymbalta 40 mg for a total of 100 mg per day Yes CESAR Oneal CNP   DULoxetine 40 MG CPEP Take 40 mg by mouth daily Yes CESAR Oneal CNP   DICLOFENAC PO Take 75 mg by mouth 2 times daily Yes Historical Provider, MD   predniSONE (DELTASONE) 5 MG tablet Take 5 mg by mouth daily Yes Historical Provider, MD   azithromycin (ZITHROMAX) 500 MG tablet Take 500 mg by mouth daily Yes Historical Provider, MD   magnesium (MAGNESIUM-OXIDE) 250 MG TABS tablet Take 250 mg by mouth daily Yes Historical Provider, MD   ezetimibe (ZETIA) 10 MG tablet Take 1 tablet by mouth daily Yes CESAR Peguero CNP   atorvastatin (LIPITOR) 80 MG tablet Take 1 tablet by mouth daily Yes CESAR Peguero CNP   glimepiride (AMARYL) 1 MG tablet Take 1 tablet by mouth every morning (before breakfast) Yes CESAR Peguero CNP   TEGRETOL 200 MG tablet TAKE ONE TABLET BY MOUTH TWICE A DAY Yes Oswald Syed MD   levothyroxine (SYNTHROID) 100 MCG tablet TAKE ONE TABLET BY MOUTH DAILY Yes DARBY Randhawa MD   ibuprofen (ADVIL;MOTRIN) 800 MG tablet Take 1 tablet by mouth every 6 hours as needed for Pain Yes Odalis Davies MD   omeprazole (PRILOSEC) 20 MG capsule Take 1 capsule by mouth daily Yes Oswald Syed MD   aspirin 81 MG tablet Take 81 mg by mouth daily. Yes Historical Provider, MD       [x] Medications and dosages reviewed. ROS:  [x]Full ROS obtained and negative except as mentioned in HPI      Physical Examination:    Vitals:    02/17/21 1024   BP: 118/60   Site: Left Upper Arm   Position: Sitting   Cuff Size: Large Adult   Pulse: 94   Resp: 22   SpO2: 98%   Weight: 217 lb (98.4 kg)   Height: 5' 2.5\" (1.588 m)        · GENERAL: elderly female on O2 in NAD  · NEUROLOGICAL: Alert and oriented  · PSYCH: Calm affect  · SKIN: Warm and dry, No visible rash,   · EYES: Pupils equal and round, Sclera non-icteric,   · HENT:  External ears and nose unremarkable, mucus membranes moist  · MUSCULOSKELETAL: Normal cephalic, neck supple  · CAROTID: Normal upstroke, no bruits  · CARDIAC: JVP normal, Normal PMI, regular rate and rhythm, normal S1S2, no murmur, rub, or gallop  · RESPIRATORY: Normal respiratory effort, clear to auscultation bilaterally  · EXTREMITIES: No LE edema  · GASTROINTESTINAL: normal bowel sounds, soft, non-tender, No hepatomegaly     Testing below reviewed    ECHO 3/2020  Summary   -Overall left ventricular systolic function is mildly depressed .   -Ejection fraction is visually estimated to be 50-55%.  E/e'= 9.3 cm.   -There is mild concentric left ventricular hypertrophy.   -There is mild diffuse hypokinesis.   -Indeterminate diastolic function.   -The tip of the anterior leaflet appears to be slightly bulbous, and is   suggestive of some myxomatous change and minimal prolapse. -IVC size is normal (<2.1cm) and collapses > 50% with respiration consistent   with normal RA pressure (3mmHg). MYOVIEW 9/2020   Summary    Normal LV size with hyperdynamic systolic function.    Left ventricular ejection fraction of 76%.    There is normal isotope uptake at stress and rest.    There is no evidence of myocardial ischemia or scar. LABS 1/22/2021  Creat=0.85  LDL=67  ALT/AST=12/18  H/H=12.5/38    CARDIAC CATH 2016     60% LAD at D1 bifurcation--IFR 0.97,  FFR 0.91  50% mid cx  Normal LV FXN    Assessment:       CAD:  Non obstructive CAD 2016  Normal Myoview 9/2021  Stable.  Continue medical therapy    Hyperlipidemia:  Well controlled on lipitor and zetia  LDL=67  Continue current therapy    HTN:  /60 (Site: Left Upper Arm, Position: Sitting, Cuff Size: Large Adult)   Pulse 94   Resp 22   Ht 5' 2.5\" (1.588 m)   Wt 217 lb (98.4 kg)   SpO2 98%   BMI 39.06 kg/m²   Well controlled  Continue current therapy      Plan:  Stable cardia status  Major issue is her lungs  Refill HTN meds-Lisinopril, lopressor,lasix    Thank you for allowing me to participate in the care of this individual.      Lizy Alvarez M.D., [facility] - Preemption

## 2021-03-08 ENCOUNTER — TELEPHONE (OUTPATIENT)
Dept: PRIMARY CARE CLINIC | Age: 71
End: 2021-03-08

## 2021-03-08 DIAGNOSIS — F33.1 MODERATE EPISODE OF RECURRENT MAJOR DEPRESSIVE DISORDER (HCC): ICD-10-CM

## 2021-03-08 RX ORDER — DULOXETIN HYDROCHLORIDE 60 MG/1
CAPSULE, DELAYED RELEASE ORAL
Qty: 30 CAPSULE | Refills: 2 | Status: SHIPPED | OUTPATIENT
Start: 2021-03-08 | End: 2021-05-12 | Stop reason: SDUPTHER

## 2021-03-08 RX ORDER — DULOXETINE 40 MG/1
40 CAPSULE, DELAYED RELEASE ORAL DAILY
Qty: 30 CAPSULE | Refills: 2 | Status: SHIPPED | OUTPATIENT
Start: 2021-03-08 | End: 2021-05-12 | Stop reason: SDUPTHER

## 2021-03-09 DIAGNOSIS — E78.5 HYPERLIPIDEMIA, UNSPECIFIED HYPERLIPIDEMIA TYPE: ICD-10-CM

## 2021-03-09 DIAGNOSIS — I10 ESSENTIAL HYPERTENSION, BENIGN: ICD-10-CM

## 2021-03-09 DIAGNOSIS — E03.9 HYPOTHYROIDISM, UNSPECIFIED TYPE: ICD-10-CM

## 2021-03-09 DIAGNOSIS — E11.9 TYPE 2 DIABETES MELLITUS WITHOUT COMPLICATION, WITHOUT LONG-TERM CURRENT USE OF INSULIN (HCC): ICD-10-CM

## 2021-03-09 LAB
BASOPHILS ABSOLUTE: 0.1 K/UL (ref 0–0.2)
BASOPHILS RELATIVE PERCENT: 0.7 %
EOSINOPHILS ABSOLUTE: 0.4 K/UL (ref 0–0.6)
EOSINOPHILS RELATIVE PERCENT: 4.1 %
HCT VFR BLD CALC: 38.7 % (ref 36–48)
HEMOGLOBIN: 12.7 G/DL (ref 12–16)
LYMPHOCYTES ABSOLUTE: 2.4 K/UL (ref 1–5.1)
LYMPHOCYTES RELATIVE PERCENT: 22 %
MCH RBC QN AUTO: 30.3 PG (ref 26–34)
MCHC RBC AUTO-ENTMCNC: 32.7 G/DL (ref 31–36)
MCV RBC AUTO: 92.6 FL (ref 80–100)
MONOCYTES ABSOLUTE: 0.9 K/UL (ref 0–1.3)
MONOCYTES RELATIVE PERCENT: 8 %
NEUTROPHILS ABSOLUTE: 7 K/UL (ref 1.7–7.7)
NEUTROPHILS RELATIVE PERCENT: 65.2 %
PDW BLD-RTO: 14.7 % (ref 12.4–15.4)
PLATELET # BLD: 260 K/UL (ref 135–450)
PMV BLD AUTO: 8.3 FL (ref 5–10.5)
RBC # BLD: 4.18 M/UL (ref 4–5.2)
WBC # BLD: 10.7 K/UL (ref 4–11)

## 2021-03-09 NOTE — PROGRESS NOTES
Patient is taking Duloxetine 60 mg & 40 mg for total of 100 mg daily. Patient's , Christina Tapia  requests refill of 60 mg tablet. Refill for both 40 mg and 60 mg sent to pharmacy. Patient is taking Levothyroxine 100 mcg daily. Previous PCP prescribed quantity enough for one year. Thyroid labs required to determine if 100 mcg is still the appropriate dosing. Patient to schedule a virtual visit to discuss thyroid labs and Levothyroxine.

## 2021-03-10 ENCOUNTER — VIRTUAL VISIT (OUTPATIENT)
Dept: PRIMARY CARE CLINIC | Age: 71
End: 2021-03-10
Payer: MEDICARE

## 2021-03-10 DIAGNOSIS — F41.0 PANIC ATTACKS: ICD-10-CM

## 2021-03-10 DIAGNOSIS — G40.909 SEIZURE DISORDER (HCC): ICD-10-CM

## 2021-03-10 DIAGNOSIS — E03.9 HYPOTHYROIDISM, UNSPECIFIED TYPE: Primary | ICD-10-CM

## 2021-03-10 DIAGNOSIS — N28.9 DECREASED RENAL FUNCTION: ICD-10-CM

## 2021-03-10 DIAGNOSIS — J84.9 INTERSTITIAL LUNG DISEASE (HCC): ICD-10-CM

## 2021-03-10 DIAGNOSIS — E78.5 HYPERLIPIDEMIA, UNSPECIFIED HYPERLIPIDEMIA TYPE: ICD-10-CM

## 2021-03-10 DIAGNOSIS — E11.9 TYPE 2 DIABETES MELLITUS WITHOUT COMPLICATION, WITHOUT LONG-TERM CURRENT USE OF INSULIN (HCC): ICD-10-CM

## 2021-03-10 LAB
A/G RATIO: 1.4 (ref 1.1–2.2)
ALBUMIN SERPL-MCNC: 4.2 G/DL (ref 3.4–5)
ALP BLD-CCNC: 78 U/L (ref 40–129)
ALT SERPL-CCNC: 14 U/L (ref 10–40)
ANION GAP SERPL CALCULATED.3IONS-SCNC: 18 MMOL/L (ref 3–16)
AST SERPL-CCNC: 19 U/L (ref 15–37)
BILIRUB SERPL-MCNC: <0.2 MG/DL (ref 0–1)
BILIRUBIN DIRECT: <0.2 MG/DL (ref 0–0.3)
BILIRUBIN, INDIRECT: NORMAL MG/DL (ref 0–1)
BUN BLDV-MCNC: 25 MG/DL (ref 7–20)
CALCIUM SERPL-MCNC: 9.5 MG/DL (ref 8.3–10.6)
CHLORIDE BLD-SCNC: 102 MMOL/L (ref 99–110)
CHOLESTEROL, FASTING: 202 MG/DL (ref 0–199)
CO2: 26 MMOL/L (ref 21–32)
CREAT SERPL-MCNC: 1.1 MG/DL (ref 0.6–1.2)
ESTIMATED AVERAGE GLUCOSE: 154.2 MG/DL
GFR AFRICAN AMERICAN: 59
GFR NON-AFRICAN AMERICAN: 49
GLOBULIN: 3.1 G/DL
GLUCOSE FASTING: 143 MG/DL (ref 70–99)
HBA1C MFR BLD: 7 %
HDLC SERPL-MCNC: 82 MG/DL (ref 40–60)
LDL CHOLESTEROL CALCULATED: 88 MG/DL
POTASSIUM SERPL-SCNC: 5 MMOL/L (ref 3.5–5.1)
SODIUM BLD-SCNC: 146 MMOL/L (ref 136–145)
TOTAL PROTEIN: 7.3 G/DL (ref 6.4–8.2)
TRIGLYCERIDE, FASTING: 158 MG/DL (ref 0–150)
TSH REFLEX FT4: 1.15 UIU/ML (ref 0.27–4.2)
VLDLC SERPL CALC-MCNC: 32 MG/DL

## 2021-03-10 PROCEDURE — 4040F PNEUMOC VAC/ADMIN/RCVD: CPT | Performed by: NURSE PRACTITIONER

## 2021-03-10 PROCEDURE — 1123F ACP DISCUSS/DSCN MKR DOCD: CPT | Performed by: NURSE PRACTITIONER

## 2021-03-10 PROCEDURE — 3017F COLORECTAL CA SCREEN DOC REV: CPT | Performed by: NURSE PRACTITIONER

## 2021-03-10 PROCEDURE — 3051F HG A1C>EQUAL 7.0%<8.0%: CPT | Performed by: NURSE PRACTITIONER

## 2021-03-10 PROCEDURE — G8399 PT W/DXA RESULTS DOCUMENT: HCPCS | Performed by: NURSE PRACTITIONER

## 2021-03-10 PROCEDURE — 1090F PRES/ABSN URINE INCON ASSESS: CPT | Performed by: NURSE PRACTITIONER

## 2021-03-10 PROCEDURE — 99214 OFFICE O/P EST MOD 30 MIN: CPT | Performed by: NURSE PRACTITIONER

## 2021-03-10 PROCEDURE — G8427 DOCREV CUR MEDS BY ELIG CLIN: HCPCS | Performed by: NURSE PRACTITIONER

## 2021-03-10 PROCEDURE — 2022F DILAT RTA XM EVC RTNOPTHY: CPT | Performed by: NURSE PRACTITIONER

## 2021-03-10 RX ORDER — ALPRAZOLAM 0.25 MG/1
0.25 TABLET ORAL 2 TIMES DAILY PRN
Qty: 60 TABLET | Refills: 0 | Status: SHIPPED | OUTPATIENT
Start: 2021-03-10 | End: 2021-04-14

## 2021-03-10 RX ORDER — EZETIMIBE 10 MG/1
10 TABLET ORAL DAILY
Qty: 90 TABLET | Refills: 1 | Status: SHIPPED | OUTPATIENT
Start: 2021-03-10 | End: 2021-09-16

## 2021-03-10 RX ORDER — GLIMEPIRIDE 1 MG/1
1 TABLET ORAL
Qty: 90 TABLET | Refills: 1 | Status: SHIPPED | OUTPATIENT
Start: 2021-03-10 | End: 2021-05-12 | Stop reason: SDUPTHER

## 2021-03-10 RX ORDER — ATORVASTATIN CALCIUM 80 MG/1
80 TABLET, FILM COATED ORAL DAILY
Qty: 90 TABLET | Refills: 1 | Status: SHIPPED | OUTPATIENT
Start: 2021-03-10 | End: 2021-05-17 | Stop reason: SDUPTHER

## 2021-03-10 RX ORDER — LEVOTHYROXINE SODIUM 0.1 MG/1
TABLET ORAL
Qty: 90 TABLET | Refills: 1 | Status: SHIPPED | OUTPATIENT
Start: 2021-03-10 | End: 2021-05-12 | Stop reason: SDUPTHER

## 2021-03-10 RX ORDER — CARBAMAZEPINE 200 MG
200 TABLET ORAL 2 TIMES DAILY
Qty: 180 TABLET | Refills: 1 | Status: CANCELLED | OUTPATIENT
Start: 2021-03-10 | End: 2021-06-08

## 2021-03-10 NOTE — PROGRESS NOTES
3/10/2021    TELEHEALTH EVALUATION -- Audio/Visual (During  public health emergency)    Chief Complaint   Patient presents with    Discuss Labs    Medication Refill        HPI:    Ania Henley (: 1950) has requested an audio/video evaluation for the following concern(s): Review lab results, follow up for chronic diseases and medication refills. Hypothyroidism:   Page Andrade is taking Levothyroxine 100 mcg daily as prescribed. She requires refill today. She is tolerating well without adverse effects. She has taken same dosage for several years. She denies fatigue, weight changes, heat/cold intolerance, bowel/skin changes or CVS symptoms. The problem has been stable. Lab Results   Component Value Date    TSHFT4 1.15 2021    TSH 0.83 10/28/2015     Dyslipidemia:   She is taking Zetia 10 mg daily and Lipitor 80 mg nightly- She requires refills of both medications today. Compliance with treatment thus far has been good. She is tolerating medications well without adverse effects. She denies myalgia. The patient does use medications that may worsen dyslipidemias- Prednisone 5 mg and diuretics daily. The patient does not exercises due to chronic lung disease and shortness of breath. She admits to not following diet closely, stating she has recently improved her diet.        Cardiac Risk Factors  Age > 45-male, > 55-female:  YES  +1   Smoking:   NO   Sig. family hx of CHD*:  YES  +1   Hypertension:   YES  +1   Diabetes:   YES  +1   HDL < 35:   NO   HDL > 59:   YES  -1   Total: 5       Lab Results   Component Value Date    CHOL 210 (H) 03/10/2020    CHOL 203 2016    CHOL 228 (H) 10/28/2015     Lab Results   Component Value Date    TRIG 172 (H) 03/10/2020    TRIG 157 2016    TRIG 148 10/28/2015     Lab Results   Component Value Date    HDL 82 (H) 2021    HDL 87 (H) 2021    HDL 74 03/10/2020     Lab Results   Component Value Date    LDLCALC 88 2021    LDLCALC 67 01/22/2021    LDLCALC 102 03/10/2020     Lab Results   Component Value Date    LABVLDL 32 03/09/2021    LABVLDL 22 01/22/2021    LABVLDL 30 10/28/2015    VLDL 31 07/22/2016     Diabetes Type 2 without complications:  She is taking Glimepiride 1 mg daily before breakfast- refills needed today. Her blood sugar typically runs between 120s-130s. She has recently started to watch carbohydrates more closely, admitting to eating deserts from [facility]. Lab Results   Component Value Date    LABA1C 7.0 03/09/2021     Lab Results   Component Value Date    .2 03/09/2021     Glucose, Fasting 143 High   70 - 99 mg/dL Final 03/09/2021 10:10 AM [facility]       Decreased renal function:  BUN 25 High   7 - 20 mg/dL Final 03/09/2021 10:10 AM [facility]   CREATININE 1.1  0.6 - 1.2 mg/dL Final 03/09/2021 10:10 AM [facility]   GFR Non- 49 Abnormal   >60 Final 03/09/2021 10:10 AM [facility]   >60 mL/min/1.73m2 EGFR, calc. for ages 25 and older using the   MDRD formula (not corrected for weight), is valid for stable   renal function. History of decreased renal function with renal repeated after hydrating x 1 week and was within normal range on 1/22/2021. She is no longer drinking Coke and is drinking only water (80 ounces daily). Discussed referral to nephrologist today given she has been staying well hydrated and GFR is low (49); patient and her  are in agreement to referral today. BUN 28 High   7 - 20 mg/dL Final 01/22/2021  9:41 AM [facility]   CREATININE 0.9  0.6 - 1.2 mg/dL Final 01/22/2021  9:41 AM [facility]   GFR Non-African American >60  >60 Final 01/22/2021  9:41 AM [facility]      Interstitial lung disease:   Managed by Dr. Gareth Mascorro; last office visit was 1/13/2021. Patient is wearing continuous oxygen at 6 liters (portable tank), 5.5 liters at home.  She reports new onset of panic attacks, 2-3 per week due to shortness of breath. Seizure disorder:  Patient takes Tegretol 200 mg twice daily as prescribed. No refills needed today; last refill by previous PCP was 9/5/2020, with one year prescription. She tolerating medication well without adverse effects. She denies seizure activities. Review of Systems:  Gen: No weight loss. Eating and drinking to baseline. CV:  Denies chest pain or tightness, palpitations. Pulm: + Chronic shortness of breath. Wear continuous oxygen. Denies cough. Abd: Denies abdominal pain, change in bowel habits. Neuro: Denies dizziness. Denies seizure activity. Denies weakness. Psych: + anxiety. + panic attacks. Current Outpatient Medications on File Prior to Visit   Medication Sig Dispense Refill    DULoxetine (CYMBALTA) 60 MG extended release capsule Take 1 capsule once daily, and capsule of Cymbalta 40 mg for a total of 100 mg per day 30 capsule 2    DULoxetine HCl 40 MG CPEP Take 40 mg by mouth daily Take with Duloxetine 60 mg tablet for a total of 100 mg daily. 30 capsule 2    lisinopril (PRINIVIL;ZESTRIL) 20 MG tablet TAKE ONE TABLET BY MOUTH DAILY 90 tablet 4    furosemide (LASIX) 20 MG tablet Take 1 tablet by mouth daily 90 tablet 4    metoprolol succinate (TOPROL XL) 50 MG extended release tablet Take one tablet by mouth daily 90 tablet 4    Cholecalciferol (VITAMIN D3) 1.25 MG (24024 UT) CAPS 1 capsule every 7 days 12 capsule 3    azaTHIOprine (IMURAN) 50 MG tablet Take 1 tablet by mouth daily 2 tablets daily, for a total of 100 mg daily.  30 tablet     OXYGEN Inhale 6 L/day into the lungs      vitamin B-12 (CYANOCOBALAMIN) 500 MCG tablet Take 500 mcg by mouth daily      DICLOFENAC PO Take 75 mg by mouth 2 times daily      predniSONE (DELTASONE) 5 MG tablet Take 5 mg by mouth daily      azithromycin (ZITHROMAX) 500 MG tablet Take 500 mg by mouth daily      magnesium (MAGNESIUM-OXIDE) 250 MG TABS tablet Take 250 mg by mouth daily      TEGRETOL 200 MG tablet TAKE ONE TABLET BY MOUTH TWICE A DAY 60 tablet 10    ibuprofen (ADVIL;MOTRIN) 800 MG tablet Take 1 tablet by mouth every 6 hours as needed for Pain 50 tablet 3    omeprazole (PRILOSEC) 20 MG capsule Take 1 capsule by mouth daily 84 capsule 2    aspirin 81 MG tablet Take 81 mg by mouth daily. No current facility-administered medications on file prior to visit.          Past Medical History:   Diagnosis Date    Abnormal stress test *Aug.1, 2016    Coronary angiography by Dr. Blanco Hughes revealed a 60% stenosis of the LAD and  a 50% mid circumflex     Arthritis     Chronic headaches     Eczema     Fibromyalgia     GERD (gastroesophageal reflux disease)     Hyperlipidemia     Hypertension     Impaired fasting glucose     Interstitial lung disease ([facility] 2016    Dr. Brenda Merida [facility]     Migraine headache     Osteopenia DEXA - March, 2016    Lumbar T score +1.7 and Hip -1.0 FRAX 7.6/0.6    Osteopenia DEXA-Dec., 2017    Lumbar T score 2.3 and hip T score 0.1    Other screening mammogram July 8, 2013    Benign    Other screening mammogram *March 4, 2016    Benign    Oxygen dependent     O2 @ 3.5 L CONTINUOUS    Pulmonary hypertension ([facility]     MILD    Retention of urine, unspecified     Screening mammogram, encounter for *February 11, 2020    Benign    Seizure disorder [facility])     Thyroid disease     Type 2 diabetes mellitus without complication ([facility] *HFR.87, 2020    FBS - 137 and A 1 C of 6.7    Vitamin D deficiency Oct., 2015    Level - 17    Wears glasses        Past Surgical History:   Procedure Laterality Date    ABDOMINOPLASTY  Jan., 2012    Dr. Cali Jimenez Bilateral     BRONCHOSCOPY  **Sept.16, 2016    Dr. Valerie Gaemz  09/16/2016    BRONCHOSCOPY  *Febr.24, 2017    Dr. Jailene Rodriguez - No endobronchial lesions    CARDIAC CATHETERIZATION  Oct., 2004    LAD 50%, circumflex normal, R coronary normal, LVEF 60%.  CARDIAC CATHETERIZATION  *2018    Dr. Halina Lema - [facility] - mild pulmonary hypertension    COLONOSCOPY  Oct., 2003 (  )    Dr. Charley Wallace - Negative - repeat     COLONOSCOPY  2014 (  )    Dr. Kaylee Villafuerte - moderate diverticulosis.  COSMETIC SURGERY      inner thighs    LIPOSUCTION Bilateral     knees    ID LAPAROSCOPY W TOT HYSTERECTUTERUS <=250 GRAM  W TUBE/OVARY N/A 2018    ROBOTIC TOTAL HYSTERECTOMY AND BILATERAL SALPINGO-OOPHORECTOMY performed by Nkechi Hou MD at [place] [id number]  *2018    Dr. Viky Lima  Oct., 2015    Dr. Shiloh Mccartney - left 2nd and 3rd    TOTAL HIP ARTHROPLASTY  2013    Dr. Rosina Jones  *2018    Dr. Sarah Pena - right    TUBAL LIGATION         Family History   Problem Relation Age of Onset    Heart Disease Father 76        , ASHD, S/P CAB, NIDDM, hyperlipidemia.  Cancer Mother 58        , lung cancer. Allergies   Allergen Reactions    Tramadol      Avoid because Pt is on Tegretol.  Penicillins Rash     Patient states she can take Keflex       Social History     Tobacco Use    Smoking status: Never Smoker    Smokeless tobacco: Never Used   Substance Use Topics    Alcohol use: Yes     Frequency: Monthly or less     Drinks per session: 3 or 4     Comment: occasional    Drug use: No          PHYSICAL EXAMINATION:  Vital Signs: (As obtained by patient/caregiver or practitioner observation)  There were no vitals taken for this visit. Patient-Reported Vitals 3/10/2021   Patient-Reported Weight 211   Patient-Reported Height 5'2.5   Patient-Reported Systolic 403   Patient-Reported Diastolic 82   Patient-Reported Pulse 94   Patient-Reported Temperature -        Respiratory rate appears normal    Constitutional: Appears well-developed and well-nourished. No apparent distress    Mental status: Alert and awake. Oriented to person/place/time.  Able to follow commands    Eyes: EOM normal. Sclera normal. No discharge visible  HENT: Normocephalic, atraumatic. Neck: No visualized mass   Pulmonary/Chest: Respiratory effort normal.  No visualized signs of difficulty breathing or respiratory distress. Speaking in full sentences without difficulty. Continuous oxygen via nasal cannula. Musculoskeletal: Normal range of motion of neck  Neurological: No Facial Asymmetry (Cranial nerve 7 motor function) (limited exam to video visit). No gaze palsy       Skin: No significant exanthematous lesions or discoloration noted on facial skin       Psychiatric: Normal Affect. No Hallucinations          ASSESSMENT/PLAN:  1. Hypothyroidism, unspecified type  - Stable. - Continue levothyroxine (SYNTHROID) 100 MCG tablet; TAKE ONE TABLET BY MOUTH DAILY  Dispense: 90 tablet; Refill: 1  - Repeat TSH, T4 in 6 months. 2. Panic attacks  - New  - Start ALPRAZolam (XANAX) 0.25 MG tablet; Take 1 tablet by mouth 2 times daily as needed for Anxiety for up to 60 days. Dispense: 60 tablet; Refill: 0    3. Hyperlipidemia, unspecified hyperlipidemia type  - Slightly worse   - Cholesterol: 210 (up 17 points), Tri (up 15), LDL: within normal range, 88, HDL 82  - ASCVD Risk: 18.7%  - Continue ezetimibe (ZETIA) 10 MG tablet; Take 1 tablet by mouth daily  Dispense: 90 tablet; Refill: 1  - Continue atorvastatin (LIPITOR) 80 MG tablet; Take 1 tablet by mouth daily  Dispense: 90 tablet; Refill: 1  - Follow diet low in saturated fats, and decrease red meat consumption. Increase fruits and vegetables, and weight loss. 4. Decreased renal function  - New.   - [facility] - Jose Nash MD, Nephrology, [facility]. Patient to schedule appointment, number provided. 5. Seizure disorder ([address]  - Stable. - Continue Tegretol 200 mg twice daily- no refills needed (last refill by previous PCP was 2020, with one year prescription).        6. Type 2 diabetes mellitus without complication, without long-term current use days 12 capsule 3    azaTHIOprine (IMURAN) 50 MG tablet Take 1 tablet by mouth daily 2 tablets daily, for a total of 100 mg daily. 30 tablet     OXYGEN Inhale 6 L/day into the lungs      vitamin B-12 (CYANOCOBALAMIN) 500 MCG tablet Take 500 mcg by mouth daily      DICLOFENAC PO Take 75 mg by mouth 2 times daily      predniSONE (DELTASONE) 5 MG tablet Take 5 mg by mouth daily      azithromycin (ZITHROMAX) 500 MG tablet Take 500 mg by mouth daily      magnesium (MAGNESIUM-OXIDE) 250 MG TABS tablet Take 250 mg by mouth daily      TEGRETOL 200 MG tablet TAKE ONE TABLET BY MOUTH TWICE A DAY 60 tablet 10    ibuprofen (ADVIL;MOTRIN) 800 MG tablet Take 1 tablet by mouth every 6 hours as needed for Pain 50 tablet 3    omeprazole (PRILOSEC) 20 MG capsule Take 1 capsule by mouth daily 84 capsule 2    aspirin 81 MG tablet Take 81 mg by mouth daily. No current facility-administered medications for this visit. Shayna Avila is a 80 y.o. female being evaluated by a Virtual Visit (video visit) encounter to address concerns as mentioned above. A caregiver was present when appropriate. Due to this being a TeleHealth encounter (During [facility]- public [facility] emergency), evaluation of the following organ systems was limited: Vitals/Constitutional/EENT/Resp/CV/GI//MS/Neuro/Skin/Heme-Lymph-Imm. Pursuant to the emergency declaration under the [address] [place] [address], [address] [place] [address] authority and the [employer] Act, this Virtual Visit was conducted with patient's (and/or legal guardian's) consent, to reduce the patient's risk of exposure to COVID-19 and provide necessary medical care. The patient (and/or legal guardian) has also been advised to contact this office for worsening conditions or problems, and seek emergency medical treatment and/or call 911 if deemed necessary.      Patient identification was verified at the start of the visit: Yes    Total time spent on this encounter: 40 minutes. Services were provided through a video synchronous discussion virtually to substitute for in-person clinic visit. Patient was located in their home. Provider was located in the office. --CESAR Farrell CNP on 3/11/2021 at 7:14 AM    An electronic signature was used to authenticate this note. Esteban Cabrera

## 2021-03-11 NOTE — PATIENT INSTRUCTIONS
Patient Education        Learning About Meal Planning for Diabetes  Why plan your meals? Meal planning can be a key part of managing diabetes. Planning meals and snacks with the right balance of carbohydrate, protein, and fat can help you keep your blood sugar at the target level you set with your doctor. You don't have to eat special foods. You can eat what your family eats, including sweets once in a while. But you do have to pay attention to how often you eat and how much you eat of certain foods. You may want to work with a dietitian or a certified diabetes educator. He or she can give you tips and meal ideas and can answer your questions about meal planning. This health professional can also help you reach a healthy weight if that is one of your goals. What plan is right for you? Your dietitian or diabetes educator may suggest that you start with the plate format or carbohydrate counting. The plate format  The plate format is a simple way to help you manage how you eat. You plan meals by learning how much space each food should take on a plate. Using the plate format helps you spread carbohydrate throughout the day. It can make it easier to keep your blood sugar level within your target range. It also helps you see if you're eating healthy portion sizes. To use the plate format, you put non-starchy vegetables on half your plate. Add meat or meat substitutes on one-quarter of the plate. Put a grain or starchy vegetable (such as brown rice or a potato) on the final quarter of the plate. You can add a small piece of fruit and some low-fat or fat-free milk or yogurt, depending on your carbohydrate goal for each meal.  Here are some tips for using the plate format:  · Make sure that you are not using an oversized plate. A 9-inch plate is best. Many restaurants use larger plates. · Get used to using the plate format at home. Then you can use it when you eat out.   · Write down your questions about using the plate format. Talk to your doctor, a dietitian, or a diabetes educator about your concerns. Carbohydrate counting  With carbohydrate counting, you plan meals based on the amount of carbohydrate in each food. Carbohydrate raises blood sugar higher and more quickly than any other nutrient. It is found in desserts, breads and cereals, and fruit. It's also found in starchy vegetables such as potatoes and corn, grains such as rice and pasta, and milk and yogurt. Spreading carbohydrate throughout the day helps keep your blood sugar levels within your target range. Your daily amount depends on several things, including your weight, how active you are, which diabetes medicines you take, and what your goals are for your blood sugar levels. A registered dietitian or diabetes educator can help you plan how much carbohydrate to include in each meal and snack. A guideline for your daily amount of carbohydrate is:  · 45 to 60 grams at each meal. That's about the same as 3 to 4 carbohydrate servings. · 15 to 20 grams at each snack. That's about the same as 1 carbohydrate serving. The Nutrition Facts label on packaged foods tells you how much carbohydrate is in a serving of the food. First, look at the serving size on the food label. Is that the amount you eat in a serving? All of the nutrition information on a food label is based on that serving size. So if you eat more or less than that, you'll need to adjust the other numbers. Total carbohydrate is the next thing you need to look for on the label. If you count carbohydrate servings, one serving of carbohydrate is 15 grams. For foods that don't come with labels, such as fresh fruits and vegetables, you'll need a guide that lists carbohydrate in these foods. Ask your doctor, dietitian, or diabetes educator about books or other nutrition guides you can use.   If you take insulin, you need to know how many grams of carbohydrate are in a meal. This lets you know how much rapid-acting insulin to take before you eat. If you use an insulin pump, you get a constant rate of insulin during the day. So the pump must be programmed at meals to give you extra insulin to cover the rise in blood sugar after meals. When you know how much carbohydrate you will eat, you can take the right amount of insulin. Or, if you always use the same amount of insulin, you need to make sure that you eat the same amount of carbohydrate at meals. If you need more help to understand carbohydrate counting and food labels, ask your doctor, dietitian, or diabetes educator. How can you plan healthy meals? Here are some tips to get started:  · Plan your meals a week at a time. Don't forget to include snacks too. · Use cookbooks or online recipes to plan several main meals. Plan some quick meals for busy nights. You also can double some recipes that freeze well. Then you can save half for other busy nights when you don't have time to cook. · Make sure you have the ingredients you need for your recipes. If you're running low on basic items, put these items on your shopping list too. · List foods that you use to make breakfasts, lunches, and snacks. List plenty of fruits and vegetables. · Post this list on the refrigerator. Add to it as you think of more things you need. · Take the list to the store to do your weekly shopping. Follow-up care is a key part of your treatment and safety. Be sure to make and go to all appointments, and call your doctor if you are having problems. It's also a good idea to know your test results and keep a list of the medicines you take. Where can you learn more? Go to https://nellyewsandra.[employer]. org and sign in to your ByAllAccounts account. Enter G342 in the Simplilearn box to learn more about \"Learning About Meal Planning for Diabetes. \"     If you do not have an account, please click on the \"Sign Up Now\" link.   Current as of: August 31, 2020               Content Version: 12.8  © 2006-2021 Healthwise, [employer]. Care instructions adapted under license by [facility] ([facility]). If you have questions about a medical condition or this instruction, always ask your healthcare professional. [address] any warranty or liability for your use of this information. Patient Education        Diabetes Blood Sugar Emergencies: Your Action Plan  How can you prevent a blood sugar emergency? An important part of living with diabetes is keeping your blood sugar in your target range. You'll need to know what to do if it's too high or too low. Managing your blood sugar levels helps you avoid emergencies. This care sheet will teach you about the signs of high and low blood sugar. It will help you make an action plan with your doctor for when these signs occur. Low blood sugar is more likely to happen if you take certain medicines for diabetes. It can also happen if you skip a meal, drink alcohol, or exercise more than usual.  You may get high blood sugar if you eat differently than you normally do. One example is eating more carbohydrate than usual. Having a cold, the flu, or other sudden illness can also cause high blood sugar levels. Levels can also rise if you miss a dose of medicine. Any change in how you take your medicine may affect your blood sugar level. So it's important to work with your doctor before you make any changes. Track your blood sugar  Work with your doctor to fill in the blank spaces below that apply to you. Track your levels, know your target range, and write down ways you can get your blood sugar back in your target range. A log book can help you track your levels. Take the book to all of your medical appointments. · Check your blood sugar _____ times a day, at these times:________________________________________________.   (For example: Before meals, at bedtime, before exercise, during exercise, other.)  · Your blood sugar target range active, eat or drink 30 grams of carbohydrate. Then check your blood sugar again in 15 minutes. If it's not in your target range, take another 30 grams of carbohydrates. Check your blood sugar again in 15 minutes. Keep doing this until you reach your target. You can then go back to your regular testing schedule. If your symptoms or blood sugar levels are getting worse or have not improved after 15 minutes, seek medical care right away. Here are some examples of quick-sugar foods with 15 grams of carbohydrate:  · 3 or 4 glucose tablets  · 1 tablespoon (3 teaspoons) table sugar  · ½ cup to ¾ cup (4 to 6 ounces) of fruit juice or regular (not diet) soda  · Hard candy (such as 6 Life Savers)  High blood sugar  If you have symptoms of high blood sugar, check your blood sugar. Your goal is to get your level back to your target range. If it's above ______ ( for example, above 250), follow these steps:  · If you missed a dose of your diabetes medicine, take it now. Take only the amount of medicine that you have been prescribed. Do not take more or less medicine. · Give yourself insulin if your doctor has prescribed it for high blood sugar. · Test for ketones, if the doctor told you to do so. If the results of the ketone test show a moderate-to-large amount of ketones, call the doctor for advice. · Wait 30 minutes after you take the extra insulin or the missed medicine. Check your blood sugar again. If your symptoms or blood sugar levels are getting worse or have not improved after taking these steps, seek medical care right away. Follow-up care is a key part of your treatment and safety. Be sure to make and go to all appointments, and call your doctor if you are having problems. It's also a good idea to know your test results and keep a list of the medicines you take. Where can you learn more? Go to https://luis m.Krimmeni Technologies. org and sign in to your Jooxt account.  Enter K957 in the [address] box to learn more about \"Diabetes Blood Sugar Emergencies: Your Action Plan. \"     If you do not have an account, please click on the \"Sign Up Now\" link. Current as of: August 31, 2020               Content Version: 12.8  © 2006-2021 [employer]. Care instructions adapted under license by [email or web address] If you have questions about a medical condition or this instruction, always ask your healthcare professional. [place] [address] any warranty or liability for your use of this information. Patient Education        Learning About Diabetes and Your Teeth  How does diabetes affect your teeth and gums? When you have diabetes, managing blood sugar levels and taking good care of your teeth and gums are both important. When blood sugar levels are high, there's a greater risk for:  · Gum (periodontal) disease. · Tooth decay. · Fungal infections in the mouth, like thrush. · Dry mouth, or xerostomia (say \"zee-ruh-STO-clifford-uh\"). The mouth needs saliva to neutralize the acids in your mouth. These acids can lead to gum disease and tooth decay. Keeping your blood sugar levels in your target range can help prevent problems with the teeth and gums. If you have any problems with your teeth or gums, see your dentist.  How do you care for your teeth and gums when you have diabetes? · Brush your teeth twice a day. · Floss daily. Make sure to press the floss against your teeth and not your gums. · Check each day for areas where your gums might be red or painful. Be sure to let your dentist know of any sores in your mouth. · See your dentist regularly for professional cleaning of your teeth and to look for gum problems. Many dentists recommend getting checkups twice a year. Remind your dentist that you have diabetes before any work is done. · Don't smoke or use smokeless tobacco. Tobacco use with diabetes can lead to a greater risk of severe gum disease.  If you need help quitting, talk to your doctor about stop-smoking programs and medicines. These can increase your chances of quitting for good. Follow-up care is a key part of your treatment and safety. Be sure to make and go to all appointments, and call your doctor if you are having problems. It's also a good idea to know your test results and keep a list of the medicines you take. Where can you learn more? Go to https://NitroPCRpepicewMyRooms Inc..blueKiwi Software. org and sign in to your Inspherion account. Enter U066 in the Talaentia box to learn more about \"Learning About Diabetes and Your Teeth. \"     If you do not have an account, please click on the \"Sign Up Now\" link. Current as of: August 31, 2020               Content Version: 12.8  © 2006-2021 Healthwise, 5minutes. Care instructions adapted under license by [facility] ([facility]). If you have questions about a medical condition or this instruction, always ask your healthcare professional. [place] [address] any warranty or liability for your use of this information. Patient Education        Diabetes and Preventing Falls: Care Instructions  Your Care Instructions     If you are an older adult who has diabetes, you may have a higher risk of falling. Complications of diabetessuch as nerve damage, foot problems, and reduced visionmay increase your risk of a fall. Some of your medicines also may add to your risk. By making your home safer, you can lower your risk of falling. Doing things to prevent diabetes complications may also help to lower your risk. You can make your home safer with a few simple measures. Follow-up care is a key part of your treatment and safety. Be sure to make and go to all appointments, and call your doctor if you are having problems. It's also a good idea to know your test results and keep a list of the medicines you take. How can you care for yourself at home?   Taking care of yourself  · Keep your blood sugar at a target level (which you set with your doctor). · Exercise regularly to improve your strength, muscle tone, and balance. Walk if you can. Swimming may be a good choice if you cannot walk easily. · Have your vision checked as often as your doctor recommends. It is usually once a year or more often if you have eye problems. · Know the side effects of the medicines you take. Ask your doctor or pharmacist whether the medicines you take can affect your balance. Sleeping pills or sedatives can affect your balance. · Limit the amount of alcohol you drink. Alcohol can impair your balance and other senses. · Have your doctor check your feet during each visit. If you have a foot problem, see your doctor. Preventing falls at home  · Remove raised doorway thresholds, throw rugs, and clutter. Repair loose carpet or raised areas in the floor. · Move furniture and electrical cords to keep them out of walking paths. · Use nonskid floor wax, and wipe up spills right away, especially on ceramic tile floors. · If you use a walker or cane, put rubber tips on it. If you use crutches, clean the bottoms of them regularly with an abrasive pad, such as steel wool. · Keep your house well lit, especially [place], and outside walkways. Use night-lights in areas such as hallways and bathrooms. Add extra light switches or use remote switches (such as switches that go on or off when you clap your hands) to make it easier to turn lights on if you have to get up during the night. · Install sturdy handrails on stairways. Put grab bars near your shower, bathtub, and toilet. · Store household items on low shelves so that you do not have to climb or reach high. Or use a reaching device that you can get at a medical supply store. If you have to climb for something, use a step stool with handrails, or ask someone to get it for you. · Keep a cordless phone and a flashlight with new batteries by your bed.  If possible, put a phone in each of the main rooms of your house, or carry a cell phone in case you fall and cannot reach a phone. Or you can wear a device around your neck or wrist. You push a button that sends a signal for help. · Wear low-heeled shoes that fit well and give your feet good support. Use footwear with nonskid soles. Check the heels and soles of your shoes for wear. Repair or replace worn heels or soles. · Do not wear socks without shoes on wood floors. · Walk on the grass when the sidewalks are slippery. If you live in an area that gets snow and ice in the winter, sprinkle salt on slippery steps and sidewalks. Where can you learn more? Go to https://Terressentia.[employer]. org and sign in to your CitizenDish account. Enter D931 in the [employer] box to learn more about \"Diabetes and Preventing Falls: Care Instructions. \"     If you do not have an account, please click on the \"Sign Up Now\" link. Current as of: December 7, 2020               Content Version: 12.8  © 4543-5463 [employer]. Care instructions adapted under license by [facility] ([facility]). If you have questions about a medical condition or this instruction, always ask your healthcare professional. [address] any warranty or liability for your use of this information. Patient Education        Diabetes Foot Health: Care Instructions  Your Care Instructions     When you have diabetes, your feet need extra care and attention. Diabetes can damage the nerve endings and blood vessels in your feet, making you less likely to notice when your feet are injured. Diabetes also limits your body's ability to fight infection and get blood to areas that need it. If you get a minor foot injury, it could become an ulcer or a serious infection. With good foot care, you can prevent most of these problems. Caring for your feet can be quick and easy. Most of the care can be done when you are bathing or getting ready for bed.   Follow-up care is a key part of your treatment and safety. Be sure to make and go to all appointments, and call your doctor if you are having problems. It's also a good idea to know your test results and keep a list of the medicines you take. How can you care for yourself at home? · Keep your blood sugar close to normal by watching what and how much you eat, monitoring blood sugar, taking medicines if prescribed, and getting regular exercise. · Do not smoke. Smoking affects blood flow and can make foot problems worse. If you need help quitting, talk to your doctor about stop-smoking programs and medicines. These can increase your chances of quitting for good. · Eat a diet that is low in fats. High fat intake can cause fat to build up in your blood vessels and decrease blood flow. · Inspect your feet daily for blisters, cuts, cracks, or sores. If you cannot see well, use a mirror or have someone help you. · Take care of your feet:  ? Wash your feet every day. Use warm (not hot) water. Check the water temperature with your wrists or other part of your body, not your feet. ? Dry your feet well. Pat them dry. Do not rub the skin on your feet too hard. Dry well between your toes. If the skin on your feet stays moist, bacteria or a fungus can grow, which can lead to infection. ? Keep your skin soft. Use moisturizing skin cream to keep the skin on your feet soft and prevent calluses and cracks. But do not put the cream between your toes, and stop using any cream that causes a rash. ? Clean underneath your toenails carefully. Do not use a sharp object to clean underneath your toenails. Use the blunt end of a nail file or other rounded tool. ? Trim and file your toenails straight across to prevent ingrown toenails. Use a nail clipper, not scissors. Use an emery board to smooth the edges. · Change socks daily. Socks without seams are best, because seams often rub the feet.  You can find socks for people with diabetes from specialty catalogs. · Look inside your shoes every day for things like gravel or torn linings, which could cause blisters or sores. · Buy shoes that fit well:  ? Look for shoes that have plenty of space around the toes. This helps prevent bunions and blisters. ? Try on shoes while wearing the kind of socks you will usually wear with the shoes. ? Avoid plastic shoes. They may rub your feet and cause blisters. Good shoes should be made of materials that are flexible and breathable, such as leather or cloth. ? Break in new shoes slowly by wearing them for no more than an hour a day for several days. Take extra time to check your feet for red areas, blisters, or other problems after you wear new shoes. · Do not go barefoot. Do not wear sandals, and do not wear shoes with very thin soles. Thin soles are easy to puncture. They also do not protect your feet from hot pavement or cold weather. · Have your doctor check your feet during each visit. If you have a foot problem, see your doctor. Do not try to treat an early foot problem at home. Home remedies or treatments that you can buy without a prescription (such as corn removers) can be harmful. · Always get early treatment for foot problems. A minor irritation can lead to a major problem if not properly cared for early. When should you call for help?    Call your doctor now or seek immediate medical care if:    · You have a foot sore, an ulcer or break in the skin that is not healing after 4 days, bleeding corns or calluses, or an ingrown toenail.     · You have blue or black areas, which can mean bruising or blood flow problems.     · You have peeling skin or tiny blisters between your toes or cracking or oozing of the skin.     · You have a fever for more than 24 hours and a foot sore.     · You have new numbness or tingling in your feet that does not go away after you move your feet or change positions.     · You have unexplained or unusual swelling of the foot or ankle. Watch closely for changes in your health, and be sure to contact your doctor if:    · You cannot do proper foot care. Where can you learn more? Go to https://chpepicewsandra.Verold. org and sign in to your LightPath Apps account. Enter A739 in the ReferMe box to learn more about \"Diabetes Foot Health: Care Instructions. \"     If you do not have an account, please click on the \"Sign Up Now\" link. Current as of: August 31, 2020               Content Version: 12.8  © 2006-2021 [employer]. Care instructions adapted under license by [facility] ([facility]). If you have questions about a medical condition or this instruction, always ask your healthcare professional. [address] any warranty or liability for your use of this information. Patient Education        High Cholesterol: Care Instructions  Your Care Instructions     Cholesterol is a type of fat in your blood. It is needed for many body functions, such as making new cells. Cholesterol is made by your body. It also comes from food you eat. High cholesterol means that you have too much of the fat in your blood. This raises your risk of a heart attack and stroke. LDL and HDL are part of your total cholesterol. LDL is the \"bad\" cholesterol. High LDL can raise your risk for heart disease, heart attack, and stroke. HDL is the \"good\" cholesterol. It helps clear bad cholesterol from the body. High HDL is linked with a lower risk of heart disease, heart attack, and stroke. Your cholesterol levels help your doctor find out your risk for having a heart attack or stroke. You and your doctor can talk about whether you need to lower your risk and what treatment is best for you. A heart-healthy lifestyle along with medicines can help lower your cholesterol and your risk. The way you choose to lower your risk will depend on how high your risk is for heart attack and stroke.  It will also depend on how you feel take a double dose of medicine. Take your usual dose the next day. · Tell your doctor about all prescription, herbal, or over-the-counter products you take. · Take care of yourself. Eat a healthy diet, get enough sleep, and get regular exercise. When should you call for help? Call 911 anytime you think you may need emergency care. For example, call if:    · You passed out (lost consciousness).     · You have severe trouble breathing.     · You have a very slow heartbeat (less than 60 beats a minute).     · You have a low body temperature (95°F or below). Call your doctor now or seek immediate medical care if:    · You feel tired, sluggish, or weak.     · You have trouble remembering things or concentrating.     · You do not begin to feel better 2 weeks after starting your medicine. Watch closely for changes in your health, and be sure to contact your doctor if you have any problems. Where can you learn more? Go to https://Wiser (formerly WisePricer[employer]. org and sign in to your [employer] account. Enter P693 in the Kindara box to learn more about \"Hypothyroidism: Care Instructions. \"     If you do not have an account, please click on the \"Sign Up Now\" link. Current as of: March 31, 2020               Content Version: 12.8  © 2006-2021 [employer]. Care instructions adapted under license by [facility] ([facility]). If you have questions about a medical condition or this instruction, always ask your healthcare professional. [place] [address] any warranty or liability for your use of this information. Patient Education        Panic Attacks: Care Instructions  Overview     During a panic attack, you may have a feeling of intense fear or terror, trouble breathing, chest pain or tightness, heartbeat changes, dizziness, sweating, and shaking. A panic attack starts suddenly and usually lasts from 5 to 20 minutes but may last even longer.  An attack can begin with a stressful event. Or it can happen without a cause. Although panic attacks can cause scary symptoms, you can learn to manage them with self-care, counseling, and medicine. Follow-up care is a key part of your treatment and safety. Be sure to make and go to all appointments, and call your doctor if you are having problems. It's also a good idea to know your test results and keep a list of the medicines you take. How can you care for yourself at home? · Take your medicine exactly as directed. Call your doctor if you think you are having a problem with your medicine. · Go to your counseling sessions and follow-up appointments. · Recognize and accept your anxiety. Then, when you are in a situation that makes you anxious, say to yourself, \"This is not an emergency. I feel uncomfortable, but I am not in danger. I can keep going even if I feel anxious. \"  · Be kind to your body:  ? Relieve tension with exercise or a massage. ? Get enough rest.  ? Avoid alcohol, caffeine, nicotine, and illegal drugs. They can increase your anxiety level, cause sleep problems, or trigger a panic attack. ? Learn and do relaxation techniques. See below for more about these techniques. · Engage your mind. Get out and do something you enjoy. Go to a funny movie, or take a walk or hike. Plan your day. Having too much or too little to do can make you anxious. · Keep a record of your symptoms. Discuss your fears with a good friend or family member, or join a support group for people with similar problems. Talking to others sometimes relieves stress. · Get involved in social groups, or volunteer to help others. Being alone sometimes makes things seem worse than they are. · Get at least 30 minutes of exercise on most days of the week to relieve stress. Walking is a good choice. You also may want to do other activities, such as running, swimming, cycling, or playing tennis or team sports.   Relaxation techniques  Do relaxation exercises for 10 to 20 minutes a day. You can play soothing, relaxing music while you do them, if you wish. · Tell others in your house that you are going to do your relaxation exercises. Ask them not to disturb you. · Find a comfortable place, away from all distractions and noise. · Lie down on your back, or sit with your back straight. · Focus on your breathing. Make it slow and steady. · Breathe in through your nose. Breathe out through either your nose or mouth. · Breathe deeply, filling up the area between your navel and your rib cage. Breathe so that your belly goes up and down. · Do not hold your breath. · Breathe like this for 5 to 10 minutes. Notice the feeling of calmness throughout your whole body. As you continue to breathe slowly and deeply, relax by doing the following for another 5 to 10 minutes:  · Tighten and relax each muscle group in your body. You can begin at your toes and work your way up to your head. · Imagine your muscle groups relaxing and becoming heavy. · Empty your mind of all thoughts. · Let yourself relax more and more deeply. · Become aware of the state of calmness that surrounds you. · When your relaxation time is over, you can bring yourself back to alertness by moving your fingers and toes and then your hands and feet and then stretching and moving your entire body. Sometimes people fall asleep during relaxation, but they usually wake up shortly afterward. · Always give yourself time to return to full alertness before you drive a car or do anything that might cause an accident if you are not fully alert. Never play a relaxation tape while driving a car. When should you call for help? Call 911 anytime you think you may need emergency care. For example, call if:    · You feel you cannot stop from hurting yourself or someone else. Watch closely for changes in your health, and be sure to contact your doctor if:    · Your panic attacks get worse.     · You have new or different anxiety.   · You are not getting better as expected. Where can you learn more? Go to https://chpepiceweb.[place] [employer] org and sign in to your [employer] account. Enter H601 in the Ze-gen box to learn more about \"Panic Attacks: Care Instructions. \"     If you do not have an account, please click on the \"Sign Up Now\" link. Current as of: September 23, 2020               Content Version: 12.8  © 2006-2021 [employer]u. Care instructions adapted under license by [facility] ([facility]). If you have questions about a medical condition or this instruction, always ask your healthcare professional. [place] [address] any warranty or liability for your use of this information. Patient Education        Acute Kidney Injury: Care Instructions  Your Care Instructions     Acute kidney injury (MAKAYLA) is a sudden decrease in kidney function. This can happen over a period of hours, days or, in some cases, weeks. MAKAYLA used to be called acute renal failure, but kidney failure doesn't always happen with MAKAYLA. Common causes of MAKAYLA are dehydration, blood loss, and medicines. When MAKAYLA happens, the kidneys have trouble removing waste and excess fluids from the body. The waste and fluids build up and become harmful. Kidney function may return to normal if the cause of MAKAYLA is treated quickly. Your chance of a full recovery depends on what caused the problem, how quickly the cause was treated, and what other medical problems you have. A machine may be used to help your kidneys remove waste and fluids for a short period of time. This is called dialysis. Follow-up care is a key part of your treatment and safety. Be sure to make and go to all appointments, and call your doctor if you are having problems. It's also a good idea to know your test results and keep a list of the medicines you take. How can you care for yourself at home? · Talk to your doctor about how much fluid you should drink.   · Eat a balanced diet. Talk to your doctor or a dietitian about what type of diet may be best for you. You may need to limit sodium, potassium, and phosphorus. · If you need dialysis, follow the instructions and schedule for dialysis that your doctor gives you. · Do not smoke. Smoking can make your condition worse. If you need help quitting, talk to your doctor about stop-smoking programs and medicines. These can increase your chances of quitting for good. · Do not drink alcohol. · Review all of your medicines with your doctor. Do not take any medicines, including nonsteroidal anti-inflammatory drugs (NSAIDs) such as ibuprofen (Advil, Motrin) or naproxen (Aleve), unless your doctor says it is safe for you to do so. · Make sure that anyone treating you for any health problem knows that you have had MAKAYLA. When should you call for help? Call 911 anytime you think you may need emergency care. For example, call if:    · You passed out (lost consciousness). Call your doctor now or seek immediate medical care if:    · You have new or worse nausea and vomiting.     · You have much less urine than normal, or you have no urine.     · You are feeling confused or cannot think clearly.     · You have new or more blood in your urine.     · You have new swelling.     · You are dizzy or lightheaded, or feel like you may faint. Watch closely for changes in your health, and be sure to contact your doctor if:    · You do not get better as expected. Where can you learn more? Go to https://Pursuit Vasculartong.[employer]. org and sign in to your [employer] account. Enter Y012 in the [employer] box to learn more about \"Acute Kidney Injury: Care Instructions. \"     If you do not have an account, please click on the \"Sign Up Now\" link. Current as of: December 17, 2020               Content Version: 12.8  © 1644-8935 Healthwise, Incorporated. Care instructions adapted under license by [facility] ([facility]).  If you have questions about

## 2021-03-18 DIAGNOSIS — Z87.448 HISTORY OF DECREASED RENAL FUNCTION: ICD-10-CM

## 2021-03-18 DIAGNOSIS — E11.9 TYPE 2 DIABETES MELLITUS WITHOUT COMPLICATION, WITHOUT LONG-TERM CURRENT USE OF INSULIN (HCC): ICD-10-CM

## 2021-03-18 LAB
ALBUMIN SERPL-MCNC: 4.3 G/DL (ref 3.4–5)
ANION GAP SERPL CALCULATED.3IONS-SCNC: 7 MMOL/L (ref 3–16)
BUN BLDV-MCNC: 26 MG/DL (ref 7–20)
CALCIUM SERPL-MCNC: 9.5 MG/DL (ref 8.3–10.6)
CHLORIDE BLD-SCNC: 100 MMOL/L (ref 99–110)
CO2: 32 MMOL/L (ref 21–32)
CREAT SERPL-MCNC: 1 MG/DL (ref 0.6–1.2)
CREATININE URINE: 157.9 MG/DL (ref 28–259)
GFR AFRICAN AMERICAN: >60
GFR NON-AFRICAN AMERICAN: 55
GLUCOSE BLD-MCNC: 175 MG/DL (ref 70–99)
MICROALBUMIN UR-MCNC: 2.8 MG/DL
MICROALBUMIN/CREAT UR-RTO: 17.7 MG/G (ref 0–30)
PHOSPHORUS: 4 MG/DL (ref 2.5–4.9)
POTASSIUM SERPL-SCNC: 5.1 MMOL/L (ref 3.5–5.1)
SODIUM BLD-SCNC: 139 MMOL/L (ref 136–145)

## 2021-04-05 DIAGNOSIS — F33.1 MODERATE EPISODE OF RECURRENT MAJOR DEPRESSIVE DISORDER (HCC): ICD-10-CM

## 2021-04-05 DIAGNOSIS — E11.9 TYPE 2 DIABETES MELLITUS WITHOUT COMPLICATION, WITHOUT LONG-TERM CURRENT USE OF INSULIN (HCC): ICD-10-CM

## 2021-04-05 RX ORDER — GLIMEPIRIDE 1 MG/1
1 TABLET ORAL
Qty: 90 TABLET | Refills: 1 | OUTPATIENT
Start: 2021-04-05 | End: 2021-07-04

## 2021-04-05 RX ORDER — DULOXETIN HYDROCHLORIDE 60 MG/1
CAPSULE, DELAYED RELEASE ORAL
Qty: 30 CAPSULE | Refills: 2 | OUTPATIENT
Start: 2021-04-05

## 2021-04-14 ENCOUNTER — VIRTUAL VISIT (OUTPATIENT)
Dept: PRIMARY CARE CLINIC | Age: 71
End: 2021-04-14
Payer: MEDICARE

## 2021-04-14 DIAGNOSIS — F41.0 PANIC ATTACKS: ICD-10-CM

## 2021-04-14 PROCEDURE — 4040F PNEUMOC VAC/ADMIN/RCVD: CPT | Performed by: NURSE PRACTITIONER

## 2021-04-14 PROCEDURE — 1123F ACP DISCUSS/DSCN MKR DOCD: CPT | Performed by: NURSE PRACTITIONER

## 2021-04-14 PROCEDURE — G8427 DOCREV CUR MEDS BY ELIG CLIN: HCPCS | Performed by: NURSE PRACTITIONER

## 2021-04-14 PROCEDURE — 99213 OFFICE O/P EST LOW 20 MIN: CPT | Performed by: NURSE PRACTITIONER

## 2021-04-14 PROCEDURE — 1090F PRES/ABSN URINE INCON ASSESS: CPT | Performed by: NURSE PRACTITIONER

## 2021-04-14 PROCEDURE — G8399 PT W/DXA RESULTS DOCUMENT: HCPCS | Performed by: NURSE PRACTITIONER

## 2021-04-14 PROCEDURE — 3017F COLORECTAL CA SCREEN DOC REV: CPT | Performed by: NURSE PRACTITIONER

## 2021-04-14 RX ORDER — ALPRAZOLAM 0.5 MG/1
0.5 TABLET ORAL 2 TIMES DAILY PRN
Qty: 60 TABLET | Refills: 0 | Status: SHIPPED | OUTPATIENT
Start: 2021-04-14 | End: 2021-05-12 | Stop reason: SDUPTHER

## 2021-04-14 NOTE — PROGRESS NOTES
2021    TELEHEALTH EVALUATION -- Audio/Visual (During PJLWX-52 public health emergency)    Chief Complaint   Patient presents with    Anxiety     4 weeks follow up        HPI:    Sheryle Lacy (: 1950) has requested an audio/video evaluation for the following concern(s): 4 week follow up of anxiety. At patient's last appointment Xanax 0.25 mg, 1 tablet 2 times daily as needed was initiated for anxiety/panic attacks. Patient reports Xanax is helping \"a little bit\". She is predominantly taking 1 tablet before bed. She has experienced ~4 panic attacks in last 4 weeks. Xanax was \"mildly helpful, and was better than counting\" as she previously had done during panic attacks. She feels it would be beneficial to increase dosage today. Discussed increasing to 0.5 mg twice daily as needed. She is to take 2 tablets of 0.25 mg dose for a total of 0.5 mg as needed; patient verbalizes understanding. She denies medication side effects. Review of Systems:  Gen: No weight loss. Eating and drinking to baseline. CV:  Denies chest pain or tightness, palpitations. Pulm: + chronic shortness of breath (Oxygen via nasal canula at at times)  Psych: Denies depression. + intermittent anxiety. + panic attacks. Current Outpatient Medications on File Prior to Visit   Medication Sig Dispense Refill    ezetimibe (ZETIA) 10 MG tablet Take 1 tablet by mouth daily 90 tablet 1    atorvastatin (LIPITOR) 80 MG tablet Take 1 tablet by mouth daily 90 tablet 1    levothyroxine (SYNTHROID) 100 MCG tablet TAKE ONE TABLET BY MOUTH DAILY 90 tablet 1    glimepiride (AMARYL) 1 MG tablet Take 1 tablet by mouth every morning (before breakfast) 90 tablet 1    ALPRAZolam (XANAX) 0.25 MG tablet Take 1 tablet by mouth 2 times daily as needed for Anxiety for up to 60 days.  60 tablet 0    DULoxetine (CYMBALTA) 60 MG extended release capsule Take 1 capsule once daily, and capsule of Cymbalta 40 mg for a total of 100 mg per day 30 capsule 2    lisinopril (PRINIVIL;ZESTRIL) 20 MG tablet TAKE ONE TABLET BY MOUTH DAILY 90 tablet 4    furosemide (LASIX) 20 MG tablet Take 1 tablet by mouth daily 90 tablet 4    metoprolol succinate (TOPROL XL) 50 MG extended release tablet Take one tablet by mouth daily 90 tablet 4    Cholecalciferol (VITAMIN D3) 1.25 MG (91050 UT) CAPS 1 capsule every 7 days 12 capsule 3    azaTHIOprine (IMURAN) 50 MG tablet Take 1 tablet by mouth daily 2 tablets daily, for a total of 100 mg daily. 30 tablet     OXYGEN Inhale 6 L/day into the lungs      vitamin B-12 (CYANOCOBALAMIN) 500 MCG tablet Take 500 mcg by mouth daily      DICLOFENAC PO Take 75 mg by mouth 2 times daily      predniSONE (DELTASONE) 5 MG tablet Take 5 mg by mouth daily      azithromycin (ZITHROMAX) 500 MG tablet Take 500 mg by mouth daily      magnesium (MAGNESIUM-OXIDE) 250 MG TABS tablet Take 250 mg by mouth daily      TEGRETOL 200 MG tablet TAKE ONE TABLET BY MOUTH TWICE A DAY 60 tablet 10    ibuprofen (ADVIL;MOTRIN) 800 MG tablet Take 1 tablet by mouth every 6 hours as needed for Pain 50 tablet 3    omeprazole (PRILOSEC) 20 MG capsule Take 1 capsule by mouth daily 84 capsule 2    aspirin 81 MG tablet Take 81 mg by mouth daily.  DULoxetine HCl 40 MG CPEP Take 40 mg by mouth daily Take with Duloxetine 60 mg tablet for a total of 100 mg daily. 30 capsule 2     No current facility-administered medications on file prior to visit.          Past Medical History:   Diagnosis Date    Abnormal stress test *Aug.1, 2016    Coronary angiography by Dr. Alberto Victoria revealed a 60% stenosis of the LAD and  a 50% mid circumflex     Arthritis     Chronic headaches     Eczema     Fibromyalgia     GERD (gastroesophageal reflux disease)     Hyperlipidemia     Hypertension     Impaired fasting glucose     [facility] 2016    Dr. Yvonne Hartman Interstitial lung disease ([facility]     Migraine headache     Osteopenia DEXA - March, 2016 Lumbar T score +1.7 and Hip -1.0 FRAX 7.6/0.6    Osteopenia DEXA-Dec., 2017    Lumbar T score 2.3 and hip T score 0.1    Other screening mammogram 2013    Benign    Other screening mammogram *2016    Benign    Oxygen dependent     O2 @ 3.5 L CONTINUOUS    Pulmonary hypertension ([facility]     MILD    Retention of urine, unspecified     Screening mammogram, encounter for *2020    Benign    [facility])     Thyroid disease     Type 2 diabetes mellitus without complication ([facility] *BJZ.2020    FBS - 137 and A 1 C of 6.7    Vitamin D deficiency Oct., 2015    Level - 17    Wears glasses        Past Surgical History:   Procedure Laterality Date    ABDOMINOPLASTY  2012    Dr. Parrish Bach Bilateral     BRONCHOSCOPY  **2016    Dr. Thomas Mcdonald  2016    BRONCHOSCOPY  *2017    Dr. Chris Sheridan - No endobronchial lesions    CARDIAC CATHETERIZATION  Oct., 2004    LAD 50%, circumflex normal, R coronary normal, LVEF 60%.  CARDIAC CATHETERIZATION  *2018    [facility]. Farrah Jacome - [facility] - mild pulmonary hypertension    COLONOSCOPY  Oct., 2003 (  )    Dr. Juan Carlos Jason - Negative - repeat     COLONOSCOPY  2014 (  )    Dr. Wanda Jackson - moderate diverticulosis.     COSMETIC SURGERY      inner thighs    LIPOSUCTION Bilateral     knees    HI LAPAROSCOPY W TOT HYSTERECTUTERUS <=250 GRAM  W TUBE/OVARY N/A 2018    ROBOTIC TOTAL HYSTERECTOMY AND BILATERAL SALPINGO-OOPHORECTOMY performed by Roni Quiles MD at [place] [address]  *2018    Dr. Frances Segura  Oct., 2015    Dr. Rebeca Vinson - left 2nd and 3rd    TOTAL HIP ARTHROPLASTY  2013    Dr. Nicole Correia - [facility]  *2018    Dr. Sobeida Rapp - right    TUBAL LIGATION         Family History   Problem Relation Age of Onset    Heart Disease Father 76        , ASHD, S/P CAB, NIDDM, hyperlipidemia.  Cancer Mother 58        , lung cancer. Allergies   Allergen Reactions    Tramadol      Avoid because Pt is on Tegretol.  Penicillins Rash     Patient states she can take Keflex       Social History     Tobacco Use    Smoking status: Never Smoker    Smokeless tobacco: Never Used   Substance Use Topics    Alcohol use: Yes     Frequency: Monthly or less     Drinks per session: 3 or 4     Comment: occasional    Drug use: No          PHYSICAL EXAMINATION:  Vital Signs: (As obtained by patient/caregiver or practitioner observation)  There were no vitals taken for this visit. Patient-Reported Vitals 2021   Patient-Reported Weight 212lb   Patient-Reported Height 5'2.5   Patient-Reported Systolic 535   Patient-Reported Diastolic 78   Patient-Reported Pulse -   Patient-Reported Temperature -        Respiratory rate appears normal    Constitutional: Appears well-developed and well-nourished. No apparent distress    Mental status: Alert and awake. Oriented to person/place/time. Able to follow commands    Pulmonary/Chest: Respiratory effort normal.  + oxygen with nasal canula. No visualized signs of difficulty breathing or respiratory distress. Psychiatric: Normal Affect. Patient denies not appear depressed or anxious. ASSESSMENT/PLAN:  1. Panic attacks  - Stable. - Increase ALPRAZolam (XANAX) 0.5 MG tablet; Take 1 tablet by mouth 2 times daily as needed for Anxiety for up to 60 days. Dispense: 60 tablet; Refill: 0        * Dosage increased from 0.25 mg to 0.5 mg.  - Call or send [employer] message for medication refills for May & 2021 if needed. - Practice self care and daily fresh air for 20 minutes. - Regular exercise program (as tolerated)  - Practice self relaxation with music or meditation etc.         Return in about 3 months (around 2021) for Anxiety follow up.     Current Outpatient Medications   Medication Sig Dispense Refill    ALPRAZolam (XANAX) 0.5 MG tablet Take 1 tablet by mouth 2 times daily as needed for Anxiety for up to 60 days. 60 tablet 0    ezetimibe (ZETIA) 10 MG tablet Take 1 tablet by mouth daily 90 tablet 1    atorvastatin (LIPITOR) 80 MG tablet Take 1 tablet by mouth daily 90 tablet 1    levothyroxine (SYNTHROID) 100 MCG tablet TAKE ONE TABLET BY MOUTH DAILY 90 tablet 1    glimepiride (AMARYL) 1 MG tablet Take 1 tablet by mouth every morning (before breakfast) 90 tablet 1    DULoxetine (CYMBALTA) 60 MG extended release capsule Take 1 capsule once daily, and capsule of Cymbalta 40 mg for a total of 100 mg per day 30 capsule 2    lisinopril (PRINIVIL;ZESTRIL) 20 MG tablet TAKE ONE TABLET BY MOUTH DAILY 90 tablet 4    furosemide (LASIX) 20 MG tablet Take 1 tablet by mouth daily 90 tablet 4    metoprolol succinate (TOPROL XL) 50 MG extended release tablet Take one tablet by mouth daily 90 tablet 4    Cholecalciferol (VITAMIN D3) 1.25 MG (20227 UT) CAPS 1 capsule every 7 days 12 capsule 3    azaTHIOprine (IMURAN) 50 MG tablet Take 1 tablet by mouth daily 2 tablets daily, for a total of 100 mg daily. 30 tablet     OXYGEN Inhale 6 L/day into the lungs      vitamin B-12 (CYANOCOBALAMIN) 500 MCG tablet Take 500 mcg by mouth daily      DICLOFENAC PO Take 75 mg by mouth 2 times daily      predniSONE (DELTASONE) 5 MG tablet Take 5 mg by mouth daily      azithromycin (ZITHROMAX) 500 MG tablet Take 500 mg by mouth daily      magnesium (MAGNESIUM-OXIDE) 250 MG TABS tablet Take 250 mg by mouth daily      TEGRETOL 200 MG tablet TAKE ONE TABLET BY MOUTH TWICE A DAY 60 tablet 10    ibuprofen (ADVIL;MOTRIN) 800 MG tablet Take 1 tablet by mouth every 6 hours as needed for Pain 50 tablet 3    omeprazole (PRILOSEC) 20 MG capsule Take 1 capsule by mouth daily 84 capsule 2    aspirin 81 MG tablet Take 81 mg by mouth daily.  DULoxetine HCl 40 MG CPEP Take 40 mg by mouth daily Take with Duloxetine 60 mg tablet for a total of 100 mg daily.  27 capsule 2     No current facility-administered medications for this visit. Leonardo Zuluaga is a 80 y.o. female being evaluated by a Virtual Visit (video visit) encounter to address concerns as mentioned above. A caregiver was present when appropriate. Due to this being a TeleHealth encounter (During OLHJJ-05 public health emergency), evaluation of the following organ systems was limited: Vitals/Constitutional/EENT/Resp/CV/GI//MS/Neuro/Skin/Heme-Lymph-Imm. Pursuant to the emergency declaration under the [address] [place] [address], [address] [place] [address] authority and the [employer] Act, this Virtual Visit was conducted with patient's (and/or legal guardian's) consent, to reduce the patient's risk of exposure to COVID-19 and provide necessary medical care. The patient (and/or legal guardian) has also been advised to contact this office for worsening conditions or problems, and seek emergency medical treatment and/or call 911 if deemed necessary. Patient identification was verified at the start of the visit: Yes    Total time spent on this encounter: 15 minutes. Services were provided through a video synchronous discussion virtually to substitute for in-person clinic visit. Patient was located in their home. Provider was located in the office. --CESAR Musa CNP on 4/14/2021 at 4:39 PM    An electronic signature was used to authenticate this note. [place]

## 2021-04-14 NOTE — PATIENT INSTRUCTIONS
Patient Education        Panic Attacks: Care Instructions  Overview     During a panic attack, you may have a feeling of intense fear or terror, trouble breathing, chest pain or tightness, heartbeat changes, dizziness, sweating, and shaking. A panic attack starts suddenly and usually lasts from 5 to 20 minutes but may last even longer. An attack can begin with a stressful event. Or it can happen without a cause. Although panic attacks can cause scary symptoms, you can learn to manage them with self-care, counseling, and medicine. Follow-up care is a key part of your treatment and safety. Be sure to make and go to all appointments, and call your doctor if you are having problems. It's also a good idea to know your test results and keep a list of the medicines you take. How can you care for yourself at home? · Take your medicine exactly as directed. Call your doctor if you think you are having a problem with your medicine. · Go to your counseling sessions and follow-up appointments. · Recognize and accept your anxiety. Then, when you are in a situation that makes you anxious, say to yourself, \"This is not an emergency. I feel uncomfortable, but I am not in danger. I can keep going even if I feel anxious. \"  · Be kind to your body:  ? Relieve tension with exercise or a massage. ? Get enough rest.  ? Avoid alcohol, caffeine, nicotine, and illegal drugs. They can increase your anxiety level, cause sleep problems, or trigger a panic attack. ? Learn and do relaxation techniques. See below for more about these techniques. · Engage your mind. Get out and do something you enjoy. Go to a funny movie, or take a walk or hike. Plan your day. Having too much or too little to do can make you anxious. · Keep a record of your symptoms. Discuss your fears with a good friend or family member, or join a support group for people with similar problems. Talking to others sometimes relieves stress.   · Get involved in social groups, or volunteer to help others. Being alone sometimes makes things seem worse than they are. · Get at least 30 minutes of exercise on most days of the week to relieve stress. Walking is a good choice. You also may want to do other activities, such as running, swimming, cycling, or playing tennis or team sports. Relaxation techniques  Do relaxation exercises for 10 to 20 minutes a day. You can play soothing, relaxing music while you do them, if you wish. · Tell others in your house that you are going to do your relaxation exercises. Ask them not to disturb you. · Find a comfortable place, away from all distractions and noise. · Lie down on your back, or sit with your back straight. · Focus on your breathing. Make it slow and steady. · Breathe in through your nose. Breathe out through either your nose or mouth. · Breathe deeply, filling up the area between your navel and your rib cage. Breathe so that your belly goes up and down. · Do not hold your breath. · Breathe like this for 5 to 10 minutes. Notice the feeling of calmness throughout your whole body. As you continue to breathe slowly and deeply, relax by doing the following for another 5 to 10 minutes:  · Tighten and relax each muscle group in your body. You can begin at your toes and work your way up to your head. · Imagine your muscle groups relaxing and becoming heavy. · Empty your mind of all thoughts. · Let yourself relax more and more deeply. · Become aware of the state of calmness that surrounds you. · When your relaxation time is over, you can bring yourself back to alertness by moving your fingers and toes and then your hands and feet and then stretching and moving your entire body. Sometimes people fall asleep during relaxation, but they usually wake up shortly afterward. · Always give yourself time to return to full alertness before you drive a car or do anything that might cause an accident if you are not fully alert.  Never play a relaxation tape while driving a car. When should you call for help? Call 911 anytime you think you may need emergency care. For example, call if:    · You feel you cannot stop from hurting yourself or someone else. Watch closely for changes in your health, and be sure to contact your doctor if:    · Your panic attacks get worse.     · You have new or different anxiety.     · You are not getting better as expected. Where can you learn more? Go to https://[employer]walkby. org and sign in to your [employer] account. Enter H601 in the [employer] box to learn more about \"Panic Attacks: Care Instructions. \"     If you do not have an account, please click on the \"Sign Up Now\" link. Current as of: September 23, 2020               Content Version: 12.8  © 4668-4629 [employer]. Care instructions adapted under license by [facility] If you have questions about a medical condition or this instruction, always ask your healthcare professional. [place] [address] any warranty or liability for your use of this information.

## 2021-05-12 ENCOUNTER — OFFICE VISIT (OUTPATIENT)
Dept: PRIMARY CARE CLINIC | Age: 71
End: 2021-05-12
Payer: MEDICARE

## 2021-05-12 VITALS
HEIGHT: 63 IN | BODY MASS INDEX: 37.92 KG/M2 | SYSTOLIC BLOOD PRESSURE: 134 MMHG | HEART RATE: 101 BPM | OXYGEN SATURATION: 92 % | DIASTOLIC BLOOD PRESSURE: 82 MMHG | WEIGHT: 214 LBS

## 2021-05-12 DIAGNOSIS — F33.1 MODERATE EPISODE OF RECURRENT MAJOR DEPRESSIVE DISORDER (HCC): ICD-10-CM

## 2021-05-12 DIAGNOSIS — E11.9 TYPE 2 DIABETES MELLITUS WITHOUT COMPLICATION, WITHOUT LONG-TERM CURRENT USE OF INSULIN (HCC): ICD-10-CM

## 2021-05-12 DIAGNOSIS — E03.9 HYPOTHYROIDISM, UNSPECIFIED TYPE: ICD-10-CM

## 2021-05-12 DIAGNOSIS — K21.9 GASTROESOPHAGEAL REFLUX DISEASE WITHOUT ESOPHAGITIS: ICD-10-CM

## 2021-05-12 DIAGNOSIS — F41.0 PANIC ATTACKS: Primary | ICD-10-CM

## 2021-05-12 DIAGNOSIS — E78.5 HYPERLIPIDEMIA, UNSPECIFIED HYPERLIPIDEMIA TYPE: ICD-10-CM

## 2021-05-12 DIAGNOSIS — F41.9 ANXIETY: ICD-10-CM

## 2021-05-12 DIAGNOSIS — I10 ESSENTIAL HYPERTENSION, BENIGN: ICD-10-CM

## 2021-05-12 DIAGNOSIS — G40.909 SEIZURE DISORDER (HCC): ICD-10-CM

## 2021-05-12 DIAGNOSIS — J84.9 INTERSTITIAL LUNG DISEASE (HCC): ICD-10-CM

## 2021-05-12 DIAGNOSIS — N18.30 STAGE 3 CHRONIC KIDNEY DISEASE, UNSPECIFIED WHETHER STAGE 3A OR 3B CKD (HCC): ICD-10-CM

## 2021-05-12 DIAGNOSIS — Z12.31 ENCOUNTER FOR SCREENING MAMMOGRAM FOR MALIGNANT NEOPLASM OF BREAST: ICD-10-CM

## 2021-05-12 PROCEDURE — 1036F TOBACCO NON-USER: CPT | Performed by: NURSE PRACTITIONER

## 2021-05-12 PROCEDURE — 4040F PNEUMOC VAC/ADMIN/RCVD: CPT | Performed by: NURSE PRACTITIONER

## 2021-05-12 PROCEDURE — 1123F ACP DISCUSS/DSCN MKR DOCD: CPT | Performed by: NURSE PRACTITIONER

## 2021-05-12 PROCEDURE — 3051F HG A1C>EQUAL 7.0%<8.0%: CPT | Performed by: NURSE PRACTITIONER

## 2021-05-12 PROCEDURE — 1090F PRES/ABSN URINE INCON ASSESS: CPT | Performed by: NURSE PRACTITIONER

## 2021-05-12 PROCEDURE — 99214 OFFICE O/P EST MOD 30 MIN: CPT | Performed by: NURSE PRACTITIONER

## 2021-05-12 PROCEDURE — G8417 CALC BMI ABV UP PARAM F/U: HCPCS | Performed by: NURSE PRACTITIONER

## 2021-05-12 PROCEDURE — G8427 DOCREV CUR MEDS BY ELIG CLIN: HCPCS | Performed by: NURSE PRACTITIONER

## 2021-05-12 PROCEDURE — 3017F COLORECTAL CA SCREEN DOC REV: CPT | Performed by: NURSE PRACTITIONER

## 2021-05-12 PROCEDURE — 2022F DILAT RTA XM EVC RTNOPTHY: CPT | Performed by: NURSE PRACTITIONER

## 2021-05-12 PROCEDURE — G8399 PT W/DXA RESULTS DOCUMENT: HCPCS | Performed by: NURSE PRACTITIONER

## 2021-05-12 RX ORDER — DULOXETINE 40 MG/1
40 CAPSULE, DELAYED RELEASE ORAL DAILY
Qty: 90 CAPSULE | Refills: 1 | Status: SHIPPED
Start: 2021-05-12 | End: 2021-11-17 | Stop reason: ALTCHOICE

## 2021-05-12 RX ORDER — ALPRAZOLAM 0.5 MG/1
0.5 TABLET ORAL 2 TIMES DAILY PRN
Qty: 60 TABLET | Refills: 2 | Status: SHIPPED | OUTPATIENT
Start: 2021-05-12 | End: 2021-07-11

## 2021-05-12 RX ORDER — EMPAGLIFLOZIN 10 MG/1
1 TABLET, FILM COATED ORAL DAILY
Qty: 90 TABLET | Refills: 1 | Status: SHIPPED | OUTPATIENT
Start: 2021-05-12 | End: 2021-11-01

## 2021-05-12 RX ORDER — DULOXETIN HYDROCHLORIDE 60 MG/1
CAPSULE, DELAYED RELEASE ORAL
Qty: 90 CAPSULE | Refills: 1 | Status: SHIPPED | OUTPATIENT
Start: 2021-05-12 | End: 2021-11-17

## 2021-05-12 RX ORDER — CARBAMAZEPINE 200 MG
TABLET ORAL
Qty: 180 TABLET | Refills: 1 | Status: SHIPPED | OUTPATIENT
Start: 2021-05-12 | End: 2022-02-28

## 2021-05-12 RX ORDER — LEVOTHYROXINE SODIUM 0.1 MG/1
TABLET ORAL
Qty: 90 TABLET | Refills: 1 | Status: SHIPPED | OUTPATIENT
Start: 2021-05-12 | End: 2022-03-09 | Stop reason: SDUPTHER

## 2021-05-12 RX ORDER — GLIMEPIRIDE 1 MG/1
1 TABLET ORAL
Qty: 90 TABLET | Refills: 1 | Status: SHIPPED | OUTPATIENT
Start: 2021-05-12 | End: 2022-03-09 | Stop reason: SDUPTHER

## 2021-05-12 ASSESSMENT — ENCOUNTER SYMPTOMS
CONSTIPATION: 0
WHEEZING: 0
VOMITING: 0
ABDOMINAL PAIN: 0
NAUSEA: 0
STRIDOR: 0
COUGH: 1
SHORTNESS OF BREATH: 1
ABDOMINAL DISTENTION: 0
DIARRHEA: 0
CHEST TIGHTNESS: 0

## 2021-05-12 NOTE — PATIENT INSTRUCTIONS
the plate format. Talk to your doctor, a dietitian, or a diabetes educator about your concerns. Carbohydrate counting  With carbohydrate counting, you plan meals based on the amount of carbohydrate in each food. Carbohydrate raises blood sugar higher and more quickly than any other nutrient. It is found in desserts, breads and cereals, and fruit. It's also found in starchy vegetables such as potatoes and corn, grains such as rice and pasta, and milk and yogurt. Spreading carbohydrate throughout the day helps keep your blood sugar levels within your target range. Your daily amount depends on several things, including your weight, how active you are, which diabetes medicines you take, and what your goals are for your blood sugar levels. A registered dietitian or diabetes educator can help you plan how much carbohydrate to include in each meal and snack. A guideline for your daily amount of carbohydrate is:  · 45 to 60 grams at each meal. That's about the same as 3 to 4 carbohydrate servings. · 15 to 20 grams at each snack. That's about the same as 1 carbohydrate serving. The Nutrition Facts label on packaged foods tells you how much carbohydrate is in a serving of the food. First, look at the serving size on the food label. Is that the amount you eat in a serving? All of the nutrition information on a food label is based on that serving size. So if you eat more or less than that, you'll need to adjust the other numbers. Total carbohydrate is the next thing you need to look for on the label. If you count carbohydrate servings, one serving of carbohydrate is 15 grams. For foods that don't come with labels, such as fresh fruits and vegetables, you'll need a guide that lists carbohydrate in these foods. Ask your doctor, dietitian, or diabetes educator about books or other nutrition guides you can use.   If you take insulin, you need to know how many grams of carbohydrate are in a meal. This lets you know how much rapid-acting insulin to take before you eat. If you use an insulin pump, you get a constant rate of insulin during the day. So the pump must be programmed at meals to give you extra insulin to cover the rise in blood sugar after meals. When you know how much carbohydrate you will eat, you can take the right amount of insulin. Or, if you always use the same amount of insulin, you need to make sure that you eat the same amount of carbohydrate at meals. If you need more help to understand carbohydrate counting and food labels, ask your doctor, dietitian, or diabetes educator. How can you plan healthy meals? Here are some tips to get started:  · Plan your meals a week at a time. Don't forget to include snacks too. · Use cookbooks or online recipes to plan several main meals. Plan some quick meals for busy nights. You also can double some recipes that freeze well. Then you can save half for other busy nights when you don't have time to cook. · Make sure you have the ingredients you need for your recipes. If you're running low on basic items, put these items on your shopping list too. · List foods that you use to make breakfasts, lunches, and snacks. List plenty of fruits and vegetables. · Post this list on the refrigerator. Add to it as you think of more things you need. · Take the list to the store to do your weekly shopping. Follow-up care is a key part of your treatment and safety. Be sure to make and go to all appointments, and call your doctor if you are having problems. It's also a good idea to know your test results and keep a list of the medicines you take. Where can you learn more? Go to https://Quickcueeulalioewsandra.C3Nano. org and sign in to your [employer] account. Enter H729 in the [employer] box to learn more about \"Learning About Meal Planning for Diabetes. \"     If you do not have an account, please click on the \"Sign Up Now\" link.   Current as of: August 31, 2020               Content Version: 12.8  © 2006-2021 Healthwise, "[employer]. Care instructions adapted under license by [facility] ([facility]). If you have questions about a medical condition or this instruction, always ask your healthcare professional. [address] any warranty or liability for your use of this information. Patient Education        Diabetes Blood Sugar Emergencies: Your Action Plan  How can you prevent a blood sugar emergency? An important part of living with diabetes is keeping your blood sugar in your target range. You'll need to know what to do if it's too high or too low. Managing your blood sugar levels helps you avoid emergencies. This care sheet will teach you about the signs of high and low blood sugar. It will help you make an action plan with your doctor for when these signs occur. Low blood sugar is more likely to happen if you take certain medicines for diabetes. It can also happen if you skip a meal, drink alcohol, or exercise more than usual.  You may get high blood sugar if you eat differently than you normally do. One example is eating more carbohydrate than usual. Having a cold, the flu, or other sudden illness can also cause high blood sugar levels. Levels can also rise if you miss a dose of medicine. Any change in how you take your medicine may affect your blood sugar level. So it's important to work with your doctor before you make any changes. Track your blood sugar  Work with your doctor to fill in the blank spaces below that apply to you. Track your levels, know your target range, and write down ways you can get your blood sugar back in your target range. A log book can help you track your levels. Take the book to all of your medical appointments. · Check your blood sugar _____ times a day, at these times:________________________________________________.   (For example: Before meals, at bedtime, before exercise, during exercise, other.)  · Your blood sugar target range before a meal is ___________________. Your blood sugar target range after a meal is _______________________. · Do this___________________________________________________to get your blood sugar back within your safe range if your blood sugar results are _________________________________________. (For example: Less than 70 or above 250 mg/dL.)  Call your doctor when your blood sugar results are ___________________________________. (For example: Less than 70 or above 250 mg/dL.)  What are the symptoms of low and high blood sugar? Common symptoms of low blood sugar are sweating and feeling shaky, weak, hungry, or confused. Symptoms can start quickly. Common symptoms of high blood sugar are feeling very thirsty or very hungry. You may also pass urine more often than usual. You may have blurry vision and may lose weight without trying. But some people may have high or low blood sugar without having any symptoms. That's a good reason to check your blood sugar on a regular schedule. What should you do if you have symptoms? Work with your doctor to fill in the blank spaces below that apply to you. Low blood sugar  If you have symptoms of low blood sugar, check your blood sugar. If it's below _____ ( for example, below 70), eat or drink a quick-sugar food that has about 15 grams of carbohydrate. Your goal is to get your level back to your safe range. Check your blood sugar again 15 minutes later. If it's still not in your target range, take another 15 grams of carbohydrate and check your blood sugar again in 15 minutes. Repeat this until you reach your target. Then go back to your regular testing schedule. Children usually need less than 15 grams of carbohydrate. Check with your doctor or diabetes educator for the amount that is right for your child. When you have low blood sugar, it's best to stop or reduce any physical activity until your blood sugar is back in your target range and is stable.  If you must stay Information box to learn more about \"Diabetes Blood Sugar Emergencies: Your Action Plan. \"     If you do not have an account, please click on the \"Sign Up Now\" link. Current as of: August 31, 2020               Content Version: 12.8  © 2006-2021 Healthwise, [email or web address]. Care instructions adapted under license by [facility] ([facility]). If you have questions about a medical condition or this instruction, always ask your healthcare professional. [address] any warranty or liability for your use of this information. Patient Education        Learning About Carbohydrate (Carb) Counting and Eating Out When You Have Diabetes  Why plan your meals? Meal planning can be a key part of managing diabetes. Planning meals and snacks with the right balance of carbohydrate, protein, and fat can help you keep your blood sugar at the target level you set with your doctor. You don't have to eat special foods. You can eat what your family eats, including sweets once in a while. But you do have to pay attention to how often you eat and how much you eat of certain foods. You may want to work with a dietitian or a certified diabetes educator. He or she can give you tips and meal ideas and can answer your questions about meal planning. This health professional can also help you reach a healthy weight if that is one of your goals. What should you know about eating carbs? Managing the amount of carbohydrate (carbs) you eat is an important part of healthy meals when you have diabetes. Carbohydrate is found in many foods. · Learn which foods have carbs. And learn the amounts of carbs in different foods. ? Bread, cereal, pasta, and rice have about 15 grams of carbs in a serving. A serving is 1 slice of bread (1 ounce), ½ cup of cooked cereal, or 1/3 cup of cooked pasta or rice. ? Fruits have 15 grams of carbs in a serving.  A serving is 1 small fresh fruit, such as an apple or orange; ½ of a banana; ½ cup of cooked or canned fruit; ½ cup of fruit juice; 1 cup of melon or raspberries; or 2 tablespoons of dried fruit. ? Milk and no-sugar-added yogurt have 15 grams of carbs in a serving. A serving is 1 cup of milk or 2/3 cup of no-sugar-added yogurt. ? Starchy vegetables have 15 grams of carbs in a serving. A serving is ½ cup of mashed potatoes or sweet potato; 1 cup winter squash; ½ of a small baked potato; ½ cup of cooked beans; or ½ cup cooked corn or green peas. · Learn how much carbs to eat each day and at each meal. A dietitian or CDE can teach you how to keep track of the amount of carbs you eat. This is called carbohydrate counting. · If you are not sure how to count carbohydrate grams, use the Plate Method to plan meals. It is a good, quick way to make sure that you have a balanced meal. It also helps you spread carbs throughout the day. ? Divide your plate by types of foods. Put non-starchy vegetables on half the plate, meat or other protein food on one-quarter of the plate, and a grain or starchy vegetable in the final quarter of the plate. To this you can add a small piece of fruit and 1 cup of milk or yogurt, depending on how many carbs you are supposed to eat at a meal.  · Try to eat about the same amount of carbs at each meal. Do not \"save up\" your daily allowance of carbs to eat at one meal.  · Proteins have very little or no carbs per serving. Examples of proteins are beef, chicken, turkey, fish, eggs, tofu, cheese, cottage cheese, and peanut butter. A serving size of meat is 3 ounces, which is about the size of a deck of cards. Examples of meat substitute serving sizes (equal to 1 ounce of meat) are 1/4 cup of cottage cheese, 1 egg, 1 tablespoon of peanut butter, and ½ cup of tofu. How can you eat out and still eat healthy? · Learn to estimate the serving sizes of foods that have carbohydrate. If you measure food at home, it will be easier to estimate the amount in a serving of restaurant food.   · If the meal you order has too much carbohydrate (such as potatoes, corn, or baked beans), ask to have a low-carbohydrate food instead. Ask for a salad or green vegetables. · If you use insulin, check your blood sugar before and after eating out to help you plan how much to eat in the future. · If you eat more carbohydrate at a meal than you had planned, take a walk or do other exercise. This will help lower your blood sugar. What are some tips for eating healthy? · Limit saturated fat, such as the fat from meat and dairy products. This is a healthy choice because people who have diabetes are at higher risk of heart disease. So choose lean cuts of meat and nonfat or low-fat dairy products. Use olive or canola oil instead of butter or shortening when cooking. · Don't skip meals. Your blood sugar may drop too low if you skip meals and take insulin or certain medicines for diabetes. · Check with your doctor before you drink alcohol. Alcohol can cause your blood sugar to drop too low. Alcohol can also cause a bad reaction if you take certain diabetes medicines. Follow-up care is a key part of your treatment and safety. Be sure to make and go to all appointments, and call your doctor if you are having problems. It's also a good idea to know your test results and keep a list of the medicines you take. Where can you learn more? Go to https://AssetMetrix Corporationtong.Merlin. org and sign in to your [employer] account. Enter V000 in the [facility] box to learn more about \"Learning About Carbohydrate (Carb) Counting and Eating Out When You Have Diabetes. \"     If you do not have an account, please click on the \"Sign Up Now\" link. Current as of: August 31, 2020               Content Version: 12.8  © 2490-3166 Healthwise, Incorporated. Care instructions adapted under license by [facility] ([facility]).  If you have questions about a medical condition or this instruction, always ask your healthcare professional. [employer] disclaims any warranty or liability for your use of this information. Patient Education        Diabetes Foot Health: Care Instructions  Your Care Instructions     When you have diabetes, your feet need extra care and attention. Diabetes can damage the nerve endings and blood vessels in your feet, making you less likely to notice when your feet are injured. Diabetes also limits your body's ability to fight infection and get blood to areas that need it. If you get a minor foot injury, it could become an ulcer or a serious infection. With good foot care, you can prevent most of these problems. Caring for your feet can be quick and easy. Most of the care can be done when you are bathing or getting ready for bed. Follow-up care is a key part of your treatment and safety. Be sure to make and go to all appointments, and call your doctor if you are having problems. It's also a good idea to know your test results and keep a list of the medicines you take. How can you care for yourself at home? · Keep your blood sugar close to normal by watching what and how much you eat, monitoring blood sugar, taking medicines if prescribed, and getting regular exercise. · Do not smoke. Smoking affects blood flow and can make foot problems worse. If you need help quitting, talk to your doctor about stop-smoking programs and medicines. These can increase your chances of quitting for good. · Eat a diet that is low in fats. High fat intake can cause fat to build up in your blood vessels and decrease blood flow. · Inspect your feet daily for blisters, cuts, cracks, or sores. If you cannot see well, use a mirror or have someone help you. · Take care of your feet:  ? Wash your feet every day. Use warm (not hot) water. Check the water temperature with your wrists or other part of your body, not your feet. ? Dry your feet well. Pat them dry. Do not rub the skin on your feet too hard. Dry well between your toes.  If the skin on your feet stays moist, bacteria or a fungus can grow, which can lead to infection. ? Keep your skin soft. Use moisturizing skin cream to keep the skin on your feet soft and prevent calluses and cracks. But do not put the cream between your toes, and stop using any cream that causes a rash. ? Clean underneath your toenails carefully. Do not use a sharp object to clean underneath your toenails. Use the blunt end of a nail file or other rounded tool. ? Trim and file your toenails straight across to prevent ingrown toenails. Use a nail clipper, not scissors. Use an emery board to smooth the edges. · Change socks daily. Socks without seams are best, because seams often rub the feet. You can find socks for people with diabetes from specialty catalogs. · Look inside your shoes every day for things like gravel or torn linings, which could cause blisters or sores. · Buy shoes that fit well:  ? Look for shoes that have plenty of space around the toes. This helps prevent bunions and blisters. ? Try on shoes while wearing the kind of socks you will usually wear with the shoes. ? Avoid plastic shoes. They may rub your feet and cause blisters. Good shoes should be made of materials that are flexible and breathable, such as leather or cloth. ? Break in new shoes slowly by wearing them for no more than an hour a day for several days. Take extra time to check your feet for red areas, blisters, or other problems after you wear new shoes. · Do not go barefoot. Do not wear sandals, and do not wear shoes with very thin soles. Thin soles are easy to puncture. They also do not protect your feet from hot pavement or cold weather. · Have your doctor check your feet during each visit. If you have a foot problem, see your doctor. Do not try to treat an early foot problem at home. Home remedies or treatments that you can buy without a prescription (such as corn removers) can be harmful.   · Always get early treatment for foot problems. A minor irritation can lead to a major problem if not properly cared for early. When should you call for help? Call your doctor now or seek immediate medical care if:    · You have a foot sore, an ulcer or break in the skin that is not healing after 4 days, bleeding corns or calluses, or an ingrown toenail.     · You have blue or black areas, which can mean bruising or blood flow problems.     · You have peeling skin or tiny blisters between your toes or cracking or oozing of the skin.     · You have a fever for more than 24 hours and a foot sore.     · You have new numbness or tingling in your feet that does not go away after you move your feet or change positions.     · You have unexplained or unusual swelling of the foot or ankle. Watch closely for changes in your health, and be sure to contact your doctor if:    · You cannot do proper foot care. Where can you learn more? Go to https://Kaixin001.Barburrito. org and sign in to your [employer] account. Enter A739 in the [employer] box to learn more about \"Diabetes Foot Health: Care Instructions. \"     If you do not have an account, please click on the \"Sign Up Now\" link. Current as of: August 31, 2020               Content Version: 12.8  © 2006-2021 [email or web address]. Care instructions adapted under license by [facility] ([facility]). If you have questions about a medical condition or this instruction, always ask your healthcare professional. [place] [address] any warranty or liability for your use of this information. Patient Education        DASH Diet: Care Instructions  Your Care Instructions     The DASH diet is an eating plan that can help lower your blood pressure. DASH stands for Dietary Approaches to Stop Hypertension. Hypertension is high blood pressure. The DASH diet focuses on eating foods that are high in calcium, potassium, and magnesium. These nutrients can lower blood pressure.  The foods that are highest in these nutrients are fruits, vegetables, low-fat dairy products, nuts, seeds, and legumes. But taking calcium, potassium, and magnesium supplements instead of eating foods that are high in those nutrients does not have the same effect. The DASH diet also includes whole grains, fish, and poultry. The DASH diet is one of several lifestyle changes your doctor may recommend to lower your high blood pressure. Your doctor may also want you to decrease the amount of sodium in your diet. Lowering sodium while following the DASH diet can lower blood pressure even further than just the DASH diet alone. Follow-up care is a key part of your treatment and safety. Be sure to make and go to all appointments, and call your doctor if you are having problems. It's also a good idea to know your test results and keep a list of the medicines you take. How can you care for yourself at home? Following the DASH diet  · Eat 4 to 5 servings of fruit each day. A serving is 1 medium-sized piece of fruit, ½ cup chopped or canned fruit, 1/4 cup dried fruit, or 4 ounces (½ cup) of fruit juice. Choose fruit more often than fruit juice. · Eat 4 to 5 servings of vegetables each day. A serving is 1 cup of lettuce or raw leafy vegetables, ½ cup of chopped or cooked vegetables, or 4 ounces (½ cup) of vegetable juice. Choose vegetables more often than vegetable juice. · Get 2 to 3 servings of low-fat and fat-free dairy each day. A serving is 8 ounces of milk, 1 cup of yogurt, or 1 ½ ounces of cheese. · Eat 6 to 8 servings of grains each day. A serving is 1 slice of bread, 1 ounce of dry cereal, or ½ cup of cooked rice, pasta, or cooked cereal. Try to choose whole-grain products as much as possible. · Limit lean meat, poultry, and fish to 2 servings each day. A serving is 3 ounces, about the size of a deck of cards. · Eat 4 to 5 servings of nuts, seeds, and legumes (cooked dried beans, lentils, and split peas) each week.  A serving is 1/3 cup of nuts, 2 tablespoons of seeds, or ½ cup of cooked beans or peas. · Limit fats and oils to 2 to 3 servings each day. A serving is 1 teaspoon of vegetable oil or 2 tablespoons of salad dressing. · Limit sweets and added sugars to 5 servings or less a week. A serving is 1 tablespoon jelly or jam, ½ cup sorbet, or 1 cup of lemonade. · Eat less than 2,300 milligrams (mg) of sodium a day. If you limit your sodium to 1,500 mg a day, you can lower your blood pressure even more. · Be aware that all of these are the suggested number of servings for people who eat 1,800 to 2,000 calories a day. Your recommended number of servings may be different if you need more or fewer calories. Tips for success  · Start small. Do not try to make dramatic changes to your diet all at once. You might feel that you are missing out on your favorite foods and then be more likely to not follow the plan. Make small changes, and stick with them. Once those changes become habit, add a few more changes. · Try some of the following:  ? Make it a goal to eat a fruit or vegetable at every meal and at snacks. This will make it easy to get the recommended amount of fruits and vegetables each day. ? Try yogurt topped with fruit and nuts for a snack or healthy dessert. ? Add lettuce, tomato, cucumber, and onion to sandwiches. ? Combine a ready-made pizza crust with low-fat mozzarella cheese and lots of vegetable toppings. Try using tomatoes, squash, spinach, broccoli, carrots, cauliflower, and onions. ? Have a variety of cut-up vegetables with a low-fat dip as an appetizer instead of chips and dip. ? Sprinkle sunflower seeds or chopped almonds over salads. Or try adding chopped walnuts or almonds to cooked vegetables. ? Try some vegetarian meals using beans and peas. Add garbanzo or kidney beans to salads. Make burritos and tacos with mashed mcneal beans or black beans. Where can you learn more?   Go to https://chpepiceweb.[email or web address]. org and sign in to your [employer] account. Enter W062 in the [facility] box to learn more about \"DASH Diet: Care Instructions. \"     If you do not have an account, please click on the \"Sign Up Now\" link. Current as of: August 31, 2020               Content Version: 12.8  © 2006-2021 [employer]. Care instructions adapted under license by [facility] ([facility]). If you have questions about a medical condition or this instruction, always ask your healthcare professional. [place] [id number] any warranty or liability for your use of this information. Patient Education        Low Sodium Diet (2,000 Milligram): Care Instructions  Overview     Limiting sodium can be an important part of managing some health problems. The most common source of sodium is salt. People get most of the salt in their diet from canned, prepared, and packaged foods. Fast food and restaurant meals also are very high in sodium. Your doctor will probably limit your sodium to less than 2,000 milligrams (mg) a day. This limit counts all the sodium in prepared and packaged foods and any salt you add to your food. Follow-up care is a key part of your treatment and safety. Be sure to make and go to all appointments, and call your doctor if you are having problems. It's also a good idea to know your test results and keep a list of the medicines you take. How can you care for yourself at home? Read food labels  · Read labels on cans and food packages. The labels tell you how much sodium is in each serving. Make sure that you look at the serving size. If you eat more than the serving size, you have eaten more sodium. · Food labels also tell you the Percent Daily Value for sodium. Choose products with low Percent Daily Values for sodium.   · Be aware that sodium can come in forms other than salt, including monosodium glutamate (MSG), sodium citrate, and sodium bicarbonate (baking soda). MSG is often added to Asian food. When you eat out, you can sometimes ask for food without MSG or added salt. Buy low-sodium foods  · Buy foods that are labeled \"unsalted\" (no salt added), \"sodium-free\" (less than 5 mg of sodium per serving), or \"low-sodium\" (140 mg or less of sodium per serving). Foods labeled \"reduced-sodium\" and \"light sodium\" may still have too much sodium. Be sure to read the label to see how much sodium you are getting. · Buy fresh vegetables, or frozen vegetables without added sauces. Buy low-sodium versions of canned vegetables, soups, and other canned goods. Prepare low-sodium meals  · Cut back on the amount of salt you use in cooking. This will help you adjust to the taste. Do not add salt after cooking. One teaspoon of salt has about 2,300 mg of sodium. · Take the salt shaker off the table. · Flavor your food with garlic, lemon juice, onion, vinegar, herbs, and spices. Do not use soy sauce, lite soy sauce, steak sauce, onion salt, garlic salt, celery salt, or ketchup on your food. · Use low-sodium salad dressings, sauces, and ketchup. Or make your own salad dressings and sauces without adding salt. · Use less salt (or none) when recipes call for it. You can often use half the salt a recipe calls for without losing flavor. Other foods such as rice, pasta, and grains do not need added salt. · Rinse canned vegetables, and cook them in fresh water. This removes somebut not allof the salt. · Avoid water that is naturally high in sodium or that has been treated with water softeners, which add sodium. If you buy bottled water, read the label and choose a sodium-free brand. Avoid high-sodium foods  · Avoid eating:  ? Smoked, cured, salted, and canned meat, fish, and poultry. ? Ham, michelle, hot dogs, and luncheon meats. ? Regular, hard, and processed cheese and regular peanut butter.   ? Crackers with salted tops, and other salted snack foods such as pretzels, chips, and salted popcorn. ? Frozen prepared meals, unless labeled low-sodium. ? Canned and dried soups, broths, and bouillon, unless labeled sodium-free or low-sodium. ? Canned vegetables, unless labeled sodium-free or low-sodium. ? [place] fries, pizza, tacos, and other fast foods. ? Pickles, olives, ketchup, and other condiments, especially soy sauce, unless labeled sodium-free or low-sodium. Where can you learn more? Go to [email or web address] org and sign in to your [employer] account. Enter C151 in the [facility] box to learn more about \"Low Sodium Diet (2,000 Milligram): Care Instructions. \"     If you do not have an account, please click on the \"Sign Up Now\" link. Current as of: December 17, 2020               Content Version: 12.8  © 6784-4086 Healthwise, [facility]. Care instructions adapted under license by [facility] ([facility]). If you have questions about a medical condition or this instruction, always ask your healthcare professional. [place] [address] any warranty or liability for your use of this information.

## 2021-05-12 NOTE — PROGRESS NOTES
5/12/2021    Chief Complaint   Patient presents with    3 Month Follow-Up     renal panel/ depression        Misbah Chew is a 80 y.o. female, presents today for follow up of anxiety, depression, panic attacks and decreased renal function. HPI   Anxiety    Patient presents for 1 month follow up of anxiety and depression. Xanax dosage was increased from 0.25 mg to 0.5 mg to be taken twice daily as needed for anxiety and panic attacks. She is typically taking Xanax mostly in the evening prior to bedtime. She reports taking a couple times in the afternoon when \"felt like a panic attack was coming on\". She reports anxiety and begins to panic when she has \"coughing fits she become short of breath\". Xanax has been helpful in reducing high anxiety during panic attack when taken at onset of symptoms. Medication side effects:  none. Onset of panic attacks was around 2 months ago due to shortness of breath (Needs home oxygen, currently 6 liters/minute via nasal canula (portable tank) and 5.5 liters at home. Depression   She presents for follow up of depression. Current pharmacologic treatment: Cymbalta 100 mg daily (she takes 1 tablet of Cymbalta 60 mg daily & 1 tablet of Cymbalta 40 mg daily for a total of 100 mg). She reports dosage is appropriate and doing well. Patient and her  request 90 day prescription refills. Medication side effects:  dry mouth occassionally. She denies symptoms of depression: anhedonia, depressed mood, difficulty concentrating, fatigue, insomnia, recurrent thoughts of death, suicidal attempt, suicidal thoughts with specific plan and suicidal thoughts without plan. She is not participating in counselling/therapy; she does not feel that would be helpful. Decreased renal function  Patient was evaluated by Dr. Luis Eduardo Botello MD (Nephrologist) on 4/1/2021 and diagnosed with renal osteodystrophy and stage 3 chronic kidney disease and is under his care for this problem. Hypertension and Hyperlipidemia  Managed by Dr. Eliu Tony. She is taking Lisinopril 20 mg daily, Toprol XL 50 mg alvares, Lasix 20 mg daily, Lipitor 80 mg daily and Zetia 10 mg daily. Blood pressure at home last week was 111/60. She is following a low-sodium diet. She has decreased sweets/sugary foods and now eating grilled/baked chicken/fish. She is no longer eating bread. Pulmonologist instructed patient to lose 30 lbs in 3 months for lung transplant. Patient is unsure if she wants to pursue lung transplant. Diabetes  Patient is taking Glimepiride 1 mg once daily as prescribed and tolerating well. She reports daily blood sugar typically runs 115-145. She denies hypoglycemia. She has recently stop eating bread and has greatly decreased carbohydrates in her diet. She is unable to exercise due to chronic shortness of breath. Discussed adding Jardiance 10 mg daily to diabetes regimen. I instructed her to log daily blood sugar to review at next appointment. Hemoglobin A1C 7.0  See comment % Final 03/09/2021  2:45 PM [facility]     Hemoglobin A1C up 1.5 points from Oct. 2020. Hypothyroidism  Patient is taking Levothyroxine 100 mcg daily as prescribed and tolerating well. She denies fatigue, weight changes, heat/cold intolerance, bowel/skin changes or CVS symptoms. The problem has been stable with taking Levothyroxine. She requests refill today. Lab Results   Component Value Date     TSHFT4 1.15 03/09/2021     TSH: 2.660 (within normal range) on 9/22/2020. Seizure disorder  Patient is taking Tegretol 200 mg twice daily as prescribed and tolerating well. She request refill today. She denies seizure activities. Seizure disorder has always been managed by previous PCP (Dr. Hugh Acosta).      Interstitial lung disease:   Managed by Dr. Cathy Ahmadi; last office visit was 1/13/2021. Patient is wearing continuous oxygen at 6 liters (portable tank), 5.5 liters at home.  She is taking prednisone 5 mg day 30 capsule 2    lisinopril (PRINIVIL;ZESTRIL) 20 MG tablet TAKE ONE TABLET BY MOUTH DAILY 90 tablet 4    furosemide (LASIX) 20 MG tablet Take 1 tablet by mouth daily 90 tablet 4    metoprolol succinate (TOPROL XL) 50 MG extended release tablet Take one tablet by mouth daily 90 tablet 4    Cholecalciferol (VITAMIN D3) 1.25 MG (82997 UT) CAPS 1 capsule every 7 days 12 capsule 3    azaTHIOprine (IMURAN) 50 MG tablet Take 1 tablet by mouth daily 2 tablets daily, for a total of 100 mg daily. 30 tablet     OXYGEN Inhale 6 L/day into the lungs      vitamin B-12 (CYANOCOBALAMIN) 500 MCG tablet Take 500 mcg by mouth daily      DICLOFENAC PO Take 75 mg by mouth 2 times daily      predniSONE (DELTASONE) 5 MG tablet Take 5 mg by mouth daily      azithromycin (ZITHROMAX) 500 MG tablet Take 500 mg by mouth daily      magnesium (MAGNESIUM-OXIDE) 250 MG TABS tablet Take 250 mg by mouth daily      TEGRETOL 200 MG tablet TAKE ONE TABLET BY MOUTH TWICE A DAY 60 tablet 10    ibuprofen (ADVIL;MOTRIN) 800 MG tablet Take 1 tablet by mouth every 6 hours as needed for Pain 50 tablet 3    omeprazole (PRILOSEC) 20 MG capsule Take 1 capsule by mouth daily 84 capsule 2    aspirin 81 MG tablet Take 81 mg by mouth daily.  DULoxetine HCl 40 MG CPEP Take 40 mg by mouth daily Take with Duloxetine 60 mg tablet for a total of 100 mg daily. 30 capsule 2     No current facility-administered medications on file prior to visit. Allergies   Allergen Reactions    Tramadol      Avoid because Pt is on Tegretol.     Penicillins Rash     Patient states she can take Keflex     Past Medical History:   Diagnosis Date    Abnormal stress test *Aug.1, 2016    Coronary angiography by Dr. Kati Robles revealed a 60% stenosis of the LAD and  a 50% mid circumflex     Arthritis     Chronic headaches     Eczema     Fibromyalgia     GERD (gastroesophageal reflux disease)     Hyperlipidemia     Hypertension     Impaired fasting glucose right    TUBAL LIGATION        Social History     Tobacco Use    Smoking status: Never Smoker    Smokeless tobacco: Never Used   Substance Use Topics    Alcohol use: Yes     Frequency: Monthly or less     Drinks per session: 3 or 4     Comment: occasional      Family History   Problem Relation Age of Onset    Heart Disease Father 76        , ASHD, S/P CAB, NIDDM, hyperlipidemia.  Cancer Mother 58        , lung cancer. Vitals:    21 1001   BP: 134/82   Site: Left Upper Arm   Position: Sitting   Cuff Size: Large Adult   Pulse: 101   SpO2: 92%   Weight: 214 lb (97.1 kg)   Height: 5' 2.5\" (1.588 m)     Estimated body mass index is 38.52 kg/m² as calculated from the following:    Height as of this encounter: 5' 2.5\" (1.588 m). Weight as of this encounter: 214 lb (97.1 kg). Physical Exam  Constitutional:       General: She is not in acute distress. Appearance: Normal appearance. She is well-groomed. She is obese. Neck:      Musculoskeletal: Neck supple. Vascular: No carotid bruit. Cardiovascular:      Rate and Rhythm: Normal rate and regular rhythm. Pulses: Normal pulses. Dorsalis pedis pulses are 2+ on the right side and 2+ on the left side. Posterior tibial pulses are 2+ on the right side and 2+ on the left side. Heart sounds: Normal heart sounds. No murmur. Pulmonary:      Effort: Pulmonary effort is normal. No respiratory distress. Breath sounds: Normal breath sounds. No stridor. No wheezing or rhonchi. Comments: Wearing oxygen 6 liters via NC  Musculoskeletal: Normal range of motion. Right lower leg: No edema. Left lower leg: No edema. Feet:      Right foot:      Protective Sensation: 10 sites tested. 10 sites sensed. Skin integrity: Skin integrity normal. No ulcer, blister, skin breakdown, erythema, callus, dry skin or fissure.       Toenail Condition: Right toenails are normal.      Left foot:      Protective Sensation: 10 sites tested. 10 sites sensed. Skin integrity: Skin integrity normal. No ulcer, blister, skin breakdown, erythema, callus, dry skin or fissure. Toenail Condition: Left toenails are normal.   Lymphadenopathy:      Cervical: No cervical adenopathy. Skin:     General: Skin is warm. Neurological:      Mental Status: She is alert and oriented to person, place, and time. Psychiatric:         Attention and Perception: Attention normal.         Mood and Affect: Mood and affect normal. Mood is not anxious or depressed. Speech: Speech normal.         Behavior: Behavior normal. Behavior is cooperative. Thought Content: Thought content normal.         ASSESSMENT/PLAN:  1. Panic attacks  - Stable. - Continue ALPRAZolam (XANAX) 0.5 MG tablet; Take 1 tablet by mouth 2 times daily as needed for Anxiety for up to 60 days. Dispense: 60 tablet; Refill: 2    2. Moderate episode of recurrent major depressive disorder (HCC)  - Stable. - Continue DULoxetine (CYMBALTA) 60 MG extended release capsule; Take 1 capsule once daily, and capsule of Cymbalta 40 mg for a total of 100 mg per day  Dispense: 90 capsule; Refill: 1  - Continue DULoxetine HCl 40 MG CPEP; Take 40 mg by mouth daily Take with Duloxetine 60 mg tablet for a total of 100 mg daily. Dispense: 90 capsule; Refill: 1  - Practice self care and daily fresh air for 20 minutes. - Regular exercise program  - Practice self relaxation with music or meditation etc. Phone apps such as Tinypass: Mediatation and Relaxation, or Calm. - Call 9-1-1 or go to closest emergency room with any thoughts or intent of harming self or others; Patient verbalizes understanding and agrees to plan. 3. Anxiety  - Stable. - See # 1 & # 2.    4. Type 2 diabetes mellitus without complication, without long-term current use of insulin ([address]  - Uncontrolled.   - Hemoglobin A1C: 7.0 on 3/9/2021 (up 1.5 points from Oct. 2020)   - Continue glimepiride (AMARYL) 1 MG tablet; Take 1 tablet by mouth every morning (before breakfast)  Dispense: 90 tablet; Refill: 1  - Start empagliflozin (JARDIANCE) 10 MG tablet; Take 1 tablet by mouth daily  Dispense: 90 tablet; Refill: 1  - Check blood sugar daily and record on log provided- will review at one month follow up. - Follow low-carb diet. - Weight loss. 5. Hypothyroidism, unspecified type  - Stable. - Continue Levothyroxine (SYNTHROID) 100 MCG tablet; TAKE ONE TABLET BY MOUTH DAILY  Dispense: 90 tablet; Refill: 1    6. Seizure disorder ([facility]  - Stable. - Continue TEGRETOL 200 MG tablet; TAKE ONE TABLET BY MOUTH TWICE A DAY  Dispense: 180 tablet; Refill: 1    7. Essential hypertension, benign  - Stable. - Managed by Dr. Jens Lucas MD(Cardiologist)  - Continue Lisinopril 20 mg daily, Toprol XL 50 mg alvares, Lasix 20 mg daily   - Follow low-sodium diet. - Check blood pressure daily. 8. Hyperlipidemia, unspecified hyperlipidemia type  - Stable. - Managed by Dr. Jens Lucas MD (Cardiologist)    - Continue Lipitor 80 mg daily and Zetia 10 mg daily.  - Follow low-saturated fat diet     9. Gastroesophageal reflux disease without esophagitis  - Stable. - Continue Omeprazole 20 mg daily before breakfast (OTC)    10. Interstitial lung disease ([facility]  - Stable. - Managed by Dr. Dr. Carlita Gonzalez (last office visit was 1/13/2021)  - Continue continuous oxygen at 6 liters (portable tank), 5.5 liters at home  - Continue prednisone 5 mg daily, Imuran 50 mg once daily and Azithromycin as needed. 11. Stage 3 chronic kidney disease, unspecified whether stage 3a or 3b CKD  - New.   - Managed by Dr. Joshua Billingsley MD    12. Encounter for screening mammogram for malignant neoplasm of breast  - [facility] Digital Screen Bilateral [[id number]]; Future  - Patient to schedule appointment, phone number provided. Return in about 4 weeks (around 6/9/2021) for diabetes follow up. 6 months for all chronic diseases and refills.      Discussed use, benefit, and side effects of prescribed medications. Patient's questions answered and concerns addressed. Patient agrees to plan of care. My scheduled days in the office reviewed with patient, and same day appointments available. Encouraged to use [employer] for communication as needed.      Electronically signed by CESAR Rodriguez CNP on 5/12/2021 at 1:26 PM

## 2021-05-17 DIAGNOSIS — E78.5 HYPERLIPIDEMIA, UNSPECIFIED HYPERLIPIDEMIA TYPE: ICD-10-CM

## 2021-05-17 RX ORDER — ATORVASTATIN CALCIUM 80 MG/1
80 TABLET, FILM COATED ORAL DAILY
Qty: 90 TABLET | Refills: 1 | Status: SHIPPED | OUTPATIENT
Start: 2021-05-17 | End: 2021-11-17 | Stop reason: SDUPTHER

## 2021-05-17 NOTE — PROGRESS NOTES
Patient's  sent Mobitto message requesting Atorvastatin to be refilled. It was not refilled at last appointment on 5/12/2021.

## 2021-06-15 ENCOUNTER — PATIENT MESSAGE (OUTPATIENT)
Dept: PRIMARY CARE CLINIC | Age: 71
End: 2021-06-15

## 2021-06-15 NOTE — TELEPHONE ENCOUNTER
From: Claudy Walls  To: CESAR Deleon CNP  Sent: 6/15/2021 6:19 AM EDT  Subject: Visit Follow-Up Question    Shawn Myers, attached is the blood sugar readings for [facility] before and after starting Jardiance. This seems to have no impact.

## 2021-06-17 ENCOUNTER — OFFICE VISIT (OUTPATIENT)
Dept: PRIMARY CARE CLINIC | Age: 71
End: 2021-06-17
Payer: MEDICARE

## 2021-06-17 VITALS
RESPIRATION RATE: 18 BRPM | HEIGHT: 62 IN | TEMPERATURE: 96.6 F | HEART RATE: 89 BPM | DIASTOLIC BLOOD PRESSURE: 78 MMHG | OXYGEN SATURATION: 98 % | BODY MASS INDEX: 38.46 KG/M2 | SYSTOLIC BLOOD PRESSURE: 130 MMHG | WEIGHT: 209 LBS

## 2021-06-17 DIAGNOSIS — E66.01 CLASS 2 SEVERE OBESITY DUE TO EXCESS CALORIES WITH SERIOUS COMORBIDITY AND BODY MASS INDEX (BMI) OF 38.0 TO 38.9 IN ADULT (HCC): ICD-10-CM

## 2021-06-17 DIAGNOSIS — F41.9 ANXIETY: ICD-10-CM

## 2021-06-17 DIAGNOSIS — E11.9 TYPE 2 DIABETES MELLITUS WITHOUT COMPLICATION, WITHOUT LONG-TERM CURRENT USE OF INSULIN (HCC): Primary | ICD-10-CM

## 2021-06-17 DIAGNOSIS — F51.01 PRIMARY INSOMNIA: ICD-10-CM

## 2021-06-17 DIAGNOSIS — J84.9 INTERSTITIAL LUNG DISEASE (HCC): ICD-10-CM

## 2021-06-17 PROBLEM — E66.812 CLASS 2 SEVERE OBESITY DUE TO EXCESS CALORIES WITH SERIOUS COMORBIDITY AND BODY MASS INDEX (BMI) OF 38.0 TO 38.9 IN ADULT: Status: ACTIVE | Noted: 2021-06-17

## 2021-06-17 LAB — HBA1C MFR BLD: 7.1 %

## 2021-06-17 PROCEDURE — 3017F COLORECTAL CA SCREEN DOC REV: CPT | Performed by: NURSE PRACTITIONER

## 2021-06-17 PROCEDURE — 2022F DILAT RTA XM EVC RTNOPTHY: CPT | Performed by: NURSE PRACTITIONER

## 2021-06-17 PROCEDURE — 83036 HEMOGLOBIN GLYCOSYLATED A1C: CPT | Performed by: NURSE PRACTITIONER

## 2021-06-17 PROCEDURE — 99214 OFFICE O/P EST MOD 30 MIN: CPT | Performed by: NURSE PRACTITIONER

## 2021-06-17 PROCEDURE — G8417 CALC BMI ABV UP PARAM F/U: HCPCS | Performed by: NURSE PRACTITIONER

## 2021-06-17 PROCEDURE — 4040F PNEUMOC VAC/ADMIN/RCVD: CPT | Performed by: NURSE PRACTITIONER

## 2021-06-17 PROCEDURE — 3051F HG A1C>EQUAL 7.0%<8.0%: CPT | Performed by: NURSE PRACTITIONER

## 2021-06-17 PROCEDURE — 1123F ACP DISCUSS/DSCN MKR DOCD: CPT | Performed by: NURSE PRACTITIONER

## 2021-06-17 PROCEDURE — G8399 PT W/DXA RESULTS DOCUMENT: HCPCS | Performed by: NURSE PRACTITIONER

## 2021-06-17 PROCEDURE — 1036F TOBACCO NON-USER: CPT | Performed by: NURSE PRACTITIONER

## 2021-06-17 PROCEDURE — 1090F PRES/ABSN URINE INCON ASSESS: CPT | Performed by: NURSE PRACTITIONER

## 2021-06-17 PROCEDURE — G8427 DOCREV CUR MEDS BY ELIG CLIN: HCPCS | Performed by: NURSE PRACTITIONER

## 2021-06-17 RX ORDER — TRAZODONE HYDROCHLORIDE 50 MG/1
TABLET ORAL
Qty: 90 TABLET | Refills: 0 | Status: SHIPPED | OUTPATIENT
Start: 2021-06-17 | End: 2021-07-22

## 2021-06-17 RX ORDER — BUSPIRONE HYDROCHLORIDE 10 MG/1
10 TABLET ORAL 3 TIMES DAILY PRN
COMMUNITY
Start: 2021-05-17 | End: 2021-10-13 | Stop reason: SDUPTHER

## 2021-06-17 SDOH — ECONOMIC STABILITY: FOOD INSECURITY: WITHIN THE PAST 12 MONTHS, YOU WORRIED THAT YOUR FOOD WOULD RUN OUT BEFORE YOU GOT MONEY TO BUY MORE.: NEVER TRUE

## 2021-06-17 SDOH — ECONOMIC STABILITY: FOOD INSECURITY: WITHIN THE PAST 12 MONTHS, THE FOOD YOU BOUGHT JUST DIDN'T LAST AND YOU DIDN'T HAVE MONEY TO GET MORE.: NEVER TRUE

## 2021-06-17 ASSESSMENT — SOCIAL DETERMINANTS OF HEALTH (SDOH): HOW HARD IS IT FOR YOU TO PAY FOR THE VERY BASICS LIKE FOOD, HOUSING, MEDICAL CARE, AND HEATING?: NOT HARD AT ALL

## 2021-06-17 ASSESSMENT — ENCOUNTER SYMPTOMS
APNEA: 0
WHEEZING: 0
COUGH: 0
SHORTNESS OF BREATH: 1
CHEST TIGHTNESS: 0

## 2021-06-17 NOTE — PATIENT INSTRUCTIONS
Patient Education        Learning About Foot and Toenail Care  Foot and toenail care: Overview  Checking your loved one's feet and keeping them clean and soft can help prevent cracks and infection in the skin. This is especially important for people who have diabetes. Keeping toenails trimmedand polished if that's what the person likesalso helps the person feel well-groomed. If the person you care for has diabetes or has foot problems, such as bad bunions and corns, think about taking them to see a podiatrist. This is a doctor who specializes in the care of the feet. Sometimes a podiatrist will come to the home if the person can't go out for visits. Try to take the person for salon pedicures if that is what they want. It's a chance to get out and see people and continue a favorite activity. You can do basic nail care at home. Usually all you need to do is keep the nails clean and at a safe length. How do you trim someone's toenails? Try to trim the person's nails every week. Or check the nails each week to see if they need to be trimmed. It's easiest to trim nails after the person has had a shower or foot bath. It makes the nails softer and easier to trim. Start by gathering your supplies. You will need toenail clippers and a nail file. You may also need nail polish and nail polish remover. To trim the nails:  1. Wash and dry your hands. You don't need to wear gloves. 2. Use nail polish remover to take off any polish. 3. Hold the person's foot and toe steady with one hand while you trim the nail with your other hand. Trim the nails straight across. Leave the nails a little longer at the corners so that the sharp ends don't cut into the skin. 4. Keep the nails no longer than the tip of the toes. 5. Let the nails dry if they are still damp and soft. 6. Use a nail file to gently smooth the edges of the nails, especially at the corners. They may be sharp after the nails are cut straight.   7. Apply nail polish, if the person wants it. If the person's nails are thick and discolored, it may be safest to have a podiatrist cut them. What else do you need to know? When you're caring for someone's nails, it is important to remember not to trim or cut the cuticles. A minor cut in a cuticle could lead to an infection. Wash the feet daily in the shower or bath or in a basin made for washing feet. It's extra important to wash the feet carefully if the person has diabetes. After washing the feet, dry gently. Put lotion on the feet, especially on the heels. But don't put it between the toes. If the person doesn't have diabetes and you see signs of athlete's foot (such as dry, cracking, or itchy skin between the toes), you can try an over-the-counter medicine. These medicines can kill the fungus that causes athlete's foot. If the problem doesn't go away, talk to the person's doctor. Look every day for cuts or signs of infection, such as pain, swelling, redness, or warmth. If you see any of these signsespecially in someone who has diabetescall the doctor. Where can you learn more? Go to [email or web address]://Ad [email or web address] org and sign in to your CinnaBid account. Enter P209 in the [facility] box to learn more about \"Learning About Foot and Toenail Care. \"     If you do not have an account, please click on the \"Sign Up Now\" link. Current as of: March 17, 2021               Content Version: 12.9  © 0721-8068 Healthwise, Incorporated. Care instructions adapted under license by [facility] ([facility]). If you have questions about a medical condition or this instruction, always ask your healthcare professional. [place] [address] any warranty or liability for your use of this information. Patient Education        Learning About Meal Planning for Diabetes  Why plan your meals? Meal planning can be a key part of managing diabetes.  Planning meals and snacks with the right balance of carbohydrate, protein, and fat can help you keep your blood sugar at the target level you set with your doctor. You don't have to eat special foods. You can eat what your family eats, including sweets once in a while. But you do have to pay attention to how often you eat and how much you eat of certain foods. You may want to work with a dietitian or a certified diabetes educator. He or she can give you tips and meal ideas and can answer your questions about meal planning. This health professional can also help you reach a healthy weight if that is one of your goals. What plan is right for you? Your dietitian or diabetes educator may suggest that you start with the plate format or carbohydrate counting. The plate format  The plate format is a simple way to help you manage how you eat. You plan meals by learning how much space each food should take on a plate. Using the plate format helps you spread carbohydrate throughout the day. It can make it easier to keep your blood sugar level within your target range. It also helps you see if you're eating healthy portion sizes. To use the plate format, you put non-starchy vegetables on half your plate. Add meat or meat substitutes on one-quarter of the plate. Put a grain or starchy vegetable (such as brown rice or a potato) on the final quarter of the plate. You can add a small piece of fruit and some low-fat or fat-free milk or yogurt, depending on your carbohydrate goal for each meal.  Here are some tips for using the plate format:  · Make sure that you are not using an oversized plate. A 9-inch plate is best. Many restaurants use larger plates. · Get used to using the plate format at home. Then you can use it when you eat out. · Write down your questions about using the plate format. Talk to your doctor, a dietitian, or a diabetes educator about your concerns. Carbohydrate counting  With carbohydrate counting, you plan meals based on the amount of carbohydrate in each food. Carbohydrate raises blood sugar higher and more quickly than any other nutrient. It is found in desserts, breads and cereals, and fruit. It's also found in starchy vegetables such as potatoes and corn, grains such as rice and pasta, and milk and yogurt. Spreading carbohydrate throughout the day helps keep your blood sugar levels within your target range. Your daily amount depends on several things, including your weight, how active you are, which diabetes medicines you take, and what your goals are for your blood sugar levels. A registered dietitian or diabetes educator can help you plan how much carbohydrate to include in each meal and snack. A guideline for your daily amount of carbohydrate is:  · 45 to 60 grams at each meal. That's about the same as 3 to 4 carbohydrate servings. · 15 to 20 grams at each snack. That's about the same as 1 carbohydrate serving. The Nutrition Facts label on packaged foods tells you how much carbohydrate is in a serving of the food. First, look at the serving size on the food label. Is that the amount you eat in a serving? All of the nutrition information on a food label is based on that serving size. So if you eat more or less than that, you'll need to adjust the other numbers. Total carbohydrate is the next thing you need to look for on the label. If you count carbohydrate servings, one serving of carbohydrate is 15 grams. For foods that don't come with labels, such as fresh fruits and vegetables, you'll need a guide that lists carbohydrate in these foods. Ask your doctor, dietitian, or diabetes educator about books or other nutrition guides you can use. If you take insulin, you need to know how many grams of carbohydrate are in a meal. This lets you know how much rapid-acting insulin to take before you eat. If you use an insulin pump, you get a constant rate of insulin during the day.  So the pump must be programmed at meals to give you extra insulin to cover the rise in blood sugar after meals. When you know how much carbohydrate you will eat, you can take the right amount of insulin. Or, if you always use the same amount of insulin, you need to make sure that you eat the same amount of carbohydrate at meals. If you need more help to understand carbohydrate counting and food labels, ask your doctor, dietitian, or diabetes educator. How can you plan healthy meals? Here are some tips to get started:  · Plan your meals a week at a time. Don't forget to include snacks too. · Use cookbooks or online recipes to plan several main meals. Plan some quick meals for busy nights. You also can double some recipes that freeze well. Then you can save half for other busy nights when you don't have time to cook. · Make sure you have the ingredients you need for your recipes. If you're running low on basic items, put these items on your shopping list too. · List foods that you use to make breakfasts, lunches, and snacks. List plenty of fruits and vegetables. · Post this list on the refrigerator. Add to it as you think of more things you need. · Take the list to the store to do your weekly shopping. Follow-up care is a key part of your treatment and safety. Be sure to make and go to all appointments, and call your doctor if you are having problems. It's also a good idea to know your test results and keep a list of the medicines you take. Where can you learn more? Go to [email or web address] and sign in to your Grab Media account. Enter M444 in the [facility] box to learn more about \"Learning About Meal Planning for Diabetes. \"     If you do not have an account, please click on the \"Sign Up Now\" link. Current as of: August 31, 2020               Content Version: 12.9  © 0357-8434 Healthwise, Incorporated. Care instructions adapted under license by [facility] ([facility]).  If you have questions about a medical condition or this instruction, always ask your healthcare or ½ cup cooked corn or green peas. · Learn how much carbs to eat each day and at each meal. A dietitian or CDE can teach you how to keep track of the amount of carbs you eat. This is called carbohydrate counting. · If you are not sure how to count carbohydrate grams, use the Plate Method to plan meals. It is a good, quick way to make sure that you have a balanced meal. It also helps you spread carbs throughout the day. ? Divide your plate by types of foods. Put non-starchy vegetables on half the plate, meat or other protein food on one-quarter of the plate, and a grain or starchy vegetable in the final quarter of the plate. To this you can add a small piece of fruit and 1 cup of milk or yogurt, depending on how many carbs you are supposed to eat at a meal.  · Try to eat about the same amount of carbs at each meal. Do not \"save up\" your daily allowance of carbs to eat at one meal.  · Proteins have very little or no carbs per serving. Examples of proteins are beef, chicken, turkey, fish, eggs, tofu, cheese, cottage cheese, and peanut butter. A serving size of meat is 3 ounces, which is about the size of a deck of cards. Examples of meat substitute serving sizes (equal to 1 ounce of meat) are 1/4 cup of cottage cheese, 1 egg, 1 tablespoon of peanut butter, and ½ cup of tofu. How can you eat out and still eat healthy? · Learn to estimate the serving sizes of foods that have carbohydrate. If you measure food at home, it will be easier to estimate the amount in a serving of restaurant food. · If the meal you order has too much carbohydrate (such as potatoes, corn, or baked beans), ask to have a low-carbohydrate food instead. Ask for a salad or green vegetables. · If you use insulin, check your blood sugar before and after eating out to help you plan how much to eat in the future. · If you eat more carbohydrate at a meal than you had planned, take a walk or do other exercise. This will help lower your blood sugar. What are some tips for eating healthy? · Limit saturated fat, such as the fat from meat and dairy products. This is a healthy choice because people who have diabetes are at higher risk of heart disease. So choose lean cuts of meat and nonfat or low-fat dairy products. Use olive or canola oil instead of butter or shortening when cooking. · Don't skip meals. Your blood sugar may drop too low if you skip meals and take insulin or certain medicines for diabetes. · Check with your doctor before you drink alcohol. Alcohol can cause your blood sugar to drop too low. Alcohol can also cause a bad reaction if you take certain diabetes medicines. Follow-up care is a key part of your treatment and safety. Be sure to make and go to all appointments, and call your doctor if you are having problems. It's also a good idea to know your test results and keep a list of the medicines you take. Where can you learn more? Go to [email or web address]://[email or web address].[email or web address] org and sign in to your REQQI account. Enter G060 in the [employer] box to learn more about \"Learning About Carbohydrate (Carb) Counting and Eating Out When You Have Diabetes. \"     If you do not have an account, please click on the \"Sign Up Now\" link. Current as of: August 31, 2020               Content Version: 12.9  © 2006-2021 Healthwise, Incorporated. Care instructions adapted under license by [facility] ([facility]). If you have questions about a medical condition or this instruction, always ask your healthcare professional. [place] [address] any warranty or liability for your use of this information. Patient Education        Insomnia: Care Instructions  Your Care Instructions     Insomnia is the inability to sleep well. It is a common problem for most people at some time. Insomnia may make it hard for you to get to sleep, stay asleep, or sleep as long as you need to. This can make you tired and grouchy during the day.  It can also make you forgetful, less effective at work, and unhappy. Insomnia can be caused by conditions such as depression or anxiety. Pain can also affect your ability to sleep. When these problems are solved, the insomnia usually clears up. But sometimes bad sleep habits can cause insomnia. If insomnia is affecting your work or your enjoyment of life, you can take steps to improve your sleep. Follow-up care is a key part of your treatment and safety. Be sure to make and go to all appointments, and call your doctor if you are having problems. It's also a good idea to know your test results and keep a list of the medicines you take. How can you care for yourself at home? What to avoid   · Do not have drinks with caffeine, such as coffee or black tea, for 8 hours before bed. · Do not smoke or use other types of tobacco near bedtime. Nicotine is a stimulant and can keep you awake. · Avoid drinking alcohol late in the evening, because it can cause you to wake in the middle of the night. · Do not eat a big meal close to bedtime. If you are hungry, eat a light snack. · Do not drink a lot of water close to bedtime, because the need to urinate may wake you up during the night. · Do not read or watch TV in bed. Use the bed only for sleeping and sexual activity. What to try   · Go to bed at the same time every night, and wake up at the same time every morning. Do not take naps during the day. · Keep your bedroom quiet, dark, and cool. · Sleep on a comfortable pillow and mattress. · If watching the clock makes you anxious, turn it facing away from you so you cannot see the time. · If you worry when you lie down, start a worry book. Well before bedtime, write down your worries, and then set the book and your concerns aside. · Try meditation or other relaxation techniques before you go to bed. · If you cannot fall asleep, get up and go to another room until you feel sleepy. Do something relaxing.  Repeat your bedtime routine before you go to bed again. · Make your house quiet and calm about an hour before bedtime. Turn down the lights, turn off the TV, log off the computer, and turn down the volume on music. This can help you relax after a busy day. When should you call for help? Watch closely for changes in your health, and be sure to contact your doctor if:    · Your efforts to improve your sleep do not work.     · Your insomnia gets worse.     · You have been feeling down, depressed, or hopeless or have lost interest in things that you usually enjoy. Where can you learn more? Go to [email or web address] and sign in to your AppRedeem account. Enter P513 in the [employer] box to learn more about \"Insomnia: Care Instructions. \"     If you do not have an account, please click on the \"Sign Up Now\" link. Current as of: August 31, 2020               Content Version: 12.9  © 2006-2021 [employer]. Care instructions adapted under license by [facility] ([facility]). If you have questions about a medical condition or this instruction, always ask your healthcare professional. [place] [address] any warranty or liability for your use of this information. Patient Education        Learning About Sleeping Well  What does sleeping well mean? Sleeping well means getting enough sleep. How much sleep is enough varies among people. The number of hours you sleep is not as important as how you feel when you wake up. If you do not feel refreshed, you probably need more sleep. Another sign of not getting enough sleep is feeling tired during the day. The average total nightly sleep time is 7½ to 8 hours. Healthy adults may need a little more or a little less than this. Why is getting enough sleep important? Getting enough quality sleep is a basic part of good health. When your sleep suffers, your mood and your thoughts can suffer too.  You may find yourself feeling more grumpy or stressed. Not getting enough sleep also can lead to serious problems, including injury, accidents, anxiety, and depression. What might cause poor sleeping? Many things can cause sleep problems, including:  · Stress. Stress can be caused by fear about a single event, such as giving a speech. Or you may have ongoing stress, such as worry about work or school. · Depression, anxiety, and other mental or emotional conditions. · Changes in your sleep habits or surroundings. This includes changes that happen where you sleep, such as noise, light, or sleeping in a different bed. It also includes changes in your sleep pattern, such as having jet lag or working a late shift. · Health problems, such as pain, breathing problems, and restless legs syndrome. · Lack of regular exercise. How can you help yourself? Here are some tips that may help you sleep more soundly and wake up feeling more refreshed. Your sleeping area   · Use your bedroom only for sleeping and sex. A bit of light reading may help you fall asleep. But if it doesn't, do your reading elsewhere in the house. Don't watch TV in bed. · Be sure your bed is big enough to stretch out comfortably, especially if you have a sleep partner. · Keep your bedroom quiet, dark, and cool. Use curtains, blinds, or a sleep mask to block out light. To block out noise, use earplugs, soothing music, or a \"white noise\" machine. Your evening and bedtime routine   · Create a relaxing bedtime routine. You might want to take a warm shower or bath, listen to soothing music, or drink a cup of noncaffeinated tea. · Go to bed at the same time every night. And get up at the same time every morning, even if you feel tired. What to avoid   · Limit caffeine (coffee, tea, caffeinated sodas) during the day, and don't have any for at least 4 to 6 hours before bedtime. · Don't drink alcohol before bedtime. Alcohol can cause you to wake up more often during the night.   · Don't smoke or use tobacco, especially in the evening. Nicotine can keep you awake. · Don't take naps during the day, especially close to bedtime. · Don't lie in bed awake for too long. If you can't fall asleep, or if you wake up in the middle of the night and can't get back to sleep within 15 minutes or so, get out of bed and go to another room until you feel sleepy. · Don't take medicine right before bed that may keep you awake or make you feel hyper or energized. Your doctor can tell you if your medicine may do this and if you can take it earlier in the day. If you can't sleep   · Imagine yourself in a peaceful, pleasant scene. Focus on the details and feelings of being in a place that is relaxing. · Get up and do a quiet or boring activity until you feel sleepy. · Don't drink any liquids after 6 p.m. if you wake up often because you have to go to the bathroom. Where can you learn more? Go to https://[email or web address].Extend [email or web address] org and sign in to your Zoona account. Enter H223 in the [employer] box to learn more about \"Learning About Sleeping Well. \"     If you do not have an account, please click on the \"Sign Up Now\" link. Current as of: September 23, 2020               Content Version: 12.9  © 7427-8356 Healthwise, Incorporated. Care instructions adapted under license by [facility] ([facility]). If you have questions about a medical condition or this instruction, always ask your healthcare professional. [place] [address] any warranty or liability for your use of this information.

## 2021-06-17 NOTE — PROGRESS NOTES
hours per night. She has not taken a sleep aid in the past. Will initiate trazodone 50 mg nightly. Patient instructed to take 30-60 minutes prior to bedtime. Interstitial lung disease  She mentioned she has decided not to proceed with lung transplant at this time due to \"poor odds with age\" and states Jude Tejeda is not able to lose 60 lbs needed for surgery\".  reports recent pulmonary lung function test has decreased. She is on 6 liters of oxygen via nasal cannula. Obesity  Weight is lowing decreasing; she has lost 5 lb in 1 month. Patient is trying to limit calories to [id number] calories daily. She is unable to exercise due to chronic dyspnea. Encouraged to continue counting calories, and tighten up on diabetic diet and continue to avoid deserts, and sugary snacks. Review of Systems   Constitutional: Negative for activity change. Respiratory: Positive for shortness of breath (chronic). Negative for apnea, cough, chest tightness and wheezing. Cardiovascular: Negative for chest pain, palpitations and leg swelling. Endocrine: Negative for polydipsia, polyphagia and polyuria. Musculoskeletal: Negative. Skin: Negative. Neurological: Negative for dizziness, weakness and light-headedness. Psychiatric/Behavioral: Positive for sleep disturbance. Current Outpatient Medications on File Prior to Visit   Medication Sig Dispense Refill    busPIRone (BUSPAR) 10 MG tablet Take 10 mg by mouth 3 times daily as needed      atorvastatin (LIPITOR) 80 MG tablet Take 1 tablet by mouth daily 90 tablet 1    ALPRAZolam (XANAX) 0.5 MG tablet Take 1 tablet by mouth 2 times daily as needed for Anxiety for up to 60 days. 60 tablet 2    DULoxetine (CYMBALTA) 60 MG extended release capsule Take 1 capsule once daily, and capsule of Cymbalta 40 mg for a total of 100 mg per day 90 capsule 1    DULoxetine HCl 40 MG CPEP Take 40 mg by mouth daily Take with Duloxetine 60 mg tablet for a total of 100 mg daily.  80 capsule 1    glimepiride (AMARYL) 1 MG tablet Take 1 tablet by mouth every morning (before breakfast) 90 tablet 1    levothyroxine (SYNTHROID) 100 MCG tablet TAKE ONE TABLET BY MOUTH DAILY 90 tablet 1    TEGRETOL 200 MG tablet TAKE ONE TABLET BY MOUTH TWICE A  tablet 1    empagliflozin (JARDIANCE) 10 MG tablet Take 1 tablet by mouth daily 90 tablet 1    ezetimibe (ZETIA) 10 MG tablet Take 1 tablet by mouth daily 90 tablet 1    lisinopril (PRINIVIL;ZESTRIL) 20 MG tablet TAKE ONE TABLET BY MOUTH DAILY 90 tablet 4    furosemide (LASIX) 20 MG tablet Take 1 tablet by mouth daily 90 tablet 4    metoprolol succinate (TOPROL XL) 50 MG extended release tablet Take one tablet by mouth daily 90 tablet 4    Cholecalciferol (VITAMIN D3) 1.25 MG (41418 UT) CAPS 1 capsule every 7 days 12 capsule 3    azaTHIOprine (IMURAN) 50 MG tablet Take 1 tablet by mouth daily 2 tablets daily, for a total of 100 mg daily. 30 tablet     OXYGEN Inhale 6 L/day into the lungs      vitamin B-12 (CYANOCOBALAMIN) 500 MCG tablet Take 500 mcg by mouth daily      DICLOFENAC PO Take 75 mg by mouth 2 times daily as needed      predniSONE (DELTASONE) 5 MG tablet Take 5 mg by mouth daily      azithromycin (ZITHROMAX) 500 MG tablet Take 500 mg by mouth daily      magnesium (MAGNESIUM-OXIDE) 250 MG TABS tablet Take 250 mg by mouth daily      ibuprofen (ADVIL;MOTRIN) 800 MG tablet Take 1 tablet by mouth every 6 hours as needed for Pain 50 tablet 3    omeprazole (PRILOSEC) 20 MG capsule Take 1 capsule by mouth daily 84 capsule 2    aspirin 81 MG tablet Take 81 mg by mouth daily. No current facility-administered medications on file prior to visit. Allergies   Allergen Reactions    Tramadol      Avoid because Pt is on Tegretol.     Penicillins Rash     Patient states she can take Keflex     Past Medical History:   Diagnosis Date    Abnormal stress test *Aug.1, 2016    Coronary angiography by Dr. Ila Yip revealed a 60% stenosis of the LAD and  a 50% mid circumflex     Arthritis     Chronic headaches     Eczema     Fibromyalgia     GERD (gastroesophageal reflux disease)     Hyperlipidemia     Hypertension     Impaired fasting glucose     Interstitial lung disease ([facility] 2016    Dr. Radha Yi Interstitial lung disease ([facility] [address] [facility]     Migraine headache     Osteopenia DEXA - March, 2016    Lumbar T score +1.7 and Hip -1.0 FRAX 7.6/0.6    Osteopenia DEXA-Dec., 2017    Lumbar T score 2.3 and hip T score 0.1    Other screening mammogram July 8, 2013    Benign    Other screening mammogram *March 4, 2016    Benign    Oxygen dependent     O2 @ 3.5 L CONTINUOUS    Pulmonary hypertension ([address]     MILD    Retention of urine, unspecified     Screening mammogram, encounter for *February 11, 2020    Benign    Seizure disorder [facility])     Thyroid disease     Type 2 diabetes mellitus without complication ([facility] [address] *FVH.92, 2020    FBS - 137 and A 1 C of 6.7    Vitamin D deficiency Oct., 2015    Level - 17    Wears glasses      Past Surgical History:   Procedure Laterality Date    ABDOMINOPLASTY  Jan., 2012    Dr. Lukas Solo Bilateral     BRONCHOSCOPY  **Sept.16, 2016    Dr. Aline Daily  09/16/2016    BRONCHOSCOPY  *Febr.24, 2017    Dr. Kasey Laguerre - No endobronchial lesions    CARDIAC CATHETERIZATION  Oct., 2004    LAD 50%, circumflex normal, R coronary normal, LVEF 60%.  CARDIAC CATHETERIZATION  *Sept.24, 2018    Dr. Esmer Christine - [facility] - mild pulmonary hypertension    COLONOSCOPY  Oct., 2003 ( 2013 )    Dr. Haley Wong - Negative - repeat 2013    COLONOSCOPY  Jan., 2014 ( 2024 )    Dr. Jah Lundberg - moderate diverticulosis.     COSMETIC SURGERY      inner thighs    LIPOSUCTION Bilateral     knees    OR LAPAROSCOPY W TOT HYSTERECTUTERUS <=250 GRAM  W TUBE/OVARY N/A 11/16/2018    ROBOTIC TOTAL HYSTERECTOMY AND BILATERAL SALPINGO-OOPHORECTOMY performed by Abigail Avery MD at [facility]  NORA AND BSO  *2018    Dr. Frances Segura  Oct., 2015    Dr. Rebeca Vinson - left 2nd and 3rd    TOTAL HIP ARTHROPLASTY  2013    Dr. Nicole Correia - [facility]  *2018    Dr. Sobeida Rapp - right    TUBAL LIGATION        Social History     Tobacco Use    Smoking status: Never Smoker    Smokeless tobacco: Never Used   Substance Use Topics    Alcohol use: Yes     Comment: occasional      Family History   Problem Relation Age of Onset    Heart Disease Father 76        , ASHD, S/P CAB, NIDDM, hyperlipidemia.  Cancer Mother 58        , lung cancer. Vitals:    21 0900   BP: 130/78   Pulse: 89   Resp: 18   Temp: 96.6 °F (35.9 °C)   TempSrc: Temporal   SpO2: 98%  Comment: w/ 6 liters of O2   Weight: 209 lb (94.8 kg)   Height: 5' 2\" (1.575 m)     Estimated body mass index is 38.23 kg/m² as calculated from the following:    Height as of this encounter: 5' 2\" (1.575 m). Weight as of this encounter: 209 lb (94.8 kg). Physical Exam  Constitutional:       General: She is not in acute distress. Appearance: Normal appearance. She is obese. Cardiovascular:      Rate and Rhythm: Normal rate and regular rhythm. Pulses: Normal pulses. Heart sounds: Normal heart sounds. Pulmonary:      Effort: Pulmonary effort is normal. No respiratory distress. Breath sounds: Normal breath sounds. No wheezing. Comments: 6 liters oxygen via nasal cannula  Musculoskeletal:      Right lower leg: No edema. Left lower leg: No edema. Skin:     General: Skin is warm. Neurological:      General: No focal deficit present. Mental Status: She is alert and oriented to person, place, and time. Psychiatric:         Mood and Affect: Mood normal.         Behavior: Behavior normal.       Results for POC orders placed in visit on 21   POCT glycosylated hemoglobin (Hb A1C)   Result Value Ref Range    Hemoglobin A1C 7.1 %     ASSESSMENT/PLAN:  1.  Type 2 diabetes mellitus without complication, without long-term current use of insulin (HCC)  - Stable  - Continue Glimepiride 1 mg daily - Will not need refill until Nov. 2021  - Continue Jardiance 10 mg daily - Will not need refill until Nov. 2021  - Consider Registered Dietician visit at [facility] [id number] to check blood sugar daily and log to review at follow up visit. 2. Primary insomnia  - New.  - Start traZODone (DESYREL) 50 MG tablet; Take 1 tablet by mouth nightly. Take 30-60 minutes prior to bedtime. Dispense: 90 tablet; Refill: 0  - Increase dosage to 100 mg if no improvement- patient to call or send [employer] message. 3. Interstitial lung disease (HCC)  - Worsening.  - Managed by Dr. Blayne Gamez  - 6 liters oxygen continuous via nasal cannula. 4. Class 2 severe obesity due to excess calories with serious comorbidity and body mass index (BMI) of 38.0 to 38.9 in adult [facility])  - Improving.  - 5 lb loss in 4 weeks. - Try to increase activity as much as possible with dyspnea. - Continue to lose weight. Return in about 5 months (around 11/17/2021) for Diabetes, Hypertension, Anxiety, Depression, Insomnia follow up. Discussed use, benefit, and side effects of prescribed medications. Patient's questions answered and concerns addressed. Patient agrees to plan of care. My scheduled days in the office reviewed with patient, and same day appointments available. Encouraged to use [employer] for communication as needed.      Electronically signed by CESAR López CNP on 6/17/2021 at 10:01 AM

## 2021-06-18 ENCOUNTER — HOSPITAL ENCOUNTER (OUTPATIENT)
Dept: MAMMOGRAPHY | Age: 71
Discharge: HOME | End: 2021-06-22
Payer: MEDICARE

## 2021-06-18 ENCOUNTER — TELEPHONE (OUTPATIENT)
Dept: PRIMARY CARE CLINIC | Age: 71
End: 2021-06-18

## 2021-06-18 VITALS — BODY MASS INDEX: 37.54 KG/M2 | HEIGHT: 62 IN | WEIGHT: 204 LBS

## 2021-06-18 DIAGNOSIS — Z12.31 VISIT FOR SCREENING MAMMOGRAM: ICD-10-CM

## 2021-06-18 PROCEDURE — 77063 BREAST TOMOSYNTHESIS BI: CPT

## 2021-06-18 NOTE — TELEPHONE ENCOUNTER
Sophie called from [facility] to find out the patient last visit with Dr. Michael Duque.

## 2021-07-14 RX ORDER — BLOOD SUGAR DIAGNOSTIC
1 STRIP MISCELLANEOUS DAILY
Qty: 100 EACH | Refills: 3 | Status: SHIPPED | OUTPATIENT
Start: 2021-07-14 | End: 2022-10-24 | Stop reason: SDUPTHER

## 2021-07-14 RX ORDER — BLOOD-GLUCOSE METER
EACH MISCELLANEOUS
Qty: 1 KIT | Refills: 0 | Status: SHIPPED | OUTPATIENT
Start: 2021-07-14

## 2021-07-22 DIAGNOSIS — F51.01 PRIMARY INSOMNIA: Primary | ICD-10-CM

## 2021-07-22 RX ORDER — TRAZODONE HYDROCHLORIDE 100 MG/1
TABLET ORAL
Qty: 90 TABLET | Refills: 0 | Status: SHIPPED | OUTPATIENT
Start: 2021-07-22 | End: 2021-10-13 | Stop reason: SDUPTHER

## 2021-08-05 ENCOUNTER — VIRTUAL VISIT (OUTPATIENT)
Dept: PRIMARY CARE CLINIC | Age: 71
End: 2021-08-05
Payer: MEDICARE

## 2021-08-05 DIAGNOSIS — J84.9 INTERSTITIAL LUNG DISEASE (HCC): ICD-10-CM

## 2021-08-05 DIAGNOSIS — J06.9 ACUTE URI: Primary | ICD-10-CM

## 2021-08-05 DIAGNOSIS — R19.7 DIARRHEA OF PRESUMED INFECTIOUS ORIGIN: ICD-10-CM

## 2021-08-05 DIAGNOSIS — I10 ESSENTIAL HYPERTENSION, BENIGN: ICD-10-CM

## 2021-08-05 PROCEDURE — 99213 OFFICE O/P EST LOW 20 MIN: CPT | Performed by: INTERNAL MEDICINE

## 2021-08-05 RX ORDER — FLUTICASONE PROPIONATE 50 MCG
1 SPRAY, SUSPENSION (ML) NASAL 2 TIMES DAILY PRN
Qty: 1 BOTTLE | Refills: 1 | Status: SHIPPED | OUTPATIENT
Start: 2021-08-05 | End: 2022-03-09

## 2021-08-05 RX ORDER — LORATADINE 10 MG/1
10 TABLET ORAL DAILY
COMMUNITY
End: 2022-05-17

## 2021-08-05 ASSESSMENT — ENCOUNTER SYMPTOMS
COUGH: 1
TROUBLE SWALLOWING: 0
NAUSEA: 0
BLOOD IN STOOL: 0
WHEEZING: 0
ABDOMINAL PAIN: 0
EYE DISCHARGE: 0
DIARRHEA: 1
EYE PAIN: 0
SINUS PAIN: 0
SORE THROAT: 0
SHORTNESS OF BREATH: 0
VOMITING: 0
RHINORRHEA: 1
SWOLLEN GLANDS: 0
SINUS PRESSURE: 0
CHEST TIGHTNESS: 0

## 2021-08-05 NOTE — PATIENT INSTRUCTIONS
Start fluticasone nose spray 1 spray twice a day each nostril from today. Tylenol or acetaminophen as needed for headache. No furosemide x 3 days. Keep a blood pressure check and lisinopril adjust dosage half to 1 tablet keeping systolic blood pressure from 100-1 30. Fluids as electrolytes are very important with this diarrhea and she has not had any diarrhea today. COVID-19 test done today.  can do COVID-19 testing with [facility].    Keep diabetes in good control. Keep vitamin C and zinc and she already takes vitamin D.    Keep lung specialist follow-up as oxygen has been adjusted by the lung specialist.    Rest of the medications same.

## 2021-08-05 NOTE — PROGRESS NOTES
Leopoldo Cora (:  1950) is a 80 y.o. female,Established patient, here for evaluation of the following chief complaint(s): Concern For COVID-19         ASSESSMENT/PLAN:  1. Acute URI  -     fluticasone (FLONASE) 50 MCG/ACT nasal spray; 1 spray by Each Nostril route 2 times daily as needed for Rhinitis, Disp-1 Bottle, R-1Normal  2. Diarrhea of presumed infectious origin -electrolyte replacement  3. Essential hypertension, benign blood pressure check and adjust medication  4. Interstitial lung disease (HCC)-keep lung specialist follow-up      No follow-ups on file. SUBJECTIVE/OBJECTIVE:  Saturday lunch w family--no one sick. Had covid 19 vaccine and did the COVID-19 testing today at the lab.    7 litres-o2-for 2 wk---99 %--walk at doctor office --dropped to 80s %    Diabetes Mellitus     Home blood glucose log reviewed. Control is GOOD per home record. 103    She  has no symptoms of hypoglycemia. The patient denied polyphagia, polydipsia, or polyuria. The available labs reviewed and analyzed and independent interpretation of the results explained at length.   The last HBA1C and routine lab was Lab Results       Component                Value               Date                       LABA1C                   7.1                 2021            Lab Results       Component                Value               Date                       EAG                      154.2               2021            Lab Results       Component                Value               Date                       NA                       139                 2021                 K                        5.1                 2021                 CL                       100                 2021                 CREATININE               1.0                 2021                 BUN                      26 (H)              2021                 CO2                      32                  2021 GLUF                     143 (H)             03/09/2021                 LABMICR                  2.80 (H)            03/18/2021              Has been taking meds as ordered. She has no side effects from the current diabetes medications. Hypertension    Watching low-sodium diet. Taking blood pressure medications regularly. Blood pressure checked off and on and trying to keep a goal of blood pressure less than 130/85 most of the time. Denies any chest pain / palpitation / shortness of breath / lightheadedness etc.   The available labs reviewed and analyzed and independent interpretation of the results explained at length. Lab Results       Component                Value               Date                       NA                       139                 03/18/2021                 K                        5.1                 03/18/2021                 K                        5.1                 09/29/2020                 CL                       100                 03/18/2021                 CO2                      32                  03/18/2021                 BUN                      26                  03/18/2021                 CREATININE               1.0                 03/18/2021                 GLUCOSE                  175                 03/18/2021                 GLUCOSE                  106                 01/17/2012                 CALCIUM                  9.5                 03/18/2021                URI   This is a new problem. Associated symptoms include coughing, diarrhea, headaches and rhinorrhea. Pertinent negatives include no abdominal pain, chest pain, congestion, ear pain, nausea, rash, sinus pain, sneezing, sore throat, swollen glands, vomiting or wheezing. Review of Systems   Constitutional: Negative for appetite change, chills, fever and unexpected weight change. HENT: Positive for rhinorrhea.  Negative for congestion, ear discharge, ear pain, nosebleeds, Musculoskeletal:   [x] Normal gait with no signs of ataxia         [x] Normal range of motion of neck        [] Abnormal -     Neurological:        [x] No Facial Asymmetry (Cranial nerve 7 motor function) (limited exam due to video visit)          [x] No gaze palsy        [] Abnormal -          Skin:        [x] No significant exanthematous lesions or discoloration noted on facial skin         [] Abnormal -            Psychiatric:       [x] Normal Affect [] Abnormal -        [x] No Hallucinations    Other pertinent observable physical exam findings:-          On this date 8/5/2021 I have spent 21 minutes reviewing previous notes, test results and face to face (virtual) with the patient discussing the diagnosis and importance of compliance with the treatment plan as well as documenting on the day of the visit. Devon Perezs, was evaluated through a synchronous (real-time) audio-video encounter. The patient (or guardian if applicable) is aware that this is a billable service. Verbal consent to proceed has been obtained within the past 12 months. The visit was conducted pursuant to the emergency declaration under the [address] [place] [address] authority and the [employer] Act. Patient identification was verified, and a caregiver was present when appropriate. The patient was located in a state where the provider was credentialed to provide care. An electronic signature was used to authenticate this note.     --Liz Martin MD

## 2021-08-09 ENCOUNTER — TELEPHONE (OUTPATIENT)
Dept: PRIMARY CARE CLINIC | Age: 71
End: 2021-08-09

## 2021-09-13 ENCOUNTER — OFFICE VISIT (OUTPATIENT)
Dept: PRIMARY CARE CLINIC | Age: 71
End: 2021-09-13
Payer: MEDICARE

## 2021-09-13 VITALS
HEART RATE: 93 BPM | OXYGEN SATURATION: 96 % | BODY MASS INDEX: 38.46 KG/M2 | WEIGHT: 209 LBS | SYSTOLIC BLOOD PRESSURE: 130 MMHG | DIASTOLIC BLOOD PRESSURE: 76 MMHG | HEIGHT: 62 IN

## 2021-09-13 DIAGNOSIS — L98.9 SKIN LESION: ICD-10-CM

## 2021-09-13 DIAGNOSIS — N30.00 ACUTE CYSTITIS WITHOUT HEMATURIA: ICD-10-CM

## 2021-09-13 DIAGNOSIS — K57.90 DIVERTICULOSIS: Primary | ICD-10-CM

## 2021-09-13 DIAGNOSIS — N18.30 STAGE 3 CHRONIC KIDNEY DISEASE, UNSPECIFIED WHETHER STAGE 3A OR 3B CKD (HCC): ICD-10-CM

## 2021-09-13 PROCEDURE — G8399 PT W/DXA RESULTS DOCUMENT: HCPCS | Performed by: NURSE PRACTITIONER

## 2021-09-13 PROCEDURE — 4040F PNEUMOC VAC/ADMIN/RCVD: CPT | Performed by: NURSE PRACTITIONER

## 2021-09-13 PROCEDURE — G8427 DOCREV CUR MEDS BY ELIG CLIN: HCPCS | Performed by: NURSE PRACTITIONER

## 2021-09-13 PROCEDURE — G8417 CALC BMI ABV UP PARAM F/U: HCPCS | Performed by: NURSE PRACTITIONER

## 2021-09-13 PROCEDURE — 1090F PRES/ABSN URINE INCON ASSESS: CPT | Performed by: NURSE PRACTITIONER

## 2021-09-13 PROCEDURE — 99214 OFFICE O/P EST MOD 30 MIN: CPT | Performed by: NURSE PRACTITIONER

## 2021-09-13 PROCEDURE — 3017F COLORECTAL CA SCREEN DOC REV: CPT | Performed by: NURSE PRACTITIONER

## 2021-09-13 PROCEDURE — 1036F TOBACCO NON-USER: CPT | Performed by: NURSE PRACTITIONER

## 2021-09-13 PROCEDURE — 1123F ACP DISCUSS/DSCN MKR DOCD: CPT | Performed by: NURSE PRACTITIONER

## 2021-09-13 NOTE — PROGRESS NOTES
9/13/2021    Chief Complaint   Patient presents with    Other     Emergency Department follow up of Adrianna Caballero is a 80 y.o. female, presents today for follow up of diverticulitis. HPI   Diverticulitis  She was examined in the emergency department on 9/7/2021, diagnosed and treated for diverticulitis. She was released same day she presented to hospital and not admitted. She continues to have some fatigue, however she is no longer experiencing  abdominal pain. She completed Flagyl as prescribed. Urinary tract infection  Follow up. Diagnosed in emergency department. She was unable to take Cipro due to medication interaction with Tegretol.  sent [employer] message regarding interaction and I switched Cipro to Bactrim DS twice daily x 5 days. Keep finished antibiotic as prescribed. She denies urinary frequency, urgency, dysuria. She denies vaginal discharge. Suspicious lesion  Reports suspicious skin cancer to external vagina noted by Dr. Susie Forde when patient presented with vaginal itching which she contributed to a vaginal yeast infection. Patient did not have yeast infection and Dr. Susie Forde contribute itching to suspicious lesion. . She is scheduled to see gynecologist again on 9/29/2021 for biopsy. Decreased kidney function  Managed by Dr. Rosio Tyson ([facility]). She is now following low-phosphorus diet as prescribed. She is scheduled in Nov. 2021 for follow up. Review of Systems   Constitutional: Negative for activity change, appetite change, chills, diaphoresis, fever and unexpected weight change. Respiratory: Negative for apnea, cough and wheezing. Shortness of breath: Chronic - related to lung disease. Cardiovascular: Negative for chest pain, palpitations and leg swelling. Gastrointestinal: Negative for constipation, diarrhea, nausea and vomiting.    Genitourinary: Negative for decreased urine volume, difficulty urinating, dysuria, flank pain, frequency, hematuria, pelvic pain, urgency, vaginal bleeding, vaginal discharge and vaginal pain. Musculoskeletal: Negative for arthralgias and myalgias. Skin:        Suspicious lesion to external vagina. Neurological: Negative for dizziness, tremors, weakness and headaches. Psychiatric/Behavioral: Negative for agitation and dysphoric mood. The patient is not nervous/anxious. Current Outpatient Medications on File Prior to Visit   Medication Sig Dispense Refill    loratadine (CLARITIN) 10 MG tablet Take 10 mg by mouth daily      fluticasone (FLONASE) 50 MCG/ACT nasal spray 1 spray by Each Nostril route 2 times daily as needed for Rhinitis 1 Bottle 1    traZODone (DESYREL) 100 MG tablet Take 1 tablet by mouth nightly. Take 30-60 minutes prior to bedtime. 90 tablet 0    Blood Glucose Monitoring Suppl (ONETOUCH VERIO) w/Device KIT Test once daily 1 kit 0    blood glucose test strips (ONETOUCH VERIO) strip 1 each by In Vitro route daily Use to test once daily or as needed 100 each 3    busPIRone (BUSPAR) 10 MG tablet Take 10 mg by mouth 3 times daily as needed      DULoxetine (CYMBALTA) 60 MG extended release capsule Take 1 capsule once daily, and capsule of Cymbalta 40 mg for a total of 100 mg per day 90 capsule 1    levothyroxine (SYNTHROID) 100 MCG tablet TAKE ONE TABLET BY MOUTH DAILY 90 tablet 1    TEGRETOL 200 MG tablet TAKE ONE TABLET BY MOUTH TWICE A  tablet 1    ezetimibe (ZETIA) 10 MG tablet Take 1 tablet by mouth daily 90 tablet 1    furosemide (LASIX) 20 MG tablet Take 1 tablet by mouth daily 90 tablet 4    metoprolol succinate (TOPROL XL) 50 MG extended release tablet Take one tablet by mouth daily 90 tablet 4    Cholecalciferol (VITAMIN D3) 1.25 MG (61938 UT) CAPS 1 capsule every 7 days 12 capsule 3    azaTHIOprine (IMURAN) 50 MG tablet Take 1 tablet by mouth daily 2 tablets daily, for a total of 100 mg daily.  30 tablet     OXYGEN Inhale 6 L/day into the lungs      vitamin B-12 (CYANOCOBALAMIN) 500 MCG tablet Take 500 mcg by mouth daily      DICLOFENAC PO Take 75 mg by mouth 2 times daily as needed      predniSONE (DELTASONE) 5 MG tablet Take 5 mg by mouth daily      azithromycin (ZITHROMAX) 500 MG tablet Take 500 mg by mouth daily      magnesium (MAGNESIUM-OXIDE) 250 MG TABS tablet Take 250 mg by mouth daily      omeprazole (PRILOSEC) 20 MG capsule Take 1 capsule by mouth daily 84 capsule 2    aspirin 81 MG tablet Take 81 mg by mouth daily.  atorvastatin (LIPITOR) 80 MG tablet Take 1 tablet by mouth daily 90 tablet 1    DULoxetine HCl 40 MG CPEP Take 40 mg by mouth daily Take with Duloxetine 60 mg tablet for a total of 100 mg daily. 90 capsule 1    glimepiride (AMARYL) 1 MG tablet Take 1 tablet by mouth every morning (before breakfast) 90 tablet 1    empagliflozin (JARDIANCE) 10 MG tablet Take 1 tablet by mouth daily 90 tablet 1    lisinopril (PRINIVIL;ZESTRIL) 20 MG tablet TAKE ONE TABLET BY MOUTH DAILY (Patient not taking: Reported on 9/13/2021) 90 tablet 4     No current facility-administered medications on file prior to visit. Allergies   Allergen Reactions    Tramadol      Avoid because Pt is on Tegretol.     Penicillins Rash     Patient states she can take Keflex     Past Medical History:   Diagnosis Date    Abnormal stress test *Aug.1, 2016    Coronary angiography by Dr. Jayda Juarez revealed a 60% stenosis of the LAD and  a 50% mid circumflex     Arthritis     Chronic headaches     Eczema     Fibromyalgia     GERD (gastroesophageal reflux disease)     Hyperlipidemia     Hypertension     Impaired fasting glucose     Interstitial lung disease ([facility] [address] 2016    Dr. Melissa Vences Interstitial lung disease ([facility] [address]     Migraine headache     Osteopenia DEXA - March, 2016    Lumbar T score +1.7 and Hip -1.0 FRAX 7.6/0.6    Osteopenia DEXA-Dec., 2017    Lumbar T score 2.3 and hip T score 0.1    Other screening mammogram July 8,     Benign    Other screening mammogram *2016    Benign    Oxygen dependent     O2 @ 3.5 L CONTINUOUS    Pulmonary hypertension ([facility] [address]     MILD    Retention of urine, unspecified     Screening mammogram, encounter for *2020    Benign    Seizure disorder [facility])     Thyroid disease     Type 2 diabetes mellitus without complication ([facility] [address] *KZH.15, 2020    FBS - 137 and A 1 C of 6.7    Vitamin D deficiency Oct., 2015    Level - 17    Wears glasses      Past Surgical History:   Procedure Laterality Date    ABDOMINOPLASTY  2012    Dr. Liss Jackson Bilateral     BRONCHOSCOPY  **2016    Dr. Cristina Rodriguez  2016    BRONCHOSCOPY  *2017    Dr. Olga Daniels - No endobronchial lesions    CARDIAC CATHETERIZATION  Oct., 2004    LAD 50%, circumflex normal, R coronary normal, LVEF 60%.  CARDIAC CATHETERIZATION  *2018    Dr. [facility] - mild pulmonary hypertension    COLONOSCOPY  Oct., 2003 (  )    Dr. Gilberto Alvarez - Negative - repeat     COLONOSCOPY  2014 (  )    Dr. Lis Rosales - moderate diverticulosis.     COSMETIC SURGERY      inner thighs    HYSTERECTOMY      LIPOSUCTION Bilateral     knees    [place] LAPAROSCOPY W TOT HYSTERECTUTERUS <=250 GRAM  W TUBE/OVARY N/A 2018    ROBOTIC TOTAL HYSTERECTOMY AND BILATERAL SALPINGO-OOPHORECTOMY performed by Elver Gabriel MD at /[facility]  *2018    Dr. Rachael Linda  Oct., 2015    Dr. Denise Dixon - left 2nd and 3rd    TOTAL HIP ARTHROPLASTY  2013    Dr. Kale Tillman - [facility] [place]  *2018    Dr. Arlen Cee - right    TUBAL LIGATION        Social History     Tobacco Use    Smoking status: Never Smoker    Smokeless tobacco: Never Used   Substance Use Topics    Alcohol use: Yes     Comment: occasional      Family History   Problem Relation Age of Onset    Heart Disease Father 76        , ASHD, S/P CAB, NIDDM, hyperlipidemia.  Cancer Mother 58        , lung cancer.  Ovarian Cancer Maternal Grandmother         age unknown    Ovarian Cancer Paternal Grandmother         age unknown    Breast Cancer Maternal Aunt         age 48        Vitals:    21 1211   BP: 130/76   Site: Left Upper Arm   Position: Sitting   Cuff Size: Medium Adult   Pulse: 93   SpO2: 96%   Weight: 209 lb (94.8 kg)   Height: 5' 2\" (1.575 m)     Estimated body mass index is 38.23 kg/m² as calculated from the following:    Height as of this encounter: 5' 2\" (1.575 m). Weight as of this encounter: 209 lb (94.8 kg). Physical Exam    ASSESSMENT/PLAN:  1. Diverticulosis  - Improved. - External Referral To Gastroenterology - Harika Zamarripa ([facility], 's gastroenterologist)- patient to schedule appointment. 2. Acute cystitis without hematuria  - Resolved. 3. Stage 3 chronic kidney disease, unspecified whether stage 3a or 3b CKD (HCC)  - Stable. - Managed by Dr. Sahil Cervantes, Nephrology  - Continue low-potassium diet    4. Skin lesion  - New to external vagina  - OB/GYN, Dr. Angelia Millan suspects cancerous lesion  -Keep upcoming appointment with OB/GYN. Return in 2 months (on 2021) for follow up of chronic diseases and medication refills. .     Discussed use, benefit, and side effects of prescribed medications. Patient's questions answered and concerns addressed. Patient agrees to plan of care. My scheduled days in the office reviewed with patient, and same day appointments available. Encouraged to use [employer] for communication as needed. Electronically signed by CESAR Sawyer CNP on 2021 at 11:01 AM       This dictation was generated by voice recognition computer software. Although all attempts are made to edit the dictation for accuracy, there may be errors in the transcription that are not intended.

## 2021-09-13 NOTE — PATIENT INSTRUCTIONS
Patient Education        Diverticulosis: Care Instructions  Your Care Instructions  In diverticulosis, pouches called diverticula form in the wall of the large intestine (colon). The pouches do not cause any pain or other symptoms. Most people who have diverticulosis do not know they have it. But the pouches sometimes bleed, and if they become infected, they can cause pain and other symptoms. When this happens, it is called diverticulitis. Diverticula form when pressure pushes the wall of the colon outward at certain weak points. A diet that is too low in fiber can cause diverticula. Follow-up care is a key part of your treatment and safety. Be sure to make and go to all appointments, and call your doctor if you are having problems. It's also a good idea to know your test results and keep a list of the medicines you take. How can you care for yourself at home? · Include fruits, leafy green vegetables, beans, and whole grains in your diet each day. These foods are high in fiber. · Take a fiber supplement, such as Citrucel or Metamucil, every day if needed. Read and follow all instructions on the label. · Drink plenty of fluids. If you have kidney, heart, or liver disease and have to limit fluids, talk with your doctor before you increase the amount of fluids you drink. · Get at least 30 minutes of exercise on most days of the week. Walking is a good choice. You also may want to do other activities, such as running, swimming, cycling, or playing tennis or team sports. · Cut out foods that cause gas, pain, or other symptoms. When should you call for help?    Call your doctor now or seek immediate medical care if:    · You have belly pain.     · You pass maroon or very bloody stools.     · You have a fever.     · You have nausea and vomiting.     · You have unusual changes in your bowel movements or abdominal swelling.     · You have burning pain when you urinate.     · You have abnormal vaginal discharge.     · You have shoulder pain.     · You have cramping pain that does not get better when you have a bowel movement or pass gas.     · You pass gas or stool from your urethra while urinating. Watch closely for changes in your health, and be sure to contact your doctor if you have any problems. Where can you learn more? Go to [email or web address] and sign in to your [employer] account. Enter Y813 in the [employer] box to learn more about \"Diverticulosis: Care Instructions. \"     If you do not have an account, please click on the \"Sign Up Now\" link. Current as of: February 10, 2021               Content Version: 12.9  © 2006-2021 [employer]. Care instructions adapted under license by [facility] ([facility]). If you have questions about a medical condition or this instruction, always ask your healthcare professional. [place] [address] any warranty or liability for your use of this information. Patient Education        Learning About Diverticulosis and Diverticulitis  What are diverticulosis and diverticulitis? In diverticulosis and diverticulitis, pouches called diverticula form in the wall of the large intestine, or colon. · In diverticulosis, the pouches do not cause any pain or other symptoms. · In diverticulitis, the pouches get inflamed or infected and cause symptoms. Doctors aren't sure what causes these pouches in the colon. But they think that a low-fiber diet may play a role. Without fiber to add bulk to the stool, the colon has to work harder than normal to push the stool forward. The pressure from this may cause pouches to form in weak spots along the colon. Some people with diverticulosis get diverticulitis. But experts don't know why this happens. What are the symptoms? · In diverticulosis, most people don't have symptoms. But pouches sometimes bleed. · In diverticulitis, symptoms may last from a few hours to a week or more.  They include:  ? Belly pain. This is usually in the lower left side. It is sometimes worse when you move. This is the most common symptom. ? Fever and chills. ? Bloating and gas. ? Diarrhea or constipation. ? Nausea and sometimes vomiting.  ? Not feeling like eating. How can you prevent diverticulitis? You may be able to lower your chance of getting diverticulitis. You can do this by taking steps to prevent constipation. · Eat fruits, vegetables, beans, and whole grains every day. These foods are high in fiber. · Drink plenty of fluids. If you have kidney, heart, or liver disease and have to limit fluids, talk with your doctor before you increase the amount of fluids you drink. · Get at least 30 minutes of exercise on most days of the week. Walking is a good choice. You also may want to do other activities, such as running, swimming, cycling, or playing tennis or team sports. · Take a fiber supplement, such as Citrucel or Metamucil, every day if needed. Read and follow all instructions on the label. · Schedule time each day for a bowel movement. Having a daily routine may help. Take your time and do not strain when having a bowel movement. Some people avoid nuts, seeds, berries, and popcorn. They believe that these foods might get trapped in the diverticula and cause pain. But there is no proof that these foods cause diverticulitis or make it worse. How are these problems treated? · The best way to treat diverticulosis is to avoid constipation. · Treatment for diverticulitis includes antibiotics. It often includes a change in your diet. You may need only liquids at first. Your doctor may suggest pain medicines for pain or belly cramps. In some cases, surgery may be needed. Follow-up care is a key part of your treatment and safety. Be sure to make and go to all appointments, and call your doctor if you are having problems.  It's also a good idea to know your test results and keep a list of the medicines you take.  Where can you learn more? Go to [email or web address] and sign in to your boarding passt account. Enter R155 in the [facility] box to learn more about \"Learning About Diverticulosis and Diverticulitis. \"     If you do not have an account, please click on the \"Sign Up Now\" link. Current as of: February 10, 2021               Content Version: 12.9  © 2006-2021 [employer]wise[employer]. Care instructions adapted under license by [facility] ([facility]). If you have questions about a medical condition or this instruction, always ask your healthcare professional. [address] any warranty or liability for your use of this information.

## 2021-09-14 PROBLEM — K57.90 DIVERTICULOSIS: Status: ACTIVE | Noted: 2021-09-14

## 2021-09-14 ASSESSMENT — ENCOUNTER SYMPTOMS
NAUSEA: 0
CONSTIPATION: 0
APNEA: 0
DIARRHEA: 0
WHEEZING: 0
VOMITING: 0
COUGH: 0

## 2021-09-16 DIAGNOSIS — E78.5 HYPERLIPIDEMIA, UNSPECIFIED HYPERLIPIDEMIA TYPE: ICD-10-CM

## 2021-09-16 RX ORDER — EZETIMIBE 10 MG/1
TABLET ORAL
Qty: 90 TABLET | Refills: 1 | Status: SHIPPED | OUTPATIENT
Start: 2021-09-16 | End: 2021-11-17 | Stop reason: SDUPTHER

## 2021-09-16 NOTE — TELEPHONE ENCOUNTER
Patient's  called to say the patient needs a medication refill on her ezetimibe (ZETIA) 10 MG tablet.

## 2021-10-13 ENCOUNTER — OFFICE VISIT (OUTPATIENT)
Dept: PRIMARY CARE CLINIC | Age: 71
End: 2021-10-13
Payer: MEDICARE

## 2021-10-13 VITALS
WEIGHT: 204 LBS | DIASTOLIC BLOOD PRESSURE: 70 MMHG | OXYGEN SATURATION: 92 % | HEIGHT: 62 IN | HEART RATE: 90 BPM | BODY MASS INDEX: 37.54 KG/M2 | SYSTOLIC BLOOD PRESSURE: 118 MMHG

## 2021-10-13 DIAGNOSIS — R43.2 ALTERED TASTE: ICD-10-CM

## 2021-10-13 DIAGNOSIS — J00 COMMON COLD: ICD-10-CM

## 2021-10-13 DIAGNOSIS — E11.9 TYPE 2 DIABETES MELLITUS WITHOUT COMPLICATION, WITHOUT LONG-TERM CURRENT USE OF INSULIN (HCC): Primary | ICD-10-CM

## 2021-10-13 DIAGNOSIS — H69.81 DYSFUNCTION OF RIGHT EUSTACHIAN TUBE: ICD-10-CM

## 2021-10-13 DIAGNOSIS — F51.01 PRIMARY INSOMNIA: ICD-10-CM

## 2021-10-13 DIAGNOSIS — F41.9 ANXIETY: ICD-10-CM

## 2021-10-13 LAB
CHP ED QC CHECK: NORMAL
GLUCOSE BLD-MCNC: 178 MG/DL
HBA1C MFR BLD: 6.6 %

## 2021-10-13 PROCEDURE — 1090F PRES/ABSN URINE INCON ASSESS: CPT | Performed by: NURSE PRACTITIONER

## 2021-10-13 PROCEDURE — G8427 DOCREV CUR MEDS BY ELIG CLIN: HCPCS | Performed by: NURSE PRACTITIONER

## 2021-10-13 PROCEDURE — 83036 HEMOGLOBIN GLYCOSYLATED A1C: CPT | Performed by: NURSE PRACTITIONER

## 2021-10-13 PROCEDURE — 3017F COLORECTAL CA SCREEN DOC REV: CPT | Performed by: NURSE PRACTITIONER

## 2021-10-13 PROCEDURE — G8417 CALC BMI ABV UP PARAM F/U: HCPCS | Performed by: NURSE PRACTITIONER

## 2021-10-13 PROCEDURE — 1036F TOBACCO NON-USER: CPT | Performed by: NURSE PRACTITIONER

## 2021-10-13 PROCEDURE — 2022F DILAT RTA XM EVC RTNOPTHY: CPT | Performed by: NURSE PRACTITIONER

## 2021-10-13 PROCEDURE — 3044F HG A1C LEVEL LT 7.0%: CPT | Performed by: NURSE PRACTITIONER

## 2021-10-13 PROCEDURE — G8399 PT W/DXA RESULTS DOCUMENT: HCPCS | Performed by: NURSE PRACTITIONER

## 2021-10-13 PROCEDURE — 82962 GLUCOSE BLOOD TEST: CPT | Performed by: NURSE PRACTITIONER

## 2021-10-13 PROCEDURE — G8484 FLU IMMUNIZE NO ADMIN: HCPCS | Performed by: NURSE PRACTITIONER

## 2021-10-13 PROCEDURE — 1123F ACP DISCUSS/DSCN MKR DOCD: CPT | Performed by: NURSE PRACTITIONER

## 2021-10-13 PROCEDURE — 99214 OFFICE O/P EST MOD 30 MIN: CPT | Performed by: NURSE PRACTITIONER

## 2021-10-13 PROCEDURE — 4040F PNEUMOC VAC/ADMIN/RCVD: CPT | Performed by: NURSE PRACTITIONER

## 2021-10-13 RX ORDER — FLUTICASONE PROPIONATE 50 MCG
1 SPRAY, SUSPENSION (ML) NASAL DAILY
Qty: 16 G | Refills: 2 | Status: SHIPPED | OUTPATIENT
Start: 2021-10-13 | End: 2022-03-09

## 2021-10-13 RX ORDER — GUAIFENESIN 600 MG/1
1200 TABLET, EXTENDED RELEASE ORAL 2 TIMES DAILY
Qty: 40 TABLET | Refills: 0 | Status: SHIPPED | OUTPATIENT
Start: 2021-10-13 | End: 2021-10-23

## 2021-10-13 RX ORDER — ALPRAZOLAM 0.5 MG/1
0.5 TABLET ORAL 3 TIMES DAILY PRN
COMMUNITY
End: 2021-10-13 | Stop reason: SDUPTHER

## 2021-10-13 RX ORDER — ALPRAZOLAM 0.5 MG/1
0.5 TABLET ORAL 3 TIMES DAILY PRN
Qty: 270 TABLET | Refills: 0 | Status: SHIPPED | OUTPATIENT
Start: 2021-10-13 | End: 2021-11-17 | Stop reason: SDUPTHER

## 2021-10-13 RX ORDER — TRAZODONE HYDROCHLORIDE 100 MG/1
TABLET ORAL
Qty: 90 TABLET | Refills: 1 | Status: SHIPPED | OUTPATIENT
Start: 2021-10-13 | End: 2021-11-17

## 2021-10-13 RX ORDER — BUSPIRONE HYDROCHLORIDE 10 MG/1
TABLET ORAL
Qty: 270 TABLET | Refills: 0 | Status: SHIPPED | OUTPATIENT
Start: 2021-10-13 | End: 2021-11-17 | Stop reason: SDUPTHER

## 2021-10-13 NOTE — PATIENT INSTRUCTIONS

## 2021-10-13 NOTE — PROGRESS NOTES
daily as needed. Review of Systems   Constitutional: Positive for activity change (Lately decreased since onset of illness) and appetite change (Slightly decreased). Negative for chills, fatigue and unexpected weight change. HENT: Positive for congestion, ear pain and rhinorrhea. Negative for ear discharge, hearing loss, nosebleeds, postnasal drip, sneezing and sore throat. Respiratory: Positive for shortness of breath (Chronic). Negative for apnea, cough, chest tightness and wheezing. Cardiovascular: Negative for chest pain, palpitations and leg swelling. Gastrointestinal: Negative for abdominal distention, abdominal pain, constipation, diarrhea, nausea and vomiting. Endocrine: Negative for polydipsia, polyphagia and polyuria. Musculoskeletal: Negative for arthralgias and myalgias. Neurological: Negative for dizziness, tremors, weakness, light-headedness and headaches. Psychiatric/Behavioral: Negative for agitation, confusion, dysphoric mood, self-injury, sleep disturbance and suicidal ideas. The patient is not nervous/anxious. Current Outpatient Medications on File Prior to Visit   Medication Sig Dispense Refill    ezetimibe (ZETIA) 10 MG tablet TAKE ONE TABLET BY MOUTH DAILY 90 tablet 1    loratadine (CLARITIN) 10 MG tablet Take 10 mg by mouth daily      fluticasone (FLONASE) 50 MCG/ACT nasal spray 1 spray by Each Nostril route 2 times daily as needed for Rhinitis 1 Bottle 1    traZODone (DESYREL) 100 MG tablet Take 1 tablet by mouth nightly. Take 30-60 minutes prior to bedtime.  90 tablet 0    Blood Glucose Monitoring Suppl (ONETOUCH VERIO) w/Device KIT Test once daily 1 kit 0    blood glucose test strips (ONETOUCH VERIO) strip 1 each by In Vitro route daily Use to test once daily or as needed 100 each 3    busPIRone (BUSPAR) 10 MG tablet Take 10 mg by mouth 3 times daily as needed      DULoxetine (CYMBALTA) 60 MG extended release capsule Take 1 capsule once daily, and capsule of Cymbalta 40 mg for a total of 100 mg per day 90 capsule 1    levothyroxine (SYNTHROID) 100 MCG tablet TAKE ONE TABLET BY MOUTH DAILY 90 tablet 1    TEGRETOL 200 MG tablet TAKE ONE TABLET BY MOUTH TWICE A  tablet 1    lisinopril (PRINIVIL;ZESTRIL) 20 MG tablet TAKE ONE TABLET BY MOUTH DAILY 90 tablet 4    furosemide (LASIX) 20 MG tablet Take 1 tablet by mouth daily 90 tablet 4    metoprolol succinate (TOPROL XL) 50 MG extended release tablet Take one tablet by mouth daily 90 tablet 4    Cholecalciferol (VITAMIN D3) 1.25 MG (75962 UT) CAPS 1 capsule every 7 days 12 capsule 3    OXYGEN Inhale 6 L/day into the lungs      vitamin B-12 (CYANOCOBALAMIN) 500 MCG tablet Take 500 mcg by mouth daily      DICLOFENAC PO Take 75 mg by mouth 2 times daily as needed      predniSONE (DELTASONE) 5 MG tablet Take 30 mg by mouth daily       azithromycin (ZITHROMAX) 500 MG tablet Take 500 mg by mouth daily      magnesium (MAGNESIUM-OXIDE) 250 MG TABS tablet Take 250 mg by mouth daily      omeprazole (PRILOSEC) 20 MG capsule Take 1 capsule by mouth daily 84 capsule 2    aspirin 81 MG tablet Take 81 mg by mouth daily.  atorvastatin (LIPITOR) 80 MG tablet Take 1 tablet by mouth daily 90 tablet 1    DULoxetine HCl 40 MG CPEP Take 40 mg by mouth daily Take with Duloxetine 60 mg tablet for a total of 100 mg daily. 90 capsule 1    glimepiride (AMARYL) 1 MG tablet Take 1 tablet by mouth every morning (before breakfast) 90 tablet 1    empagliflozin (JARDIANCE) 10 MG tablet Take 1 tablet by mouth daily 90 tablet 1     No current facility-administered medications on file prior to visit. Allergies   Allergen Reactions    Tramadol      Avoid because Pt is on Tegretol.     Penicillins Rash     Patient states she can take Keflex     Past Medical History:   Diagnosis Date    Abnormal stress test *Aug.1, 2016    Coronary angiography by Dr. Felicity Person revealed a 60% stenosis of the LAD and  a 50% mid circumflex     lower leg: No edema. Left lower leg: No edema. Lymphadenopathy:      Cervical: No cervical adenopathy. Skin:     General: Skin is warm. Neurological:      General: No focal deficit present. Mental Status: She is alert and oriented to person, place, and time. Psychiatric:         Mood and Affect: Mood normal.         Behavior: Behavior normal.         ASSESSMENT/PLAN:  1. Anxiety  - Improved. - Continue  busPIRone (BUSPAR) 10 MG tablet; TAKE 1 TABLET BY MOUTH 3 TIMES A DAY  Dispense: 270 tablet; Refill: 0  - Continue ALPRAZolam (XANAX) 0.5 MG tablet; Take 1 tablet by mouth 3 times daily as needed for Anxiety for up to 90 days. Dispense: 270 tablet; Refill: 0    2. Primary insomnia  - Improved. - Continue traZODone (DESYREL) 100 MG tablet; Take 1 tablet by mouth nightly. Take 30-60 minutes prior to bedtime. Dispense: 90 tablet; Refill: 1    3. Type 2 diabetes mellitus without complication, without long-term current use of insulin ([facility])  - Improved  - Hb A1C down from 7.1 to 6.6  - POCT Glucose  - POCT glycosylated hemoglobin (Hb A1C)  - Continue Glimepiride 1 mg daily  - Continue Jardiance 10 mg once daily  - Continue low carb/diabetic diet. 4. Common cold  - Acute  - Start guaiFENesin (MUCINEX) 600 MG extended release tablet; Take 2 tablets by mouth 2 times daily for 10 days  Dispense: 40 tablet; Refill: 0    5. Dysfunction of right eustachian tube  - Acute  - Continue fluticasone (FLONASE) 50 MCG/ACT nasal spray; 1 spray by Each Nostril route daily  Dispense: 16 g; Refill: 2    6. Altered taste  - Acute  - Covid-19 Ambulatory; Future  - Will call with results. Return in about 3 months (around 1/13/2022) for 3 month follow up for anxiety, insomnia or sooner is cold symptoms persist or worsen. Discussed use, benefit, and side effects of prescribed medications. Patient's questions answered and concerns addressed. Patient agrees to plan of care.      My scheduled days in the office reviewed with patient, and same day appointments available. Encouraged to use [employer] for communication as needed. Electronically signed by CESAR Anthony CNP on 11/14/2021 at 1:21 PM       This dictation was generated by voice recognition computer software. Although all attempts are made to edit the dictation for accuracy, there may be errors in the transcription that are not intended.

## 2021-11-01 DIAGNOSIS — E11.9 TYPE 2 DIABETES MELLITUS WITHOUT COMPLICATION, WITHOUT LONG-TERM CURRENT USE OF INSULIN (HCC): ICD-10-CM

## 2021-11-01 RX ORDER — EMPAGLIFLOZIN 10 MG/1
TABLET, FILM COATED ORAL
Qty: 90 TABLET | Refills: 1 | Status: SHIPPED | OUTPATIENT
Start: 2021-11-01 | End: 2022-02-16 | Stop reason: SDUPTHER

## 2021-11-01 NOTE — TELEPHONE ENCOUNTER
Medication:   Requested Prescriptions     Pending Prescriptions Disp Refills    JARDIANCE 10 MG tablet [Pharmacy Med Name: Lori Barth 10 MG TABLET] 90 tablet 1     Sig: TAKE ONE TABLET BY MOUTH DAILY       Last Filled:  5/12/21 #90 1rf    Patient Phone Number: [phone] (home)     Last appt: 10/13/2021   Next appt: 11/1/2021    Last Labs DM:   Lab Results   Component Value Date    LABA1C 6.6 10/13/2021

## 2021-11-14 ASSESSMENT — ENCOUNTER SYMPTOMS
NAUSEA: 0
SHORTNESS OF BREATH: 1
ABDOMINAL DISTENTION: 0
WHEEZING: 0
SORE THROAT: 0
COUGH: 0
DIARRHEA: 0
CHEST TIGHTNESS: 0
APNEA: 0
VOMITING: 0
ABDOMINAL PAIN: 0
CONSTIPATION: 0
RHINORRHEA: 1

## 2021-11-17 ENCOUNTER — OFFICE VISIT (OUTPATIENT)
Dept: PRIMARY CARE CLINIC | Age: 71
End: 2021-11-17
Payer: MEDICARE

## 2021-11-17 VITALS
HEART RATE: 81 BPM | BODY MASS INDEX: 37.73 KG/M2 | OXYGEN SATURATION: 93 % | HEIGHT: 62 IN | DIASTOLIC BLOOD PRESSURE: 78 MMHG | WEIGHT: 205 LBS | SYSTOLIC BLOOD PRESSURE: 130 MMHG

## 2021-11-17 DIAGNOSIS — J84.9 INTERSTITIAL LUNG DISEASE (HCC): ICD-10-CM

## 2021-11-17 DIAGNOSIS — E78.5 HYPERLIPIDEMIA, UNSPECIFIED HYPERLIPIDEMIA TYPE: ICD-10-CM

## 2021-11-17 DIAGNOSIS — F33.1 MODERATE EPISODE OF RECURRENT MAJOR DEPRESSIVE DISORDER (HCC): Primary | ICD-10-CM

## 2021-11-17 DIAGNOSIS — I10 ESSENTIAL HYPERTENSION, BENIGN: ICD-10-CM

## 2021-11-17 DIAGNOSIS — E11.9 TYPE 2 DIABETES MELLITUS WITHOUT COMPLICATION, WITHOUT LONG-TERM CURRENT USE OF INSULIN (HCC): ICD-10-CM

## 2021-11-17 DIAGNOSIS — F41.9 ANXIETY: ICD-10-CM

## 2021-11-17 DIAGNOSIS — F51.01 PRIMARY INSOMNIA: ICD-10-CM

## 2021-11-17 DIAGNOSIS — Z91.81 AT HIGH RISK FOR FALLS: ICD-10-CM

## 2021-11-17 PROCEDURE — G8484 FLU IMMUNIZE NO ADMIN: HCPCS | Performed by: NURSE PRACTITIONER

## 2021-11-17 PROCEDURE — 1123F ACP DISCUSS/DSCN MKR DOCD: CPT | Performed by: NURSE PRACTITIONER

## 2021-11-17 PROCEDURE — 1036F TOBACCO NON-USER: CPT | Performed by: NURSE PRACTITIONER

## 2021-11-17 PROCEDURE — 3017F COLORECTAL CA SCREEN DOC REV: CPT | Performed by: NURSE PRACTITIONER

## 2021-11-17 PROCEDURE — 3044F HG A1C LEVEL LT 7.0%: CPT | Performed by: NURSE PRACTITIONER

## 2021-11-17 PROCEDURE — 2022F DILAT RTA XM EVC RTNOPTHY: CPT | Performed by: NURSE PRACTITIONER

## 2021-11-17 PROCEDURE — 99214 OFFICE O/P EST MOD 30 MIN: CPT | Performed by: NURSE PRACTITIONER

## 2021-11-17 PROCEDURE — G8417 CALC BMI ABV UP PARAM F/U: HCPCS | Performed by: NURSE PRACTITIONER

## 2021-11-17 PROCEDURE — G8427 DOCREV CUR MEDS BY ELIG CLIN: HCPCS | Performed by: NURSE PRACTITIONER

## 2021-11-17 PROCEDURE — G8399 PT W/DXA RESULTS DOCUMENT: HCPCS | Performed by: NURSE PRACTITIONER

## 2021-11-17 PROCEDURE — 4040F PNEUMOC VAC/ADMIN/RCVD: CPT | Performed by: NURSE PRACTITIONER

## 2021-11-17 PROCEDURE — 1090F PRES/ABSN URINE INCON ASSESS: CPT | Performed by: NURSE PRACTITIONER

## 2021-11-17 RX ORDER — ALPRAZOLAM 0.5 MG/1
0.5 TABLET ORAL 3 TIMES DAILY PRN
Qty: 270 TABLET | Refills: 0 | Status: SHIPPED | OUTPATIENT
Start: 2021-11-17 | End: 2022-07-06

## 2021-11-17 RX ORDER — DULOXETIN HYDROCHLORIDE 60 MG/1
CAPSULE, DELAYED RELEASE ORAL
Qty: 180 CAPSULE | Refills: 0 | Status: SHIPPED | OUTPATIENT
Start: 2021-11-17 | End: 2022-02-17 | Stop reason: SDUPTHER

## 2021-11-17 RX ORDER — AZITHROMYCIN 500 MG/1
500 TABLET, FILM COATED ORAL DAILY
Qty: 5 TABLET | Refills: 2 | Status: SHIPPED | OUTPATIENT
Start: 2021-11-17 | End: 2021-11-22

## 2021-11-17 RX ORDER — BUSPIRONE HYDROCHLORIDE 10 MG/1
TABLET ORAL
Qty: 270 TABLET | Refills: 0 | Status: SHIPPED | OUTPATIENT
Start: 2021-11-17 | End: 2022-07-06 | Stop reason: SDUPTHER

## 2021-11-17 RX ORDER — ATORVASTATIN CALCIUM 80 MG/1
80 TABLET, FILM COATED ORAL DAILY
Qty: 90 TABLET | Refills: 1 | Status: SHIPPED | OUTPATIENT
Start: 2021-11-17 | End: 2022-03-09 | Stop reason: SDUPTHER

## 2021-11-17 RX ORDER — TRAZODONE HYDROCHLORIDE 100 MG/1
TABLET ORAL
Qty: 90 TABLET | Refills: 1 | Status: CANCELLED | OUTPATIENT
Start: 2021-11-17

## 2021-11-17 RX ORDER — EZETIMIBE 10 MG/1
TABLET ORAL
Qty: 90 TABLET | Refills: 1 | Status: SHIPPED | OUTPATIENT
Start: 2021-11-17 | End: 2022-04-25

## 2021-11-17 RX ORDER — TRAZODONE HYDROCHLORIDE 150 MG/1
TABLET ORAL
Qty: 90 TABLET | Refills: 1 | Status: SHIPPED | OUTPATIENT
Start: 2021-11-17 | End: 2022-04-18

## 2021-11-17 ASSESSMENT — COLUMBIA-SUICIDE SEVERITY RATING SCALE - C-SSRS
6. HAVE YOU EVER DONE ANYTHING, STARTED TO DO ANYTHING, OR PREPARED TO DO ANYTHING TO END YOUR LIFE?: NO
2. HAVE YOU ACTUALLY HAD ANY THOUGHTS OF KILLING YOURSELF?: NO
1. WITHIN THE PAST MONTH, HAVE YOU WISHED YOU WERE DEAD OR WISHED YOU COULD GO TO SLEEP AND NOT WAKE UP?: NO

## 2021-11-17 ASSESSMENT — PATIENT HEALTH QUESTIONNAIRE - PHQ9
9. THOUGHTS THAT YOU WOULD BE BETTER OFF DEAD, OR OF HURTING YOURSELF: 0
10. IF YOU CHECKED OFF ANY PROBLEMS, HOW DIFFICULT HAVE THESE PROBLEMS MADE IT FOR YOU TO DO YOUR WORK, TAKE CARE OF THINGS AT HOME, OR GET ALONG WITH OTHER PEOPLE: 1
SUM OF ALL RESPONSES TO PHQ QUESTIONS 1-9: 10
5. POOR APPETITE OR OVEREATING: 0
SUM OF ALL RESPONSES TO PHQ QUESTIONS 1-9: 10
2. FEELING DOWN, DEPRESSED OR HOPELESS: 2
4. FEELING TIRED OR HAVING LITTLE ENERGY: 3
8. MOVING OR SPEAKING SO SLOWLY THAT OTHER PEOPLE COULD HAVE NOTICED. OR THE OPPOSITE, BEING SO FIGETY OR RESTLESS THAT YOU HAVE BEEN MOVING AROUND A LOT MORE THAN USUAL: 0
SUM OF ALL RESPONSES TO PHQ9 QUESTIONS 1 & 2: 4
7. TROUBLE CONCENTRATING ON THINGS, SUCH AS READING THE NEWSPAPER OR WATCHING TELEVISION: 3
SUM OF ALL RESPONSES TO PHQ QUESTIONS 1-9: 10
6. FEELING BAD ABOUT YOURSELF - OR THAT YOU ARE A FAILURE OR HAVE LET YOURSELF OR YOUR FAMILY DOWN: 0
1. LITTLE INTEREST OR PLEASURE IN DOING THINGS: 2
3. TROUBLE FALLING OR STAYING ASLEEP: 0

## 2021-11-17 NOTE — PROGRESS NOTES
11/17/2021    Chief Complaint   Patient presents with    Follow-up     diabetes/ hypertension / anxiety / depression       Aislinn Calvo is a 80 y.o. female, presents today for 3 month follow up of Depression, Anxiety, Diabetes, Hypertension, Hyperlipidemia, Insomnia. HPI   Treatment Adherence:   Medication compliance:  compliant all of the time  Diet compliance:  compliant most of the time  Weight trend: 4 lb loss in 2 months  Current exercise: no regular exercise  Barriers: impairment:  physical: oxygen dependant secondary to lung disease    Depression and anxiety  Patient contributes increase in depressive mood and increased anxiety to upcoming Thoracotomy scheduled at [facility] on 11/29/2021 for paralyzed right lung. She will be on a ventilator for a couple days following surgery and also mentioned risk of not coming of vent. She is taking Cymbalta 100 mg daily- will increase to max dose 120 mg. She is taking BuSpar mg 3 times daily and Xanax 0.5 mg 3 times daily as needed- She typically takes twice daily, 3 times only when really anxious. PHQ Scores 11/17/2021 1/11/2021 12/11/2020 9/17/2020 4/10/2018 3/1/2016   PHQ2 Score 4 2 0 6 0 2   PHQ9 Score 10 2 0 12 0 2       Diabetes Mellitus Type 2  Current medications: Glimepiride  1 mg daily before breakfast, Jardiance 10 mg daily  Home blood sugar records: Checks once daily- good  Any episodes of hypoglycemia? no  Eye exam current (within one year): yes  Tobacco history: She  reports that she has never smoked. She has never used smokeless tobacco.   Daily Aspirin? Yes  Known diabetic complications: none    Hypertension  Current medication: Lisinopril 20 mg once daily, metoprolol (Toprol-XL) 50 mg once daily, Lasix 20 mg daily  Home blood pressure monitoring: Yes - Consistently under 136/85. She is adherent to a low sodium diet.    Patient denies chest pain, shortness of breath, headache, lightheadedness, blurred vision, peripheral edema, palpitations, dry cough and fatigue. Denies antihypertensive medication side effects. Hyperlipidemia  No new myalgias or GI upset on atorvastatin (Lipitor) 80 mg, Zetia 10 mg daily. She is adherent to diet low in saturated fats most of the time. Lab Results       Component                Value               Date                       LABA1C                     6.6                   10/13/2021                 LABA1C                     7.1                   06/17/2021                 LABA1C                     7.0                   03/09/2021            Lab Results       Component                Value               Date                       LABMICR                    YES                08/11/2021                 CREATININE              1.6 (H)             08/11/2021            Lab Results       Component                Value               Date                       ALT                             14                   08/11/2021                 AST                             15                   08/11/2021            Lab Results       Component                Value               Date                       CHOL                         210 (H)           03/10/2020                 TRIG                          172 (H)            03/10/2020                 HDL                            82 (H)             03/09/2021                 LDLCALC                  88                    03/09/2021           Insomnia  Patient denies difficulty falling asleep or staying asleep with taking Trazodone 150 mg nightly. Interstitial lung disease  Managed by Dr. Marybel Manning. Currently on continuous 6 liters of oxygen via nasal cannula. She has previously been prescribed by Pulmonologist Azithromycin to be taken as needed; She requests refill today. Diaphragm paralysis  Right thoracotomy diaphragm plication with Dr. Rae Luis scheduled on 11/29/2021 at [address] [place] [address].      On the basis of positive falls risk screening, assessment and plan is as follows: home safety tips provided, Declines PT / balance therapy today- uses wheelchair to conserve energy with lung disease. Uses wheelchair most of the time at home and recently put in a home stairlift. Review of Systems   Constitutional: Negative for activity change, appetite change, fatigue and unexpected weight change. Eyes: Negative for visual disturbance. Respiratory: Positive for shortness of breath (chronic). Negative for apnea, cough and wheezing. Cardiovascular: Negative for chest pain, palpitations and leg swelling. Gastrointestinal: Negative for diarrhea, nausea and vomiting. Endocrine: Negative for polydipsia, polyphagia and polyuria. Genitourinary: Negative. Musculoskeletal: Negative for arthralgias and myalgias. Skin: Negative. Neurological: Negative for dizziness, weakness, light-headedness and headaches. Psychiatric/Behavioral: Positive for dysphoric mood. Negative for self-injury, sleep disturbance and suicidal ideas. The patient is nervous/anxious. Current Outpatient Medications on File Prior to Visit   Medication Sig Dispense Refill    JARDIANCE 10 MG tablet TAKE ONE TABLET BY MOUTH DAILY 90 tablet 1    busPIRone (BUSPAR) 10 MG tablet TAKE 1 TABLET BY MOUTH 3 TIMES A  tablet 0    traZODone (DESYREL) 100 MG tablet Take 1 tablet by mouth nightly. Take 30-60 minutes prior to bedtime. 90 tablet 1    ALPRAZolam (XANAX) 0.5 MG tablet Take 1 tablet by mouth 3 times daily as needed for Anxiety for up to 90 days.  270 tablet 0    ezetimibe (ZETIA) 10 MG tablet TAKE ONE TABLET BY MOUTH DAILY 90 tablet 1    loratadine (CLARITIN) 10 MG tablet Take 10 mg by mouth daily      fluticasone (FLONASE) 50 MCG/ACT nasal spray 1 spray by Each Nostril route 2 times daily as needed for Rhinitis 1 Bottle 1    Blood Glucose Monitoring Suppl (ONETOUCH VERIO) w/Device KIT Test once daily 1 kit 0    blood glucose test strips [facility] take Keflex     Past Medical History:   Diagnosis Date    Abnormal stress test *Aug.1, 2016    Coronary angiography by Dr. Niki Alexander revealed a 60% stenosis of the LAD and  a 50% mid circumflex     Arthritis     Chronic headaches     Eczema     Fibromyalgia     GERD (gastroesophageal reflux disease)     Hyperlipidemia     Hypertension     Impaired fasting glucose     Interstitial lung disease ([facility] [address] 2016    Dr. Jerica Pedraza Interstitial lung disease ([facility] [address] [facility]     Migraine headache     Osteopenia DEXA - March, 2016    Lumbar T score +1.7 and Hip -1.0 FRAX 7.6/0.6    Osteopenia DEXA-Dec., 2017    Lumbar T score 2.3 and hip T score 0.1    Other screening mammogram July 8, 2013    Benign    Other screening mammogram *March 4, 2016    Benign    Oxygen dependent     O2 @ 3.5 L CONTINUOUS    Pulmonary hypertension ([facility] [address]     MILD    Retention of urine, unspecified     Screening mammogram, encounter for *February 11, 2020    Benign    Seizure disorder [facility])     Thyroid disease     Type 2 diabetes mellitus without complication ([facility] [address] *IMM.27, 2020    FBS - 137 and A 1 C of 6.7    Vitamin D deficiency Oct., 2015    Level - 17    Wears glasses      Past Surgical History:   Procedure Laterality Date    ABDOMINOPLASTY  Jan., 2012    Dr. Hugh Douglass Bilateral     BRONCHOSCOPY  **Sept.16, 2016    Dr. Salter Going  09/16/2016    BRONCHOSCOPY  *Febr.24, 2017    Dr. Sun Felton - No endobronchial lesions    CARDIAC CATHETERIZATION  Oct., 2004    LAD 50%, circumflex normal, R coronary normal, LVEF 60%.  CARDIAC CATHETERIZATION  *Sept.24, 2018    Dr. Nikhil Almeida - [facility] - mild pulmonary hypertension    COLONOSCOPY  Oct., 2003 ( 2013 )    Dr. Gretta Rosenbaum - Negative - repeat 2013    COLONOSCOPY  Jan., 2014 ( 2024 )    Dr. Darleen Devi - moderate diverticulosis.     COSMETIC SURGERY      inner thighs    HYSTERECTOMY      LIPOSUCTION Bilateral     knees    SD LAPAROSCOPY W TOT HYSTERECTUTERUS <=250 GRAM  W TUBE/OVARY N/A 2018    ROBOTIC TOTAL HYSTERECTOMY AND BILATERAL SALPINGO-OOPHORECTOMY performed by Yanelis Lynn MD at [place] [address]  *2018    Dr. Luis Larger  Oct., 2015    Dr. Ashley Hernandez - left 2nd and 3rd    TOTAL HIP ARTHROPLASTY  2013    Dr. Bora Pace - [place]  *2018    Dr. Aakash Davies - right    TUBAL LIGATION        Social History     Tobacco Use    Smoking status: Never Smoker    Smokeless tobacco: Never Used   Substance Use Topics    Alcohol use: Yes     Comment: occasional      Family History   Problem Relation Age of Onset    Heart Disease Father 76        , ASHD, S/P CAB, NIDDM, hyperlipidemia.  Cancer Mother 58        , lung cancer.  Ovarian Cancer Maternal Grandmother         age unknown    Ovarian Cancer Paternal Grandmother         age unknown    Breast Cancer Maternal Aunt         age 48        Vitals:    21 0854   BP: 130/78   Site: Left Upper Arm   Position: Sitting   Cuff Size: Medium Adult   Pulse: 81   SpO2: 93%   Weight: 205 lb (93 kg)   Height: 5' 2\" (1.575 m)     Estimated body mass index is 37.49 kg/m² as calculated from the following:    Height as of this encounter: 5' 2\" (1.575 m). Weight as of this encounter: 205 lb (93 kg). Physical Exam  Vitals and nursing note reviewed. Constitutional:       General: She is not in acute distress. Appearance: Normal appearance. She is obese. Neck:      Vascular: No carotid bruit. Cardiovascular:      Rate and Rhythm: Normal rate and regular rhythm. Pulses: Normal pulses. Heart sounds: Normal heart sounds, S1 normal and S2 normal.   Pulmonary:      Effort: Pulmonary effort is normal.      Breath sounds: Normal breath sounds. Comments: 6 L O2 via nasal cannula  Musculoskeletal:         General: Normal range of motion. Cervical back: Normal range of motion and neck supple.       Right lower leg: Fasting; Future  - Start stricter low-saturated fat diet    6. Primary insomnia  - Stable  - Continue traZODone (DESYREL) 150 MG tablet; Take 1 tablet by mouth nightly. Take 30-60 minutes prior to bedtime. Dispense: 90 tablet; Refill: 1    7. Interstitial lung disease ([facility] [address]  - Managed by Dr. Betsy Noriega as needed azithromycin (ZITHROMAX) 500 MG tablet; Take 1 tablet by mouth daily for 5 days  Dispense: 5 tablet; Refill: 2    8. At high risk for falls      Return in about 3 months (around 2/17/2022) for Diabetes, Depression, Anxiety, Hypertension, Dyslipidemia, Insomnia. Discussed use, benefit, and side effects of prescribed medications. Patient's questions answered and concerns addressed. Patient agrees to plan of care. My scheduled days in the office reviewed with patient, and same day appointments available. Encouraged to use [employer] for communication as needed. Electronically signed by CESAR Foley CNP on 11/27/2021 at 2:57 PM       This dictation was generated by voice recognition computer software. Although all attempts are made to edit the dictation for accuracy, there may be errors in the transcription that are not intended.

## 2021-11-22 ENCOUNTER — TELEPHONE (OUTPATIENT)
Dept: CARDIOLOGY CLINIC | Age: 71
End: 2021-11-22

## 2021-11-22 DIAGNOSIS — I25.83 CORONARY ARTERY DISEASE DUE TO LIPID RICH PLAQUE: ICD-10-CM

## 2021-11-22 DIAGNOSIS — Z01.810 PREOP CARDIOVASCULAR EXAM: Primary | ICD-10-CM

## 2021-11-22 DIAGNOSIS — R06.02 SOB (SHORTNESS OF BREATH): ICD-10-CM

## 2021-11-22 DIAGNOSIS — I25.10 CORONARY ARTERY DISEASE DUE TO LIPID RICH PLAQUE: ICD-10-CM

## 2021-11-22 NOTE — TELEPHONE ENCOUNTER
Dr Antonella Christy talked to Dr Cherre Rinne (anesthesiologist) for [address] [place] [address] regarding upcoming surgery. She is having significant SOB. Unclear if it is all due to her lungs or possibly cardiac related. She has a history of non obstructive CAD per [facility]. She is scheduled for a diaphragm plication surgery on 10/40/69. Per [facility]- She will need Lexiscan for cardiac risk stratification. If normal stress test she may proceed with moderate cardiac risk. If abnormal she will need surgery postponed and will need a cardiac cath. Dr Cherre Rinne would like to be contacted after stress test completed with findings.  P- [id number] F- [phone]

## 2021-11-23 ENCOUNTER — HOSPITAL ENCOUNTER (OUTPATIENT)
Dept: NON INVASIVE DIAGNOSTICS | Age: 71
Discharge: HOME | End: 2021-11-23
Payer: MEDICARE

## 2021-11-23 DIAGNOSIS — I25.83 CORONARY ARTERY DISEASE DUE TO LIPID RICH PLAQUE: ICD-10-CM

## 2021-11-23 DIAGNOSIS — R06.02 SOB (SHORTNESS OF BREATH): ICD-10-CM

## 2021-11-23 DIAGNOSIS — I25.10 CORONARY ARTERY DISEASE DUE TO LIPID RICH PLAQUE: ICD-10-CM

## 2021-11-23 DIAGNOSIS — Z01.810 PREOP CARDIOVASCULAR EXAM: ICD-10-CM

## 2021-11-23 LAB
LV EF: 65 %
LVEF MODALITY: NORMAL

## 2021-11-23 PROCEDURE — 93017 CV STRESS TEST TRACING ONLY: CPT | Performed by: INTERNAL MEDICINE

## 2021-11-23 PROCEDURE — 78452 HT MUSCLE IMAGE SPECT MULT: CPT | Performed by: INTERNAL MEDICINE

## 2021-11-23 PROCEDURE — 6360000002 HC RX W HCPCS: Performed by: INTERNAL MEDICINE

## 2021-11-23 PROCEDURE — A9502 TC99M TETROFOSMIN: HCPCS | Performed by: INTERNAL MEDICINE

## 2021-11-23 PROCEDURE — 3430000000 HC RX DIAGNOSTIC RADIOPHARMACEUTICAL: Performed by: INTERNAL MEDICINE

## 2021-11-23 RX ADMIN — TETROFOSMIN 10 MILLICURIE: 1.38 INJECTION, POWDER, LYOPHILIZED, FOR SOLUTION INTRAVENOUS at 09:14

## 2021-11-23 RX ADMIN — TETROFOSMIN 30 MILLICURIE: 1.38 INJECTION, POWDER, LYOPHILIZED, FOR SOLUTION INTRAVENOUS at 10:35

## 2021-11-23 RX ADMIN — REGADENOSON 0.4 MG: 0.08 INJECTION, SOLUTION INTRAVENOUS at 10:40

## 2021-11-23 NOTE — PROGRESS NOTES
Instructed on Lexiscan Stress Test Procedure including possible side effects/ adverse reactions. Patient verbalizes  understanding and denies having any questions. See [address] [place] [address] Rd Cardiology.   Jaya Spencer RN

## 2021-11-23 NOTE — TELEPHONE ENCOUNTER
Patient's stress test is normal and Dr Severo Freire reviewed with patient. He is calling Dr Em Arce to discuss results.

## 2021-11-24 ENCOUNTER — TELEPHONE (OUTPATIENT)
Dept: CARDIOLOGY | Age: 71
End: 2021-11-24

## 2021-11-27 PROBLEM — Z91.81 AT HIGH RISK FOR FALLS: Status: ACTIVE | Noted: 2021-11-27

## 2021-11-27 ASSESSMENT — ENCOUNTER SYMPTOMS
SHORTNESS OF BREATH: 1
DIARRHEA: 0
COUGH: 0
WHEEZING: 0
VOMITING: 0
APNEA: 0
NAUSEA: 0

## 2022-01-06 ENCOUNTER — OFFICE VISIT (OUTPATIENT)
Dept: PRIMARY CARE CLINIC | Age: 72
End: 2022-01-06
Payer: MEDICARE

## 2022-01-06 VITALS
WEIGHT: 206 LBS | SYSTOLIC BLOOD PRESSURE: 120 MMHG | HEIGHT: 62 IN | OXYGEN SATURATION: 97 % | BODY MASS INDEX: 37.91 KG/M2 | HEART RATE: 84 BPM | DIASTOLIC BLOOD PRESSURE: 72 MMHG

## 2022-01-06 DIAGNOSIS — J84.9 INTERSTITIAL LUNG DISEASE (HCC): ICD-10-CM

## 2022-01-06 DIAGNOSIS — Z09 FOLLOW-UP EXAM: Primary | ICD-10-CM

## 2022-01-06 PROCEDURE — G8417 CALC BMI ABV UP PARAM F/U: HCPCS | Performed by: NURSE PRACTITIONER

## 2022-01-06 PROCEDURE — 99213 OFFICE O/P EST LOW 20 MIN: CPT | Performed by: NURSE PRACTITIONER

## 2022-01-06 PROCEDURE — 4040F PNEUMOC VAC/ADMIN/RCVD: CPT | Performed by: NURSE PRACTITIONER

## 2022-01-06 PROCEDURE — 1036F TOBACCO NON-USER: CPT | Performed by: NURSE PRACTITIONER

## 2022-01-06 PROCEDURE — 1090F PRES/ABSN URINE INCON ASSESS: CPT | Performed by: NURSE PRACTITIONER

## 2022-01-06 PROCEDURE — G8399 PT W/DXA RESULTS DOCUMENT: HCPCS | Performed by: NURSE PRACTITIONER

## 2022-01-06 PROCEDURE — 1123F ACP DISCUSS/DSCN MKR DOCD: CPT | Performed by: NURSE PRACTITIONER

## 2022-01-06 PROCEDURE — 3017F COLORECTAL CA SCREEN DOC REV: CPT | Performed by: NURSE PRACTITIONER

## 2022-01-06 PROCEDURE — G8427 DOCREV CUR MEDS BY ELIG CLIN: HCPCS | Performed by: NURSE PRACTITIONER

## 2022-01-06 PROCEDURE — G8484 FLU IMMUNIZE NO ADMIN: HCPCS | Performed by: NURSE PRACTITIONER

## 2022-01-06 RX ORDER — GABAPENTIN 100 MG/1
100 CAPSULE ORAL 3 TIMES DAILY
COMMUNITY
Start: 2021-12-16 | End: 2022-03-09

## 2022-01-06 ASSESSMENT — ENCOUNTER SYMPTOMS
APNEA: 0
SHORTNESS OF BREATH: 1
CHEST TIGHTNESS: 0
COUGH: 0

## 2022-01-06 NOTE — PROGRESS NOTES
1/6/2022    Chief Complaint   Patient presents with   Colt Brandin is a 80 y.o. female, presents today for post-op follow up     HPI   Interstitial Lung Disease / Diaphragm paralysis  Status post Right Thoracotomy Diaphragm Plication on 57/17/7340. States it could be several months up to a year until she notices an improvement in breathing. Right thoracotomy incisions x 2 tender. Unable to wear bra- plans to purchase sports bra. Cardiothoracic surgeon follow up with Dr. Nia Gonzalez is scheduled on 2/15/2021. She is taking Gabapentin 100 mg prescribed  3 times daily for neuralgia. Occasionally takes twice daily. Medication is efficient in decreasing neuralgia and is gradually improving. Pre-surgical oxygen requirement have not changed; She is on 6L oxygen continuous via nasal cannula (transport tank), 7L continuous at home. Obesity  Weight at home was 201 lb today. She has cut food portion sizes down and making better good choices. Reports \"taste is off\" since surgery (some foods no longer taste good). She is working towards weight loss    Review of Systems   Constitutional: Positive for activity change (slowly increasing) and fatigue (slowly improving). Negative for appetite change, chills and fever. Respiratory: Positive for shortness of breath (chronic). Negative for apnea, cough and chest tightness. Cardiovascular: Negative for chest pain, palpitations and leg swelling. Neurological: Negative. Current Outpatient Medications on File Prior to Visit   Medication Sig Dispense Refill    busPIRone (BUSPAR) 10 MG tablet TAKE 1 TABLET BY MOUTH 3 TIMES A  tablet 0    atorvastatin (LIPITOR) 80 MG tablet Take 1 tablet by mouth daily 90 tablet 1    ezetimibe (ZETIA) 10 MG tablet TAKE ONE TABLET BY MOUTH DAILY 90 tablet 1    ALPRAZolam (XANAX) 0.5 MG tablet Take 1 tablet by mouth 3 times daily as needed for Anxiety for up to 90 days.  270 tablet 0    DULoxetine (CYMBALTA) 60 MG extended release capsule Take 2 capsule once daily for a total of 120 mg per day 180 capsule 0    traZODone (DESYREL) 150 MG tablet Take 1 tablet by mouth nightly. Take 30-60 minutes prior to bedtime. 90 tablet 1    JARDIANCE 10 MG tablet TAKE ONE TABLET BY MOUTH DAILY 90 tablet 1    loratadine (CLARITIN) 10 MG tablet Take 10 mg by mouth daily      fluticasone (FLONASE) 50 MCG/ACT nasal spray 1 spray by Each Nostril route 2 times daily as needed for Rhinitis 1 Bottle 1    Blood Glucose Monitoring Suppl (ONETOUCH VERIO) w/Device KIT Test once daily 1 kit 0    blood glucose test strips (ONETOUCH VERIO) strip 1 each by In Vitro route daily Use to test once daily or as needed 100 each 3    levothyroxine (SYNTHROID) 100 MCG tablet TAKE ONE TABLET BY MOUTH DAILY 90 tablet 1    TEGRETOL 200 MG tablet TAKE ONE TABLET BY MOUTH TWICE A  tablet 1    lisinopril (PRINIVIL;ZESTRIL) 20 MG tablet TAKE ONE TABLET BY MOUTH DAILY 90 tablet 4    furosemide (LASIX) 20 MG tablet Take 1 tablet by mouth daily 90 tablet 4    metoprolol succinate (TOPROL XL) 50 MG extended release tablet Take one tablet by mouth daily 90 tablet 4    Cholecalciferol (VITAMIN D3) 1.25 MG (76770 UT) CAPS 1 capsule every 7 days 12 capsule 3    OXYGEN Inhale 6 L/day into the lungs      vitamin B-12 (CYANOCOBALAMIN) 500 MCG tablet Take 500 mcg by mouth daily      DICLOFENAC PO Take 75 mg by mouth 2 times daily as needed      predniSONE (DELTASONE) 5 MG tablet Take 30 mg by mouth daily       magnesium (MAGNESIUM-OXIDE) 250 MG TABS tablet Take 250 mg by mouth daily      omeprazole (PRILOSEC) 20 MG capsule Take 1 capsule by mouth daily 84 capsule 2    aspirin 81 MG tablet Take 81 mg by mouth daily.         fluticasone (FLONASE) 50 MCG/ACT nasal spray 1 spray by Each Nostril route daily 16 g 2    glimepiride (AMARYL) 1 MG tablet Take 1 tablet by mouth every morning (before breakfast) 90 tablet 1     No current facility-administered medications on file prior to visit. Allergies   Allergen Reactions    Tramadol      Avoid because Pt is on Tegretol.  Penicillins Rash     Patient states she can take Keflex     Past Medical History:   Diagnosis Date    Abnormal stress test *Aug.1, 2016    Coronary angiography by Dr. Cornell Reyes revealed a 60% stenosis of the LAD and  a 50% mid circumflex     Arthritis     Chronic headaches     Eczema     Fibromyalgia     GERD (gastroesophageal reflux disease)     Hyperlipidemia     Hypertension     Impaired fasting glucose     Interstitial lung disease ([facility] [address] 2016    Dr. Pugh Blind Interstitial lung disease ([facility] [address]     Migraine headache     Osteopenia DEXA - March, 2016    Lumbar T score +1.7 and Hip -1.0 FRAX 7.6/0.6    Osteopenia DEXA-Dec., 2017    Lumbar T score 2.3 and hip T score 0.1    Other screening mammogram July 8, 2013    Benign    Other screening mammogram *March 4, 2016    Benign    Oxygen dependent     O2 @ 3.5 L CONTINUOUS    Pulmonary hypertension ([facility] [address]     MILD    Retention of urine, unspecified     Screening mammogram, encounter for *February 11, 2020    Benign    Seizure disorder [facility])     Thyroid disease     Type 2 diabetes mellitus without complication ([facility] [address] *XCV.99, 2020    FBS - 137 and A 1 C of 6.7    Vitamin D deficiency Oct., 2015    Level - 17    Wears glasses      Past Surgical History:   Procedure Laterality Date    ABDOMINOPLASTY  Jan., 2012    Dr. Christiana Witt Bilateral     BRONCHOSCOPY  **Sept.16, 2016    Dr. Parikh Bow  09/16/2016    BRONCHOSCOPY  *Febr.24, 2017    Dr. Anish Wray - No endobronchial lesions    CARDIAC CATHETERIZATION  Oct., 2004    LAD 50%, circumflex normal, R coronary normal, LVEF 60%.      CARDIAC CATHETERIZATION  *Sept.24, 2018    Dr. Ninfa Vazquez - C - mild pulmonary hypertension    COLONOSCOPY  Oct., 2003 ( 2013 )    Dr. Ermias Gomez - Negative - repeat 2013  COLONOSCOPY  2014 (  )    Dr. Oren Bejarano - moderate diverticulosis.  COSMETIC SURGERY      inner thighs    HYSTERECTOMY      LIPOSUCTION Bilateral     knees    LA LAPAROSCOPY W TOT HYSTERECTUTERUS <=250 GRAM  W TUBE/OVARY N/A 2018    ROBOTIC TOTAL HYSTERECTOMY AND BILATERAL SALPINGO-OOPHORECTOMY performed by Priyanka Kim MD at [place] [address]  *2018    Dr. Kyara Garibay  Oct., 2015    Dr. Metta Dance - left 2nd and 3rd    TOTAL HIP ARTHROPLASTY  2013    Dr. Rani Ahmadi - [place]  *2018    Dr. Brianda Bynum - right    TUBAL LIGATION        Social History     Tobacco Use    Smoking status: Never Smoker    Smokeless tobacco: Never Used   Substance Use Topics    Alcohol use: Yes     Comment: occasional      Family History   Problem Relation Age of Onset    Heart Disease Father 76        , ASHD, S/P CAB, NIDDM, hyperlipidemia.  Cancer Mother 58        , lung cancer.  Ovarian Cancer Maternal Grandmother         age unknown    Ovarian Cancer Paternal Grandmother         age unknown    Breast Cancer Maternal Aunt         age 48        Vitals:    22 0802   BP: 120/72   Site: Left Upper Arm   Position: Sitting   Cuff Size: Medium Adult   Pulse: 84   SpO2: 97%   Weight: 206 lb (93.4 kg)   Height: 5' 2\" (1.575 m)     Estimated body mass index is 37.68 kg/m² as calculated from the following:    Height as of this encounter: 5' 2\" (1.575 m). Weight as of this encounter: 206 lb (93.4 kg). Physical Exam  Vitals and nursing note reviewed. Constitutional:       Appearance: Normal appearance. She is obese. Cardiovascular:      Rate and Rhythm: Normal rate and regular rhythm. Pulses: Normal pulses. Heart sounds: Normal heart sounds. Pulmonary:      Effort: Pulmonary effort is normal. No respiratory distress. Breath sounds: Normal breath sounds and air entry.       Comments: 6L O2 continuous via nasal cannula  Chest:          Comments: 1. Thoracotomy incision -   2. Chest tube insertion site    Incisions tender, ealing with edges well approximated. No sign of infection. Breast were covered with examination of incisions and  was present. Neurological:      General: No focal deficit present. Mental Status: She is alert and oriented to person, place, and time. Psychiatric:         Mood and Affect: Mood normal.         Behavior: Behavior normal.         ASSESSMENT/PLAN:  1. Follow-up exam  -No abnormalities noted. -Incisions healing without signs of infection.  -Follow-up appointment scheduled with surgeon on 2/15/2022.    2. Interstitial lung disease ([facility] [address]  -No change  -Continue O2  -Continue regularly showed appointments with Dr. Jaimie Yu      Return in 6 weeks (on 2/16/2022) for follow up of diabetes, HTN, hyperlipidemia, anxiety, depresssion, insomnia (already scheduled). Discussed use, benefit, and side effects of prescribed medications. Patient's questions answered and concerns addressed. Patient agrees to plan of care. My scheduled days in the office reviewed with patient, and same day appointments available. Encouraged to use [employer] for communication as needed. Electronically signed by Wenceslao Leyden, APRN - CNP on 1/6/2022 at 4:50 PM       This dictation was generated by voice recognition computer software. Although all attempts are made to edit the dictation for accuracy, there may be errors in the transcription that are not intended.

## 2022-02-13 DIAGNOSIS — F33.1 MODERATE EPISODE OF RECURRENT MAJOR DEPRESSIVE DISORDER (HCC): ICD-10-CM

## 2022-02-16 DIAGNOSIS — E11.9 TYPE 2 DIABETES MELLITUS WITHOUT COMPLICATION, WITHOUT LONG-TERM CURRENT USE OF INSULIN (HCC): ICD-10-CM

## 2022-02-16 NOTE — TELEPHONE ENCOUNTER
Recent Visits  Date Type Provider Dept   01/06/22 Office Visit Saturnino Maradiaga, APRN - CNP [facility]   11/17/21 Office Visit Saturnino Maradiaga, APRN - CNP [facility]   10/13/21 Office Visit Saturnino Maradiaga, APRN - CNP [facility]   09/13/21 Office Visit Saturnino Maradiaga, APRN - [facility]NP [facility]   06/17/21 Office Visit Saturnino Maradiaga, APRN - [facility]NP [facility]   05/12/21 Office Visit Saturnino Maradiaga, APRN - [facility]NP [facility]   01/04/21 Office Visit Rina Spring, APRN (Old) [facility]   11/11/20 Office Visit Saturnino Maradiaga, APRN - CNP [facility]   10/21/20 Office Visit Saturnino Maradiaga, APRN - CNP [facility]   09/17/20 Office Visit Saturnino Maradiaga, APRN - CNP [facility]   Showing recent visits within past 540 days with a meds authorizing provider and meeting all other requirements  Future Appointments  Date Type Provider Dept   03/09/22 Appointment Saturnino Maradiaga, APRN - CNP [facility]   Showing future appointments within next 150 days with a meds authorizing provider and meeting all other requirements

## 2022-02-17 DIAGNOSIS — F33.1 MODERATE EPISODE OF RECURRENT MAJOR DEPRESSIVE DISORDER (HCC): ICD-10-CM

## 2022-02-20 DIAGNOSIS — E55.9 VITAMIN D DEFICIENCY: ICD-10-CM

## 2022-02-23 RX ORDER — METHOCARBAMOL 750 MG/1
TABLET ORAL
Qty: 12 CAPSULE | Refills: 3 | Status: SHIPPED
Start: 2022-02-23 | End: 2022-03-24 | Stop reason: DRUGHIGH

## 2022-02-23 RX ORDER — DULOXETIN HYDROCHLORIDE 60 MG/1
CAPSULE, DELAYED RELEASE ORAL
Qty: 180 CAPSULE | Refills: 0 | Status: SHIPPED
Start: 2022-02-23 | End: 2022-03-09 | Stop reason: ALTCHOICE

## 2022-02-23 RX ORDER — EMPAGLIFLOZIN 10 MG/1
10 TABLET, FILM COATED ORAL DAILY
Qty: 90 TABLET | Refills: 1 | Status: SHIPPED | OUTPATIENT
Start: 2022-02-23 | End: 2022-07-06 | Stop reason: SDUPTHER

## 2022-02-27 DIAGNOSIS — G40.909 SEIZURE DISORDER (HCC): ICD-10-CM

## 2022-02-28 RX ORDER — FUROSEMIDE 20 MG/1
20 TABLET ORAL DAILY
Qty: 90 TABLET | Refills: 4 | Status: SHIPPED | OUTPATIENT
Start: 2022-02-28

## 2022-02-28 RX ORDER — CARBAMAZEPINE 200 MG
TABLET ORAL
Qty: 180 TABLET | Refills: 1 | OUTPATIENT
Start: 2022-02-28

## 2022-02-28 RX ORDER — CARBAMAZEPINE 200 MG
TABLET ORAL
Qty: 180 TABLET | Refills: 1 | Status: SHIPPED | OUTPATIENT
Start: 2022-02-28 | End: 2022-08-29

## 2022-02-28 NOTE — TELEPHONE ENCOUNTER
Received refill request for Furosemide from [address] [place] [address].     Last ov:02/17/2021 [facility]    Last labs:08/11/2021 CMP    Last Refill:02/17/2021 #90 tabs w/ 4 refills    Next appointment:none

## 2022-03-02 RX ORDER — FUROSEMIDE 20 MG/1
TABLET ORAL
Qty: 90 TABLET | Refills: 4 | OUTPATIENT
Start: 2022-03-02

## 2022-03-02 NOTE — TELEPHONE ENCOUNTER
Medication was just sent to the pharmacy     E-Prescribing Status: Receipt confirmed by pharmacy (2/28/2022  8:44 AM EST)

## 2022-03-09 ENCOUNTER — OFFICE VISIT (OUTPATIENT)
Dept: PRIMARY CARE CLINIC | Age: 72
End: 2022-03-09
Payer: MEDICARE

## 2022-03-09 VITALS
BODY MASS INDEX: 38.24 KG/M2 | SYSTOLIC BLOOD PRESSURE: 130 MMHG | OXYGEN SATURATION: 96 % | TEMPERATURE: 98.2 F | WEIGHT: 207.8 LBS | HEIGHT: 62 IN | HEART RATE: 80 BPM | DIASTOLIC BLOOD PRESSURE: 70 MMHG

## 2022-03-09 DIAGNOSIS — F33.1 MODERATE EPISODE OF RECURRENT MAJOR DEPRESSIVE DISORDER (HCC): ICD-10-CM

## 2022-03-09 DIAGNOSIS — N18.30 STAGE 3 CHRONIC KIDNEY DISEASE, UNSPECIFIED WHETHER STAGE 3A OR 3B CKD (HCC): ICD-10-CM

## 2022-03-09 DIAGNOSIS — I10 ESSENTIAL HYPERTENSION, BENIGN: ICD-10-CM

## 2022-03-09 DIAGNOSIS — Z00.00 MEDICARE ANNUAL WELLNESS VISIT, SUBSEQUENT: Primary | ICD-10-CM

## 2022-03-09 DIAGNOSIS — E66.01 CLASS 2 SEVERE OBESITY DUE TO EXCESS CALORIES WITH SERIOUS COMORBIDITY AND BODY MASS INDEX (BMI) OF 38.0 TO 38.9 IN ADULT (HCC): ICD-10-CM

## 2022-03-09 DIAGNOSIS — E11.9 TYPE 2 DIABETES MELLITUS WITHOUT COMPLICATION, WITHOUT LONG-TERM CURRENT USE OF INSULIN (HCC): ICD-10-CM

## 2022-03-09 DIAGNOSIS — K21.9 GASTROESOPHAGEAL REFLUX DISEASE WITHOUT ESOPHAGITIS: ICD-10-CM

## 2022-03-09 DIAGNOSIS — E55.9 VITAMIN D DEFICIENCY: ICD-10-CM

## 2022-03-09 DIAGNOSIS — F41.9 ANXIETY: ICD-10-CM

## 2022-03-09 DIAGNOSIS — E78.5 HYPERLIPIDEMIA, UNSPECIFIED HYPERLIPIDEMIA TYPE: ICD-10-CM

## 2022-03-09 DIAGNOSIS — E03.9 HYPOTHYROIDISM, UNSPECIFIED TYPE: ICD-10-CM

## 2022-03-09 DIAGNOSIS — F51.01 PRIMARY INSOMNIA: ICD-10-CM

## 2022-03-09 LAB — HBA1C MFR BLD: 7.2 %

## 2022-03-09 PROCEDURE — 3017F COLORECTAL CA SCREEN DOC REV: CPT | Performed by: NURSE PRACTITIONER

## 2022-03-09 PROCEDURE — 1123F ACP DISCUSS/DSCN MKR DOCD: CPT | Performed by: NURSE PRACTITIONER

## 2022-03-09 PROCEDURE — 3051F HG A1C>EQUAL 7.0%<8.0%: CPT | Performed by: NURSE PRACTITIONER

## 2022-03-09 PROCEDURE — 4040F PNEUMOC VAC/ADMIN/RCVD: CPT | Performed by: NURSE PRACTITIONER

## 2022-03-09 PROCEDURE — G8484 FLU IMMUNIZE NO ADMIN: HCPCS | Performed by: NURSE PRACTITIONER

## 2022-03-09 PROCEDURE — 83036 HEMOGLOBIN GLYCOSYLATED A1C: CPT | Performed by: NURSE PRACTITIONER

## 2022-03-09 PROCEDURE — G0439 PPPS, SUBSEQ VISIT: HCPCS | Performed by: NURSE PRACTITIONER

## 2022-03-09 PROCEDURE — 99214 OFFICE O/P EST MOD 30 MIN: CPT | Performed by: NURSE PRACTITIONER

## 2022-03-09 RX ORDER — ATORVASTATIN CALCIUM 80 MG/1
80 TABLET, FILM COATED ORAL DAILY
Qty: 90 TABLET | Refills: 1 | Status: SHIPPED | OUTPATIENT
Start: 2022-03-09 | End: 2022-07-06 | Stop reason: SDUPTHER

## 2022-03-09 RX ORDER — LEVOTHYROXINE SODIUM 0.1 MG/1
TABLET ORAL
Qty: 90 TABLET | Refills: 1 | Status: SHIPPED | OUTPATIENT
Start: 2022-03-09 | End: 2022-07-06 | Stop reason: SDUPTHER

## 2022-03-09 RX ORDER — FAMOTIDINE 20 MG/1
1 TABLET, FILM COATED ORAL 2 TIMES DAILY
COMMUNITY
Start: 2021-12-13 | End: 2022-03-09

## 2022-03-09 RX ORDER — PANTOPRAZOLE SODIUM 20 MG/1
20 TABLET, DELAYED RELEASE ORAL 2 TIMES DAILY
Qty: 180 TABLET | Refills: 0 | Status: SHIPPED | OUTPATIENT
Start: 2022-03-09 | End: 2022-05-20

## 2022-03-09 RX ORDER — GLIMEPIRIDE 1 MG/1
1 TABLET ORAL
Qty: 90 TABLET | Refills: 1 | Status: SHIPPED | OUTPATIENT
Start: 2022-03-09 | End: 2022-04-29 | Stop reason: SDUPTHER

## 2022-03-09 ASSESSMENT — PATIENT HEALTH QUESTIONNAIRE - PHQ9
5. POOR APPETITE OR OVEREATING: 0
6. FEELING BAD ABOUT YOURSELF - OR THAT YOU ARE A FAILURE OR HAVE LET YOURSELF OR YOUR FAMILY DOWN: 2
SUM OF ALL RESPONSES TO PHQ QUESTIONS 1-9: 13
2. FEELING DOWN, DEPRESSED OR HOPELESS: 3
4. FEELING TIRED OR HAVING LITTLE ENERGY: 3
SUM OF ALL RESPONSES TO PHQ QUESTIONS 1-9: 13
SUM OF ALL RESPONSES TO PHQ9 QUESTIONS 1 & 2: 6
7. TROUBLE CONCENTRATING ON THINGS, SUCH AS READING THE NEWSPAPER OR WATCHING TELEVISION: 0
3. TROUBLE FALLING OR STAYING ASLEEP: 1
SUM OF ALL RESPONSES TO PHQ QUESTIONS 1-9: 13
1. LITTLE INTEREST OR PLEASURE IN DOING THINGS: 3
8. MOVING OR SPEAKING SO SLOWLY THAT OTHER PEOPLE COULD HAVE NOTICED. OR THE OPPOSITE, BEING SO FIGETY OR RESTLESS THAT YOU HAVE BEEN MOVING AROUND A LOT MORE THAN USUAL: 1
SUM OF ALL RESPONSES TO PHQ QUESTIONS 1-9: 13
9. THOUGHTS THAT YOU WOULD BE BETTER OFF DEAD, OR OF HURTING YOURSELF: 0

## 2022-03-09 NOTE — PATIENT INSTRUCTIONS
Personalized Preventive Plan for Aretha Booker - 3/9/2022  Medicare offers a range of preventive health benefits. Some of the tests and screenings are paid in full while other may be subject to a deductible, co-insurance, and/or copay. Some of these benefits include a comprehensive review of your medical history including lifestyle, illnesses that may run in your family, and various assessments and screenings as appropriate. After reviewing your medical record and screening and assessments performed today your provider may have ordered immunizations, labs, imaging, and/or referrals for you. A list of these orders (if applicable) as well as your Preventive Care list are included within your After Visit Summary for your review. Other Preventive Recommendations:    · A preventive eye exam performed by an eye specialist is recommended every 1-2 years to screen for glaucoma; cataracts, macular degeneration, and other eye disorders. · A preventive dental visit is recommended every 6 months. · Try to get at least 150 minutes of exercise per week or 10,000 steps per day on a pedometer . · Order or download the FREE \"Exercise & Physical Activity: Your Everyday Guide\" from [employer]. Call 3-848.234.7329 or search [employer] online. · You need 0476-6861 mg of calcium and 6600-8359 IU of vitamin D per day. It is possible to meet your calcium requirement with diet alone, but a vitamin D supplement is usually necessary to meet this goal.  · When exposed to the sun, use a sunscreen that protects against both UVA and UVB radiation with an SPF of 30 or greater. Reapply every 2 to 3 hours or after sweating, drying off with a towel, or swimming. · Always wear a seat belt when traveling in a car. Always wear a helmet when riding a bicycle or motorcycle.

## 2022-03-09 NOTE — PROGRESS NOTES
Medicare Annual Wellness Visit and 3-month follow up of chronic diseases with medication refills    Robert Shae is here for Medicare AWV and 3-month follow up Depression, Anxiety, Diabetes, Hypertension, Hyperlipidemia, Hypothyroidism, stage III chronic kidney disease, insomnia. Riki Angel \"Deepak\" is accompanied by her , Mirza Camilo today. He has a list of medications that need refills. Assessment & Plan   Riki Angel was seen today for medicare awv. Diagnoses and all orders for this visit:    Medicare annual wellness visit, subsequent    Severe obesity (BMI 35.0-39. 9) with comorbidity (HCC)    Moderate episode of recurrent major depressive disorder ([facility] [address]  -Uncontrolled  -Start vilazodone HCl 10 & 20 MG KIT; Take as directed on kit. Take with food  -Stop Cymbalta    Type 2 diabetes mellitus without complication, without long-term current use of insulin (HCC)  -Not at goal of hemoglobin A1C ,7.0  -Hemoglobin A1c 7.2 (up from 6.6 at last appointment 4 months ago). - Continue glimepiride (AMARYL) 1 MG tablet; Take 1 tablet by mouth every morning (before breakfast)  - Continue Jardiance 10 mg daily.  - POCT glycosylated hemoglobin (Hb A1C)    Stage 3 chronic kidney disease, unspecified whether stage 3a or 3b CKD (HCC)  -Stable  -Followed by Dr.Sharad Kat Snyder    Class 2 severe obesity due to excess calories with serious comorbidity and body mass index (BMI) of 38.0 to 38.9 in adult ([facility] [address]  - 2 lb gain in 4 months  -Strongly encourage patient to continue to work towards weight loss. Anxiety  -No change  -Continue buspirone (BuSpar) 10 mg 3 times a day. -Continue ALPRAZOLAM 0.5 MG, as needed for panic attack(s)    Essential hypertension, benign  -Controlled  -Followed by cardiologist, Dr. Agustin Crocker  - Continue metoprolol succinate (Toprol-XL) 50 mg once daily  -Continue Lasix 20 mg once daily  - Comprehensive Metabolic Panel, Fasting;  Future    Hypothyroidism, unspecified type  -Stable  -Continue levothyroxine (SYNTHROID) 100 MCG tablet; TAKE ONE TABLET BY MOUTH DAILY  -T4, Free; Future  - TSH; Future    Hyperlipidemia, unspecified hyperlipidemia type  -Stable  -Continue Lipitor 80 mg daily  -Continue Zetia 10 mg  - Lipid, Fasting; Future  - CK; Future  - atorvastatin (LIPITOR) 80 MG tablet; Take 1 tablet by mouth daily    Primary insomnia  -Stable  -Continue trazodone 150 mg nightly    Vitamin D deficiency  -Stable  -Continue vitamin D 50,000 units every 7 days  - Vitamin D 25 Hydroxy; Future    Gastroesophageal reflux disease without esophagitis  -Uncontrolled  -Start pantoprazole (PROTONIX) 20 MG tablet; Take 1 tablet by mouth 2 times daily  Dispense: 180 tablet; Refill: 0  -Stop Pepcid 20 mg twice daily  -Stop omeprazole 20 mg daily       Recommendations for Preventive Services Due: see orders and patient instructions/AVS.  Recommended screening schedule for the next 5-10 years is provided to the patient in written form: see Patient Instructions/AVS.     Return in 3 months (on 6/9/2022) for 3-months for chronic disease management AND 1 year for Medicare Annual Wellness Visit . Subjective   The following chronic problems were also addressed today: Depression, Anxiety, Diabetes, Hypertension, Hyperlipidemia, Insomnia, Hypothyroidism, stage III chronic kidney disease, insomnia, GERD    HPI   Treatment Adherence:   Medication compliance:  compliant all of the time  Diet compliance:  compliant most of the time  Weight trend: 2 lb gain in 4 months  Current exercise: no regular exercise  Barriers: impairment:  physical: oxygen (6 liters nasal cannula) dependant secondary to lung disease     Depression and anxiety  Patient contributes increase in depressive mood and increased anxiety to her 2 daughters being diagnosed with COBOS disease recently.   She is taking Cymbalta 120 mg daily (max dose), BuSpar mg 3 times daily and Xanax 0.5 mg 3 times daily as needed- She typically takes Xanax twice daily, 3 times only when really anxious.      PHQ Scores 3/9/2022 11/17/2021 1/11/2021 12/11/2020 9/17/2020 4/10/2018 3/1/2016   PHQ2 Score 6 4 2 0 6 0 2   PHQ9 Score 13 10 2 0 12 0 2     We discussed depression is not well controlled and has worsened since last visit 4 months ago, we decided together that she would stop Cymbalta as she is taking currently taking the max dose and start Viibryd starter pack. Diabetes Mellitus Type 2  Current medications: Glimepiride  1 mg daily before breakfast, Jardiance 10 mg daily  Home blood sugar records: Checks once daily- good, \" never above 130\"  Any episodes of hypoglycemia? no  Eye exam current (within one year): Had diabetes retinal exam within the past year. Due for annual acuity examination /possible new glasses- 2 years since last visit. Tobacco history: She  reports that she has never smoked. She has never used smokeless tobacco.   Daily Aspirin? Yes  Known diabetic complications: none    Component Ref Range & Units 10/13/21 1406 6/17/21 0914 3/9/21 1445 9/28/20 1721 7/7/20 0911 3/23/20 1103   Hemoglobin A1C % 6.6  7.1  7.0 R, CM  6.5 R, CM  6.8 High  R, CM  6.7 R, CM        Ref Range & Units 3/18/21 1426    Microalbumin, Random Urine <2.0 mg/dL 2.80 High     Creatinine, Ur 28.0 - 259.0 mg/dL 157.9    Microalbumin Creatinine Ratio 0.0 - 30.0 mg/g 17.7         Hypertension  Current medication: metoprolol (Toprol-XL) 50 mg once daily, Lasix 20 mg daily. (Lisinopril 20 mg once daily discontinued by cardiologist, Dr. Genna Sacks)  Home blood pressure monitoring: Yes - Consistently under 136/85. She is adherent to a low sodium diet. Patient denies chest pain, shortness of breath, headache, lightheadedness, blurred vision, peripheral edema, palpitations, dry cough and fatigue. Denies antihypertensive medication side effects.     Hyperlipidemia  No new myalgias or GI upset on atorvastatin (Lipitor) 80 mg, Zetia 10 mg daily.   She is adherent to diet low in saturated fats most of the time.        Lab Results       Component                Value               Date                       LABA1C                      6.6                 10/13/2021                 LABA1C                      7.1                 06/17/2021                 LABA1C                      7.0                 03/09/2021            Lab Results       Component                Value               Date                       LABMICR                   YES                 08/11/2021                 CREATININE              1.6 (H)            08/11/2021            Lab Results       Component                Value               Date                       ALT                             14                   08/11/2021                 AST                            15                   08/11/2021            Lab Results       Component                Value               Date                       CHOL                         210 (H)           03/10/2020                 TRIG                          172 (H)            03/10/2020                 HDL                           82 (H)              03/09/2021                 LDLCALC                  88                    03/09/2021              Insomnia  Patient denies difficulty falling asleep or staying asleep with taking Trazodone 150 mg nightly. Interstitial lung disease  Followed by Dr. Pham Campos. Currently on continuous 6 liters of oxygen via nasal cannula. She has previously been prescribed Azithromycin to be taken as needed by Pulmonologist     GERD   Patient reports frequent heartburn with certain foods despite taking taking omeprazole 20 mg once daily and Pepcid 20 mg twice daily. Discussed starting Protonix 20 mg twice daily, and discontinuing omeprazole and Pepcid; patient and her  are in agreement to this plan. Review of Systems   Constitutional: Negative for activity change, appetite change, fatigue and unexpected weight change.    Eyes: Negative for visual disturbance. Respiratory: Positive for shortness of breath (chronic). Negative for apnea, cough and wheezing. Cardiovascular: Negative for chest pain, palpitations and leg swelling. Gastrointestinal: Negative for diarrhea, nausea and vomiting. Endocrine: Negative for polydipsia, polyphagia and polyuria. Genitourinary: Negative. Musculoskeletal: Negative for arthralgias and myalgias. Skin: Negative. Neurological: Negative for dizziness, weakness, light-headedness and headaches. Psychiatric/Behavioral: Positive for dysphoric mood. Negative for self-injury, sleep disturbance and suicidal ideas. The patient is nervous/anxious. Patient's complete Health Risk Assessment and screening values have been reviewed and are found in Flowsheets. The following problems were reviewed today and where indicated follow up appointments were made and/or referrals ordered.     Positive Risk Factor Screenings with Interventions:    Fall Risk:  2 or more falls in past year?: (!) yes  Fall with injury in past year?: no     Fall Risk Interventions:    · Home safety tips provided  · Home exercises provided to promote strength and balance     Depression:  PHQ-2 Score: 6  PHQ-9 Total Score: 13    Severity:1-4 = minimal depression, 5-9 = mild depression, 10-14 = moderate depression, 15-19 = moderately severe depression, 20-27 = severe depression    Depression Interventions:  · Regular exercise recommended- 3-5 times per week, 30-45 minutes per session  · Medication adjusted- Stopped Cybalta and started Viibryd--follow-up in 1 month            General Health and ACP:  General  In general, how would you say your health is?: Fair  In the past 7 days, have you experienced any of the following: New or Increased Pain, New or Increased Fatigue, Loneliness, Social Isolation, Stress or Anger?: (!) Yes (sometimes stress)  Select all that apply: (!) Stress,Social Isolation  Do you get the social and emotional support that you need?: Yes  Do you have a Living Will?: Yes    Advance Directives     Power of  Living Will ACP-Advance Directive ACP-Power of     Not on File Filed on 08/02/16 Filed Not on File      General Health Risk Interventions:  · Stress: patient's comments regarding reasons for stress and/or anger: daughters were both diagnosed with COBOS  · Anxiety with chronic shortness of breath    Health Habits/Nutrition:     Physical Activity: Inactive    Days of Exercise per Week: 0 days    Minutes of Exercise per Session: 0 min     Have you lost any weight without trying in the past 3 months?: No  Body mass index: (!) 38  Have you seen the dentist within the past year?: Yes    Health Habits/Nutrition Interventions:  · Inadequate physical activity:  patient is not ready to increase his/her physical activity level at this time  · Nutritional issues:  educational materials for healthy, well-balanced diet provided, educational materials to promote weight loss provided    Hearing/Vision:  Do you or your family notice any trouble with your hearing that hasn't been managed with hearing aids?: No  Do you have difficulty driving, watching TV, or doing any of your daily activities because of your eyesight?: (!) Yes (reading)  Have you had an eye exam within the past year?: (!) No  No exam data present    Hearing/Vision Interventions:  · Vision concerns:  patient encouraged to make appointment with his/her eye specialist for vision check.  Goes to [address] yearly for diabetic retinal exam.    Safety:  Do you have working smoke detectors?: Yes  Do you have any tripping hazards - loose or unsecured carpets or rugs?: No  Do you have any tripping hazards - clutter in doorways, halls, or stairs?: No  Do you have either shower bars, grab bars, non-slip mats or non-slip surfaces in your shower or bathtub?: (!) No  Do all of your stairways have a railing or banister?: Yes  Do you always fasten your seatbelt when you are in a car?: Yes    Safety Interventions:  · Home safety tips provided  · Uses shower chair. Does not take baths    ADLs:  In the past 7 days, did you need help from others to perform any of the following everyday activities: Eating, dressing, grooming, bathing, toileting, or walking/balance?: No  In the past 7 days, did you need help from others to take care of any of the following: Laundry, housekeeping, banking/finances, shopping, telephone use, food preparation, transportation, or taking medications?: (!) Yes  Select all that apply: (!) Transportation,Food Preparation,Taking Medications,Banking/Finances,Laundry,Housekeeping,Shopping    ADL Interventions:  · Patient unable to complete tasks above,  is supportive and attends to the activities patient is unable to complete. ·   Results for POC orders placed in visit on 03/09/22   POCT glycosylated hemoglobin (Hb A1C)   Result Value Ref Range    Hemoglobin A1C 7.2 %           Objective   Vitals:    03/09/22 1232   BP: 130/70   Site: Left Upper Arm   Position: Sitting   Cuff Size: Medium Adult   Pulse: 80   Temp: 98.2 °F (36.8 °C)   SpO2: 96%   Weight: 207 lb 12.8 oz (94.3 kg)   Height: 5' 2\" (1.575 m)      Body mass index is 38.01 kg/m². Allergies   Allergen Reactions    Tramadol      Avoid because Pt is on Tegretol.  Penicillins Rash     Patient states she can take Keflex     Prior to Visit Medications    Medication Sig Taking?  Authorizing Provider   famotidine (PEPCID) 20 MG tablet Take 1 tablet by mouth 2 times daily Yes Historical Provider, MD   atorvastatin (LIPITOR) 80 MG tablet Take 1 tablet by mouth daily Yes CESAR Lentz CNP   glimepiride (AMARYL) 1 MG tablet Take 1 tablet by mouth every morning (before breakfast) Yes CESAR Lentz CNP   levothyroxine (SYNTHROID) 100 MCG tablet TAKE ONE TABLET BY MOUTH DAILY Yes CESAR Lentz CNP   TEGRETOL 200 MG tablet TAKE ONE TABLET BY MOUTH TWICE A DAY Yes CESAR Lentz CNP furosemide (LASIX) 20 MG tablet Take 1 tablet by mouth daily Yes La Nena Anne MD   empagliflozin (JARDIANCE) 10 MG tablet Take 1 tablet by mouth daily Yes CESAR Rinaldi CNP   vitamin D (D3-50) 90805 UNIT CAPS TAKE ONE CAPSULE BY MOUTH EVERY 7 DAYS Yes CESAR Rinaldi CNP   busPIRone (BUSPAR) 10 MG tablet TAKE 1 TABLET BY MOUTH 3 TIMES A DAY Yes CESAR Rinaldi CNP   ezetimibe (ZETIA) 10 MG tablet TAKE ONE TABLET BY MOUTH DAILY Yes CESAR Rinaldi CNP   traZODone (DESYREL) 150 MG tablet Take 1 tablet by mouth nightly. Take 30-60 minutes prior to bedtime. Yes CESAR Rinaldi CNP   metoprolol succinate (TOPROL XL) 50 MG extended release tablet Take one tablet by mouth daily Yes La Nena Anne MD   omeprazole (PRILOSEC) 20 MG capsule Take 1 capsule by mouth daily Yes Alexander Chandler MD   gabapentin (NEURONTIN) 100 MG capsule Take 100 mg by mouth 3 times daily. Historical Provider, MD   loratadine (CLARITIN) 10 MG tablet Take 10 mg by mouth daily  Historical Provider, MD   Blood Glucose Monitoring Suppl (Héctor Ports) w/Device KIT Test once daily  CESAR Rinaldi CNP   blood glucose test strips (ONETOUCH VERIO) strip 1 each by In Vitro route daily Use to test once daily or as needed  CESAR Rinaldi CNP   OXYGEN Inhale 6 L/day into the lungs  CESAR Rinaldi CNP   vitamin B-12 (CYANOCOBALAMIN) 500 MCG tablet Take 500 mcg by mouth daily  Historical Provider, MD   predniSONE (DELTASONE) 5 MG tablet Take 30 mg by mouth daily   Historical Provider, MD   magnesium (MAGNESIUM-OXIDE) 250 MG TABS tablet Take 250 mg by mouth daily  Historical Provider, MD   aspirin 81 MG tablet Take 81 mg by mouth daily.     Historical Provider, MD [place] (Including outside providers/suppliers regularly involved in providing care):   Patient Care Team:  CESAR Rinaldi CNP as PCP - General (Nurse Practitioner)  CESAR Rinaldi CNP as PCP - [address] [place] [address] [address] Elsa Plata, RN as Registered Nurse (General Surgery)  Meseret Brito RN as Nurse Navigator    Reviewed and updated this visit:  Tobacco  Allergies  Meds  Problems  Med Hx  Surg Hx  Soc Hx  Fam Hx

## 2022-03-16 DIAGNOSIS — I10 ESSENTIAL HYPERTENSION, BENIGN: ICD-10-CM

## 2022-03-17 DIAGNOSIS — E55.9 VITAMIN D DEFICIENCY: ICD-10-CM

## 2022-03-17 DIAGNOSIS — I10 ESSENTIAL HYPERTENSION, BENIGN: ICD-10-CM

## 2022-03-17 DIAGNOSIS — E78.5 HYPERLIPIDEMIA, UNSPECIFIED HYPERLIPIDEMIA TYPE: ICD-10-CM

## 2022-03-17 DIAGNOSIS — E03.9 HYPOTHYROIDISM, UNSPECIFIED TYPE: ICD-10-CM

## 2022-03-17 LAB
A/G RATIO: 1.6 (ref 1.1–2.2)
ALBUMIN SERPL-MCNC: 4.6 G/DL (ref 3.4–5)
ALP BLD-CCNC: 98 U/L (ref 40–129)
ALT SERPL-CCNC: 17 U/L (ref 10–40)
ANION GAP SERPL CALCULATED.3IONS-SCNC: 17 MMOL/L (ref 3–16)
AST SERPL-CCNC: 20 U/L (ref 15–37)
BILIRUB SERPL-MCNC: <0.2 MG/DL (ref 0–1)
BUN BLDV-MCNC: 23 MG/DL (ref 7–20)
CALCIUM SERPL-MCNC: 10.1 MG/DL (ref 8.3–10.6)
CHLORIDE BLD-SCNC: 95 MMOL/L (ref 99–110)
CHOLESTEROL, FASTING: 190 MG/DL (ref 0–199)
CO2: 29 MMOL/L (ref 21–32)
CREAT SERPL-MCNC: 0.9 MG/DL (ref 0.6–1.2)
GFR AFRICAN AMERICAN: >60
GFR NON-AFRICAN AMERICAN: >60
GLUCOSE FASTING: 161 MG/DL (ref 70–99)
HDLC SERPL-MCNC: 98 MG/DL (ref 40–60)
LDL CHOLESTEROL CALCULATED: 69 MG/DL
POTASSIUM SERPL-SCNC: 4.6 MMOL/L (ref 3.5–5.1)
SODIUM BLD-SCNC: 141 MMOL/L (ref 136–145)
T4 FREE: 1.1 NG/DL (ref 0.9–1.8)
TOTAL CK: 40 U/L (ref 26–192)
TOTAL PROTEIN: 7.4 G/DL (ref 6.4–8.2)
TRIGLYCERIDE, FASTING: 115 MG/DL (ref 0–150)
TSH SERPL DL<=0.05 MIU/L-ACNC: 0.51 UIU/ML (ref 0.27–4.2)
VITAMIN D 25-HYDROXY: 57 NG/ML
VLDLC SERPL CALC-MCNC: 23 MG/DL

## 2022-03-18 RX ORDER — METOPROLOL SUCCINATE 50 MG/1
TABLET, EXTENDED RELEASE ORAL
Qty: 90 TABLET | Refills: 1 | Status: SHIPPED | OUTPATIENT
Start: 2022-03-18 | End: 2022-05-17 | Stop reason: SDUPTHER

## 2022-03-18 NOTE — TELEPHONE ENCOUNTER
Received refill request for Metoprolol from [facility].      Last OV: 02/17/2021 w/ TARAK     Last Refill: 02/17/2021 #90 w/ 4 refills     Next Appointment: 05/17/2022 w/ MARY

## 2022-03-24 ENCOUNTER — OFFICE VISIT (OUTPATIENT)
Dept: PRIMARY CARE CLINIC | Age: 72
End: 2022-03-24
Payer: MEDICARE

## 2022-03-24 VITALS
HEART RATE: 81 BPM | SYSTOLIC BLOOD PRESSURE: 118 MMHG | DIASTOLIC BLOOD PRESSURE: 68 MMHG | BODY MASS INDEX: 37.31 KG/M2 | TEMPERATURE: 97 F | WEIGHT: 204 LBS | OXYGEN SATURATION: 97 %

## 2022-03-24 DIAGNOSIS — F41.9 ANXIETY: ICD-10-CM

## 2022-03-24 DIAGNOSIS — F33.1 MODERATE EPISODE OF RECURRENT MAJOR DEPRESSIVE DISORDER (HCC): Primary | ICD-10-CM

## 2022-03-24 PROCEDURE — 1123F ACP DISCUSS/DSCN MKR DOCD: CPT | Performed by: NURSE PRACTITIONER

## 2022-03-24 PROCEDURE — 99214 OFFICE O/P EST MOD 30 MIN: CPT | Performed by: NURSE PRACTITIONER

## 2022-03-24 PROCEDURE — 1036F TOBACCO NON-USER: CPT | Performed by: NURSE PRACTITIONER

## 2022-03-24 PROCEDURE — G8417 CALC BMI ABV UP PARAM F/U: HCPCS | Performed by: NURSE PRACTITIONER

## 2022-03-24 PROCEDURE — G8484 FLU IMMUNIZE NO ADMIN: HCPCS | Performed by: NURSE PRACTITIONER

## 2022-03-24 PROCEDURE — G8427 DOCREV CUR MEDS BY ELIG CLIN: HCPCS | Performed by: NURSE PRACTITIONER

## 2022-03-24 PROCEDURE — 3017F COLORECTAL CA SCREEN DOC REV: CPT | Performed by: NURSE PRACTITIONER

## 2022-03-24 PROCEDURE — 4040F PNEUMOC VAC/ADMIN/RCVD: CPT | Performed by: NURSE PRACTITIONER

## 2022-03-24 PROCEDURE — G8399 PT W/DXA RESULTS DOCUMENT: HCPCS | Performed by: NURSE PRACTITIONER

## 2022-03-24 PROCEDURE — 1090F PRES/ABSN URINE INCON ASSESS: CPT | Performed by: NURSE PRACTITIONER

## 2022-03-24 ASSESSMENT — ENCOUNTER SYMPTOMS
SHORTNESS OF BREATH: 1
APNEA: 0
WHEEZING: 0
CHEST TIGHTNESS: 0

## 2022-03-24 NOTE — PROGRESS NOTES
3/24/2022    Chief Complaint   Patient presents with    Depression     f/u to medication. Pt states that she just started the new medication about 1.5 weeks ago. No negative changes       Valentin Bourne is a 71 y.o. female, presents today for medication follow up. Patient is accompanied by her , Cleve Maciel today. HPI   Depression and Anxiety  Patient is here for medication follow up. Gavin Chung) started [employer] pack approximately 1.5 weeks ago and Cymbalta gradually discontinued. Denies problems with weaning off Cymbalta. She is also taking Buspar 10 mg 2 times daily (doesn't get up early enough to take 3 doses) and Alprazolam 0.5 mg twice a day. Reports an occasional panic attack, occurring very infrequently. Javier Mercado states she has not noticed a difference in mood or depression since she starting Viibryd 1.5 weeks ago. Adding she hasn't noticed worsening of depression as well. Will reassess depression after taking Viibryd a couple more weeks; Patient's , Cleve Maciel to send [facility] message with depression update. Will need medication refill in approximately 2 weeks; Will increase dosage if needed. Patient and Cleve Maciel agree to plan. She denies homicidal and suicidal ideation and intent ideation and intent. Review of Systems   Constitutional: Negative for activity change, appetite change, fatigue and unexpected weight change. Respiratory: Positive for shortness of breath (chronic). Negative for apnea, chest tightness and wheezing. Cardiovascular: Negative for chest pain, palpitations and leg swelling. Neurological: Negative for dizziness, tremors, syncope, weakness, light-headedness and headaches. Psychiatric/Behavioral: Positive for dysphoric mood (no change). Negative for agitation, decreased concentration, hallucinations, self-injury, sleep disturbance and suicidal ideas. The patient is not nervous/anxious and is not hyperactive.         Current Outpatient Medications on File Prior to Visit   Medication Sig Dispense Refill    metoprolol succinate (TOPROL XL) 50 MG extended release tablet TAKE ONE TABLET BY MOUTH DAILY 90 tablet 1    Vilazodone HCl 10 & 20 MG KIT Take as directed on kit. Take with food 1 kit 0    atorvastatin (LIPITOR) 80 MG tablet Take 1 tablet by mouth daily 90 tablet 1    glimepiride (AMARYL) 1 MG tablet Take 1 tablet by mouth every morning (before breakfast) 90 tablet 1    levothyroxine (SYNTHROID) 100 MCG tablet TAKE ONE TABLET BY MOUTH DAILY 90 tablet 1    pantoprazole (PROTONIX) 20 MG tablet Take 1 tablet by mouth 2 times daily 180 tablet 0    TEGRETOL 200 MG tablet TAKE ONE TABLET BY MOUTH TWICE A  tablet 1    furosemide (LASIX) 20 MG tablet Take 1 tablet by mouth daily 90 tablet 4    empagliflozin (JARDIANCE) 10 MG tablet Take 1 tablet by mouth daily 90 tablet 1    vitamin D (D3-50) 46444 UNIT CAPS TAKE ONE CAPSULE BY MOUTH EVERY 7 DAYS 12 capsule 3    busPIRone (BUSPAR) 10 MG tablet TAKE 1 TABLET BY MOUTH 3 TIMES A  tablet 0    ezetimibe (ZETIA) 10 MG tablet TAKE ONE TABLET BY MOUTH DAILY 90 tablet 1    traZODone (DESYREL) 150 MG tablet Take 1 tablet by mouth nightly. Take 30-60 minutes prior to bedtime. 90 tablet 1    loratadine (CLARITIN) 10 MG tablet Take 10 mg by mouth daily      Blood Glucose Monitoring Suppl (ONETOUCH VERIO) w/Device KIT Test once daily 1 kit 0    blood glucose test strips (ONETOUCH VERIO) strip 1 each by In Vitro route daily Use to test once daily or as needed 100 each 3    OXYGEN Inhale 6 L/day into the lungs      vitamin B-12 (CYANOCOBALAMIN) 500 MCG tablet Take 500 mcg by mouth daily      predniSONE (DELTASONE) 5 MG tablet Take 30 mg by mouth daily       magnesium (MAGNESIUM-OXIDE) 250 MG TABS tablet Take 250 mg by mouth daily      aspirin 81 MG tablet Take 81 mg by mouth daily. No current facility-administered medications on file prior to visit.       Allergies   Allergen Reactions    Tramadol      Avoid because Pt is on Tegretol.  Penicillins Rash     Patient states she can take Keflex     Past Medical History:   Diagnosis Date    Abnormal stress test *Aug.1, 2016    Coronary angiography by Dr. Srinivasa Wahl revealed a 60% stenosis of the LAD and  a 50% mid circumflex     Arthritis     Chronic headaches     Eczema     Fibromyalgia     GERD (gastroesophageal reflux disease)     Hyperlipidemia     Hypertension     Impaired fasting glucose     Interstitial [facility] disease ([facility] 2016    Dr. Anita Guillermo Interstitial lung disease ([facility]     Migraine headache     Osteopenia DEXA - March, 2016    Lumbar T score +1.7 and Hip -1.0 FRAX 7.6/0.6    Osteopenia DEXA-Dec., 2017    Lumbar T score 2.3 and hip T score 0.1    Other screening mammogram July 8, 2013    Benign    Other screening mammogram *March 4, 2016    Benign    Oxygen dependent     O2 @ 3.5 L CONTINUOUS    Pulmonary hypertension ([facility]     MILD    Retention of urine, unspecified     Screening mammogram, encounter for *February 11, 2020    Benign    Seizure disorder [facility])     Thyroid disease     Type 2 diabetes mellitus without complication ([facility] *AND.53, 2020    FBS - 137 and A 1 C of 6.7    Vitamin D deficiency Oct., 2015    Level - 17    Wears glasses    z  Past Surgical History:   Procedure Laterality Date    ABDOMINOPLASTY  Jan., 2012    Dr. Eder Lawrence Bilateral     BRONCHOSCOPY  **Sept.16, 2016    Dr. Jean Dominguez  09/16/2016    BRONCHOSCOPY  *Febr.24, 2017    Dr. Ada Shah - No endobronchial lesions    CARDIAC CATHETERIZATION  Oct., 2004    LAD 50%, circumflex normal, R coronary normal, LVEF 60%.  CARDIAC CATHETERIZATION  *Sept.24, 2018    Dr. Kannan Tavares - [facility] - mild pulmonary hypertension    COLONOSCOPY  Oct., 2003 ( 2013 )    Dr. Justin Herring - Negative - repeat 2013    COLONOSCOPY  Jan., 2014 ( 2024 )    Dr. Hi Flores - moderate diverticulosis.     COSMETIC SURGERY inner thighs    HYSTERECTOMY      LIPOSUCTION Bilateral     knees    HI LAPAROSCOPY W TOT HYSTERECTUTERUS <=250 GRAM  W TUBE/OVARY N/A 2018    ROBOTIC TOTAL HYSTERECTOMY AND BILATERAL SALPINGO-OOPHORECTOMY performed by Wendy Schulz MD at /[facility]  *2018    Dr. Joceline Payne  Oct., 2015    Dr. Sarah Beth Hand - left 2nd and 3rd    TOTAL HIP ARTHROPLASTY  2013    Dr. Lord Elizabeth - [facility]  *2018    Dr. Marcelo Camarena - right    TUBAL LIGATION        Social History     Tobacco Use    Smoking status: Never Smoker    Smokeless tobacco: Never Used   Substance Use Topics    Alcohol use: Yes     Comment: occasional      Family History   Problem Relation Age of Onset    Heart Disease Father 76        , ASHD, S/P CAB, NIDDM, hyperlipidemia.  Cancer Mother 58        , lung cancer.  Ovarian Cancer Maternal Grandmother         age unknown    Ovarian Cancer Paternal Grandmother         age unknown    Breast Cancer Maternal Aunt         age 48        Vitals:    22 1431   BP: 118/68   Pulse: 81   Temp: 97 °F (36.1 °C)   TempSrc: Infrared   SpO2: 97%   Weight: 204 lb (92.5 kg)     Estimated body mass index is 37.31 kg/m² as calculated from the following:    Height as of 3/9/22: 5' 2\" (1.575 m). Weight as of this encounter: 204 lb (92.5 kg). Physical Exam  Vitals and nursing note reviewed. Constitutional:       General: She is not in acute distress. Appearance: Normal appearance. She is well-developed. She is obese. Cardiovascular:      Rate and Rhythm: Normal rate and regular rhythm. Pulses: Normal pulses. Heart sounds: Normal heart sounds. Pulmonary:      Effort: Pulmonary effort is normal.      Breath sounds: Normal breath sounds. Skin:     General: Skin is warm. Neurological:      General: No focal deficit present. Mental Status: She is alert and oriented to person, place, and time.    Psychiatric: Attention and Perception: Attention and perception normal.         Mood and Affect: Mood and affect normal.         Speech: Speech normal.         Behavior: Behavior normal. Behavior is cooperative. Thought Content: Thought content normal. Thought content is not paranoid or delusional. Thought content does not include homicidal or suicidal ideation. Thought content does not include homicidal or suicidal plan. Cognition and Memory: Cognition normal.         ASSESSMENT/PLAN:  1. Moderate episode of recurrent major depressive disorder (HCC)  - Currently active, no change  - Continue Viibryd 20 mg daily  - Send [facility] message with depression (anxiety) update in 2 weeks- will need refill or dosage increase. 2. Anxiety  - Stable. - See #1      Return in about 3 months (around 7/1/2022) for 3 month follow up and medication refills for chronic diseases. .     Discussed use, benefit, and side effects of prescribed medications. Patient's questions answered and concerns addressed. Patient agrees to plan of care. My scheduled days in the office reviewed with patient, and same day appointments available. Encouraged to use [facility] for communication as needed. Electronically signed by CESAR Taylor CNP on 3/24/2022 at 4:11 PM       This dictation was generated by voice recognition computer software. Although all attempts are made to edit the dictation for accuracy, there may be errors in the transcription that are not intended.

## 2022-04-06 ENCOUNTER — PATIENT MESSAGE (OUTPATIENT)
Dept: PRIMARY CARE CLINIC | Age: 72
End: 2022-04-06

## 2022-04-06 DIAGNOSIS — F41.9 ANXIETY: ICD-10-CM

## 2022-04-06 DIAGNOSIS — F33.1 MODERATE EPISODE OF RECURRENT MAJOR DEPRESSIVE DISORDER (HCC): Primary | ICD-10-CM

## 2022-04-06 RX ORDER — VILAZODONE HYDROCHLORIDE 20 MG/1
20 TABLET ORAL DAILY
Qty: 90 TABLET | Refills: 0 | Status: SHIPPED | OUTPATIENT
Start: 2022-04-06 | End: 2022-07-06

## 2022-04-06 RX ORDER — VILAZODONE HYDROCHLORIDE 20 MG/1
20 TABLET ORAL DAILY
COMMUNITY
End: 2022-04-06 | Stop reason: SDUPTHER

## 2022-04-06 NOTE — TELEPHONE ENCOUNTER
1. Moderate episode of recurrent major depressive disorder (HCC)  - Improving  - Continue vilazodone HCl (VIIBRYD) 20 MG TABS; Take 1 tablet by mouth daily  Dispense: 90 tablet;  Refill: 0

## 2022-04-06 NOTE — TELEPHONE ENCOUNTER
From: Rebecca Larry  To: Jorge Roche  Sent: 4/6/2022 8:07 AM EDT  Subject: Vincent Pang, looks like the viibryd is working well, please go ahead and send script in for the 20mg viibryd as we discussed.  We will be back in town on Saturday and able to pick it up

## 2022-04-17 DIAGNOSIS — F51.01 PRIMARY INSOMNIA: ICD-10-CM

## 2022-04-18 RX ORDER — TRAZODONE HYDROCHLORIDE 150 MG/1
TABLET ORAL
Qty: 90 TABLET | Refills: 0 | Status: SHIPPED | OUTPATIENT
Start: 2022-04-18 | End: 2022-07-06 | Stop reason: SDUPTHER

## 2022-04-18 NOTE — TELEPHONE ENCOUNTER
Recent Visits  Date Type Provider Dept   03/24/22 Office Visit [facility], APRN - CNP [facility]   03/09/22 Office Visit [facility], APRN - CNP [facility]   01/06/22 Office Visit [facility], APRN - CNP [facility]   11/17/21 Office Visit [facility], APRN - CNP [facility]   10/13/21 Office Visit [facility], APRN - CNP [facility]   09/13/21 Office Visit [facility], APRN - CNP [facility]   06/17/21 Office Visit [facility], APRN - CNP [facility]   05/12/21 Office Visit [facility], APRN - CNP [facility]   01/04/21 Office Visit Ashley Spence, APRN (Old) [facility]   11/11/20 Office Visit Birdie [place], APRN - CNP [facility]   Showing recent visits within past 540 days with a meds authorizing provider and meeting all other requirements  Future Appointments  Date Type Provider Dept   07/06/22 Appointment Birdie Shetty, APRN - CNP [facility]   Showing future appointments within next 150 days with a meds authorizing provider and meeting all other requirements         This is a pt of [facility] out of the office this week. Please sign for pt's prescription. Discharged

## 2022-04-24 DIAGNOSIS — E11.9 TYPE 2 DIABETES MELLITUS WITHOUT COMPLICATION, WITHOUT LONG-TERM CURRENT USE OF INSULIN (HCC): ICD-10-CM

## 2022-04-24 DIAGNOSIS — E78.5 HYPERLIPIDEMIA, UNSPECIFIED HYPERLIPIDEMIA TYPE: ICD-10-CM

## 2022-04-25 RX ORDER — GLIMEPIRIDE 1 MG/1
TABLET ORAL
Qty: 90 TABLET | Refills: 1 | OUTPATIENT
Start: 2022-04-25

## 2022-04-25 RX ORDER — EZETIMIBE 10 MG/1
TABLET ORAL
Qty: 90 TABLET | Refills: 1 | Status: SHIPPED | OUTPATIENT
Start: 2022-04-25 | End: 2022-07-06 | Stop reason: SDUPTHER

## 2022-04-25 NOTE — TELEPHONE ENCOUNTER
Recent Visits  Date Type Provider Dept   03/24/22 Office Visit Murry Goodell, APRN - CNP [facility]   03/09/22 Office Visit Murry Goodell, APRN - CNP [facility]   01/06/22 Office Visit Murry Goodell, APRN - CNP [facility]   11/17/21 Office Visit Murry Goodell, APRN - CNP [facility]   10/13/21 Office Visit Murry Goodell, APRN - CNP [facility]   09/13/21 Office Visit Murry Goodell, APRN - [facility]NP [facility]   06/17/21 Office Visit Murry Goodell, APRN - [facility]NP [facility]   05/12/21 Office Visit Murry Goodell, APRN - [facility]NP [facility]   01/04/21 Office Visit CESAR Mackey (Old) [facility]   11/11/20 Office Visit Murry Goodell, APRN - CNP [facility]   Showing recent visits within past 540 days with a meds authorizing provider and meeting all other requirements  Future Appointments  Date Type Provider Dept   07/06/22 Appointment Murry Goodell, APRN - CNP [facility]   Showing future appointments within next 150 days with a meds authorizing provider and meeting all other requirements

## 2022-04-26 DIAGNOSIS — E11.9 TYPE 2 DIABETES MELLITUS WITHOUT COMPLICATION, WITHOUT LONG-TERM CURRENT USE OF INSULIN (HCC): ICD-10-CM

## 2022-04-26 RX ORDER — GLIMEPIRIDE 1 MG/1
TABLET ORAL
Qty: 90 TABLET | Refills: 1 | OUTPATIENT
Start: 2022-04-26

## 2022-04-26 NOTE — TELEPHONE ENCOUNTER
Recent Visits  Date Type Provider Dept   03/24/22 Office Visit Arben Dutton, APRN - CNP [facility]   03/09/22 Office Visit Arben [place], APRN - CNP [facility]   01/06/22 Office Visit Arben Gautier, APRN - CNP [facility]   11/17/21 Office Visit Arben Gautier, APRN - [facility]   10/13/21 Office Visit Arben Gautier, APRN - [facility]   [facility]   06/17/21 Office Visit Arebn Gautier, APRN - CNP [facility]   05/12/21 Office Visit Arben Gautier, APRN - [facility]NP [facility]   01/04/21 Office Visit Savannah Mena, APRN (Old) [facility]   11/11/20 Office Visit [facility], APRN - CNP [facility]   Showing recent visits within past 540 days with a meds authorizing provider and meeting all other requirements  Future Appointments  Date Type Provider Dept   07/06/22 Appointment Arben Dutton, APRN - CNP [facility]   Showing future appointments within next 150 days with a meds authorizing provider and meeting all other requirements     glimepiride (AMARYL) 1 MG tablet 90 tablet 1 3/9/2022 6/7/2022    Sig - Route: Take 1 tablet by mouth every morning (before breakfast) - Oral    Sent to pharmacy as: Glimepiride 1 MG Oral Tablet (AMARYL)    E-Prescribing Status: Receipt confirmed by pharmacy (3/9/2022  1:21 PM EST)      Refill too soon.  Pt will obtain future refills at her appt on 7/6/22

## 2022-04-28 DIAGNOSIS — E11.9 TYPE 2 DIABETES MELLITUS WITHOUT COMPLICATION, WITHOUT LONG-TERM CURRENT USE OF INSULIN (HCC): ICD-10-CM

## 2022-04-28 RX ORDER — GLIMEPIRIDE 1 MG/1
TABLET ORAL
Qty: 90 TABLET | Refills: 1 | OUTPATIENT
Start: 2022-04-28

## 2022-04-29 ENCOUNTER — PATIENT MESSAGE (OUTPATIENT)
Dept: PRIMARY CARE CLINIC | Age: 72
End: 2022-04-29

## 2022-04-29 DIAGNOSIS — E11.9 TYPE 2 DIABETES MELLITUS WITHOUT COMPLICATION, WITHOUT LONG-TERM CURRENT USE OF INSULIN (HCC): ICD-10-CM

## 2022-04-29 RX ORDER — GLIMEPIRIDE 1 MG/1
1 TABLET ORAL
Qty: 90 TABLET | Refills: 0 | OUTPATIENT
Start: 2022-04-29 | End: 2022-07-06 | Stop reason: SDUPTHER

## 2022-04-29 NOTE — TELEPHONE ENCOUNTER
From: Royal Malcolm  To: Deloris Dukes  Sent: 4/29/2022 4:35 PM EDT  Subject: Glimepiride Script    Jordi Malone, I called [email or web address] last Sunday to get above refilled and there was no refills to it. Yanna sent notice to your office, nothing was responded. I talked to BODØ on Monday about my Victoza and it was taken care of. I checked yesterday with [facility] [employer] and they sent another notice about script. There has been no response yet. I checked My Chart and it states that you sent a new script on 3/9. [employer] has no information on this. Alex Ordonez will be out of this medication after Sunday, I need to get a script called over [facility]. I even drove to the office this afternoon, at 4:10 the office was already locked and everyone gone. I need immediate help with this.     Wei Cisneros

## 2022-05-17 ENCOUNTER — OFFICE VISIT (OUTPATIENT)
Dept: CARDIOLOGY CLINIC | Age: 72
End: 2022-05-17
Payer: MEDICARE

## 2022-05-17 VITALS
DIASTOLIC BLOOD PRESSURE: 70 MMHG | OXYGEN SATURATION: 97 % | HEIGHT: 62 IN | SYSTOLIC BLOOD PRESSURE: 122 MMHG | HEART RATE: 68 BPM | WEIGHT: 206.7 LBS | BODY MASS INDEX: 38.04 KG/M2

## 2022-05-17 DIAGNOSIS — E78.2 MIXED HYPERLIPIDEMIA: ICD-10-CM

## 2022-05-17 DIAGNOSIS — R07.9 CHEST PAIN, UNSPECIFIED TYPE: ICD-10-CM

## 2022-05-17 DIAGNOSIS — I25.10 ATHEROSCLEROSIS OF NATIVE CORONARY ARTERY OF NATIVE HEART WITHOUT ANGINA PECTORIS: Primary | ICD-10-CM

## 2022-05-17 DIAGNOSIS — I10 ESSENTIAL HYPERTENSION, BENIGN: ICD-10-CM

## 2022-05-17 PROCEDURE — 1123F ACP DISCUSS/DSCN MKR DOCD: CPT | Performed by: INTERNAL MEDICINE

## 2022-05-17 PROCEDURE — 4040F PNEUMOC VAC/ADMIN/RCVD: CPT | Performed by: INTERNAL MEDICINE

## 2022-05-17 PROCEDURE — 3017F COLORECTAL CA SCREEN DOC REV: CPT | Performed by: INTERNAL MEDICINE

## 2022-05-17 PROCEDURE — G8399 PT W/DXA RESULTS DOCUMENT: HCPCS | Performed by: INTERNAL MEDICINE

## 2022-05-17 PROCEDURE — 93000 ELECTROCARDIOGRAM COMPLETE: CPT | Performed by: INTERNAL MEDICINE

## 2022-05-17 PROCEDURE — 1090F PRES/ABSN URINE INCON ASSESS: CPT | Performed by: INTERNAL MEDICINE

## 2022-05-17 PROCEDURE — G8427 DOCREV CUR MEDS BY ELIG CLIN: HCPCS | Performed by: INTERNAL MEDICINE

## 2022-05-17 PROCEDURE — 1036F TOBACCO NON-USER: CPT | Performed by: INTERNAL MEDICINE

## 2022-05-17 PROCEDURE — G8417 CALC BMI ABV UP PARAM F/U: HCPCS | Performed by: INTERNAL MEDICINE

## 2022-05-17 PROCEDURE — 99214 OFFICE O/P EST MOD 30 MIN: CPT | Performed by: INTERNAL MEDICINE

## 2022-05-17 RX ORDER — METOPROLOL SUCCINATE 50 MG/1
TABLET, EXTENDED RELEASE ORAL
Qty: 90 TABLET | Refills: 4 | Status: SHIPPED | OUTPATIENT
Start: 2022-05-17

## 2022-05-17 RX ORDER — B-COMPLEX WITH VITAMIN C
100 TABLET ORAL DAILY
COMMUNITY

## 2022-05-17 NOTE — PROGRESS NOTES
[address]  Cardiac Consult     Referring Provider:  CESAR Couch CNP     Chief Complaint   Patient presents with    1 Year Follow Up    Coronary Artery Disease    Hypertension    Hyperlipidemia        History of Present Illness:   71 y.o. female formally followed by Dr. Lana Gómez seen in f/u for mild CAD, hyperlipidemia and HTN. She is on chronic O2 for interstitial lung disease. She was admitted 9/2020 with atypical chest pain. Stress myoview was normal. Another stress obtained 11/2021 for preop and was normal.  She has significant GI issues with GERD on protonix. Over the past month she has had 4-5 episodes of chest pain at rest followed by vomiting. Center of chest. Associated nausea. Past Medical History:   has a past medical history of Abnormal stress test, Arthritis, Chronic headaches, Eczema, Fibromyalgia, GERD (gastroesophageal reflux disease), Hyperlipidemia, Hypertension, Impaired fasting glucose, Interstitial lung disease ([address] Interstitial lung disease ([address] Migraine headache, Osteopenia, Osteopenia, Other screening mammogram, Other screening mammogram, Oxygen dependent, Pulmonary hypertension ([address] Retention of urine, unspecified, Screening mammogram, encounter for, Seizure disorder ([address] Thyroid disease, Type 2 diabetes mellitus without complication ([address] Vitamin D deficiency, and Wears glasses. Surgical History:   has a past surgical history that includes Appendectomy; Tubal ligation; Colonoscopy (Oct., 2003 ( 2013 )); Abdominoplasty (Jan., 2012); Cosmetic surgery; Liposuction (Bilateral); Cardiac catheterization (Oct., 2004); Breast reduction surgery (Bilateral); Total hip arthroplasty (July 31, 2013); Colonoscopy (Jan., 2014 ( 2024 )); Toe Surgery (Oct., 2015); bronchoscopy (**Sept.16, 2016); bronchoscopy (09/16/2016); bronchoscopy (*Febr.24, 2017); Total hip arthroplasty (*April 13, 2018);  Cardiac catheterization (*Sept.24, 2018); neto and bso (cervix removed) (*Nov., 2018); pr laparoscopy w tot hysterectuterus <=250 gram  w tube/ovary (N/A, 11/16/2018); and Hysterectomy. Social History:  Social History     Tobacco Use    Smoking status: Never Smoker    Smokeless tobacco: Never Used   Substance Use Topics    Alcohol use: Yes     Comment: occasional        Family History:  family history includes Breast Cancer in her maternal aunt; Cancer (age of onset: 58) in her mother; Heart Disease (age of onset: 76) in her father; Ovarian Cancer in her maternal grandmother and paternal grandmother. Allergies:  Tramadol and Penicillins     Home Medications:  Prior to Visit Medications    Medication Sig Taking?  Authorizing Provider   Zinc 100 MG TABS Take 100 mg by mouth daily Yes Historical Provider, MD   glimepiride (AMARYL) 1 MG tablet Take 1 tablet by mouth every morning (before breakfast) Yes CESAR Joyce CNP   ezetimibe (ZETIA) 10 MG tablet TAKE ONE TABLET BY MOUTH DAILY Yes La Nena Kapoor MD   traZODone (DESYREL) 150 MG tablet TAKE 1 TABLET BY MOUTH 30 TO 60 MINUTES PRIOR TO BEDTIME Yes La Nena Kapoor MD   vilazodone HCl (VIIBRYD) 20 MG TABS Take 1 tablet by mouth daily Yes CESAR Joyce CNP   metoprolol succinate (TOPROL XL) 50 MG extended release tablet TAKE ONE TABLET BY MOUTH DAILY Yes Tho Olivares MD   atorvastatin (LIPITOR) 80 MG tablet Take 1 tablet by mouth daily Yes CESAR Joyce CNP   levothyroxine (SYNTHROID) 100 MCG tablet TAKE ONE TABLET BY MOUTH DAILY Yes CESAR Joyce CNP   pantoprazole (PROTONIX) 20 MG tablet Take 1 tablet by mouth 2 times daily Yes CESAR Pena CNP   TEGRETOL 200 MG tablet TAKE ONE TABLET BY MOUTH TWICE A DAY Yes CESAR Joyce CNP   furosemide (LASIX) 20 MG tablet Take 1 tablet by mouth daily Yes Tho Olivares MD   empagliflozin (JARDIANCE) 10 MG tablet Take 1 tablet by mouth daily Yes CESAR Joyce CNP   busPIRone (BUSPAR) 10 MG tablet TAKE 1 TABLET BY MOUTH 3 TIMES A DAY [facility]   Blood Glucose Monitoring Suppl (ONETOUCH VERIO) w/Device KIT Test once daily Yes [facility]   blood glucose test strips (ONETOUCH VERIO) strip 1 each by In Vitro route daily Use to test once daily or as needed Yes Arben [place], APRN - CNP   OXYGEN Inhale 6 L/day into the lungs Yes [facility], APRN - CNP   vitamin B-12 (CYANOCOBALAMIN) 500 MCG tablet Take 500 mcg by mouth daily Yes Historical Provider, MD   predniSONE (DELTASONE) 5 MG tablet Take 5 mg by mouth daily  Yes Historical Provider, MD   magnesium (MAGNESIUM-OXIDE) 250 MG TABS tablet Take 250 mg by mouth daily Yes Historical Provider, MD   aspirin 81 MG tablet Take 81 mg by mouth daily. Yes Historical Provider, MD       [x] Medications and dosages reviewed. ROS:  [x]Full ROS obtained and negative except as mentioned in HPI      Physical Examination:    Vitals:    05/17/22 0900   BP: 122/70   Site: Right Upper Arm   Position: Sitting   Cuff Size: Medium Adult   Pulse: 68   SpO2: 97%   Weight: 206 lb 11.2 oz (93.8 kg)   Height: 5' 2\" (1.575 m)        · GENERAL: elderly female on O2 in NAD  · NEUROLOGICAL: Alert and oriented  · PSYCH: Calm affect  · SKIN: Warm and dry, No visible rash,   · EYES: Pupils equal and round, Sclera non-icteric,   · HENT:  External ears and nose unremarkable, mucus membranes moist  · MUSCULOSKELETAL: Normal cephalic, neck supple  · CAROTID: Normal upstroke, no bruits  · CARDIAC: JVP normal, Normal PMI, regular rate and rhythm, normal S1S2, no murmur, rub, or gallop  · RESPIRATORY: Normal respiratory effort, clear to auscultation bilaterally  · EXTREMITIES: No LE edema  · GASTROINTESTINAL: normal bowel sounds, soft, non-tender, No hepatomegaly     Testing below reviewed    ECHO 3/2020  Summary   -Overall left ventricular systolic function is mildly depressed .   -Ejection fraction is visually estimated to be 50-55%. E/e'= 9.3 cm.   -There is mild concentric left ventricular hypertrophy. -There is mild diffuse hypokinesis.   -Indeterminate diastolic function.   -The tip of the anterior leaflet appears to be slightly bulbous, and is   suggestive of some myxomatous change and minimal prolapse. -IVC size is normal (<2.1cm) and collapses > 50% with respiration consistent   with normal RA pressure (3mmHg). MYOVIEW 9/2020   Summary    Normal LV size with hyperdynamic systolic function.    Left ventricular ejection fraction of 76%.    There is normal isotope uptake at stress and rest.    There is no evidence of myocardial ischemia or scar. CARDIAC CATH 2016     60% LAD at D1 bifurcation--IFR 0.97,  FFR 0.91  50% mid cx  Normal LV FXN    EKG:  NSR, no ischemic change      ASSESSMENT:    CHEST PAIN:  Suspect GI but she has known moderate CAD. New over past month  Discussed options.   I do not think we should go straight to cath  Repeat stress even though she had one 6 months ago as symptoms are new    CAD:  Non obstructive CAD 2016  Normal Myoview 11/2021  Repeat stress as above    Hyperlipidemia:  Well controlled   LDL=69  Continue  lipitor and zetia    HTN:  /70 (Site: Right Upper Arm, Position: Sitting, Cuff Size: Medium Adult)   Pulse 68   Ht 5' 2\" (1.575 m)   Wt 206 lb 11.2 oz (93.8 kg)   SpO2 97%   BMI 37.81 kg/m²   Well controlled  Continue current therapy      PLAN:  Stress myoview  Will review with her at time of testing  F/u 3 months  Cath if ischemic    Thank you for allowing me to participate in the care of this individual.      Jakub Nelson M.D., [facility]

## 2022-05-20 DIAGNOSIS — K21.9 GASTROESOPHAGEAL REFLUX DISEASE WITHOUT ESOPHAGITIS: ICD-10-CM

## 2022-05-20 RX ORDER — PANTOPRAZOLE SODIUM 20 MG/1
TABLET, DELAYED RELEASE ORAL
Qty: 180 TABLET | Refills: 0 | Status: SHIPPED | OUTPATIENT
Start: 2022-05-20 | End: 2022-07-06 | Stop reason: SDUPTHER

## 2022-05-25 ENCOUNTER — HOSPITAL ENCOUNTER (OUTPATIENT)
Dept: NON INVASIVE DIAGNOSTICS | Age: 72
Discharge: HOME | End: 2022-05-25
Payer: MEDICARE

## 2022-05-25 DIAGNOSIS — R07.9 CHEST PAIN, UNSPECIFIED TYPE: ICD-10-CM

## 2022-05-25 DIAGNOSIS — I25.10 ATHEROSCLEROSIS OF NATIVE CORONARY ARTERY OF NATIVE HEART WITHOUT ANGINA PECTORIS: ICD-10-CM

## 2022-05-25 LAB
LV EF: 64 %
LVEF MODALITY: NORMAL

## 2022-05-25 PROCEDURE — 3430000000 HC RX DIAGNOSTIC RADIOPHARMACEUTICAL: Performed by: INTERNAL MEDICINE

## 2022-05-25 PROCEDURE — 78452 HT MUSCLE IMAGE SPECT MULT: CPT | Performed by: INTERNAL MEDICINE

## 2022-05-25 PROCEDURE — 6360000002 HC RX W HCPCS: Performed by: INTERNAL MEDICINE

## 2022-05-25 PROCEDURE — A9502 TC99M TETROFOSMIN: HCPCS | Performed by: INTERNAL MEDICINE

## 2022-05-25 PROCEDURE — 93017 CV STRESS TEST TRACING ONLY: CPT | Performed by: INTERNAL MEDICINE

## 2022-05-25 RX ORDER — AMINOPHYLLINE DIHYDRATE 25 MG/ML
100 INJECTION, SOLUTION INTRAVENOUS ONCE
Status: COMPLETED | OUTPATIENT
Start: 2022-05-25 | End: 2022-05-25

## 2022-05-25 RX ADMIN — REGADENOSON 0.4 MG: 0.08 INJECTION, SOLUTION INTRAVENOUS at 11:54

## 2022-05-25 RX ADMIN — TETROFOSMIN 10 MILLICURIE: 1.38 INJECTION, POWDER, LYOPHILIZED, FOR SOLUTION INTRAVENOUS at 10:48

## 2022-05-25 RX ADMIN — AMINOPHYLLINE 100 MG: 25 INJECTION, SOLUTION INTRAVENOUS at 12:08

## 2022-05-25 RX ADMIN — TETROFOSMIN 30 MILLICURIE: 1.38 INJECTION, POWDER, LYOPHILIZED, FOR SOLUTION INTRAVENOUS at 12:03

## 2022-05-25 NOTE — PROGRESS NOTES
Instructed on Lexiscan Stress Test Procedure including possible side effects/ adverse reactions. Patient verbalizes  understanding and denies having any questions . See [address] [place] [address] Cardiology

## 2022-07-03 DIAGNOSIS — F41.9 ANXIETY: ICD-10-CM

## 2022-07-03 DIAGNOSIS — F33.1 MODERATE EPISODE OF RECURRENT MAJOR DEPRESSIVE DISORDER (HCC): ICD-10-CM

## 2022-07-06 ENCOUNTER — OFFICE VISIT (OUTPATIENT)
Dept: PRIMARY CARE CLINIC | Age: 72
End: 2022-07-06
Payer: MEDICARE

## 2022-07-06 VITALS
HEART RATE: 68 BPM | OXYGEN SATURATION: 97 % | BODY MASS INDEX: 39.05 KG/M2 | DIASTOLIC BLOOD PRESSURE: 80 MMHG | WEIGHT: 212.2 LBS | SYSTOLIC BLOOD PRESSURE: 124 MMHG | HEIGHT: 62 IN

## 2022-07-06 DIAGNOSIS — E03.9 HYPOTHYROIDISM, UNSPECIFIED TYPE: ICD-10-CM

## 2022-07-06 DIAGNOSIS — I10 ESSENTIAL HYPERTENSION, BENIGN: ICD-10-CM

## 2022-07-06 DIAGNOSIS — F41.9 ANXIETY: ICD-10-CM

## 2022-07-06 DIAGNOSIS — F33.1 MODERATE EPISODE OF RECURRENT MAJOR DEPRESSIVE DISORDER (HCC): ICD-10-CM

## 2022-07-06 DIAGNOSIS — E78.5 HYPERLIPIDEMIA, UNSPECIFIED HYPERLIPIDEMIA TYPE: ICD-10-CM

## 2022-07-06 DIAGNOSIS — Z12.31 ENCOUNTER FOR SCREENING MAMMOGRAM FOR MALIGNANT NEOPLASM OF BREAST: ICD-10-CM

## 2022-07-06 DIAGNOSIS — Z78.0 POSTMENOPAUSAL: ICD-10-CM

## 2022-07-06 DIAGNOSIS — F51.01 PRIMARY INSOMNIA: ICD-10-CM

## 2022-07-06 DIAGNOSIS — E11.9 TYPE 2 DIABETES MELLITUS WITHOUT COMPLICATION, WITHOUT LONG-TERM CURRENT USE OF INSULIN (HCC): Primary | ICD-10-CM

## 2022-07-06 DIAGNOSIS — K21.9 GASTROESOPHAGEAL REFLUX DISEASE WITHOUT ESOPHAGITIS: ICD-10-CM

## 2022-07-06 LAB
CREATININE URINE POCT: NORMAL
HBA1C MFR BLD: 7.7 %
MICROALBUMIN/CREAT 24H UR: NORMAL MG/G{CREAT}
MICROALBUMIN/CREAT UR-RTO: NORMAL

## 2022-07-06 PROCEDURE — 1090F PRES/ABSN URINE INCON ASSESS: CPT | Performed by: NURSE PRACTITIONER

## 2022-07-06 PROCEDURE — G8417 CALC BMI ABV UP PARAM F/U: HCPCS | Performed by: NURSE PRACTITIONER

## 2022-07-06 PROCEDURE — G8427 DOCREV CUR MEDS BY ELIG CLIN: HCPCS | Performed by: NURSE PRACTITIONER

## 2022-07-06 PROCEDURE — 1123F ACP DISCUSS/DSCN MKR DOCD: CPT | Performed by: NURSE PRACTITIONER

## 2022-07-06 PROCEDURE — 82044 UR ALBUMIN SEMIQUANTITATIVE: CPT | Performed by: NURSE PRACTITIONER

## 2022-07-06 PROCEDURE — 1036F TOBACCO NON-USER: CPT | Performed by: NURSE PRACTITIONER

## 2022-07-06 PROCEDURE — 99214 OFFICE O/P EST MOD 30 MIN: CPT | Performed by: NURSE PRACTITIONER

## 2022-07-06 PROCEDURE — 3017F COLORECTAL CA SCREEN DOC REV: CPT | Performed by: NURSE PRACTITIONER

## 2022-07-06 PROCEDURE — 3051F HG A1C>EQUAL 7.0%<8.0%: CPT | Performed by: NURSE PRACTITIONER

## 2022-07-06 PROCEDURE — 83036 HEMOGLOBIN GLYCOSYLATED A1C: CPT | Performed by: NURSE PRACTITIONER

## 2022-07-06 PROCEDURE — G8399 PT W/DXA RESULTS DOCUMENT: HCPCS | Performed by: NURSE PRACTITIONER

## 2022-07-06 PROCEDURE — 2022F DILAT RTA XM EVC RTNOPTHY: CPT | Performed by: NURSE PRACTITIONER

## 2022-07-06 RX ORDER — EZETIMIBE 10 MG/1
10 TABLET ORAL NIGHTLY
Qty: 90 TABLET | Refills: 1 | Status: SHIPPED | OUTPATIENT
Start: 2022-07-06 | End: 2022-10-24 | Stop reason: SDUPTHER

## 2022-07-06 RX ORDER — ASCORBIC ACID 500 MG
1 TABLET ORAL DAILY
COMMUNITY

## 2022-07-06 RX ORDER — ATORVASTATIN CALCIUM 80 MG/1
80 TABLET, FILM COATED ORAL DAILY
Qty: 90 TABLET | Refills: 1 | Status: SHIPPED | OUTPATIENT
Start: 2022-07-06 | End: 2022-10-24 | Stop reason: SDUPTHER

## 2022-07-06 RX ORDER — PANTOPRAZOLE SODIUM 20 MG/1
TABLET, DELAYED RELEASE ORAL
Qty: 180 TABLET | Refills: 0 | Status: SHIPPED | OUTPATIENT
Start: 2022-07-06 | End: 2022-10-24 | Stop reason: SDUPTHER

## 2022-07-06 RX ORDER — ALPRAZOLAM 0.5 MG/1
TABLET ORAL
Qty: 270 TABLET | Refills: 0 | Status: SHIPPED | OUTPATIENT
Start: 2022-07-06 | End: 2022-10-04

## 2022-07-06 RX ORDER — BUSPIRONE HYDROCHLORIDE 10 MG/1
TABLET ORAL
Qty: 270 TABLET | Refills: 0 | Status: SHIPPED | OUTPATIENT
Start: 2022-07-06 | End: 2022-10-24

## 2022-07-06 RX ORDER — MIRTAZAPINE 15 MG/1
TABLET, FILM COATED ORAL
Qty: 30 TABLET | Refills: 2 | Status: SHIPPED | OUTPATIENT
Start: 2022-07-06 | End: 2022-10-10

## 2022-07-06 RX ORDER — TRAZODONE HYDROCHLORIDE 150 MG/1
TABLET ORAL
Qty: 90 TABLET | Refills: 0 | Status: SHIPPED
Start: 2022-07-06 | End: 2022-07-06 | Stop reason: ALTCHOICE

## 2022-07-06 RX ORDER — ALPRAZOLAM 0.5 MG/1
0.5 TABLET ORAL 3 TIMES DAILY PRN
Qty: 90 TABLET | Refills: 0 | Status: CANCELLED | OUTPATIENT
Start: 2022-07-06 | End: 2022-08-05

## 2022-07-06 RX ORDER — GLIMEPIRIDE 1 MG/1
1 TABLET ORAL
Qty: 90 TABLET | Refills: 0 | Status: SHIPPED | OUTPATIENT
Start: 2022-07-06 | End: 2022-10-24

## 2022-07-06 RX ORDER — VILAZODONE HYDROCHLORIDE 40 MG/1
40 TABLET ORAL DAILY
Qty: 90 TABLET | Refills: 0 | Status: SHIPPED | OUTPATIENT
Start: 2022-07-06 | End: 2022-10-03

## 2022-07-06 RX ORDER — LEVOTHYROXINE SODIUM 0.1 MG/1
TABLET ORAL
Qty: 90 TABLET | Refills: 1 | Status: SHIPPED | OUTPATIENT
Start: 2022-07-06 | End: 2022-10-24 | Stop reason: SDUPTHER

## 2022-07-06 ASSESSMENT — ENCOUNTER SYMPTOMS
WATER BRASH: 0
HEARTBURN: 0
BLURRED VISION: 0
COUGH: 0
WHEEZING: 0
SHORTNESS OF BREATH: 1
GLOBUS SENSATION: 0
VISUAL CHANGE: 0
ORTHOPNEA: 0
NAUSEA: 0
BELCHING: 0
ABDOMINAL PAIN: 0

## 2022-07-06 ASSESSMENT — PATIENT HEALTH QUESTIONNAIRE - PHQ9
2. FEELING DOWN, DEPRESSED OR HOPELESS: 2
4. FEELING TIRED OR HAVING LITTLE ENERGY: 0
6. FEELING BAD ABOUT YOURSELF - OR THAT YOU ARE A FAILURE OR HAVE LET YOURSELF OR YOUR FAMILY DOWN: 0
SUM OF ALL RESPONSES TO PHQ QUESTIONS 1-9: 12
1. LITTLE INTEREST OR PLEASURE IN DOING THINGS: 2
SUM OF ALL RESPONSES TO PHQ QUESTIONS 1-9: 14
5. POOR APPETITE OR OVEREATING: 0
8. MOVING OR SPEAKING SO SLOWLY THAT OTHER PEOPLE COULD HAVE NOTICED. OR THE OPPOSITE, BEING SO FIGETY OR RESTLESS THAT YOU HAVE BEEN MOVING AROUND A LOT MORE THAN USUAL: 3
9. THOUGHTS THAT YOU WOULD BE BETTER OFF DEAD, OR OF HURTING YOURSELF: 2
7. TROUBLE CONCENTRATING ON THINGS, SUCH AS READING THE NEWSPAPER OR WATCHING TELEVISION: 3
3. TROUBLE FALLING OR STAYING ASLEEP: 2
SUM OF ALL RESPONSES TO PHQ QUESTIONS 1-9: 14
10. IF YOU CHECKED OFF ANY PROBLEMS, HOW DIFFICULT HAVE THESE PROBLEMS MADE IT FOR YOU TO DO YOUR WORK, TAKE CARE OF THINGS AT HOME, OR GET ALONG WITH OTHER PEOPLE: 2
SUM OF ALL RESPONSES TO PHQ9 QUESTIONS 1 & 2: 4
SUM OF ALL RESPONSES TO PHQ QUESTIONS 1-9: 14

## 2022-07-06 ASSESSMENT — ANXIETY QUESTIONNAIRES
4. TROUBLE RELAXING: 0
7. FEELING AFRAID AS IF SOMETHING AWFUL MIGHT HAPPEN: 3
6. BECOMING EASILY ANNOYED OR IRRITABLE: 2
IF YOU CHECKED OFF ANY PROBLEMS ON THIS QUESTIONNAIRE, HOW DIFFICULT HAVE THESE PROBLEMS MADE IT FOR YOU TO DO YOUR WORK, TAKE CARE OF THINGS AT HOME, OR GET ALONG WITH OTHER PEOPLE: NOT DIFFICULT AT ALL
1. FEELING NERVOUS, ANXIOUS, OR ON EDGE: 3
2. NOT BEING ABLE TO STOP OR CONTROL WORRYING: 3
5. BEING SO RESTLESS THAT IT IS HARD TO SIT STILL: 0
GAD7 TOTAL SCORE: 14
3. WORRYING TOO MUCH ABOUT DIFFERENT THINGS: 3

## 2022-07-06 ASSESSMENT — COLUMBIA-SUICIDE SEVERITY RATING SCALE - C-SSRS
2. HAVE YOU ACTUALLY HAD ANY THOUGHTS OF KILLING YOURSELF?: NO
6. HAVE YOU EVER DONE ANYTHING, STARTED TO DO ANYTHING, OR PREPARED TO DO ANYTHING TO END YOUR LIFE?: NO
1. WITHIN THE PAST MONTH, HAVE YOU WISHED YOU WERE DEAD OR WISHED YOU COULD GO TO SLEEP AND NOT WAKE UP?: NO

## 2022-07-06 NOTE — PROGRESS NOTES
7/6/2022    Chief Complaint   Patient presents with    Follow-up     3 month medication refills for chronic diseases. Charis Wolfe is a 71 y.o. female, presents today for 3 month follow up of Diabetes, hypertension, hyperlipidemia, GERD, hypothyroidism, depression, anxiety, insomnia      HPI               Hypertension  The problem is controlled. Associated symptoms include shortness of breath (chronic). Pertinent negatives include no blurred vision, chest pain, malaise/fatigue, neck pain, orthopnea, palpitations, peripheral edema or PND. Risk factors for coronary artery disease include diabetes mellitus, dyslipidemia, sedentary lifestyle, obesity and post-menopausal state. Treatments tried: metoprolol. The current treatment provides significant improvement. Compliance problems include exercise. Hyperlipidemia  The problem is controlled (LDL <70 (69 in March 2022). Recent lipid tests were reviewed and are normal. Associated symptoms include shortness of breath (chronic). Pertinent negatives include no chest pain, focal sensory loss, focal weakness, leg pain or myalgias. Current antihyperlipidemic treatment includes statins (lipitor). The current treatment provides significant improvement of lipids. Compliance problems include adherence to exercise. Risk factors for coronary artery disease include diabetes mellitus, dyslipidemia, hypertension, a sedentary lifestyle, post-menopausal and obesity. Gastroesophageal Reflux  She reports no abdominal pain, no belching, no chest pain, no coughing, no early satiety, no globus sensation, no heartburn, no nausea, no water brash or no wheezing. The problem occurs rarely. The problem has been resolved. Pertinent negatives include no fatigue or weight loss. She has tried a PPI (Protonix 20 mg BID) for the symptoms. The treatment provided significant relief. Diabetes  She presents for her follow-up diabetic visit. She has type 2 diabetes mellitus.  Her disease course has been worsening. There are no hypoglycemic associated symptoms. Pertinent negatives for diabetes include no blurred vision, no chest pain, no fatigue, no foot paresthesias, no foot ulcerations, no polydipsia, no polyphagia, no polyuria, no visual change, no weakness and no weight loss. Symptoms are stable. Risk factors for coronary artery disease include dyslipidemia, diabetes mellitus, obesity, hypertension, post-menopausal and sedentary lifestyle. Current diabetic treatment includes oral agent (dual therapy) and diet (Glimepiride, Jardiance). She is compliant with treatment most of the time (struggles with diet. Compliant all the time with taking medications). She is following a generally healthy and diabetic diet. Meal planning includes avoidance of concentrated sweets. She never (due to chronic shortness of breath with chronic lung disease) participates in exercise. An ACE inhibitor/angiotensin II receptor blocker is being taken. She sees a podiatrist.Eye exam is current. Hypothyroidism  Patient is taking Levothyroxine 100 mcg consistently on an empty stomach. Denies fatigue, weight gain, weight loss, cold intolerance, heat intolerance, hair loss, dry skin, constipation, diarrhea, edema, anxiety, tremor, palpitations and dysphagia. Depression and Anxiety  Patient is taking Viibryd 20 mg daily for depression and Buspar 10 mg TID for anxiety and Xanax 0.5 mg up to 3 times a day as needed. She typically takes Xanax 2 times a day, and occasionally at bedtime. She reports minimal change in anxiety and depression since switching from Cymbalta to [address] in February 2022. - Will increase dosage from 20 mg to 40 mg daily and follow up in 4 weeks. She denies medication side effects  She denies current suicidal and homicidal ideation.         PHQ Scores 7/6/2022 3/9/2022 11/17/2021 1/11/2021 12/11/2020 9/17/2020 4/10/2018   PHQ2 Score 4 6 4 2 0 6 0   PHQ9 Score 14 13 10 2 0 12 0 Interpretation of Total Score Depression Severity: 10-14 = Moderate depression, 15-19 = Moderately severe depression, 20-27 = Severe depression    PHQ-9  7/6/2022 3/9/2022 11/17/2021   Depression Unable to Assess - - -   Little interest or pleasure in doing things 2 3 2   Feeling down, depressed, or hopeless 2 3 2   Trouble falling or staying asleep, or sleeping too much 2 1 0   Feeling tired or having little energy 0 3 3   Poor appetite or overeating 0 0 0   Feeling bad about yourself - or that you are a failure or have let yourself or your family down 0 2 0   Trouble concentrating on things, such as reading the newspaper or watching television 3 0 3   Moving or speaking so slowly that other people could have noticed. Or the opposite - being so fidgety or restless that you have been moving around a lot more than usual 3 1 0   Thoughts that you would be better off dead, or of hurting yourself in some way 2 0 0   PHQ-2 Score 4 6 4   PHQ-9 Total Score 14 13 10   If you checked off any problems, how difficult have these problems made it for you to do your work, take care of things at home, or get along with other people? 2 - 1       LADI 7 SCORE 7/6/2022   LADI-7 Total Score 14     Interpretation of LADI-7 score:10-14 = moderate anxiety  LADI-7 SCREENING 7/6/2022   Feeling nervous, anxious, or on edge Nearly every day   Not being able to stop or control worrying Nearly every day   Worrying too much about different things Nearly every day   Trouble relaxing Not at all   Being so restless that it is hard to sit still Not at all   Becoming easily annoyed or irritable More than half the days   Feeling afraid as if something awful might happen Nearly every day   LADI-7 Total Score 14   How difficult have these problems made it for you to do your work, take care of things at home, or get along with other people? Not difficult at all       Insomnia   Current medication: Trazodone 150 mg. She reports difficulty falling asleep. Denies difficulty staying asleep, often falling asleep around 2 am. She is sleeping 6-7 hours a night. She feels rested upon waking around 10:30 am.      - Will discontinue Trazodone and start Remeron. Previous lab results from March 2022  Component Ref Range & Units 3/9/22 1327 10/13/21 1406 6/17/21 0914 3/9/21 1445 9/28/20 1721 7/7/20 0911 3/23/20 1103   Hemoglobin A1C % 7.2  6.6  7.1  7.0 R, CM  6.5 R, CM  6.8 High  R, CM  6.7 R, CM      Component Ref Range & Units 3/17/22 1118 3/9/21 1010 1/22/21 0941 3/10/20 1106 7/22/16 10/28/15 1133 8/14/12 0820   Cholesterol, Fasting 0 - 199 mg/dL 190  202 High   176        Triglyceride, Fasting 0 - 150 mg/dL 115  158 High   112        HDL 40 - 60 mg/dL 98 High   82 High   87 High   74 R, CM  90 Abnormal  R  100 High   90 High  R    LDL Calculated <100 mg/dL 69  88  67  102 R  82 R  98  93 R      Component Ref Range & Units 3/17/22 1118 8/11/21 0814 3/18/21 1425 3/9/21 1010 3/9/21 1010 1/22/21 0941 1/22/21 0941 1/22/21 0941   Sodium 136 - 145 mmol/L 141  138  139  146 High      142    Potassium 3.5 - 5.1 mmol/L 4.6  5.0  5.1  5.0     4.6    Chloride 99 - 110 mmol/L 95 Low   100  100  102     99    CO2 21 - 32 mmol/L 29  22  32  26     28    Anion Gap 3 - 16 17 High   16  7  18 High      15    Glucose, Fasting 70 - 99 mg/dL 161 High     143 High         BUN 7 - 20 mg/dL 23 High   48 High   26 High   25 High      28 High     CREATININE 0.6 - 1.2 mg/dL 0.9  1.6 High   1.0  1.1     0.9    GFR Non- >60 >60  32 Abnormal  CM  55 Abnormal  CM  49 Abnormal  CM     >60 CM    Comment: >60 mL/min/1.73m2 EGFR, calc. for ages 25 and older using the   MDRD formula (not corrected for weight), is valid for stable   renal function. GFR  >60 >60  38 Abnormal  CM  >60 CM  59 Abnormal  CM     >60 CM    Comment: Chronic Kidney Disease: less than 60 ml/min/1.73 sq. m.         Kidney Failure: less than 15 ml/min/1.73 sq.m.    Results valid for patients 18 years and older. Calcium 8.3 - 10.6 mg/dL 10.1  9.5  9.5  9.5     9.6    Total Protein 6.4 - 8.2 g/dL 7.4  6.7    7.3       Albumin 3.4 - 5.0 g/dL 4.6  4.2  4.3   4.2    4.4    Albumin/Globulin Ratio 1.1 - 2.2 1.6  1.7   1.4        Total Bilirubin 0.0 - 1.0 mg/dL <0.2  <0.2    <0.2       Alkaline Phosphatase 40 - 129 U/L 98  75    78       ALT 10 - 40 U/L 17  14    14  12      AST 15 - 37 U/L 20  15    19   18       Component Ref Range & Units 3/17/22 1118 10/28/15 1133   TSH 0.27 - 4.20 uIU/mL 0.51  0.83      Component Ref Range & Units 3/17/22 1118 10/28/15 1133   T4 Free 0.9 - 1.8 ng/dL 1.1  1.2        Review of Systems   Constitutional: Negative for activity change, appetite change, fatigue, malaise/fatigue, unexpected weight change and weight loss. Eyes: Negative for blurred vision. Respiratory: Positive for shortness of breath (chronic). Negative for cough and wheezing. Cardiovascular: Negative for chest pain, palpitations, orthopnea, leg swelling and PND. Gastrointestinal: Negative for abdominal pain, heartburn and nausea. Endocrine: Negative for polydipsia, polyphagia and polyuria. Musculoskeletal: Negative for arthralgias, gait problem, myalgias and neck pain. Neurological: Negative for focal weakness, syncope, weakness and light-headedness. Psychiatric/Behavioral: Positive for dysphoric mood and sleep disturbance. Negative for agitation, hallucinations, self-injury and suicidal ideas.        Current Outpatient Medications on File Prior to Visit   Medication Sig Dispense Refill    ascorbic acid (VITAMIN C) 500 MG tablet Take 1 tablet by mouth daily      ALPRAZOLAM PO Take by mouth 3 times daily as needed      pantoprazole (PROTONIX) 20 MG tablet TAKE ONE TABLET BY MOUTH TWICE A  tablet 0    Zinc 100 MG TABS Take 100 mg by mouth daily      metoprolol succinate (TOPROL XL) 50 MG extended release tablet TAKE ONE TAB BY MOUTH ONCE DAILY 90 tablet 4    glimepiride (AMARYL) 1 MG Dr. Ml Tinoco - Shakila Stalker  *2018    Dr. Lizbet Mead - right    TUBAL LIGATION        Social History     Tobacco Use    Smoking status: Never Smoker    Smokeless tobacco: Never Used   Substance Use Topics    Alcohol use: Yes     Comment: occasional      Family History   Problem Relation Age of Onset    Heart Disease Father 76        , ASHD, S/P CAB, NIDDM, hyperlipidemia.  Cancer Mother 58        , lung cancer.  Ovarian Cancer Maternal Grandmother         age unknown    Ovarian Cancer Paternal Grandmother         age unknown   Maria L Spurling Breast Cancer Maternal Aunt         age 48    Other Daughter     Other Daughter         Vitals:    22 0901   BP: 124/80   Site: Left Upper Arm   Position: Sitting   Cuff Size: Medium Adult   Pulse: 68   SpO2: 97%   Weight: 212 lb 3.2 oz (96.3 kg)   Height: 5' 2\" (1.575 m)     Estimated body mass index is 38.81 kg/m² as calculated from the following:    Height as of this encounter: 5' 2\" (1.575 m). Weight as of this encounter: 212 lb 3.2 oz (96.3 kg). Physical Exam  Vitals and nursing note reviewed. Constitutional:       General: She is not in acute distress. Appearance: Normal appearance. She is well-developed. She is obese. Neck:      Vascular: No carotid bruit. Cardiovascular:      Rate and Rhythm: Normal rate and regular rhythm. Pulses: Normal pulses. Dorsalis pedis pulses are 2+ on the right side and 2+ on the left side. Posterior tibial pulses are 2+ on the right side and 2+ on the left side. Heart sounds: Normal heart sounds. Pulmonary:      Effort: Pulmonary effort is normal.      Breath sounds: Normal breath sounds. Comments: Continuous oxygen 6 liters via nasal cannula  Abdominal:      General: Bowel sounds are normal.      Palpations: Abdomen is soft. Musculoskeletal:         General: Normal range of motion. Cervical back: Normal range of motion and neck supple.       Right lower leg: No edema. Left lower leg: No edema. Feet:      Right foot:      Protective Sensation: 10 sites tested. 10 sites sensed. Skin integrity: Skin integrity normal.      Toenail Condition: Right toenails are normal.      Left foot:      Protective Sensation: 10 sites tested. 10 sites sensed. Skin integrity: Skin integrity normal.      Toenail Condition: Left toenails are normal.   Lymphadenopathy:      Cervical: No cervical adenopathy. Skin:     General: Skin is warm. Neurological:      General: No focal deficit present. Mental Status: She is alert and oriented to person, place, and time. Psychiatric:         Attention and Perception: Attention normal.         Mood and Affect: Mood is anxious and depressed. Affect is flat. Speech: Speech normal.         Behavior: Behavior normal.         Thought Content: Thought content normal. Thought content is not paranoid or delusional. Thought content does not include homicidal or suicidal ideation. Thought content does not include homicidal or suicidal plan. Cognition and Memory: Cognition normal.       Diabetic Foot Exam  Order: [id number]   Status: Final result     Visible to patient: Yes (not seen)     Next appt: 08/18/2022 at 08:20 AM in Primary Care ([facility], APRN - CNP)     Dx: Type 2 diabetes mellitus without comp. ..     0 Result Notes     1  Topic           Narrative    Visual inspection:   Deformity/amputation: absent   Skin lesions/pre-ulcerative calluses: absent   Edema: right- negative, left- negative     Sensory exam:   Monofilament sensation: normal   (minimum of 5 random plantar locations tested, avoiding callused areas - > 1 area with absence of sensation is + for neuropathy)   Pulses: normal,      Impression    Normal diabetic foot exam       Last Resulted: 07/06/22               Results for POC orders placed in visit on 07/06/22   POCT microalbumin   Result Value Ref Range    Microalb, Ur 30 mg/L Creatinine Ur POCT 200 mg/dL     Microalbumin Creatinine Ratio <30 mg/g    POCT glycosylated hemoglobin (Hb A1C)   Result Value Ref Range    Hemoglobin A1C 7.7 %     ASSESSMENT/PLAN:  1. Type 2 diabetes mellitus without complication, without long-term current use of insulin ([facility])  - Worsened  - A1C: 7.7% (up from 7.2% 3 months ago)  - Diabetic Foot Exam  - POCT microalbumin  - POCT glycosylated hemoglobin (Hb A1C)  - Continue glimepiride (AMARYL) 1 MG tablet; Take 1 tablet by mouth every morning (before breakfast)  Dispense: 90 tablet; Refill: 0  - Continue empagliflozin (JARDIANCE) 10 MG tablet; Take 1 tablet by mouth daily  Dispense: 90 tablet; Refill: 0    2. Essential hypertension, benign  - Controlled  - Continue Metoprolol  - Follows Cardiologist, Dr. Bina Oh  - Comprehensive Metabolic Panel, Fasting; Future    3. Hyperlipidemia, unspecified hyperlipidemia type  - Controlled  - LDL <70 (68)  - CK; Future  - Lipid, Fasting; Future  - ezetimibe (ZETIA) 10 MG tablet; Take 1 tablet by mouth at bedtime  Dispense: 90 tablet; Refill: 1  - atorvastatin (LIPITOR) 80 MG tablet; Take 1 tablet by mouth daily  Dispense: 90 tablet; Refill: 1    4. Gastroesophageal reflux disease without esophagitis  - Controlled  - Continue pantoprazole (PROTONIX) 20 MG tablet; TAKE ONE TABLET BY MOUTH TWICE A DAY  Dispense: 180 tablet; Refill: 0    5. Anxiety  - Active  LADI-7 score: 14  - Continue busPIRone (BUSPAR) 10 MG tablet; TAKE 1 TABLET BY MOUTH 3 TIMES A DAY  Dispense: 270 tablet; Refill: 0  - Continue Xanax 0.5 mg TID PRN anxiety    6. Moderate episode of recurrent major depressive disorder (HCC)  - Active  - PHQ-9 score: 14 (up from 13)  - Start Viibryd 40 mg daily  - Viibryd dosage increased from 20 mg to 40 mg daily  7. Primary insomnia  - Uncontrolled  - Start mirtazapine (REMERON) 15 MG tablet; Take 1/2 tablet or 1 tablet by mouth 30-60 minutes prior to bedtime  Dispense: 30 tablet; Refill: 2  - Stop Trazodone 150 mg     8. Hypothyroidism, unspecified type  - Stable  - T4, Free; Future  - TSH; Future  - Continue levothyroxine (SYNTHROID) 100 MCG tablet; TAKE ONE TABLET BY MOUTH DAILY  Dispense: 90 tablet; Refill: 1    9. Encounter for screening mammogram for malignant neoplasm of breast  - [facility] DIGITAL SCREEN BILATERAL; Future    10. Postmenopausal  - DEXA BONE DENSITY 2 SITES; Future      Return in about 4 weeks (around 8/3/2022) for depression & 3 mo DM, HTN, Hyperlipidemia, GERD, hypothyroidism, depression, anxiety, insomnia. Discussed use, benefit, and side effects of prescribed medications. Patient's questions answered and concerns addressed. Patient agrees to plan of care. My scheduled days in the office reviewed with patient, and same day appointments available. Encouraged to use [employer] for communication as needed. Electronically signed by CESAR Hollis CNP on 7/6/2022 at 10:43 AM       This dictation was generated by voice recognition computer software. Although all attempts are made to edit the dictation for accuracy, there may be errors in the transcription that are not intended.

## 2022-07-06 NOTE — TELEPHONE ENCOUNTER
1. Anxiety  - ALPRAZolam (XANAX) 0.5 MG tablet; TAKE ONE TABLET BY MOUTH THREE TIMES A DAY AS NEEDED FOR ANXIETY  Dispense: 270 tablet; Refill: 0    2. Moderate episode of recurrent major depressive disorder (HCC)  - vilazodone HCl (VILAZODONE HCL) 40 MG TABS; Take 1 tablet by mouth daily Take with food. Dispense: 90 tablet;  Refill: 0

## 2022-07-06 NOTE — PATIENT INSTRUCTIONS
Patient Education        Depression and Chronic Disease: Care Instructions  Your Care Instructions     A chronic disease is one that you have for a long time. Some chronic diseases can be controlled, but they usually cannot be cured. Depression is common inpeople with chronic diseases, but it often goes unnoticed. Many people have concerns about seeking treatment for a mental health problem. You may think it's a sign of weakness, or you don't want people to know about it. It's important to overcome these reasons for not seeking treatment. Treating depression or anxiety is good for your health. Follow-up care is a key part of your treatment and safety. Be sure to make and go to all appointments, and call your doctor if you are having problems. It's also a good idea to know your test results and keep alist of the medicines you take. How can you care for yourself at home? Watch for symptoms of depression  The symptoms of depression are often subtle at first. You may think they are caused by your disease rather than depression. Or you may think it is normal toña depressed when you have a chronic disease. If you are depressed you may:   Feel sad or hopeless.  Feel guilty or worthless.  Not enjoy the things you used to enjoy.  Feel hopeless, as though life is not worth living.  Have trouble thinking or remembering.  Have low energy, and you may not eat or sleep well.  Pull away from others.  Think often about death or killing yourself. If you or someone you know talks about suicide, self-harm, or feeling hopeless, get help right away. Call the [facility] at 1-800-273-talk (9-106.613.4508) or text HOME to 318404 to access the [employer]. Consider saving these numbers in your phone. Get treatment  By treating your depression, you can feel more hopeful and have more energy. If you feel better, you may take better care of yourself, so your health mayimprove.    Talk to your doctor if you have any changes in mood during treatment for your disease.  Ask your doctor for help. Counseling, antidepressant medicine, or a combination of the two can help most people with depression. Often a combination works best. Counseling can also help you cope with having a chronic disease. When should you call for help? Call 911 anytime you think you may need emergency care. For example, call if:     You feel like hurting yourself or someone else.      Someone you know has depression and is about to attempt or is attempting suicide. If you or someone you know talks about suicide, self-harm, or feeling hopeless, get help right away. Call the [facility] at [phone]-IAEG ([phone]) or text HOME to 581938 to access the [employer]. Consider saving these numbers in your phone. Call your doctor now or seek immediate medical care if:     You hear voices.      Someone you know has depression and:  ? Starts to give away possessions. ? Uses illegal drugs or drinks alcohol heavily. ? Talks or writes about death, including writing suicide notes or talking about guns, knives, or pills. ? Starts to spend a lot of time alone. ? Acts very aggressively or suddenly appears calm. Watch closely for changes in your health, and be sure to contact your doctor if:     You do not get better as expected. Where can you learn more? Go to https://luis m.[employer] org and sign in to your Cooleaf account. Enter X663 in the [facility] box to learn more about \"Depression and Chronic Disease: Care Instructions. \"     If you do not have an account, please click on the \"Sign Up Now\" link. Current as of: February 9, 2022               Content Version: 13.3  © 1133-1800 Healthwise, Incorporated. Care instructions adapted under license by [facility] ([facility]).  If you have questions about a medical condition or this instruction, always ask your healthcare professional. [address] any warranty or liability for your use of this information. Patient Education        Recovering From Depression: Care Instructions  Your Care Instructions     Taking good care of yourself is important as you recover from depression. In time, your symptoms will fade as your treatment takes hold. Do not give up. Instead, focus your energy on getting better. Your mood will improve. It just takes some time. Focus on things that can help you feel better, such as being with friends and family, eating well, and getting enough rest. But take things slowly. Do not do too much too soon. Youwill begin to feel better gradually. Follow-up care is a key part of your treatment and safety. Be sure to make and go to all appointments, and call your doctor if you are having problems. It's also a good idea to know your test results and keep alist of the medicines you take. How can you care for yourself at home? Be realistic   If you have a large task to do, break it up into smaller steps you can handle, and just do what you can.  You may want to put off important decisions until your depression has lifted. If you have plans that will have a major impact on your life, such as marriage, divorce, or a job change, try to wait a bit. Talk it over with friends and loved ones who can help you look at the overall picture first.   Reaching out to people for help is important. Do not isolate yourself. Let your family and friends help you. Find someone you can trust and confide in, and talk to that person.  Be patient, and be kind to yourself. Remember that depression is not your fault and is not something you can overcome with willpower alone. Treatment is important for depression, just like for any other illness. Feeling better takes time, and your mood will improve little by little. Stay active   Stay busy and get outside. Take a walk, or try some other light exercise.    Talk with your doctor about an exercise program. Exercise can help with mild depression.  Go to a movie or concert. Take part in a [employer] activity or other social gathering. Go to a ball game.  Ask a friend to have dinner with you. Take care of yourself   Eat a balanced diet with plenty of fresh fruits and vegetables, whole grains, and lean protein. If you have lost your appetite, eat small snacks rather than large meals.  Avoid using illegal drugs or marijuana and drinking alcohol. Do not take medicines that have not been prescribed for you. They may interfere with medicines you may be taking for depression, or they may make your depression worse.  Take your medicines exactly as they are prescribed. You may start to feel better within 1 to 3 weeks of taking antidepressant medicine. But it can take as many as 6 to 8 weeks to see more improvement. If you have questions or concerns about your medicines, or if you do not notice any improvement by 3 weeks, talk to your doctor.  Continue to take your medicine after your symptoms improve. Taking your medicine for at least 6 months after you feel better can help keep you from getting depressed again. If this isn't the first time you have been depressed, your doctor may recommend you to take medicine even longer.  If you have any side effects from your medicine, tell your doctor. Many side effects are mild and will go away on their own after you have been taking the medicine for a few weeks. Some may last longer. Talk to your doctor if side effects are bothering you too much. You might be able to try a different medicine.  Continue counseling. It may help prevent depression from returning, especially if you've had multiple episodes of depression. Talk with your counselor if you are having a hard time attending your sessions or you think the sessions aren't working. Don't just stop going.  Get enough sleep.  Talk to your doctor if you are having problems sleeping.  Avoid sleeping pills unless they are prescribed by the doctor treating your depression. Sleeping pills may make you groggy during the day, and they may interact with other medicine you are taking.  If you have any other illnesses, such as diabetes, heart disease, or high blood pressure, make sure to continue with your treatment. Tell your doctor about all of the medicines you take, including those with or without a prescription.  If you or someone you know talks about suicide, self-harm, or feeling hopeless, get help right away. Call the [address] [place] [address] at 4-242-783-TALK ([phone]) or text HOME to 143447 to access the [employer] Text Line. Consider saving these numbers in your phone. When should you call for help? Call 561 anytime you think you may need emergency care. For example, call if:     You feel like hurting yourself or someone else.      Someone you know has depression and is about to attempt or is attempting suicide. If you or someone you know talks about suicide, self-harm, or feeling hopeless, get help right away. Call the [address] [place] [address] at 5-954-971-TALK ([phone]) or text HOME to 663586 to access the [address] Consider saving these numbers in your phone. Call your doctor now or seek immediate medical care if:     You hear voices.      Someone you know has depression and:  ? Starts to give away possessions. ? Uses illegal drugs or drinks alcohol heavily. ? Talks or writes about death, including writing suicide notes or talking about guns, knives, or pills. ? Starts to spend a lot of time alone. ? Acts very aggressively or suddenly appears calm. Watch closely for changes in your health, and be sure to contact your doctor if:     You do not get better as expected. Where can you learn more? Go to https://chpekelyeb.Fundbase. org and sign in to your [email or web address] account.  Enter M344 in the [address] box to learn more about \"Recovering From Depression: Care Instructions. \"     If you do not have an account, please click on the \"Sign Up Now\" link. Current as of: February 9, 2022               Content Version: 13.3  © 9217-7369 [employer]. Care instructions adapted under license by [facility] ([facility]). If you have questions about a medical condition or this instruction, always ask your healthcare professional. [address] any warranty or liability for your use of this information. Patient Education        DASH Diet: Care Instructions  Your Care Instructions     The DASH diet is an eating plan that can help lower your blood pressure. DASH stands for Dietary Approaches to Stop Hypertension. Hypertension is high bloodpressure. The DASH diet focuses on eating foods that are high in calcium, potassium, and magnesium. These nutrients can lower blood pressure. The foods that are highest in these nutrients are fruits, vegetables, low-fat dairy products, nuts, seeds, and legumes. But taking calcium, potassium, and magnesium supplements instead of eating foods that are high in those nutrients does not have the same effect. The DASH diet also includes whole grains, fish, and poultry. The DASH diet is one of several lifestyle changes your doctor may recommend to lower your high blood pressure. Your doctor may also want you to decrease the amount of sodium in your diet. Lowering sodium while following the DASH dietcan lower blood pressure even further than just the DASH diet alone. Follow-up care is a key part of your treatment and safety. Be sure to make and go to all appointments, and call your doctor if you are having problems. It's also a good idea to know your test results and keep alist of the medicines you take. How can you care for yourself at home? Following the DASH diet   Eat 4 to 5 servings of fruit each day.  A serving is 1 medium-sized piece of fruit, ½ cup chopped or canned fruit, 1/4 cup dried fruit, or 4 ounces (½ cup) of fruit juice. Choose fruit more often than fruit juice.  Eat 4 to 5 servings of vegetables each day. A serving is 1 cup of lettuce or raw leafy vegetables, ½ cup of chopped or cooked vegetables, or 4 ounces (½ cup) of vegetable juice. Choose vegetables more often than vegetable juice.  Get 2 to 3 servings of low-fat and fat-free dairy each day. A serving is 8 ounces of milk, 1 cup of yogurt, or 1 ½ ounces of cheese.  Eat 6 to 8 servings of grains each day. A serving is 1 slice of bread, 1 ounce of dry cereal, or ½ cup of cooked rice, pasta, or cooked cereal. Try to choose whole-grain products as much as possible.  Limit lean meat, poultry, and fish to 2 servings each day. A serving is 3 ounces, about the size of a deck of cards.  Eat 4 to 5 servings of nuts, seeds, and legumes (cooked dried beans, lentils, and split peas) each week. A serving is 1/3 cup of nuts, 2 tablespoons of seeds, or ½ cup of cooked beans or peas.  Limit fats and oils to 2 to 3 servings each day. A serving is 1 teaspoon of vegetable oil or 2 tablespoons of salad dressing.  Limit sweets and added sugars to 5 servings or less a week. A serving is 1 tablespoon jelly or jam, ½ cup sorbet, or 1 cup of lemonade.  Eat less than 2,300 milligrams (mg) of sodium a day. If you limit your sodium to 1,500 mg a day, you can lower your blood pressure even more.  Be aware that all of these are the suggested number of servings for people who eat 1,800 to 2,000 calories a day. Your recommended number of servings may be different if you need more or fewer calories. Tips for success   Start small. Do not try to make dramatic changes to your diet all at once. You might feel that you are missing out on your favorite foods and then be more likely to not follow the plan. Make small changes, and stick with them. Once those changes become habit, add a few more changes.    Try some of the following:  ? Make it a goal to eat a fruit or vegetable at every meal and at snacks. This will make it easy to get the recommended amount of fruits and vegetables each day. ? Try yogurt topped with fruit and nuts for a snack or healthy dessert. ? Add lettuce, tomato, cucumber, and onion to sandwiches. ? Combine a ready-made pizza crust with low-fat mozzarella cheese and lots of vegetable toppings. Try using tomatoes, squash, spinach, broccoli, carrots, cauliflower, and onions. ? Have a variety of cut-up vegetables with a low-fat dip as an appetizer instead of chips and dip. ? Sprinkle sunflower seeds or chopped almonds over salads. Or try adding chopped walnuts or almonds to cooked vegetables. ? Try some vegetarian meals using beans and peas. Add garbanzo or kidney beans to salads. Make burritos and tacos with mashed mcneal beans or black beans. Where can you learn more? Go to https://Sokolin.illuminate Solutions. org and sign in to your AmpIdea account. Enter E546 in the [facility] box to learn more about \"DASH Diet: Care Instructions. \"     If you do not have an account, please click on the \"Sign Up Now\" link. Current as of: January 10, 2022               Content Version: 13.3  © 5184-0898 [employer]. Care instructions adapted under license by [facility] ([facility]). If you have questions about a medical condition or this instruction, always ask your healthcare professional. [place] [address] any warranty or liability for your use of this information. Patient Education        Insomnia: Care Instructions  Overview     Insomnia is the inability to sleep well. Insomnia may make it hard for you to get to sleep, stay asleep, or sleep as long as you need to. This can make you tired and grouchy during the day. It can also make you forgetful, lesseffective at work, and unhappy. Insomnia can be linked to many things.  These include health problems,medicines, and stressful events. Treatment may include treating problems that may be linked with your insomnia. Treatment also includes behavior and lifestyle changes. This may include cognitive-behavioral therapy for insomnia (CBT-I). CBT-I uses different ways to help you change your thoughts and behaviors that may interfere with sleep. Your doctor can recommend specific things you can try. Examples include doing relaxation exercises, keeping regular bedtimes and wake times, limiting alcohol, and making healthy sleep habits. Some people decide to take medicinefor a while to help with sleep. Follow-up care is a key part of your treatment and safety. Be sure to make and go to all appointments, and call your doctor if you are having problems. It's also a good idea to know your test results and keep alist of the medicines you take. How can you care for yourself at home? Cognitive-behavioral therapy for insomnia (CBT-I)   If your doctor recommends CBT-I, follow your treatment plan. Your doctor will give you instructions that are unique for you.  Your plan will likely include a few things that you can try at home. For example:  ? Try meditation or other relaxation techniques before you go to bed. ? Go to bed at the same time every night, and wake up at the same time every morning. Do not take naps during the day. ? Do not stay in bed awake for too long. If you can't fall asleep, or if you wake up in the middle of the night and can't get back to sleep within about 15 to 20 minutes, get out of bed and go to another room until you feel sleepy. ? If watching the clock makes you anxious, turn it facing away from you so you cannot see the time. ? If you worry when you lie down, start a worry book. Well before bedtime, write down your worries, and then set the book and your concerns aside. Healthy sleep habits   If your doctor recommends it, try making healthy sleep habits.  For example:  ? Keep your bedroom quiet, dark, and cool.  ? Do not have drinks with caffeine, such as coffee or black tea, for 8 hours before bed. ? Do not smoke or use other types of tobacco near bedtime. Nicotine is a stimulant and can keep you awake. ? Avoid drinking alcohol late in the evening, because it can cause you to wake in the middle of the night. ? Do not eat a big meal close to bedtime. If you are hungry, eat a light snack. ? Do not drink a lot of water close to bedtime, because the need to urinate may wake you up during the night. ? Do not read, watch TV, or use your phone in bed. Use the bed only for sleeping and sex. Medicine   Be safe with medicines. Take your medicines exactly as prescribed. Call your doctor if you think you are having a problem with your medicine.  Talk with your doctor before you try an over-the-counter medicine, herbal product, or supplement to try to improve your sleep. Your doctor can recommend how much to take and when to take it. Make sure your doctor knows all of the medicines, vitamins, herbal products, and supplements you take.  You will get more details on the specific medicines your doctor prescribes. When should you call for help? Watch closely for changes in your health, and be sure to contact your doctor if:     Your efforts to improve your sleep do not work.      Your insomnia gets worse.      You have been feeling down, depressed, or hopeless or have lost interest in things that you usually enjoy. Where can you learn more? Go to https://ScoreStreak.Xipin. org and sign in to your [employer] account. Enter P513 in the [employer] box to learn more about \"Insomnia: Care Instructions. \"     If you do not have an account, please click on the \"Sign Up Now\" link. Current as of: February 9, 2022               Content Version: 13.3  © 3277-5964 Healthwise, Incorporated. Care instructions adapted under license by [facility] ([facility]).  If you have questions about a medical condition or this instruction, always ask your healthcare professional. [place] [address] any warranty or liability for your use of this information. Patient Education        Learning About Sleeping Well  What does sleeping well mean? Sleeping well means getting enough sleep to feel good and stay healthy. Howmuch sleep is enough varies among people. The number of hours you sleep and how you feel when you wake up are both important. If you do not feel refreshed, you probably need more sleep. Anothersign of not getting enough sleep is feeling tired during the day. Experts recommend that adults get at least 7 or more hours of sleep per day. Children and older adults need more sleep. Why is getting enough sleep important? Getting enough quality sleep is a basic part of good health. When your sleep suffers, your physical health, mood, and your thoughts can suffer too. You may find yourself feeling more grumpy or stressed. Not getting enough sleep also can lead to serious problems, including injury, accidents, anxiety, anddepression. What might cause poor sleeping? Many things can cause sleep problems, including:   Changes to your sleep schedule.  Stress. Stress can be caused by fear about a single event, such as giving a speech. Or you may have ongoing stress, such as worry about work or school.  Depression, anxiety, and other mental or emotional conditions.  Changes in your sleep habits or surroundings. This includes changes that happen where you sleep, such as noise, light, or sleeping in a different bed. It also includes changes in your sleep pattern, such as having jet lag or working a late shift.  Health problems, such as pain, breathing problems, and restless legs syndrome.  Lack of regular exercise.  Using alcohol, nicotine, or caffeine before bed. How can you help yourself? Here are some tips that may help you sleep more soundly and wake up feelingmore refreshed.   Your sleeping area  Use your bedroom only for sleeping and sex. A bit of light reading may help you fall asleep. But if it doesn't, do your reading elsewhere in the house. Try not to use your TV, computer, smartphone, or tablet while you are in bed.  Be sure your bed is big enough to stretch out comfortably, especially if you have a sleep partner.  Keep your bedroom quiet, dark, and cool. Use curtains, blinds, or a sleep mask to block out light. To block out noise, use earplugs, soothing music, or a \"white noise\" machine. Your evening and bedtime routine    Create a relaxing bedtime routine. You might want to take a warm shower or bath, or listen to soothing music.  Go to bed at the same time every night. And get up at the same time every morning, even if you feel tired. What to avoid    Limit caffeine (coffee, tea, caffeinated sodas) during the day, and don't have any for at least 6 hours before bedtime.  Avoid drinking alcohol before bedtime. Alcohol can cause you to wake up more often during the night.  Try not to smoke or use tobacco, especially in the evening. Nicotine can keep you awake.  Limit naps during the day, especially close to bedtime.  Avoid lying in bed awake for too long. If you can't fall asleep or if you wake up in the middle of the night and can't get back to sleep within about 20 minutes, get out of bed and go to another room until you feel sleepy.  Avoid taking medicine right before bed that may keep you awake or make you feel hyper or energized. Your doctor can tell you if your medicine may do this and if you can take it earlier in the day. If you can't sleep    Imagine yourself in a peaceful, pleasant scene. Focus on the details and feelings of being in a place that is relaxing.  Get up and do a quiet or boring activity until you feel sleepy.  Avoid drinking any liquids before going to bed to help prevent waking up often to use the bathroom. Where can you learn more?   Go to https://chpepiceweb.healthBoardwalktech. org and sign in to your SquadMailt account. Enter L036 in the [facility] box to learn more about \"Learning About Sleeping Well. \"     If you do not have an account, please click on the \"Sign Up Now\" link. Current as of: February 9, 2022               Content Version: 13.3  © 6323-4103 [employer], [facility]. Care instructions adapted under license by [facility] ([facility]). If you have questions about a medical condition or this instruction, always ask your healthcare professional. [place] [address] any warranty or liability for your use of this information.

## 2022-08-01 ENCOUNTER — PATIENT MESSAGE (OUTPATIENT)
Dept: PRIMARY CARE CLINIC | Age: 72
End: 2022-08-01

## 2022-08-01 ENCOUNTER — TELEPHONE (OUTPATIENT)
Dept: PRIMARY CARE CLINIC | Age: 72
End: 2022-08-01

## 2022-08-01 DIAGNOSIS — Z20.822 SUSPECTED COVID-19 VIRUS INFECTION: Primary | ICD-10-CM

## 2022-08-01 NOTE — TELEPHONE ENCOUNTER
Patient's  called. Patient has been vomiting and her joints are very achy. She has done 2 covid home tests but she would like to do a PCR test. Can [facility] put an order in for the patient?

## 2022-08-01 NOTE — TELEPHONE ENCOUNTER
Pt's  called to see if they put an order in for a Covid-19 test. I didn't see an order. Please call to advise.

## 2022-08-02 DIAGNOSIS — Z20.822 SUSPECTED COVID-19 VIRUS INFECTION: ICD-10-CM

## 2022-08-03 LAB — SARS-COV-2, PCR: NOT DETECTED

## 2022-08-18 ENCOUNTER — OFFICE VISIT (OUTPATIENT)
Dept: PRIMARY CARE CLINIC | Age: 72
End: 2022-08-18
Payer: MEDICARE

## 2022-08-18 VITALS
HEART RATE: 93 BPM | DIASTOLIC BLOOD PRESSURE: 88 MMHG | WEIGHT: 211.8 LBS | BODY MASS INDEX: 38.98 KG/M2 | HEIGHT: 62 IN | SYSTOLIC BLOOD PRESSURE: 130 MMHG | OXYGEN SATURATION: 99 %

## 2022-08-18 DIAGNOSIS — F41.9 ANXIETY: ICD-10-CM

## 2022-08-18 DIAGNOSIS — F51.01 PRIMARY INSOMNIA: ICD-10-CM

## 2022-08-18 DIAGNOSIS — F33.2 SEVERE EPISODE OF RECURRENT MAJOR DEPRESSIVE DISORDER, WITHOUT PSYCHOTIC FEATURES (HCC): Primary | ICD-10-CM

## 2022-08-18 DIAGNOSIS — J84.9 INTERSTITIAL LUNG DISEASE (HCC): ICD-10-CM

## 2022-08-18 PROCEDURE — 3017F COLORECTAL CA SCREEN DOC REV: CPT | Performed by: NURSE PRACTITIONER

## 2022-08-18 PROCEDURE — G8427 DOCREV CUR MEDS BY ELIG CLIN: HCPCS | Performed by: NURSE PRACTITIONER

## 2022-08-18 PROCEDURE — G8399 PT W/DXA RESULTS DOCUMENT: HCPCS | Performed by: NURSE PRACTITIONER

## 2022-08-18 PROCEDURE — 1123F ACP DISCUSS/DSCN MKR DOCD: CPT | Performed by: NURSE PRACTITIONER

## 2022-08-18 PROCEDURE — 99214 OFFICE O/P EST MOD 30 MIN: CPT | Performed by: NURSE PRACTITIONER

## 2022-08-18 PROCEDURE — 1036F TOBACCO NON-USER: CPT | Performed by: NURSE PRACTITIONER

## 2022-08-18 PROCEDURE — 1090F PRES/ABSN URINE INCON ASSESS: CPT | Performed by: NURSE PRACTITIONER

## 2022-08-18 PROCEDURE — G8417 CALC BMI ABV UP PARAM F/U: HCPCS | Performed by: NURSE PRACTITIONER

## 2022-08-18 ASSESSMENT — PATIENT HEALTH QUESTIONNAIRE - PHQ9
7. TROUBLE CONCENTRATING ON THINGS, SUCH AS READING THE NEWSPAPER OR WATCHING TELEVISION: 3
1. LITTLE INTEREST OR PLEASURE IN DOING THINGS: 0
2. FEELING DOWN, DEPRESSED OR HOPELESS: 3
10. IF YOU CHECKED OFF ANY PROBLEMS, HOW DIFFICULT HAVE THESE PROBLEMS MADE IT FOR YOU TO DO YOUR WORK, TAKE CARE OF THINGS AT HOME, OR GET ALONG WITH OTHER PEOPLE: 2
8. MOVING OR SPEAKING SO SLOWLY THAT OTHER PEOPLE COULD HAVE NOTICED. OR THE OPPOSITE, BEING SO FIGETY OR RESTLESS THAT YOU HAVE BEEN MOVING AROUND A LOT MORE THAN USUAL: 3
SUM OF ALL RESPONSES TO PHQ QUESTIONS 1-9: 18
9. THOUGHTS THAT YOU WOULD BE BETTER OFF DEAD, OR OF HURTING YOURSELF: 1
SUM OF ALL RESPONSES TO PHQ9 QUESTIONS 1 & 2: 3
6. FEELING BAD ABOUT YOURSELF - OR THAT YOU ARE A FAILURE OR HAVE LET YOURSELF OR YOUR FAMILY DOWN: 3
SUM OF ALL RESPONSES TO PHQ QUESTIONS 1-9: 19
4. FEELING TIRED OR HAVING LITTLE ENERGY: 3
SUM OF ALL RESPONSES TO PHQ QUESTIONS 1-9: 19
5. POOR APPETITE OR OVEREATING: 0
SUM OF ALL RESPONSES TO PHQ QUESTIONS 1-9: 19
3. TROUBLE FALLING OR STAYING ASLEEP: 3

## 2022-08-18 ASSESSMENT — ANXIETY QUESTIONNAIRES
3. WORRYING TOO MUCH ABOUT DIFFERENT THINGS: 1
1. FEELING NERVOUS, ANXIOUS, OR ON EDGE: 3
6. BECOMING EASILY ANNOYED OR IRRITABLE: 1
2. NOT BEING ABLE TO STOP OR CONTROL WORRYING: 3
GAD7 TOTAL SCORE: 9
5. BEING SO RESTLESS THAT IT IS HARD TO SIT STILL: 0
4. TROUBLE RELAXING: 0
IF YOU CHECKED OFF ANY PROBLEMS ON THIS QUESTIONNAIRE, HOW DIFFICULT HAVE THESE PROBLEMS MADE IT FOR YOU TO DO YOUR WORK, TAKE CARE OF THINGS AT HOME, OR GET ALONG WITH OTHER PEOPLE: SOMEWHAT DIFFICULT
7. FEELING AFRAID AS IF SOMETHING AWFUL MIGHT HAPPEN: 1

## 2022-08-18 ASSESSMENT — COLUMBIA-SUICIDE SEVERITY RATING SCALE - C-SSRS
2. HAVE YOU ACTUALLY HAD ANY THOUGHTS OF KILLING YOURSELF?: NO
1. WITHIN THE PAST MONTH, HAVE YOU WISHED YOU WERE DEAD OR WISHED YOU COULD GO TO SLEEP AND NOT WAKE UP?: NO
6. HAVE YOU EVER DONE ANYTHING, STARTED TO DO ANYTHING, OR PREPARED TO DO ANYTHING TO END YOUR LIFE?: NO

## 2022-08-18 ASSESSMENT — ENCOUNTER SYMPTOMS
CHEST TIGHTNESS: 0
SHORTNESS OF BREATH: 1

## 2022-08-18 NOTE — PROGRESS NOTES
8/18/2022    Chief Complaint   Patient presents with    Follow-up     Anxiety, Depression, Insomnia        Myesha Martinez is a 71 y.o. female, presents today for follow up of anxiety and depression, insomnia. Mario Fuller is accompanied by her , Miguelina Lung today. HPI   Anxiety and depression  Patient is here for follow up of anxiety and depression. Patient reports worsening of anxiety and depression since our last visit. She denies homicidal and suicidal ideation and intent, however states \"some days I wish it would be over\". She has never had a plan or intent to end her life. She is unsure if anxiety and depression medications are not working or related to increased difficulty breathing or both. She reports decreased memory, evaluated by neurology to rule out alzheimer's disease, stating memory loss was related to lack of oxygen to brain. Explained to patient memory changes could be related to uncontrolled anxiety and depression. She is on continuous oxygen at 6 liters nasal cannula for interstitial lung disease. She reports O2 Sat in 70s with exertion (light cleaning). Patient to call pulmonologist to schedule appointment sooner than scheduled appt in Oct 2022; Patient and her  verbalize understanding. Current medications include:   Xanax 0.5 mg 3x/day for anxiety, Buspar 10 mg 3x/daily and Viibryd 40 mg daily. She is taking as prescribed and tolerating well. Discussed at length with her long history of uncontrolled anxiety and depression and trial of several medications I'm referring her to [place] [address] [facility], Dr. Dai Arroyo. She is to proceed to his office following appointment today to establish care- address provided. Patient and her  agreed to plan with verbal understanding.  will update me today via [address] after her visit.      LADI 7 SCORE 8/18/2022 7/6/2022   LADI-7 Total Score 9 14     Interpretation of LADI-7 score: 5-9 = mild anxiety, 10-14 = moderate anxiety, 15+ = severe anxiety. Recommend referral to behavioral health for scores 10 or greater. LADI-7 SCREENING 8/18/2022 7/6/2022   Feeling nervous, anxious, or on edge Nearly every day Nearly every day   Not being able to stop or control worrying Nearly every day Nearly every day   Worrying too much about different things Several days Nearly every day   Trouble relaxing Not at all Not at all   Being so restless that it is hard to sit still Not at all Not at all   Becoming easily annoyed or irritable Several days More than half the days   Feeling afraid as if something awful might happen Several days Nearly every day   LADI-7 Total Score 9 14   How difficult have these problems made it for you to do your work, take care of things at home, or get along with other people? Somewhat difficult Not difficult at all        [facility] Scores 8/18/2022 7/6/2022 3/9/2022 11/17/2021 1/11/2021 12/11/2020 9/17/2020   PHQ2 Score 3 4 6 4 2 0 6   PHQ9 Score 19 14 13 10 2 0 12     Interpretation of Total Score Depression Severity: 1-4 = Minimal depression, 5-9 = Mild depression, 10-14 = Moderate depression, 15-19 = Moderately severe depression, 20-27 = Severe depression     PHQ-9  8/18/2022 7/6/2022 3/9/2022   Depression Unable to Assess - - -   Little interest or pleasure in doing things 0 2 3   Feeling down, depressed, or hopeless 3 2 3   Trouble falling or staying asleep, or sleeping too much 3 2 1   Feeling tired or having little energy 3 0 3   Poor appetite or overeating 0 0 0   Feeling bad about yourself - or that you are a failure or have let yourself or your family down 3 0 2   Trouble concentrating on things, such as reading the newspaper or watching television 3 3 0   Moving or speaking so slowly that other people could have noticed.  Or the opposite - being so fidgety or restless that you have been moving around a lot more than usual 3 3 1   Thoughts that you would be better off dead, or of hurting yourself in some way 1 2 0   PHQ-2 Score 3 4 6 PHQ-9 Total Score 19 14 13   If you checked off any problems, how difficult have these problems made it for you to do your work, take care of things at home, or get along with other people? 2 2 -        Insomnia  Current medication: Remeron 30 minutes prior to bed. Patient reports difficulty falling asleep and staying asleep. Trazodone was ineffective in the past. Will have patient discuss insomnia with Dr. Karlos Hebert today and defer treatment to him. Review of Systems   Constitutional:  Negative for activity change, appetite change, fatigue and unexpected weight change. Respiratory:  Positive for shortness of breath (chronic). Negative for chest tightness. Cardiovascular:  Negative for chest pain and palpitations. Neurological:  Negative for dizziness, weakness and light-headedness. Psychiatric/Behavioral:  Positive for dysphoric mood and sleep disturbance. Negative for agitation, confusion, decreased concentration, hallucinations, self-injury and suicidal ideas. The patient is nervous/anxious. The patient is not hyperactive. Current Outpatient Medications on File Prior to Visit   Medication Sig Dispense Refill    ALPRAZolam (XANAX) 0.5 MG tablet TAKE ONE TABLET BY MOUTH THREE TIMES A DAY AS NEEDED FOR ANXIETY 270 tablet 0    vilazodone HCl (VILAZODONE HCL) 40 MG TABS Take 1 tablet by mouth daily Take with food.  90 tablet 0    ascorbic acid (VITAMIN C) 500 MG tablet Take 1 tablet by mouth daily      pantoprazole (PROTONIX) 20 MG tablet TAKE ONE TABLET BY MOUTH TWICE A  tablet 0    glimepiride (AMARYL) 1 MG tablet Take 1 tablet by mouth every morning (before breakfast) 90 tablet 0    ezetimibe (ZETIA) 10 MG tablet Take 1 tablet by mouth at bedtime 90 tablet 1    atorvastatin (LIPITOR) 80 MG tablet Take 1 tablet by mouth daily 90 tablet 1    levothyroxine (SYNTHROID) 100 MCG tablet TAKE ONE TABLET BY MOUTH DAILY 90 tablet 1    empagliflozin (JARDIANCE) 10 MG tablet Take 1 tablet by mouth daily 90 Benign    Other screening mammogram *2016    Benign    Oxygen dependent     O2 @ 3.5 L CONTINUOUS    Pulmonary hypertension ([facility]     MILD    Retention of urine, unspecified     Screening mammogram, encounter for *2020    Benign    Seizure disorder [facility])     Thyroid disease     Type 2 diabetes mellitus without complication ([facility] *2020    FBS - 137 and A 1 C of 6.7    Vitamin D deficiency Oct., 2015    Level - 17    Wears glasses      Past Surgical History:   Procedure Laterality Date    ABDOMINOPLASTY  2012    Dr. Caitlin Handy Bilateral     BRONCHOSCOPY  **2016    Dr. Paulo Membreno  2016    BRONCHOSCOPY  *2017    Dr. Cathy Irving - No endobronchial lesions    CARDIAC CATHETERIZATION  Oct., 2004    LAD 50%, circumflex normal, R coronary normal, LVEF 60%. CARDIAC CATHETERIZATION  *2018    Dr. Doreen Chan - [facility] - mild pulmonary hypertension    COLONOSCOPY  Oct., 2003 (  )    Dr. Bolling Dancer - Negative - repeat     COLONOSCOPY  2014 (  )    Dr. Sachin Jacobs - moderate diverticulosis.     COSMETIC SURGERY      inner thighs    HYSTERECTOMY (CERVIX STATUS UNKNOWN)      LIPOSUCTION Bilateral     knees    TX LAPAROSCOPY W TOT HYSTERECTUTERUS <=250 GRAM  W TUBE/OVARY N/A 2018    ROBOTIC TOTAL HYSTERECTOMY AND BILATERAL SALPINGO-OOPHORECTOMY performed by Kit Stewart MD at [facility] ([facility] Awan)  *2018    Dr. Ginny Yost  Oct., 2015    Dr. Lisha Pierre - left 2nd and 3rd    TOTAL HIP ARTHROPLASTY  2013    Dr. Geri Fry - [place]  *2018    Dr. Cierra Kamisnki - right    TUBAL LIGATION        Social History     Tobacco Use    Smoking status: Never    Smokeless tobacco: Never   Substance Use Topics    Alcohol use: Yes     Comment: occasional      Family History   Problem Relation Age of Onset    Heart Disease Father 76        , ASHD, S/P CAB, Referral To Psychiatry-- Dr. Purnima Brower-- to go directly to his office following appointment today; patient and her  are in agreement to plan. 2. Anxiety  Active, improved. - LADI-7 score: 9 (down from 14)  - Continue Xanax, Buspar for now-- defer treatment to psych.  - Amb External Referral To Psychiatry    3. Primary insomnia  - Active  - Continue Remeron  - Defer treatment to psych.   - Amb External Referral To Psychiatry    4. Interstitial Lung Disease  - Active  - Patient reports shortness of breath gradually worsening  - Oxygen 6L via nasal canula. - Call Dr. Davis Shown office to see if they want to see her before scheduled appointment in Oct 2022. Return in 7 weeks (on 10/5/2022) for follow up scheduled for chronic diseases. Discussed use, benefit, and side effects of prescribed medications. Patient's questions answered and concerns addressed. Patient agrees to plan of care. My scheduled days in the office reviewed with patient, and same day appointments available. Encouraged to use [employer] for communication as needed. Electronically signed by CESAR Carlisle CNP on 8/18/2022 at 9:16 AM       This dictation was generated by voice recognition computer software. Although all attempts are made to edit the dictation for accuracy, there may be errors in the transcription that are not intended.

## 2022-08-24 ENCOUNTER — OFFICE VISIT (OUTPATIENT)
Dept: CARDIOLOGY CLINIC | Age: 72
End: 2022-08-24
Payer: MEDICARE

## 2022-08-24 VITALS
WEIGHT: 209 LBS | DIASTOLIC BLOOD PRESSURE: 68 MMHG | OXYGEN SATURATION: 97 % | HEART RATE: 87 BPM | BODY MASS INDEX: 38.46 KG/M2 | SYSTOLIC BLOOD PRESSURE: 126 MMHG | HEIGHT: 62 IN

## 2022-08-24 DIAGNOSIS — E78.2 MIXED HYPERLIPIDEMIA: ICD-10-CM

## 2022-08-24 DIAGNOSIS — I10 ESSENTIAL HYPERTENSION, BENIGN: ICD-10-CM

## 2022-08-24 DIAGNOSIS — I25.10 ATHEROSCLEROSIS OF NATIVE CORONARY ARTERY OF NATIVE HEART WITHOUT ANGINA PECTORIS: Primary | ICD-10-CM

## 2022-08-24 DIAGNOSIS — R07.9 CHEST PAIN, UNSPECIFIED TYPE: ICD-10-CM

## 2022-08-24 PROCEDURE — 1123F ACP DISCUSS/DSCN MKR DOCD: CPT | Performed by: INTERNAL MEDICINE

## 2022-08-24 PROCEDURE — 3017F COLORECTAL CA SCREEN DOC REV: CPT | Performed by: INTERNAL MEDICINE

## 2022-08-24 PROCEDURE — G8427 DOCREV CUR MEDS BY ELIG CLIN: HCPCS | Performed by: INTERNAL MEDICINE

## 2022-08-24 PROCEDURE — G8417 CALC BMI ABV UP PARAM F/U: HCPCS | Performed by: INTERNAL MEDICINE

## 2022-08-24 PROCEDURE — G8399 PT W/DXA RESULTS DOCUMENT: HCPCS | Performed by: INTERNAL MEDICINE

## 2022-08-24 PROCEDURE — 1090F PRES/ABSN URINE INCON ASSESS: CPT | Performed by: INTERNAL MEDICINE

## 2022-08-24 PROCEDURE — 1036F TOBACCO NON-USER: CPT | Performed by: INTERNAL MEDICINE

## 2022-08-24 PROCEDURE — 99214 OFFICE O/P EST MOD 30 MIN: CPT | Performed by: INTERNAL MEDICINE

## 2022-08-24 NOTE — PROGRESS NOTES
[address]  Cardiac Consult     Referring Provider:  CESAR Navarro - [facility]     Chief Complaint   Patient presents with    3 Month Follow-Up    Coronary Artery Disease    Hypertension    Hyperlipidemia        History of Present Illness:   71 y.o. female formally followed by Dr. Cassia Ragland seen in f/u for mild CAD, hyperlipidemia and HTN. She is on chronic O2 for interstitial lung disease. She was admitted 9/2020 with atypical chest pain. Stress myoview was normal. Another stress obtained 11/2021 for preop and was normal.  She has significant GI issues with GERD on protonix. She was seen 3 months ago with recurrent chest pain. Myoview repeated and normal. She is now feeling better. Chest pain resolved. Indigestion also much better. Past Medical History:   has a past medical history of Abnormal stress test, Arthritis, Chronic headaches, Eczema, Fibromyalgia, GERD (gastroesophageal reflux disease), Hyperlipidemia, Hypertension, Impaired fasting glucose, Interstitial lung disease ([address] Interstitial lung disease ([address] Migraine headache, Osteopenia, Osteopenia, Other screening mammogram, Other screening mammogram, Oxygen dependent, Pulmonary hypertension ([address] Retention of urine, unspecified, Screening mammogram, encounter for, Seizure disorder ([address] Thyroid disease, Type 2 diabetes mellitus without complication ([address] Vitamin D deficiency, and Wears glasses. Surgical History:   has a past surgical history that includes Appendectomy; Tubal ligation; Colonoscopy (Oct., 2003 ( 2013 )); Abdominoplasty (Jan., 2012); Cosmetic surgery; Liposuction (Bilateral); Cardiac catheterization (Oct., 2004); Breast reduction surgery (Bilateral); Total hip arthroplasty (July 31, 2013); Colonoscopy (Jan., 2014 ( 2024 )); Toe Surgery (Oct., 2015); bronchoscopy (**Sept.16, 2016); bronchoscopy (09/16/2016); bronchoscopy (*Febr.24, 2017); Total hip arthroplasty (*April 13, 2018);  Cardiac catheterization (*Sept.24, 2018); Total abdominal hysterectomy w/ bilateral salpingoophorectomy (*Nov., 2018); pr laparoscopy w tot hysterectuterus <=250 gram  w tube/ovary (N/A, 11/16/2018); and Hysterectomy. Social History:  Social History     Tobacco Use    Smoking status: Never    Smokeless tobacco: Never   Substance Use Topics    Alcohol use: Yes     Comment: occasional        Family History:  family history includes Breast Cancer in her maternal aunt; Cancer (age of onset: 58) in her mother; Heart Disease (age of onset: 76) in her father; Other in her daughter and daughter; Ovarian Cancer in her maternal grandmother and paternal grandmother. Allergies:  Tramadol and Penicillins     Home Medications:  Prior to Visit Medications    Medication Sig Taking? Authorizing Provider   ALPRAZolam (XANAX) 0.5 MG tablet TAKE ONE TABLET BY MOUTH THREE TIMES A DAY AS NEEDED FOR ANXIETY Yes CESAR Garcia CNP   vilazodone HCl (VILAZODONE HCL) 40 MG TABS Take 1 tablet by mouth daily Take with food.  Yes CESAR Garcia CNP   ascorbic acid (VITAMIN C) 500 MG tablet Take 1 tablet by mouth daily Yes Historical Provider, MD   pantoprazole (PROTONIX) 20 MG tablet TAKE ONE TABLET BY MOUTH TWICE A DAY Yes CESAR Garcia CNP   glimepiride (AMARYL) 1 MG tablet Take 1 tablet by mouth every morning (before breakfast) Yes CESAR Garcia CNP   ezetimibe (ZETIA) 10 MG tablet Take 1 tablet by mouth at bedtime Yes CESAR Garcia CNP   atorvastatin (LIPITOR) 80 MG tablet Take 1 tablet by mouth daily Yes CESAR Garcia CNP   levothyroxine (SYNTHROID) 100 MCG tablet TAKE ONE TABLET BY MOUTH DAILY Yes CESAR Garcia CNP   empagliflozin (JARDIANCE) 10 MG tablet Take 1 tablet by mouth daily Yes CESAR Garcia CNP   busPIRone (BUSPAR) 10 MG tablet TAKE 1 TABLET BY MOUTH 3 TIMES A DAY Yes CESAR Pena CNP   mirtazapine (REMERON) 15 MG tablet Take 1/2 tablet or 1 tablet by mouth 30-60 minutes prior to bedtime Yes Morris Marte Budrus, APRN - CNP   Zinc 100 MG TABS Take 100 mg by mouth daily Yes Historical Provider, MD   metoprolol succinate (TOPROL XL) 50 MG extended release tablet TAKE ONE TAB BY MOUTH ONCE DAILY Yes Jaret Ely MD   TEGRETOL 200 MG tablet TAKE ONE TABLET BY MOUTH TWICE A DAY Yes CESAR Forman CNP   furosemide (LASIX) 20 MG tablet Take 1 tablet by mouth daily Yes Jaret Ely MD   Blood Glucose Monitoring Suppl (Stalin Allen) w/Device KIT Test once daily Yes CESAR Forman CNP   blood glucose test strips (ONETOUCH VERIO) strip 1 each by In Vitro route daily Use to test once daily or as needed Yes CESAR oFrman CNP   OXYGEN Inhale 6 L/day into the lungs Yes CESAR Forman CNP   vitamin B-12 (CYANOCOBALAMIN) 500 MCG tablet Take 500 mcg by mouth daily Yes Historical Provider, MD   predniSONE (DELTASONE) 5 MG tablet Take 5 mg by mouth daily  Yes Historical Provider, MD   magnesium (MAGNESIUM-OXIDE) 250 MG TABS tablet Take 250 mg by mouth daily Yes Historical Provider, MD   aspirin 81 MG tablet Take 81 mg by mouth daily. Yes Historical Provider, MD       [x] Medications and dosages reviewed.     ROS:  [x]Full ROS obtained and negative except as mentioned in HPI      Physical Examination:    Vitals:    08/24/22 1502   BP: 126/68   Site: Right Upper Arm   Position: Sitting   Cuff Size: Large Adult   Pulse: 87   SpO2: 97%   Weight: 209 lb (94.8 kg)   Height: 5' 2\" (1.575 m)        GENERAL: elderly female on O2 in NAD  NEUROLOGICAL: Alert and oriented  PSYCH: Calm affect  SKIN: Warm and dry, No visible rash,   EYES: Pupils equal and round, Sclera non-icteric,   HENT:  External ears and nose unremarkable, mucus membranes moist  MUSCULOSKELETAL: Normal cephalic, neck supple  CAROTID: Normal upstroke, no bruits  CARDIAC: JVP normal, Normal PMI, regular rate and rhythm, normal S1S2, no murmur, rub, or gallop  RESPIRATORY: Normal respiratory effort, clear to auscultation bilaterally  EXTREMITIES: No LE edema  GASTROINTESTINAL: normal bowel sounds, soft, non-tender, No hepatomegaly         ECHO 3/2020  Summary   -Overall left ventricular systolic function is mildly depressed .   -Ejection fraction is visually estimated to be 50-55%. E/e'= 9.3 cm.   -There is mild concentric left ventricular hypertrophy.   -There is mild diffuse hypokinesis.   -Indeterminate diastolic function.   -The tip of the anterior leaflet appears to be slightly bulbous, and is   suggestive of some myxomatous change and minimal prolapse. -IVC size is normal (<2.1cm) and collapses > 50% with respiration consistent   with normal RA pressure (3mmHg). MYOVIEW 9/2020   Summary    Normal LV size with hyperdynamic systolic function. Left ventricular ejection fraction of 76%. There is normal isotope uptake at stress and rest.    There is no evidence of myocardial ischemia or scar. CARDIAC CATH 2016     60% LAD at D1 bifurcation--IFR 0.97,  FFR 0.91  50% mid cx  Normal LV FXN    EKG:  NSR, no ischemic change    MYOVIEW 5/2022    Summary    Normal myocardial perfusion. Normal LV size and systolic function. ASSESSMENT:    CHEST PAIN:  Resolved. Suspect GI but she has known moderate CAD. 2 recent stresses normal. Last 5/2022.  Follow    CAD:  Non obstructive CAD 2016  Normal Myoview 11/2021 and 5/2022  Medical therapy      Hyperlipidemia:  Well controlled   LDL=69  Continue  lipitor and zetia    HTN:  /68 (Site: Right Upper Arm, Position: Sitting, Cuff Size: Large Adult)   Pulse 87   Ht 5' 2\" (1.575 m)   Wt 209 lb (94.8 kg)   SpO2 97%   BMI 38.23 kg/m²   Well controlled  Continue current therapy      PLAN:  Stable cardiac status  Same meds  F/u 1 year    Thank you for allowing me to participate in the care of this individual.      Jeferson Reid M.D., [facility]

## 2022-08-28 DIAGNOSIS — G40.909 SEIZURE DISORDER (HCC): ICD-10-CM

## 2022-08-29 RX ORDER — CARBAMAZEPINE 200 MG
TABLET ORAL
Qty: 180 TABLET | Refills: 1 | Status: SHIPPED | OUTPATIENT
Start: 2022-08-29 | End: 2022-10-24 | Stop reason: SDUPTHER

## 2022-08-29 NOTE — TELEPHONE ENCOUNTER
Requested Prescriptions     Pending Prescriptions Disp Refills    TEGRETOL 200 MG tablet [Pharmacy Med Name: TEGretol 200MG TABLET] 180 tablet 1     Sig: TAKE ONE TABLET BY MOUTH TWICE A DAY       Last OV - 8/18/22. Next OV - 10/5/22. Last refill - 2/28/22. Last labs - 3/17/22.

## 2022-09-08 ENCOUNTER — HOSPITAL ENCOUNTER (OUTPATIENT)
Dept: WOMENS IMAGING | Age: 72
Discharge: HOME | End: 2022-09-08
Payer: MEDICARE

## 2022-09-08 ENCOUNTER — HOSPITAL ENCOUNTER (OUTPATIENT)
Dept: GENERAL RADIOLOGY | Age: 72
Discharge: HOME | End: 2022-09-08
Payer: MEDICARE

## 2022-09-08 VITALS — BODY MASS INDEX: 37.39 KG/M2 | WEIGHT: 211 LBS | HEIGHT: 63 IN

## 2022-09-08 DIAGNOSIS — Z78.0 POSTMENOPAUSAL: ICD-10-CM

## 2022-09-08 DIAGNOSIS — Z12.31 ENCOUNTER FOR SCREENING MAMMOGRAM FOR MALIGNANT NEOPLASM OF BREAST: ICD-10-CM

## 2022-09-08 PROCEDURE — 77063 BREAST TOMOSYNTHESIS BI: CPT

## 2022-09-08 PROCEDURE — 77080 DXA BONE DENSITY AXIAL: CPT

## 2022-09-18 DIAGNOSIS — M85.831 OSTEOPENIA OF RIGHT FOREARM: Primary | ICD-10-CM

## 2022-09-18 RX ORDER — GUARN/MA-HUANG/P.GIN/S.GINSENG
TABLET ORAL
Qty: 180 TABLET | Refills: 3 | Status: SHIPPED | OUTPATIENT
Start: 2022-09-18

## 2022-09-18 NOTE — PROGRESS NOTES
1. Osteopenia of right forearm  - Calcium 600-200 MG-UNIT TABS; Take 2 tablets by mouth once daily  Dispense: 180 tablet;  Refill: 3

## 2022-10-02 DIAGNOSIS — F33.1 MODERATE EPISODE OF RECURRENT MAJOR DEPRESSIVE DISORDER (HCC): ICD-10-CM

## 2022-10-03 RX ORDER — VILAZODONE HYDROCHLORIDE 40 MG/1
TABLET ORAL
Qty: 90 TABLET | Refills: 0 | Status: SHIPPED | OUTPATIENT
Start: 2022-10-03

## 2022-10-03 NOTE — TELEPHONE ENCOUNTER
Requested Prescriptions     Pending Prescriptions Disp Refills    VILAZODONE HCL 40 MG Tablet [Pharmacy Med Name: VIIBRYD 40 MG TABLET] 90 tablet 0     Sig: TAKE ONE TABLET BY MOUTH DAILY TAKE WITH FOOD       Last OV - 8/18/22. Next OV - 10/5/22. Last refill - 7/6/22. Last labs - 3/17/22.

## 2022-10-09 DIAGNOSIS — F51.01 PRIMARY INSOMNIA: ICD-10-CM

## 2022-10-10 RX ORDER — MIRTAZAPINE 15 MG/1
TABLET, FILM COATED ORAL
Qty: 90 TABLET | Refills: 0 | Status: SHIPPED | OUTPATIENT
Start: 2022-10-10 | End: 2022-10-24 | Stop reason: SDUPTHER

## 2022-10-10 NOTE — TELEPHONE ENCOUNTER
Requested Prescriptions     Pending Prescriptions Disp Refills    mirtazapine (REMERON) 15 MG tablet [Pharmacy Med Name: MIRTAZAPINE 15 MG TABLET] 30 tablet 2     Sig: TAKE 1/2 TO 1 TABLET BY MOUTH 30-60 MINUTES PRIOR TO BEDTIME       Last OV - 8/18/22. Next OV - 10/24/22. Last refill - 7/6/22. Last labs - 3/17/22.

## 2022-10-16 DIAGNOSIS — F41.9 ANXIETY: Primary | ICD-10-CM

## 2022-10-17 RX ORDER — TRAZODONE HYDROCHLORIDE 100 MG/1
100 TABLET ORAL NIGHTLY
COMMUNITY
End: 2022-11-14

## 2022-10-17 RX ORDER — ALPRAZOLAM 0.5 MG/1
0.5 TABLET ORAL 3 TIMES DAILY
Qty: 270 TABLET | Refills: 0 | Status: SHIPPED | OUTPATIENT
Start: 2022-10-17 | End: 2022-10-24 | Stop reason: SDUPTHER

## 2022-10-17 NOTE — TELEPHONE ENCOUNTER
[place] record was obtained and reviewed for the last one year and no indicators of drug misuse were found. Any other controlled substance prescriptions seen on the record have been accounted for, I am aware of the patient receiving these medications. [place] record will be rechecked as part of office protocol.

## 2022-10-24 ENCOUNTER — OFFICE VISIT (OUTPATIENT)
Dept: PRIMARY CARE CLINIC | Age: 72
End: 2022-10-24
Payer: MEDICARE

## 2022-10-24 VITALS
DIASTOLIC BLOOD PRESSURE: 80 MMHG | SYSTOLIC BLOOD PRESSURE: 136 MMHG | OXYGEN SATURATION: 96 % | BODY MASS INDEX: 38.7 KG/M2 | TEMPERATURE: 97.5 F | HEART RATE: 96 BPM | WEIGHT: 215 LBS

## 2022-10-24 DIAGNOSIS — F41.9 ANXIETY: ICD-10-CM

## 2022-10-24 DIAGNOSIS — K22.4 ESOPHAGEAL SPASM: ICD-10-CM

## 2022-10-24 DIAGNOSIS — K21.9 GASTROESOPHAGEAL REFLUX DISEASE WITHOUT ESOPHAGITIS: ICD-10-CM

## 2022-10-24 DIAGNOSIS — E11.65 UNCONTROLLED TYPE 2 DIABETES MELLITUS WITH HYPERGLYCEMIA (HCC): ICD-10-CM

## 2022-10-24 DIAGNOSIS — F33.1 MODERATE EPISODE OF RECURRENT MAJOR DEPRESSIVE DISORDER (HCC): ICD-10-CM

## 2022-10-24 DIAGNOSIS — F51.01 PRIMARY INSOMNIA: ICD-10-CM

## 2022-10-24 DIAGNOSIS — I10 ESSENTIAL HYPERTENSION, BENIGN: Primary | ICD-10-CM

## 2022-10-24 DIAGNOSIS — E66.01 CLASS 2 SEVERE OBESITY DUE TO EXCESS CALORIES WITH SERIOUS COMORBIDITY AND BODY MASS INDEX (BMI) OF 38.0 TO 38.9 IN ADULT (HCC): ICD-10-CM

## 2022-10-24 DIAGNOSIS — G40.909 SEIZURE DISORDER (HCC): ICD-10-CM

## 2022-10-24 DIAGNOSIS — E03.9 HYPOTHYROIDISM, UNSPECIFIED TYPE: ICD-10-CM

## 2022-10-24 DIAGNOSIS — E78.5 HYPERLIPIDEMIA, UNSPECIFIED HYPERLIPIDEMIA TYPE: ICD-10-CM

## 2022-10-24 DIAGNOSIS — E11.9 TYPE 2 DIABETES MELLITUS WITHOUT COMPLICATION, WITHOUT LONG-TERM CURRENT USE OF INSULIN (HCC): ICD-10-CM

## 2022-10-24 LAB
CREATININE URINE POCT: NORMAL
HBA1C MFR BLD: 8.6 %
MICROALBUMIN/CREAT 24H UR: NORMAL MG/G{CREAT}
MICROALBUMIN/CREAT UR-RTO: NORMAL

## 2022-10-24 PROCEDURE — 2022F DILAT RTA XM EVC RTNOPTHY: CPT | Performed by: NURSE PRACTITIONER

## 2022-10-24 PROCEDURE — 1036F TOBACCO NON-USER: CPT | Performed by: NURSE PRACTITIONER

## 2022-10-24 PROCEDURE — 1123F ACP DISCUSS/DSCN MKR DOCD: CPT | Performed by: NURSE PRACTITIONER

## 2022-10-24 PROCEDURE — 82044 UR ALBUMIN SEMIQUANTITATIVE: CPT | Performed by: NURSE PRACTITIONER

## 2022-10-24 PROCEDURE — G8427 DOCREV CUR MEDS BY ELIG CLIN: HCPCS | Performed by: NURSE PRACTITIONER

## 2022-10-24 PROCEDURE — 3052F HG A1C>EQUAL 8.0%<EQUAL 9.0%: CPT | Performed by: NURSE PRACTITIONER

## 2022-10-24 PROCEDURE — G8399 PT W/DXA RESULTS DOCUMENT: HCPCS | Performed by: NURSE PRACTITIONER

## 2022-10-24 PROCEDURE — G8417 CALC BMI ABV UP PARAM F/U: HCPCS | Performed by: NURSE PRACTITIONER

## 2022-10-24 PROCEDURE — 83036 HEMOGLOBIN GLYCOSYLATED A1C: CPT | Performed by: NURSE PRACTITIONER

## 2022-10-24 PROCEDURE — 99214 OFFICE O/P EST MOD 30 MIN: CPT | Performed by: NURSE PRACTITIONER

## 2022-10-24 PROCEDURE — 3017F COLORECTAL CA SCREEN DOC REV: CPT | Performed by: NURSE PRACTITIONER

## 2022-10-24 PROCEDURE — G8484 FLU IMMUNIZE NO ADMIN: HCPCS | Performed by: NURSE PRACTITIONER

## 2022-10-24 PROCEDURE — 1090F PRES/ABSN URINE INCON ASSESS: CPT | Performed by: NURSE PRACTITIONER

## 2022-10-24 RX ORDER — CARBAMAZEPINE 200 MG
TABLET ORAL
Qty: 180 TABLET | Refills: 1 | Status: SHIPPED | OUTPATIENT
Start: 2022-10-24

## 2022-10-24 RX ORDER — EZETIMIBE 10 MG/1
10 TABLET ORAL NIGHTLY
Qty: 90 TABLET | Refills: 1 | Status: SHIPPED | OUTPATIENT
Start: 2022-10-24 | End: 2023-01-22

## 2022-10-24 RX ORDER — ATORVASTATIN CALCIUM 80 MG/1
80 TABLET, FILM COATED ORAL DAILY
Qty: 90 TABLET | Refills: 1 | Status: SHIPPED | OUTPATIENT
Start: 2022-10-24 | End: 2023-01-22

## 2022-10-24 RX ORDER — BUSPIRONE HYDROCHLORIDE 15 MG/1
TABLET ORAL
Qty: 270 TABLET | Refills: 0 | Status: SHIPPED | OUTPATIENT
Start: 2022-10-24

## 2022-10-24 RX ORDER — VILAZODONE HYDROCHLORIDE 20 MG/1
20 TABLET ORAL DAILY
Qty: 30 TABLET | Refills: 1 | Status: SHIPPED | OUTPATIENT
Start: 2022-10-24 | End: 2022-11-14 | Stop reason: SDUPTHER

## 2022-10-24 RX ORDER — GLIMEPIRIDE 1 MG/1
TABLET ORAL
Qty: 90 TABLET | Refills: 0 | OUTPATIENT
Start: 2022-10-24

## 2022-10-24 RX ORDER — ALPRAZOLAM 0.5 MG/1
0.5 TABLET ORAL 3 TIMES DAILY
Qty: 270 TABLET | Refills: 0 | Status: SHIPPED | OUTPATIENT
Start: 2022-10-24 | End: 2023-01-22

## 2022-10-24 RX ORDER — MIRTAZAPINE 15 MG/1
TABLET, FILM COATED ORAL
Qty: 90 TABLET | Refills: 0 | Status: SHIPPED | OUTPATIENT
Start: 2022-10-24

## 2022-10-24 RX ORDER — BLOOD SUGAR DIAGNOSTIC
1 STRIP MISCELLANEOUS DAILY
Qty: 100 EACH | Refills: 3 | Status: SHIPPED | OUTPATIENT
Start: 2022-10-24

## 2022-10-24 RX ORDER — PANTOPRAZOLE SODIUM 20 MG/1
TABLET, DELAYED RELEASE ORAL
Qty: 180 TABLET | Refills: 0 | Status: SHIPPED | OUTPATIENT
Start: 2022-10-24

## 2022-10-24 RX ORDER — LEVOTHYROXINE SODIUM 0.1 MG/1
TABLET ORAL
Qty: 90 TABLET | Refills: 1 | Status: SHIPPED | OUTPATIENT
Start: 2022-10-24

## 2022-10-24 ASSESSMENT — PATIENT HEALTH QUESTIONNAIRE - PHQ9
3. TROUBLE FALLING OR STAYING ASLEEP: 0
SUM OF ALL RESPONSES TO PHQ QUESTIONS 1-9: 12
4. FEELING TIRED OR HAVING LITTLE ENERGY: 3
5. POOR APPETITE OR OVEREATING: 0
9. THOUGHTS THAT YOU WOULD BE BETTER OFF DEAD, OR OF HURTING YOURSELF: 2
SUM OF ALL RESPONSES TO PHQ9 QUESTIONS 1 & 2: 2
SUM OF ALL RESPONSES TO PHQ QUESTIONS 1-9: 12
SUM OF ALL RESPONSES TO PHQ QUESTIONS 1-9: 12
7. TROUBLE CONCENTRATING ON THINGS, SUCH AS READING THE NEWSPAPER OR WATCHING TELEVISION: 2
10. IF YOU CHECKED OFF ANY PROBLEMS, HOW DIFFICULT HAVE THESE PROBLEMS MADE IT FOR YOU TO DO YOUR WORK, TAKE CARE OF THINGS AT HOME, OR GET ALONG WITH OTHER PEOPLE: 1
1. LITTLE INTEREST OR PLEASURE IN DOING THINGS: 1
SUM OF ALL RESPONSES TO PHQ QUESTIONS 1-9: 10
2. FEELING DOWN, DEPRESSED OR HOPELESS: 1
6. FEELING BAD ABOUT YOURSELF - OR THAT YOU ARE A FAILURE OR HAVE LET YOURSELF OR YOUR FAMILY DOWN: 2
8. MOVING OR SPEAKING SO SLOWLY THAT OTHER PEOPLE COULD HAVE NOTICED. OR THE OPPOSITE, BEING SO FIGETY OR RESTLESS THAT YOU HAVE BEEN MOVING AROUND A LOT MORE THAN USUAL: 1

## 2022-10-24 ASSESSMENT — ENCOUNTER SYMPTOMS
BELCHING: 0
WHEEZING: 0
NAUSEA: 0
WATER BRASH: 0
GLOBUS SENSATION: 0
COUGH: 0
SHORTNESS OF BREATH: 1
BLURRED VISION: 0
ORTHOPNEA: 0
VISUAL CHANGE: 0
HEARTBURN: 0
ABDOMINAL PAIN: 0

## 2022-10-24 ASSESSMENT — ANXIETY QUESTIONNAIRES
3. WORRYING TOO MUCH ABOUT DIFFERENT THINGS: 2
IF YOU CHECKED OFF ANY PROBLEMS ON THIS QUESTIONNAIRE, HOW DIFFICULT HAVE THESE PROBLEMS MADE IT FOR YOU TO DO YOUR WORK, TAKE CARE OF THINGS AT HOME, OR GET ALONG WITH OTHER PEOPLE: SOMEWHAT DIFFICULT
5. BEING SO RESTLESS THAT IT IS HARD TO SIT STILL: 1
2. NOT BEING ABLE TO STOP OR CONTROL WORRYING: 3
7. FEELING AFRAID AS IF SOMETHING AWFUL MIGHT HAPPEN: 3
GAD7 TOTAL SCORE: 14
6. BECOMING EASILY ANNOYED OR IRRITABLE: 2
4. TROUBLE RELAXING: 0
1. FEELING NERVOUS, ANXIOUS, OR ON EDGE: 3

## 2022-10-24 ASSESSMENT — COLUMBIA-SUICIDE SEVERITY RATING SCALE - C-SSRS
2. HAVE YOU ACTUALLY HAD ANY THOUGHTS OF KILLING YOURSELF?: NO
6. HAVE YOU EVER DONE ANYTHING, STARTED TO DO ANYTHING, OR PREPARED TO DO ANYTHING TO END YOUR LIFE?: NO
1. WITHIN THE PAST MONTH, HAVE YOU WISHED YOU WERE DEAD OR WISHED YOU COULD GO TO SLEEP AND NOT WAKE UP?: YES

## 2022-10-24 NOTE — PATIENT INSTRUCTIONS
Anxiety and Depression  - Practice self care and daily fresh air for 20 minutes. - Regular exercise program  - Practice self relaxation with music or meditation etc. Phone apps such as [employer]: Mediatation and Relaxation, or Calm. - The crisis number for [facility] is [phone]. You can use this number at any time to access emergency mental health services. - Text HOME to [phone] from anywhere in the [place] campus, anytime, about any type of crisis. Crisis Text Line serves anyone, in any type of crisis, providing access to free, 24/7. The first two responses are automated. They tell you that you're being connected with a Crisis Counselor, and invite you to share a bit more. The Crisis Counselor is a trained volunteer, not a professional.  It usually takes less than five minutes to connect you with a Crisis Counselor. The goal of any conversation is to get you to a calm, safe place.

## 2022-10-24 NOTE — PROGRESS NOTES
10/24/2022    Chief Complaint   Patient presents with    3 Month Follow-Up     For Hypertension, Hyperlipidemia, GERD, Diabetes, Hypothyroidism, Depression, Anxiety, Insomnia, Seizure Disorder               Michael Freeman is a 71 y.o. female, presents today for 3 month follow up of hypertension, hyperlipidemia, diabetes, GERD, hypothyroidism, depression, anxiety, insomnia, memory changes, Seizure disorder. HPI               Hypertension  The problem is controlled. Associated symptoms include shortness of breath (chronic). Pertinent negatives include no blurred vision, chest pain, headaches, malaise/fatigue, neck pain, orthopnea, palpitations, peripheral edema or PND. Risk factors for coronary artery disease include diabetes mellitus, dyslipidemia, sedentary lifestyle, obesity and post-menopausal state. Treatments tried: metoprolol. The current treatment provides significant improvement. Compliance problems include exercise. Hyperlipidemia  The problem is controlled (LDL <70 (69 in March 2022). Recent lipid tests were reviewed and are normal. Associated symptoms include shortness of breath (chronic). Pertinent negatives include no chest pain, focal sensory loss, focal weakness, leg pain or myalgias. Current antihyperlipidemic treatment includes statins (lipitor). The current treatment provides significant improvement of lipids. Compliance problems include adherence to exercise. Risk factors for coronary artery disease include diabetes mellitus, dyslipidemia, hypertension, a sedentary lifestyle, post-menopausal and obesity. Gastroesophageal Reflux  She reports no abdominal pain, no belching, no chest pain, no coughing, no early satiety, no globus sensation, no heartburn, no nausea, no water brash or no wheezing. The problem occurs rarely. The problem has been resolved. Pertinent negatives include no fatigue or weight loss. She has tried a PPI (Protonix 20 mg BID) for the symptoms.  The treatment provided significant relief. Diabetes  She presents for her follow-up diabetic visit. She has type 2 diabetes mellitus. Her disease course has been worsening (A1C 8.6 (up from 7.7 & 7.2). Hypoglycemia symptoms include nervousness/anxiousness and seizures (well controlled. Over 10 years since last seizure). Pertinent negatives for hypoglycemia include no dizziness or headaches. Pertinent negatives for diabetes include no blurred vision, no chest pain, no fatigue, no foot paresthesias, no foot ulcerations, no polydipsia, no polyphagia, no polyuria, no visual change, no weakness and no weight loss. Symptoms are stable. Risk factors for coronary artery disease include dyslipidemia, diabetes mellitus, obesity, hypertension, post-menopausal, sedentary lifestyle and stress. Current diabetic treatment includes oral agent (dual therapy) and diet (Glimepiride, Jardiance). She is compliant with treatment most of the time (struggles with diet. Compliant all the time with taking medications). She is following a generally healthy and diabetic diet. Meal planning includes avoidance of concentrated sweets. She has not had a previous visit with a dietitian. She never (due to chronic shortness of breath with chronic lung disease) participates in exercise. Home blood sugar record trend: Not checking regularly. (Fasting typically in 150s ) An ACE inhibitor/angiotensin II receptor blocker is being taken. She sees a podiatrist.Eye exam is current.      Results for POC orders placed in visit on 10/24/22   POCT microalbumin   Result Value Ref Range    Microalb, Ur 30 mg/L     Creatinine Ur POCT 200 mg/dL     Microalbumin Creatinine Ratio <30 mg/g    POCT glycosylated hemoglobin (Hb A1C)   Result Value Ref Range    Hemoglobin A1C 8.6 %      Component 7/6/22 0922 3/9/22 1327 10/13/21 1406 6/17/21 0914 3/9/21 1445 9/28/20 1721 7/7/20 0911   Hemoglobin A1C 7.7  7.2  6.6  7.1  7.0 R, CM  6.5 R, CM  6.8 High  R, CM      Patient has great concern for increasing A1C. Patient's daughters both take Kerfreya Davina and are doing well with significant improvement with uncontrolled diabetes. Patient would like to start medication today. We discussed starting Monujaro injection once weekly, continue Jardiance for at least 1 month (dependant on daily blood sugars) and to stop Glipizide. Patient and her  agreed to plan with verbalized understanding and denied having any questions regarding new treatment. - Stop glipizide   -Continue Jardiance 10 mg for at least 1 month    Hypothyroidism  Patient is taking Levothyroxine 100 mcg consistently on an empty stomach. Denies fatigue, weight gain, weight loss, cold intolerance, heat intolerance, hair loss, dry skin, constipation, diarrhea, edema, anxiety, tremor, palpitations and dysphagia. Depression and Anxiety  Patient is taking Viibryd 40 mg daily for depression and Buspar 10 mg TID for anxiety and Xanax 0.5 mg up to 3 times a day as needed. She is now taking   Xanax 3 times a day instead of twice daily as before. She reports anxiety and depression has not improved since increasing Viibryd dosage from 20 mg to 40 mg daily. She reports both are higher with worrying about her 's health- He is scheduled to have a bone marrow biopsy in early Nov 2022. - Will increase Viibryd from 40 mg to 60 mg (max dose). She denies medication side effects  She denies current suicidal and homicidal ideation. LADI 7 SCORE 10/24/2022 8/18/2022 7/6/2022   LADI-7 Total Score 14 9 14     Interpretation of LADI-7 score: 5-9 = mild anxiety, 10-14 = moderate anxiety, 15+ = severe anxiety. Recommend referral to behavioral health for scores 10 or greater.      LADI-7 SCREENING 10/24/2022 8/18/2022 7/6/2022   Feeling nervous, anxious, or on edge Nearly every day Nearly every day Nearly every day   Not being able to stop or control worrying Nearly every day Nearly every day Nearly every day   Worrying too much about different things More than half the days Several days Nearly every day   Trouble relaxing Not at all Not at all Not at all   Being so restless that it is hard to sit still Several days Not at all Not at all   Becoming easily annoyed or irritable More than half the days Several days More than half the days   Feeling afraid as if something awful might happen Nearly every day Several days Nearly every day   LADI-7 Total Score 14 9 14   How difficult have these problems made it for you to do your work, take care of things at home, or get along with other people? Somewhat difficult Somewhat difficult Not difficult at all        [facility] Scores 10/24/2022 8/18/2022 7/6/2022 3/9/2022 11/17/2021 1/11/2021 12/11/2020   PHQ2 Score 2 3 4 6 4 2 0   PHQ9 Score 12 19 14 13 10 2 0     Interpretation of Total Score Depression Severity: 1-4 = Minimal depression, 5-9 = Mild depression, 10-14 = Moderate depression, 15-19 = Moderately severe depression, 20-27 = Severe depression     PHQ-9  10/24/2022 8/18/2022 7/6/2022   Depression Unable to Assess - - -   Little interest or pleasure in doing things 1 0 2   Feeling down, depressed, or hopeless 1 3 2   Trouble falling or staying asleep, or sleeping too much 0 3 2   Feeling tired or having little energy 3 3 0   Poor appetite or overeating 0 0 0   Feeling bad about yourself - or that you are a failure or have let yourself or your family down 2 3 0   Trouble concentrating on things, such as reading the newspaper or watching television 2 3 3   Moving or speaking so slowly that other people could have noticed.  Or the opposite - being so fidgety or restless that you have been moving around a lot more than usual 1 3 3   Thoughts that you would be better off dead, or of hurting yourself in some way 2 1 2   PHQ-2 Score 2 3 4   PHQ-9 Total Score 12 19 14   If you checked off any problems, how difficult have these problems made it for you to do your work, take care of things at home, or get along with other people? 1 2 2          Insomnia   Patient reports insomnia, difficulty falling asleep has improved since changing sleep medication from Trazodone 150 mg to Remeron. Difficulty falling asleep has improved. She denies difficulty falling asleep and staying asleep. She is sleeping 10 hours a night. She feels rested upon waking around 10:30 am.        Memory changes  Patient reports \"memory is bad and getting worse\". Patient has previously been evaluated by neuro for memory changes within the past 1.5 years. She was found to have memory changes from chronic hypoxia from interstitial lung disease (wear continuous 6 liters O2).  reports Hugo Oliver where he's gone when he leaves the house (golf or grocery shopping) and when to expect him home. They have a dry erase board however she forgets to check to board.  has concern for her driving- and wants me to discuss with instructions not to drive. We discussed at length the dangers for her to drive with memory changes and driving is prohibited. Patient agreed to plan with verbal understanding. Osteopenia (right forearm)  Patient is taking Calcium 1200 mg-Vitamin D 400 UT daily as prescribed and tolerating well. Dysphagia  Recent EGD showed \"esophageal spasms\". GI started Trazodone 100 mg nightly for spasms. Patient denies improvement. She is intolerant of several foods and always uses caution with eating and drinking. She has choked a couple times. She has an appt with GI later this week. Gait Instability  Has fallen twice since last visit. She is using walker at all times. Patient and her  states \"she's always had bad balance\". She declines balance therapy PT, stating she is already going to PT for lung conditioning. Has chair lift at home with walker on both levels of home. Seizure Disorder  Patient reports long history of seizure disorder.  She has taken Tegretol since she moved to [facility] long time ago\". She reports aura of smelling dirt prior to seizure. Last seizure was over 10 years ago. She is taking medication as prescribed and tolerating well. - Generic Tegretol is ineffective- always takes name brand. Review of Systems   Constitutional:  Negative for activity change, appetite change, fatigue, malaise/fatigue, unexpected weight change and weight loss. Eyes:  Negative for blurred vision. Respiratory:  Positive for shortness of breath (chronic). Negative for cough and wheezing. Cardiovascular:  Negative for chest pain, palpitations, orthopnea, leg swelling and PND. Gastrointestinal:  Negative for abdominal pain, heartburn and nausea. Endocrine: Negative for polydipsia, polyphagia and polyuria. Musculoskeletal:  Positive for gait problem (unsteady gait- ambulates with walker). Negative for arthralgias, myalgias and neck pain. Neurological:  Positive for seizures (well controlled. Over 10 years since last seizure). Negative for dizziness, focal weakness, syncope, weakness, light-headedness and headaches. Psychiatric/Behavioral:  Positive for dysphoric mood. Negative for agitation, hallucinations, self-injury, sleep disturbance (improved) and suicidal ideas. The patient is nervous/anxious. Allergies   Allergen Reactions    Tramadol      Avoid because Pt is on Tegretol.     Penicillins Rash     Patient states she can take Keflex     Past Medical History:   Diagnosis Date    Abnormal stress test *Aug.1, 2016    Coronary angiography by Dr. Javier Townsend revealed a 60% stenosis of the LAD and  a 50% mid circumflex     Arthritis     Chronic headaches     Eczema     Fibromyalgia     GERD (gastroesophageal reflux disease)     Hyperlipidemia     Hypertension     Impaired fasting glucose     Interstitial lung disease ([facility] [address] 2016    Dr. Suly Gu    Interstitial lung disease [facility])     Migraine headache     Osteopenia DEXA - March, 2016    Lumbar T score +1.7 and Hip -1.0 FRAX 7.6/0.6 Osteopenia DEXA-Dec., 2017    Lumbar T score 2.3 and hip T score 0.1    Other screening mammogram July 8, 2013    Benign    Other screening mammogram *March 4, 2016    Benign    Oxygen dependent     O2 @ 3.5 L CONTINUOUS    Pulmonary hypertension ([facility] [address]     MILD    Retention of urine, unspecified     Screening mammogram, encounter for *February 11, 2020    Benign    Seizure disorder [facility])     Thyroid disease     Type 2 diabetes mellitus without complication ([facility] [address] *OUM.03, 2020    FBS - 137 and A 1 C of 6.7    Vitamin D deficiency Oct., 2015    Level - 17    Wears glasses      Past Surgical History:   Procedure Laterality Date    ABDOMINOPLASTY  Jan., 2012    Dr. Ninfa Paul Bilateral     BRONCHOSCOPY  **Sept.16, 2016    Dr. Willam Johnson  09/16/2016    BRONCHOSCOPY  *Febr.24, 2017    Dr. Ingrid Perrin - No endobronchial lesions    CARDIAC CATHETERIZATION  Oct., 2004    LAD 50%, circumflex normal, R coronary normal, LVEF 60%. CARDIAC CATHETERIZATION  *Sept.24, 2018    Dr. Hughes Galeazzi - [facility] - mild pulmonary hypertension    COLONOSCOPY  Oct., 2003 ( 2013 )    Dr. Afsaneh Forrester - Negative - repeat 2013    COLONOSCOPY  Jan., 2014 ( 2024 )    Dr. Jassi Cabrera - moderate diverticulosis.     COSMETIC SURGERY      inner thighs    HYSTERECTOMY (CERVIX STATUS UNKNOWN)      LIPOSUCTION Bilateral     knees    MS LAPAROSCOPY W TOT HYSTERECTUTERUS <=250 GRAM  W TUBE/OVARY N/A 11/16/2018    ROBOTIC TOTAL HYSTERECTOMY AND BILATERAL SALPINGO-OOPHORECTOMY performed by Aquilino Bell MD at [address] [address] ([address] [place] [address])  *Nov., 2018    Dr. Kate Teague  Oct., 2015    Dr. Miller Oz - left 2nd and 3rd    TOTAL HIP ARTHROPLASTY  July 31, 2013    Dr. Shamar Copeland  *April 13, 2018    Dr. Zoila Jang - right    TUBAL LIGATION        Social History     Tobacco Use    Smoking status: Never    Smokeless tobacco: Never   Substance Use Topics    Alcohol use: Yes     Comment: Maybe a couple drinks a year      Family History   Problem Relation Age of Onset    Heart Disease Father 76        , ASHD, S/P CAB, NIDDM, hyperlipidemia. Cancer Mother 58        , lung cancer. Ovarian Cancer Maternal Grandmother         age unknown    Ovarian Cancer Paternal Grandmother         age unknown    Breast Cancer Maternal Aunt         age 48    Other Daughter     Other Daughter         Vitals:    10/24/22 1042   BP: 136/80   Site: Left Upper Arm   Position: Sitting   Cuff Size: Medium Adult   Pulse: 96   Temp: 97.5 °F (36.4 °C)   SpO2: 96%   Weight: 215 lb (97.5 kg)       Estimated body mass index is 38.7 kg/m² as calculated from the following:    Height as of 22: 5' 2.5\" (1.588 m). Weight as of this encounter: 215 lb (97.5 kg). Physical Exam  Vitals and nursing note reviewed. Constitutional:       General: She is not in acute distress. Appearance: Normal appearance. She is well-developed. She is obese. Neck:      Vascular: No carotid bruit. Cardiovascular:      Rate and Rhythm: Normal rate and regular rhythm. Pulses: Normal pulses. Heart sounds: Normal heart sounds, S1 normal and S2 normal.   Pulmonary:      Effort: Pulmonary effort is normal.      Breath sounds: Normal breath sounds. Musculoskeletal:         General: Normal range of motion. Cervical back: Normal range of motion and neck supple. Right lower leg: No edema. Left lower leg: No edema. Lymphadenopathy:      Cervical: No cervical adenopathy. Skin:     General: Skin is warm. Neurological:      General: No focal deficit present. Mental Status: She is alert and oriented to person, place, and time. Psychiatric:         Attention and Perception: Attention normal.         Mood and Affect: Affect normal. Mood is anxious and depressed. Speech: Speech normal.         Behavior: Behavior normal.         Thought Content:  Thought content normal. Thought content is not paranoid or delusional. Thought content does not include homicidal or suicidal ideation. Thought content does not include homicidal or suicidal plan. Cognition and Memory: Cognition normal.     Results for POC orders placed in visit on 10/24/22   POCT microalbumin   Result Value Ref Range    Microalb, Ur 30 mg/L     Creatinine Ur POCT 200 mg/dL     Microalbumin Creatinine Ratio <30 mg/g    POCT glycosylated hemoglobin (Hb A1C)   Result Value Ref Range    Hemoglobin A1C 8.6 %       ASSESSMENT/PLAN:  1. Essential hypertension, benign  - Stable  - Follows cardiologist, Dr. Timoteo Van  - Follows low sodium diet most of the time  - Continue metoprolol succinate (Toprol-XL) 50 mg daily    2. Hyperlipidemia, unspecified hyperlipidemia type  -Stable  -LDL <70  - Continue atorvastatin (LIPITOR) 80 MG tablet; Take 1 tablet by mouth daily  Dispense: 90 tablet; Refill: 1  - Continue ezetimibe (ZETIA) 10 MG tablet; Take 1 tablet by mouth at bedtime  Dispense: 90 tablet; Refill: 1    3. Uncontrolled type 2 diabetes mellitus with hyperglycemia (HCC)  - Worsened. - A1C   : 8.6 (up from 7.7)  - POCT glycosylated hemoglobin (Hb A1C)  - POCT microalbumin  - Continue empagliflozin (JARDIANCE) 10 MG tablet; Take 1 tablet by mouth daily  Dispense: 90 tablet; Refill: 0  - Start Tirzepatide [facility]) 2.5 MG/0.5ML SOPN SC injection; Inject 0.5 mLs into the skin once a week  Dispense: 4 Adjustable Dose Pre-filled Pen Syringe; Refill: 1  - blood glucose test strips (ONETOUCH VERIO) strip; 1 each by In Vitro route daily Use to test once daily or as needed  Dispense: 100 each; Refill: 3  - Continue empagliflozin (JARDIANCE) 10 MG tablet; Take 1 tablet by mouth daily  Dispense: 90 tablet; Refill: 0  - Stop Glipizide   - Continue Jardiance until diabetes has improved/controlled. - Common side effects of Mounjaro reviewed.      4. Gastroesophageal reflux disease without esophagitis  - Stable  - Continue pantoprazole (PROTONIX) 20 MG tablet; TAKE ONE patient, and same day appointments available. Encouraged to use [employer] for communication as needed. Electronically signed by CESAR Goldsmith CNP on 10/24/2022 at 12:27 PM       This dictation was generated by voice recognition computer software. Although all attempts are made to edit the dictation for accuracy, there may be errors in the transcription that are not intended.

## 2022-11-14 ENCOUNTER — OFFICE VISIT (OUTPATIENT)
Dept: PRIMARY CARE CLINIC | Age: 72
End: 2022-11-14
Payer: MEDICARE

## 2022-11-14 VITALS
HEIGHT: 63 IN | OXYGEN SATURATION: 98 % | DIASTOLIC BLOOD PRESSURE: 78 MMHG | WEIGHT: 212 LBS | TEMPERATURE: 96.8 F | BODY MASS INDEX: 37.56 KG/M2 | HEART RATE: 85 BPM | SYSTOLIC BLOOD PRESSURE: 112 MMHG

## 2022-11-14 DIAGNOSIS — F41.9 ANXIETY: ICD-10-CM

## 2022-11-14 DIAGNOSIS — E11.65 UNCONTROLLED TYPE 2 DIABETES MELLITUS WITH HYPERGLYCEMIA (HCC): Primary | ICD-10-CM

## 2022-11-14 DIAGNOSIS — F33.1 MODERATE EPISODE OF RECURRENT MAJOR DEPRESSIVE DISORDER (HCC): ICD-10-CM

## 2022-11-14 PROCEDURE — 1090F PRES/ABSN URINE INCON ASSESS: CPT | Performed by: NURSE PRACTITIONER

## 2022-11-14 PROCEDURE — 2022F DILAT RTA XM EVC RTNOPTHY: CPT | Performed by: NURSE PRACTITIONER

## 2022-11-14 PROCEDURE — 3074F SYST BP LT 130 MM HG: CPT | Performed by: NURSE PRACTITIONER

## 2022-11-14 PROCEDURE — G8399 PT W/DXA RESULTS DOCUMENT: HCPCS | Performed by: NURSE PRACTITIONER

## 2022-11-14 PROCEDURE — G8417 CALC BMI ABV UP PARAM F/U: HCPCS | Performed by: NURSE PRACTITIONER

## 2022-11-14 PROCEDURE — G8427 DOCREV CUR MEDS BY ELIG CLIN: HCPCS | Performed by: NURSE PRACTITIONER

## 2022-11-14 PROCEDURE — 3017F COLORECTAL CA SCREEN DOC REV: CPT | Performed by: NURSE PRACTITIONER

## 2022-11-14 PROCEDURE — G8484 FLU IMMUNIZE NO ADMIN: HCPCS | Performed by: NURSE PRACTITIONER

## 2022-11-14 PROCEDURE — 3078F DIAST BP <80 MM HG: CPT | Performed by: NURSE PRACTITIONER

## 2022-11-14 PROCEDURE — 1036F TOBACCO NON-USER: CPT | Performed by: NURSE PRACTITIONER

## 2022-11-14 PROCEDURE — 3052F HG A1C>EQUAL 8.0%<EQUAL 9.0%: CPT | Performed by: NURSE PRACTITIONER

## 2022-11-14 PROCEDURE — 99214 OFFICE O/P EST MOD 30 MIN: CPT | Performed by: NURSE PRACTITIONER

## 2022-11-14 PROCEDURE — 1123F ACP DISCUSS/DSCN MKR DOCD: CPT | Performed by: NURSE PRACTITIONER

## 2022-11-14 RX ORDER — GLIMEPIRIDE 1 MG/1
1 TABLET ORAL
COMMUNITY

## 2022-11-14 RX ORDER — VILAZODONE HYDROCHLORIDE 20 MG/1
20 TABLET ORAL DAILY
Qty: 90 TABLET | Refills: 0 | Status: SHIPPED | OUTPATIENT
Start: 2022-11-14 | End: 2023-02-12

## 2022-11-14 ASSESSMENT — PATIENT HEALTH QUESTIONNAIRE - PHQ9
SUM OF ALL RESPONSES TO PHQ QUESTIONS 1-9: 12
9. THOUGHTS THAT YOU WOULD BE BETTER OFF DEAD, OR OF HURTING YOURSELF: 0
SUM OF ALL RESPONSES TO PHQ9 QUESTIONS 1 & 2: 2
8. MOVING OR SPEAKING SO SLOWLY THAT OTHER PEOPLE COULD HAVE NOTICED. OR THE OPPOSITE, BEING SO FIGETY OR RESTLESS THAT YOU HAVE BEEN MOVING AROUND A LOT MORE THAN USUAL: 1
6. FEELING BAD ABOUT YOURSELF - OR THAT YOU ARE A FAILURE OR HAVE LET YOURSELF OR YOUR FAMILY DOWN: 3
3. TROUBLE FALLING OR STAYING ASLEEP: 2
5. POOR APPETITE OR OVEREATING: 1
2. FEELING DOWN, DEPRESSED OR HOPELESS: 2
SUM OF ALL RESPONSES TO PHQ QUESTIONS 1-9: 12
4. FEELING TIRED OR HAVING LITTLE ENERGY: 2
1. LITTLE INTEREST OR PLEASURE IN DOING THINGS: 0
SUM OF ALL RESPONSES TO PHQ QUESTIONS 1-9: 12
SUM OF ALL RESPONSES TO PHQ QUESTIONS 1-9: 12
10. IF YOU CHECKED OFF ANY PROBLEMS, HOW DIFFICULT HAVE THESE PROBLEMS MADE IT FOR YOU TO DO YOUR WORK, TAKE CARE OF THINGS AT HOME, OR GET ALONG WITH OTHER PEOPLE: 3
7. TROUBLE CONCENTRATING ON THINGS, SUCH AS READING THE NEWSPAPER OR WATCHING TELEVISION: 1

## 2022-11-14 ASSESSMENT — ANXIETY QUESTIONNAIRES
1. FEELING NERVOUS, ANXIOUS, OR ON EDGE: 2
GAD7 TOTAL SCORE: 11
6. BECOMING EASILY ANNOYED OR IRRITABLE: 0
2. NOT BEING ABLE TO STOP OR CONTROL WORRYING: 3
3. WORRYING TOO MUCH ABOUT DIFFERENT THINGS: 0
IF YOU CHECKED OFF ANY PROBLEMS ON THIS QUESTIONNAIRE, HOW DIFFICULT HAVE THESE PROBLEMS MADE IT FOR YOU TO DO YOUR WORK, TAKE CARE OF THINGS AT HOME, OR GET ALONG WITH OTHER PEOPLE: NOT DIFFICULT AT ALL
4. TROUBLE RELAXING: 1
5. BEING SO RESTLESS THAT IT IS HARD TO SIT STILL: 2
7. FEELING AFRAID AS IF SOMETHING AWFUL MIGHT HAPPEN: 3

## 2022-11-14 ASSESSMENT — COLUMBIA-SUICIDE SEVERITY RATING SCALE - C-SSRS
1. WITHIN THE PAST MONTH, HAVE YOU WISHED YOU WERE DEAD OR WISHED YOU COULD GO TO SLEEP AND NOT WAKE UP?: NO
2. HAVE YOU ACTUALLY HAD ANY THOUGHTS OF KILLING YOURSELF?: NO
6. HAVE YOU EVER DONE ANYTHING, STARTED TO DO ANYTHING, OR PREPARED TO DO ANYTHING TO END YOUR LIFE?: NO

## 2022-11-14 ASSESSMENT — ENCOUNTER SYMPTOMS
BLURRED VISION: 0
CHEST TIGHTNESS: 0
VISUAL CHANGE: 0
COUGH: 0
SHORTNESS OF BREATH: 1

## 2022-11-14 NOTE — PROGRESS NOTES
11/14/2022    Chief Complaint   Patient presents with    Follow-up     3 weeks for diabetes, anxiety and depression. Diandra Bernal is a 71 y.o. female, presents today for 3 week follow up of diabetes, anxiety and depression. Rick Aranda is accompanied by her , Fidelina today. HPI  Diabetes  She presents for her follow-up diabetic visit. She has type 2 diabetes mellitus. Hypoglycemia symptoms include nervousness/anxiousness. Pertinent negatives for diabetes include no blurred vision, no chest pain, no fatigue, no foot paresthesias, no foot ulcerations, no polydipsia, no polyphagia, no polyuria, no visual change, no weakness and no weight loss. Risk factors for coronary artery disease include obesity, post-menopausal, sedentary lifestyle, stress, hypertension, dyslipidemia and diabetes mellitus. Current diabetic treatment includes insulin injections and oral agent (monotherapy) (Mounjaro 2.5 mg injection (0.5 ml once weekly) and Glimepiride 1 mg before breakfast). She is compliant with treatment all of the time. She is following a generally healthy (Eating smaller portions since starting Mounjaro) diet. Meal planning includes avoidance of concentrated sweets (reports making healthier food choices). She never (chronic shortness of breath from chronic lung disease) participates in exercise. Home blood sugar record trend: ranges from 120s-150s. She sees a podiatrist.Eye exam is current. Anxiety and Depression  Current medications include: Xanax 0/5 mg 3 x /daily, Buspar 15 mg 3 x /daily,   Viibryd 60 mg. At last appointment 1 month ago Buspar was increased from 10 mg to 15 mg and Viibryd was increased from 40 mg to 60 mg (max dose). She denies medication side effects  She denies current suicidal and homicidal ideation. She reports anxiety and depression started to improve with Viibryd until her  was diagnosed with a blood disease and has been worried for him as of late. Patient has been on several different antidepressants without improvement/remission. We discussed at length patient needs to be evaluated and treated by a mental health provider at [facility] [address] [facility] in [place]. She does not need a referral. Phone number provided for  to schedule an appointment. Patient and her  agree to plan with verbal understanding.     - Will defer Genesight Testing to [facility]. LADI 7 SCORE 11/14/2022 10/24/2022 8/18/2022 7/6/2022   LADI-7 Total Score 11 14 9 14     Interpretation of LADI-7 score: 5-9 = mild anxiety, 10-14 = moderate anxiety, 15+ = severe anxiety. Recommend referral to behavioral health for scores 10 or greater. LADI-7 SCREENING 11/14/2022 10/24/2022 8/18/2022   Feeling nervous, anxious, or on edge More than half the days Nearly every day Nearly every day   Not being able to stop or control worrying Nearly every day Nearly every day Nearly every day   Worrying too much about different things Not at all More than half the days Several days   Trouble relaxing Several days Not at all Not at all   Being so restless that it is hard to sit still More than half the days Several days Not at all   Becoming easily annoyed or irritable Not at all More than half the days Several days   Feeling afraid as if something awful might happen Nearly every day Nearly every day Several days   LADI-7 Total Score 11 14 9   How difficult have these problems made it for you to do your work, take care of things at home, or get along with other people?  Not difficult at all Somewhat difficult Somewhat difficult        PHQ Scores 11/14/2022 10/24/2022 8/18/2022 7/6/2022 3/9/2022 11/17/2021 1/11/2021   PHQ2 Score 2 2 3 4 6 4 2   PHQ9 Score 12 12 19 14 13 10 2     Interpretation of Total Score Depression Severity: 1-4 = Minimal depression, 5-9 = Mild depression, 10-14 = Moderate depression, 15-19 = Moderately severe depression, 20-27 = Severe depression     PHQ-9 11/14/2022 10/24/2022 8/18/2022   Depression Unable to Assess - - -   Little interest or pleasure in doing things 0 1 0   Feeling down, depressed, or hopeless 2 1 3   Trouble falling or staying asleep, or sleeping too much 2 0 3   Feeling tired or having little energy 2 3 3   Poor appetite or overeating 1 0 0   Feeling bad about yourself - or that you are a failure or have let yourself or your family down 3 2 3   Trouble concentrating on things, such as reading the newspaper or watching television 1 2 3   Moving or speaking so slowly that other people could have noticed. Or the opposite - being so fidgety or restless that you have been moving around a lot more than usual 1 1 3   Thoughts that you would be better off dead, or of hurting yourself in some way 0 2 1   PHQ-2 Score 2 2 3   PHQ-9 Total Score 12 12 19   If you checked off any problems, how difficult have these problems made it for you to do your work, take care of things at home, or get along with other people? 3 1 2        Review of Systems   Constitutional:  Negative for activity change, appetite change, fatigue, unexpected weight change and weight loss. Eyes:  Negative for blurred vision. Respiratory:  Positive for shortness of breath (chronic). Negative for cough and chest tightness. Cardiovascular:  Negative for chest pain and palpitations. Endocrine: Negative for polydipsia, polyphagia and polyuria. Musculoskeletal:  Positive for gait problem (ambulates with walker for balance). Neurological:  Negative for weakness and light-headedness. Psychiatric/Behavioral:  Positive for dysphoric mood. Negative for agitation, decreased concentration, hallucinations, self-injury, sleep disturbance and suicidal ideas. The patient is nervous/anxious. The patient is not hyperactive.       Current Outpatient Medications on File Prior to Visit   Medication Sig Dispense Refill    glimepiride (AMARYL) 1 MG tablet Take 1 mg by mouth every morning (before breakfast)      ALPRAZolam (XANAX) 0.5 MG tablet Take 1 tablet by mouth 3 times daily for 90 days. 270 tablet 0    atorvastatin (LIPITOR) 80 MG tablet Take 1 tablet by mouth daily 90 tablet 1    blood glucose test strips (ONETOUCH VERIO) strip 1 each by In Vitro route daily Use to test once daily or as needed 100 each 3    busPIRone (BUSPAR) 15 MG tablet TAKE 1 TABLET BY MOUTH 3 TIMES A  tablet 0    ezetimibe (ZETIA) 10 MG tablet Take 1 tablet by mouth at bedtime 90 tablet 1    levothyroxine (SYNTHROID) 100 MCG tablet TAKE ONE TABLET BY MOUTH DAILY 90 tablet 1    mirtazapine (REMERON) 15 MG tablet TAKE 1/2 TO 1 TABLET BY MOUTH 30-60 MINUTES PRIOR TO BEDTIME 90 tablet 0    pantoprazole (PROTONIX) 20 MG tablet TAKE ONE TABLET BY MOUTH TWICE A  tablet 0    TEGRETOL 200 MG tablet TAKE ONE TABLET BY MOUTH TWICE A  tablet 1    VILAZODONE HCL 40 MG Tablet TAKE ONE TABLET BY MOUTH DAILY TAKE WITH FOOD 90 tablet 0    Calcium 600-200 MG-UNIT TABS Take 2 tablets by mouth once daily 180 tablet 3    ascorbic acid (VITAMIN C) 500 MG tablet Take 1 tablet by mouth daily      Zinc 100 MG TABS Take 100 mg by mouth daily      metoprolol succinate (TOPROL XL) 50 MG extended release tablet TAKE ONE TAB BY MOUTH ONCE DAILY 90 tablet 4    furosemide (LASIX) 20 MG tablet Take 1 tablet by mouth daily 90 tablet 4    Blood Glucose Monitoring Suppl (ONETOUCH VERIO) w/Device KIT Test once daily 1 kit 0    OXYGEN Inhale 6 L/day into the lungs      vitamin B-12 (CYANOCOBALAMIN) 500 MCG tablet Take 500 mcg by mouth daily      predniSONE (DELTASONE) 5 MG tablet Take 5 mg by mouth daily       magnesium (MAGNESIUM-OXIDE) 250 MG TABS tablet Take 250 mg by mouth daily      aspirin 81 MG tablet Take 81 mg by mouth daily. No current facility-administered medications on file prior to visit. Allergies   Allergen Reactions    Tramadol      Avoid because Pt is on Tegretol.     Penicillins Rash     Patient states she can take Keflex     Past Medical History:   Diagnosis Date    Abnormal stress test *Aug.1, 2016    Coronary angiography by Dr. Stephanie Zayas revealed a 60% stenosis of the LAD and  a 50% mid circumflex     Arthritis     Chronic headaches     Eczema     Fibromyalgia     GERD (gastroesophageal reflux disease)     Hyperlipidemia     Hypertension     Impaired fasting glucose     Interstitial lung disease ([facility] 2016    Dr. Edward Leal    Interstitial lung disease [facility])     Migraine headache     Osteopenia DEXA - March, 2016    Lumbar T score +1.7 and Hip -1.0 FRAX 7.6/0.6    Osteopenia DEXA-Dec., 2017    Lumbar T score 2.3 and hip T score 0.1    Other screening mammogram July 8, 2013    Benign    Other screening mammogram *March 4, 2016    Benign    Oxygen dependent     O2 @ 3.5 L CONTINUOUS    Pulmonary hypertension ([facility]     MILD    Retention of urine, unspecified     Screening mammogram, encounter for *February 11, 2020    Benign    Seizure disorder [facility])     Thyroid disease     Type 2 diabetes mellitus without complication ([facility] *JYL.45, 2020    FBS - 137 and A 1 C of 6.7    Vitamin D deficiency Oct., 2015    Level - 17    Wears glasses      Past Surgical History:   Procedure Laterality Date    ABDOMINOPLASTY  Jan., 2012    Dr. Nhung Zurita Bilateral     BRONCHOSCOPY  **Sept.16, 2016    Dr. Rahel Gao  09/16/2016    BRONCHOSCOPY  *Febr.24, 2017    Dr. Villa Almanza - No endobronchial lesions    CARDIAC CATHETERIZATION  Oct., 2004    LAD 50%, circumflex normal, R coronary normal, LVEF 60%. CARDIAC CATHETERIZATION  *Sept.24, 2018    Dr. Sonam Dai - [facility] - mild pulmonary hypertension    COLONOSCOPY  Oct., 2003 ( 2013 )    Dr. Siobhan Chance - Negative - repeat 2013    COLONOSCOPY  Jan., 2014 ( 2024 )    Dr. Fady Spencer - moderate diverticulosis.     COSMETIC SURGERY      inner thighs    HYSTERECTOMY (CERVIX STATUS UNKNOWN)      LIPOSUCTION Bilateral     knees    ME LAPAROSCOPY W TOT HYSTERECTUTERUS <=250 GRAM  W TUBE/OVARY N/A 2018    ROBOTIC TOTAL HYSTERECTOMY AND BILATERAL SALPINGO-OOPHORECTOMY performed by Naseem Hood MD at [address] ([place])  *2018    Dr. Efraín Zayas  Oct., 2015    Dr. Zabrina Quigley - left 2nd and 3rd    TOTAL HIP ARTHROPLASTY  2013    Dr. Merline Fenton - [facility]  *2018    Dr. Guille Kline - right    TUBAL LIGATION        Social History     Tobacco Use    Smoking status: Never    Smokeless tobacco: Never   Substance Use Topics    Alcohol use: Yes     Comment: Maybe a couple drinks a year      Family History   Problem Relation Age of Onset    Heart Disease Father 76        , ASHD, S/P CAB, NIDDM, hyperlipidemia. Cancer Mother 58        , lung cancer. Ovarian Cancer Maternal Grandmother         age unknown    Ovarian Cancer Paternal Grandmother         age unknown    Breast Cancer Maternal Aunt         age 48    Other Daughter     Other Daughter         Vitals:    22 1237   BP: 112/78   Site: Left Upper Arm   Position: Sitting   Cuff Size: Large Adult   Pulse: 85   Temp: 96.8 °F (36 °C)   SpO2: 98%   Weight: 212 lb (96.2 kg)   Height: 5' 2.5\" (1.588 m)     Estimated body mass index is 38.16 kg/m² as calculated from the following:    Height as of this encounter: 5' 2.5\" (1.588 m). Weight as of this encounter: 212 lb (96.2 kg). Physical Exam  Vitals and nursing note reviewed. Constitutional:       General: She is not in acute distress. Appearance: Normal appearance. She is well-developed. Cardiovascular:      Rate and Rhythm: Normal rate and regular rhythm. Pulses: Normal pulses. Heart sounds: Normal heart sounds. Pulmonary:      Effort: Pulmonary effort is normal.      Breath sounds: Normal breath sounds. Skin:     General: Skin is warm. Neurological:      General: No focal deficit present. Mental Status: She is alert and oriented to person, place, and time.    Psychiatric: Attention and Perception: Attention normal.         Mood and Affect: Affect normal. Mood is anxious and depressed. Speech: Speech normal.         Behavior: Behavior normal.         Thought Content: Thought content normal. Thought content is not paranoid or delusional. Thought content does not include homicidal or suicidal ideation. Thought content does not include homicidal or suicidal plan. Cognition and Memory: Cognition normal.       ASSESSMENT/PLAN:  1. Uncontrolled type 2 diabetes mellitus with hyperglycemia (HCC)  - Uncontrolled. - Last  A1C was 8.6 (up from 7.7, 7.2, 6.6)  - Continue Tirzepatide (MOUNJARO) 2.5 MG/0.5ML SOPN SC injection; Inject 0.5 mLs into the skin once a week  Dispense: 4 Adjustable Dose Pre-filled Pen Syringe; Refill: 2  - Continue glimepiride 1 mg before breakfast    2. Anxiety  -Active, minimal change  -LADI-7 score: 11 (down from 14, 9, 14). -Continue vilazodone hcl 20 MG TABS; Take 1 tablet by mouth daily Take with food. Take in addition to 40 mg tablet for daily total of 60 mg  Dispense: 90 tablet; Refill: 0  -Continue Viibryd 40 mg daily  -Continue buspirone 15 mg 3 times daily  -Continue Xanax 0.5 mg 3 times daily    3. Moderate episode of recurrent major depressive disorder (HCC)  -Active no change  -PHQ-9 score: 12 (no change from 12, 19, 14). - See #2  - Schedule appointment at [address] [place] [address]- phone number provided. Return in 2 months (on 1/14/2023) for 2 mo f/u DM, HTN, hyperlipidemia, GERD, hypothyroidism, depression, anxiety, insomnia--always an 1hr.     Discussed use, benefit, and side effects of prescribed medications. Patient's questions answered and concerns addressed. Patient agrees to plan of care. My scheduled days in the office reviewed with patient, and same day appointments available. Encouraged to use [employer] for communication as needed.      Electronically signed by CESAR Vogel CNP on 11/14/2022 at 3:12 PM This dictation was generated by voice recognition computer software. Although all attempts are made to edit the dictation for accuracy, there may be errors in the transcription that are not intended.

## 2022-11-14 NOTE — PATIENT INSTRUCTIONS
Anxiety and Depression  Please call [address] in [place] to schedule an appointment for management of anxiety and depression. [address] [facility], call [phone]    An initial phone screening starts the process to set up a Psychological Assessment or to immediately schedule your first appointment with a provider.

## 2022-12-30 DIAGNOSIS — F33.1 MODERATE EPISODE OF RECURRENT MAJOR DEPRESSIVE DISORDER (HCC): ICD-10-CM

## 2023-01-03 RX ORDER — VILAZODONE HYDROCHLORIDE 40 MG/1
TABLET ORAL
Qty: 90 TABLET | Refills: 0 | OUTPATIENT
Start: 2023-01-03

## 2023-01-03 NOTE — TELEPHONE ENCOUNTER
Requested Prescriptions     Pending Prescriptions Disp Refills    vilazodone hcl 40 MG TABS 90 tablet 0     Refused Prescriptions Disp Refills    VILAZODONE HCL 40 MG Tablet [Pharmacy Med Name: VIIBRYD 40 MG TABLET] 90 tablet 0     Sig: TAKE ONE TABLET BY MOUTH DAILY WITH FOOD     Refused By: Ericka Lane     Reason for Refusal: Patient has requested refill too soon     Last OV - 11/14/22. Next OV - 1/11/23. Last refill - 11/14/2. Last labs - 3/17/22.

## 2023-01-04 RX ORDER — VILAZODONE HYDROCHLORIDE 40 MG/1
TABLET ORAL
Qty: 90 TABLET | Refills: 0 | Status: SHIPPED | OUTPATIENT
Start: 2023-01-04

## 2023-01-07 DIAGNOSIS — F41.9 ANXIETY: ICD-10-CM

## 2023-01-09 RX ORDER — BUSPIRONE HYDROCHLORIDE 15 MG/1
TABLET ORAL
Qty: 270 TABLET | Refills: 0 | Status: SHIPPED | OUTPATIENT
Start: 2023-01-09

## 2023-01-09 NOTE — TELEPHONE ENCOUNTER
Requested Prescriptions     Pending Prescriptions Disp Refills    busPIRone (BUSPAR) 15 MG tablet [Pharmacy Med Name: BUSPIRONE HCL 15 MG TABLET] 270 tablet 0     Sig: TAKE ONE TABLET BY MOUTH THREE TIMES A DAY       Last OV - 11/14/22. Next OV - 1/11/23. Last refill - 10/24/22. Last labs - 3/17/22.

## 2023-01-11 ENCOUNTER — OFFICE VISIT (OUTPATIENT)
Dept: PRIMARY CARE CLINIC | Age: 73
End: 2023-01-11
Payer: MEDICARE

## 2023-01-11 VITALS
OXYGEN SATURATION: 98 % | BODY MASS INDEX: 36.89 KG/M2 | DIASTOLIC BLOOD PRESSURE: 60 MMHG | TEMPERATURE: 97.7 F | WEIGHT: 208.2 LBS | HEART RATE: 90 BPM | SYSTOLIC BLOOD PRESSURE: 102 MMHG | HEIGHT: 63 IN

## 2023-01-11 DIAGNOSIS — E78.5 HYPERLIPIDEMIA, UNSPECIFIED HYPERLIPIDEMIA TYPE: ICD-10-CM

## 2023-01-11 DIAGNOSIS — F41.9 ANXIETY: ICD-10-CM

## 2023-01-11 DIAGNOSIS — F51.01 PRIMARY INSOMNIA: ICD-10-CM

## 2023-01-11 DIAGNOSIS — E11.65 UNCONTROLLED TYPE 2 DIABETES MELLITUS WITH HYPERGLYCEMIA (HCC): Primary | ICD-10-CM

## 2023-01-11 DIAGNOSIS — F33.1 MODERATE EPISODE OF RECURRENT MAJOR DEPRESSIVE DISORDER (HCC): ICD-10-CM

## 2023-01-11 DIAGNOSIS — N18.30 STAGE 3 CHRONIC KIDNEY DISEASE, UNSPECIFIED WHETHER STAGE 3A OR 3B CKD (HCC): ICD-10-CM

## 2023-01-11 DIAGNOSIS — I10 ESSENTIAL HYPERTENSION, BENIGN: ICD-10-CM

## 2023-01-11 DIAGNOSIS — K21.9 GASTROESOPHAGEAL REFLUX DISEASE WITHOUT ESOPHAGITIS: ICD-10-CM

## 2023-01-11 DIAGNOSIS — J84.9 INTERSTITIAL LUNG DISEASE (HCC): ICD-10-CM

## 2023-01-11 DIAGNOSIS — E03.9 ACQUIRED HYPOTHYROIDISM: ICD-10-CM

## 2023-01-11 DIAGNOSIS — G40.909 SEIZURE DISORDER (HCC): ICD-10-CM

## 2023-01-11 PROCEDURE — G8417 CALC BMI ABV UP PARAM F/U: HCPCS | Performed by: NURSE PRACTITIONER

## 2023-01-11 PROCEDURE — 1036F TOBACCO NON-USER: CPT | Performed by: NURSE PRACTITIONER

## 2023-01-11 PROCEDURE — G8399 PT W/DXA RESULTS DOCUMENT: HCPCS | Performed by: NURSE PRACTITIONER

## 2023-01-11 PROCEDURE — 3078F DIAST BP <80 MM HG: CPT | Performed by: NURSE PRACTITIONER

## 2023-01-11 PROCEDURE — 3017F COLORECTAL CA SCREEN DOC REV: CPT | Performed by: NURSE PRACTITIONER

## 2023-01-11 PROCEDURE — 1123F ACP DISCUSS/DSCN MKR DOCD: CPT | Performed by: NURSE PRACTITIONER

## 2023-01-11 PROCEDURE — 3074F SYST BP LT 130 MM HG: CPT | Performed by: NURSE PRACTITIONER

## 2023-01-11 PROCEDURE — 2022F DILAT RTA XM EVC RTNOPTHY: CPT | Performed by: NURSE PRACTITIONER

## 2023-01-11 PROCEDURE — 1090F PRES/ABSN URINE INCON ASSESS: CPT | Performed by: NURSE PRACTITIONER

## 2023-01-11 PROCEDURE — G8427 DOCREV CUR MEDS BY ELIG CLIN: HCPCS | Performed by: NURSE PRACTITIONER

## 2023-01-11 PROCEDURE — 3046F HEMOGLOBIN A1C LEVEL >9.0%: CPT | Performed by: NURSE PRACTITIONER

## 2023-01-11 PROCEDURE — G8484 FLU IMMUNIZE NO ADMIN: HCPCS | Performed by: NURSE PRACTITIONER

## 2023-01-11 PROCEDURE — 99214 OFFICE O/P EST MOD 30 MIN: CPT | Performed by: NURSE PRACTITIONER

## 2023-01-11 RX ORDER — TIRZEPATIDE 5 MG/.5ML
5 INJECTION, SOLUTION SUBCUTANEOUS WEEKLY
Qty: 2 ML | Refills: 2 | Status: SHIPPED | OUTPATIENT
Start: 2023-01-11 | End: 2023-02-10

## 2023-01-11 RX ORDER — CARBAMAZEPINE 200 MG
TABLET ORAL
Qty: 180 TABLET | Refills: 1 | Status: SHIPPED | OUTPATIENT
Start: 2023-01-11

## 2023-01-11 RX ORDER — ALPRAZOLAM 0.5 MG/1
0.5 TABLET ORAL 3 TIMES DAILY
Qty: 270 TABLET | Refills: 0 | Status: SHIPPED | OUTPATIENT
Start: 2023-01-11 | End: 2023-04-11

## 2023-01-11 RX ORDER — VILAZODONE HYDROCHLORIDE 40 MG/1
TABLET ORAL
Qty: 90 TABLET | Refills: 0 | Status: SHIPPED | OUTPATIENT
Start: 2023-01-11

## 2023-01-11 RX ORDER — PANTOPRAZOLE SODIUM 20 MG/1
TABLET, DELAYED RELEASE ORAL
Qty: 180 TABLET | Refills: 0 | Status: SHIPPED | OUTPATIENT
Start: 2023-01-11

## 2023-01-11 RX ORDER — MIRTAZAPINE 15 MG/1
TABLET, FILM COATED ORAL
Qty: 90 TABLET | Refills: 0 | Status: SHIPPED | OUTPATIENT
Start: 2023-01-11

## 2023-01-11 RX ORDER — VILAZODONE HYDROCHLORIDE 20 MG/1
20 TABLET ORAL DAILY
Qty: 90 TABLET | Refills: 0 | Status: SHIPPED | OUTPATIENT
Start: 2023-01-11 | End: 2023-04-11

## 2023-01-11 ASSESSMENT — ENCOUNTER SYMPTOMS
BELCHING: 0
ABDOMINAL PAIN: 0
COUGH: 0
BLURRED VISION: 0
VISUAL CHANGE: 0
NAUSEA: 0
GLOBUS SENSATION: 0
WATER BRASH: 0
SHORTNESS OF BREATH: 1
HEARTBURN: 0
WHEEZING: 0
ORTHOPNEA: 0

## 2023-01-11 ASSESSMENT — ANXIETY QUESTIONNAIRES
7. FEELING AFRAID AS IF SOMETHING AWFUL MIGHT HAPPEN: 0
4. TROUBLE RELAXING: 0
GAD7 TOTAL SCORE: 1
3. WORRYING TOO MUCH ABOUT DIFFERENT THINGS: 0
1. FEELING NERVOUS, ANXIOUS, OR ON EDGE: 0
6. BECOMING EASILY ANNOYED OR IRRITABLE: 1
IF YOU CHECKED OFF ANY PROBLEMS ON THIS QUESTIONNAIRE, HOW DIFFICULT HAVE THESE PROBLEMS MADE IT FOR YOU TO DO YOUR WORK, TAKE CARE OF THINGS AT HOME, OR GET ALONG WITH OTHER PEOPLE: NOT DIFFICULT AT ALL
5. BEING SO RESTLESS THAT IT IS HARD TO SIT STILL: 0
2. NOT BEING ABLE TO STOP OR CONTROL WORRYING: 0

## 2023-01-11 ASSESSMENT — PATIENT HEALTH QUESTIONNAIRE - PHQ9
6. FEELING BAD ABOUT YOURSELF - OR THAT YOU ARE A FAILURE OR HAVE LET YOURSELF OR YOUR FAMILY DOWN: 0
SUM OF ALL RESPONSES TO PHQ QUESTIONS 1-9: 2
2. FEELING DOWN, DEPRESSED OR HOPELESS: 0
1. LITTLE INTEREST OR PLEASURE IN DOING THINGS: 0
SUM OF ALL RESPONSES TO PHQ QUESTIONS 1-9: 2
4. FEELING TIRED OR HAVING LITTLE ENERGY: 2
10. IF YOU CHECKED OFF ANY PROBLEMS, HOW DIFFICULT HAVE THESE PROBLEMS MADE IT FOR YOU TO DO YOUR WORK, TAKE CARE OF THINGS AT HOME, OR GET ALONG WITH OTHER PEOPLE: 0
8. MOVING OR SPEAKING SO SLOWLY THAT OTHER PEOPLE COULD HAVE NOTICED. OR THE OPPOSITE, BEING SO FIGETY OR RESTLESS THAT YOU HAVE BEEN MOVING AROUND A LOT MORE THAN USUAL: 0
9. THOUGHTS THAT YOU WOULD BE BETTER OFF DEAD, OR OF HURTING YOURSELF: 0
5. POOR APPETITE OR OVEREATING: 0
SUM OF ALL RESPONSES TO PHQ9 QUESTIONS 1 & 2: 0
3. TROUBLE FALLING OR STAYING ASLEEP: 0
7. TROUBLE CONCENTRATING ON THINGS, SUCH AS READING THE NEWSPAPER OR WATCHING TELEVISION: 0

## 2023-01-11 NOTE — PROGRESS NOTES
1/11/2023    Chief Complaint   Patient presents with    Follow-up     2 months for DM, HTN, hyperlipidemia, GERD, hypothyroidism, depression, anxiety, insomnia. Rosi Helsm is a 71 y.o. female, presents today for 3 month follow up of hypertension, hyperlipidemia, diabetes, GERD, hypothyroidism, depression, anxiety, insomnia, seizure disorder. HPI               Hypertension  The problem is controlled. Associated symptoms include shortness of breath (chronic). Pertinent negatives include no blurred vision, chest pain, headaches, malaise/fatigue, neck pain, orthopnea, palpitations, peripheral edema or PND. There are no associated agents to hypertension. Risk factors for coronary artery disease include diabetes mellitus, dyslipidemia, sedentary lifestyle, obesity and post-menopausal state. Treatments tried: metoprolol. The current treatment provides significant improvement. Compliance problems include exercise (Intolerant to exercise due to chronic shortness of breath). Hyperlipidemia  The problem is controlled (LDL <70 (69 in March 2022). Recent lipid tests were reviewed and are normal (March 2022-, , HDL 98, LDL 69). There are no known factors aggravating her hyperlipidemia. Associated symptoms include shortness of breath (chronic). Pertinent negatives include no chest pain, focal sensory loss, focal weakness, leg pain or myalgias. Current antihyperlipidemic treatment includes statins (lipitor). The current treatment provides significant improvement of lipids. Compliance problems include adherence to exercise. Risk factors for coronary artery disease include diabetes mellitus, dyslipidemia, hypertension, a sedentary lifestyle, post-menopausal and obesity. Gastroesophageal Reflux  She reports no abdominal pain, no belching, no chest pain, no coughing, no early satiety, no globus sensation, no heartburn, no nausea, no water brash or no wheezing. The problem occurs rarely.  The problem has been resolved. Pertinent negatives include no fatigue or weight loss. She has tried a PPI (Protonix 20 mg BID) for the symptoms. The treatment provided significant relief. Diabetes  She presents for her follow-up diabetic visit. She has type 2 diabetes mellitus. Her disease course has been worsening (A1C 8.6 (up from 7.7 & 7.2). Hypoglycemia symptoms include nervousness/anxiousness and seizures (well controlled. Over 10 years since last seizure). Pertinent negatives for hypoglycemia include no dizziness or headaches. Pertinent negatives for diabetes include no blurred vision, no chest pain, no fatigue, no foot paresthesias, no foot ulcerations, no polydipsia, no polyphagia, no polyuria, no visual change, no weakness and no weight loss. There are no hypoglycemic complications. Symptoms are stable. Risk factors for coronary artery disease include dyslipidemia, diabetes mellitus, obesity, hypertension, post-menopausal, sedentary lifestyle and stress. Current diabetic treatment includes oral agent (dual therapy) and diet (Mounjaro 2.5 mg weekly and Jardiance daily. NOTE: Glimepiride 1 mg was discontinued 2 months ago). She is compliant with treatment most of the time (struggles with diet. Compliant all the time with taking medications). She is following a generally healthy and diabetic diet. Meal planning includes avoidance of concentrated sweets (trying to limit cookies from the bakery). She has not had a previous visit with a dietitian. She never (due to chronic shortness of breath with chronic lung disease) participates in exercise. Home blood sugar record trend: Not checking regularly. Reports blood sugar has been over 150 (all but once) (Fasting typically in 150s ) An ACE inhibitor/angiotensin II receptor blocker is being taken. She sees a podiatrist.Eye exam is current. Patient is tolerating Mounjaro well without adverse effects.  She requests to increase dosage today, reporting little improvement of fasting blood sugar-- under 150 once since starting medication.   - A1C not due until 1/24/23.   - Will increase Mounjaro from 2.5 mg to 5 mg weekly. Component 10/24/22 1153 7/6/22 0922 3/9/22 1327 10/13/21 1406 6/17/21 0914 3/9/21 1445 9/28/20 1721   Hemoglobin A1C 8.6  7.7  7.2  6.6  7.1  7.0 R, CM  6.5 R, CM         Hypothyroidism  Patient is taking Levothyroxine 100 mcg consistently on an empty stomach. Denies fatigue, weight gain, weight loss, cold intolerance, heat intolerance, hair loss, dry skin, constipation, diarrhea, edema, anxiety, tremor, palpitations and dysphagia. Depression and Anxiety  Patient is taking Viibryd 40 mg daily for depression and Buspar 10 mg TID for anxiety and Xanax 0.5 mg up to 3 times a day as needed. She is taking Xanax 3 times a day (up from  twice daily)     She reports anxiety and depression has improved since increasing Viibryd dosage from 40 mg to 60 mg daily (max dose). She states she feels better and has \"learned to accept she's going to die\" (from interstitial lung disease). She denies medication side effects. She denies current suicidal and homicidal ideation. LADI 7 SCORE 1/11/2023 11/14/2022 10/24/2022 8/18/2022 7/6/2022   LADI-7 Total Score 1 11 14 9 14     Interpretation of LADI-7 score: 5-9 = mild anxiety, 10-14 = moderate anxiety, 15+ = severe anxiety. Recommend referral to behavioral health for scores 10 or greater.      LADI-7 SCREENING 1/11/2023 11/14/2022 10/24/2022   Feeling nervous, anxious, or on edge Not at all More than half the days Nearly every day   Not being able to stop or control worrying Not at all Nearly every day Nearly every day   Worrying too much about different things Not at all Not at all More than half the days   Trouble relaxing Not at all Several days Not at all   Being so restless that it is hard to sit still Not at all More than half the days Several days   Becoming easily annoyed or irritable Several days Not at all More than half the days   Feeling afraid as if something awful might happen Not at all Nearly every day Nearly every day   LADI-7 Total Score 1 11 14   How difficult have these problems made it for you to do your work, take care of things at home, or get along with other people? Not difficult at all Not difficult at all Somewhat difficult        PHQ Scores 1/11/2023 11/14/2022 10/24/2022 8/18/2022 7/6/2022 3/9/2022 11/17/2021   PHQ2 Score 0 2 2 3 4 6 4   PHQ9 Score 2 12 12 19 14 13 10     Interpretation of Total Score Depression Severity: 1-4 = Minimal depression, 5-9 = Mild depression, 10-14 = Moderate depression, 15-19 = Moderately severe depression, 20-27 = Severe depression     PHQ-9  1/11/2023 11/14/2022 10/24/2022   Depression Unable to Assess - - -   Little interest or pleasure in doing things 0 0 1   Feeling down, depressed, or hopeless 0 2 1   Trouble falling or staying asleep, or sleeping too much 0 2 0   Feeling tired or having little energy 2 2 3   Poor appetite or overeating 0 1 0   Feeling bad about yourself - or that you are a failure or have let yourself or your family down 0 3 2   Trouble concentrating on things, such as reading the newspaper or watching television 0 1 2   Moving or speaking so slowly that other people could have noticed. Or the opposite - being so fidgety or restless that you have been moving around a lot more than usual 0 1 1   Thoughts that you would be better off dead, or of hurting yourself in some way 0 0 2   PHQ-2 Score 0 2 2   PHQ-9 Total Score 2 12 12   If you checked off any problems, how difficult have these problems made it for you to do your work, take care of things at home, or get along with other people? 0 3 1        Insomnia   Patient reports insomnia and difficulty falling asleep has improved with taking Remeron nightly and is sleeping well. She denies difficulty falling asleep and staying asleep. She is sleeping 10 hours a night.   She feels rested upon waking around 10:30 am.      Gait Instability  Has fallen twice since last visit. She is using walker at all times and has a electric scooter. Has chair lift at home with walker on both levels of home. Patient and her  states \"she's always had bad balance\". She declines balance therapy PT at this time. Seizure Disorder  Patient reports long history of seizure disorder. She has taken Tegretol since she moved to [facility] long time ago\". She reports aura of smelling dirt prior to seizure. Last seizure was over 10 years ago. She is taking medication as prescribed and tolerating well. - Generic Tegretol ineffective- Always takes name brand. Interstitial lung disease  On 8 liters continuous O2 via NC. Recently increased O2 from 6 to 8. Reports chronic shortness of breath. Resting O2 Sat around 97%. 140 [address] Ge pulmonologist, Dr. José Miguel Gee - Last appointment 10/27/22. Pulmonary rehab for chronic shortness of breath-- reports no improvement with therapy. Last therapy session was 11/7/2022. Reports at last pulmonology visit Dr. Oscar Nevarez told her she \"was going to die. That he has done everything for her\". - Patient continues to stay active with portable O2, and uses walker or scooter. She is leaving on a 2 week cruise tomorrow. Chronic kidney disease  Follows nephrologist, Dr. Jeremie Peters. Review of Systems   Constitutional:  Negative for activity change, appetite change, fatigue, malaise/fatigue, unexpected weight change and weight loss. Eyes:  Negative for blurred vision. Respiratory:  Positive for shortness of breath (chronic). Negative for cough and wheezing. Cardiovascular:  Negative for chest pain, palpitations, orthopnea, leg swelling and PND. Gastrointestinal:  Negative for abdominal pain, heartburn and nausea. Endocrine: Negative for polydipsia, polyphagia and polyuria. Musculoskeletal:  Positive for gait problem (unsteady gait- ambulates with walker).  Negative for arthralgias, myalgias and neck pain. Neurological:  Positive for seizures (well controlled. Over 10 years since last seizure). Negative for dizziness, focal weakness, syncope, weakness, light-headedness and headaches. Psychiatric/Behavioral:  Positive for dysphoric mood. Negative for agitation, hallucinations, self-injury, sleep disturbance (improved) and suicidal ideas. The patient is nervous/anxious. Allergies   Allergen Reactions    Tramadol      Avoid because Pt is on Tegretol.     Penicillins Rash     Patient states she can take Keflex     Past Medical History:   Diagnosis Date    Abnormal stress test *Aug.1, 2016    Coronary angiography by Dr. Javier Townsend revealed a 60% stenosis of the LAD and  a 50% mid circumflex     Arthritis     Chronic headaches     Eczema     Fibromyalgia     GERD (gastroesophageal reflux disease)     Hyperlipidemia     Hypertension     Impaired fasting glucose     Interstitial lung disease ([facility] [address] 2016    Dr. Suly Gu    Interstitial lung disease [facility])     Migraine headache     Osteopenia DEXA - March, 2016    Lumbar T score +1.7 and Hip -1.0 FRAX 7.6/0.6    Osteopenia DEXA-Dec., 2017    Lumbar T score 2.3 and hip T score 0.1    Other screening mammogram July 8, 2013    Benign    Other screening mammogram *March 4, 2016    Benign    Oxygen dependent     O2 @ 3.5 L CONTINUOUS    Pulmonary hypertension ([facility] [address]     MILD    Retention of urine, unspecified     Screening mammogram, encounter for *February 11, 2020    Benign    Seizure disorder [facility])     Thyroid disease     Type 2 diabetes mellitus without complication ([facility] [address] *PBM.05, 2020    FBS - 137 and A 1 C of 6.7    Vitamin D deficiency Oct., 2015    Level - 17    Wears glasses      Past Surgical History:   Procedure Laterality Date    ABDOMINOPLASTY  Jan., 2012    Dr. Hunter Jeffries Bilateral     BRONCHOSCOPY  **Sept.16, 2016    Dr. Dakota Wooten  09/16/2016    BRONCHOSCOPY  *Febr.24, 2017    Dr. Carroll Mojica - No endobronchial lesions    CARDIAC CATHETERIZATION  Oct., 2004    LAD 50%, circumflex normal, R coronary normal, LVEF 60%. CARDIAC CATHETERIZATION  *2018    Dr. Roc Wright - [facility] - mild pulmonary hypertension    COLONOSCOPY  Oct., 2003 (  )    Dr. Lory Eid - Negative - repeat     COLONOSCOPY  2014 (  )    Dr. Geremias Parada - moderate diverticulosis. COSMETIC SURGERY      inner thighs    HYSTERECTOMY (CERVIX STATUS UNKNOWN)      LIPOSUCTION Bilateral     knees    GA LAPS TOTAL HYSTERECT 250 GM/< W/RMVL TUBE/OVARY N/A 2018    ROBOTIC TOTAL HYSTERECTOMY AND BILATERAL SALPINGO-OOPHORECTOMY performed by Rashid White MD at [address] ([facility])  *2018    Dr. Екатерина Godfrey  Oct., 2015    Dr. Nuris Zamora - left 2nd and 3rd    TOTAL HIP ARTHROPLASTY  2013    Dr. Villa - [facility]  *2018    Dr. Fallon Chan - right    TUBAL LIGATION        Social History     Tobacco Use    Smoking status: Never    Smokeless tobacco: Never   Substance Use Topics    Alcohol use: Yes     Comment: Maybe a couple drinks a year      Family History   Problem Relation Age of Onset    Heart Disease Father 76        , ASHD, S/P CAB, NIDDM, hyperlipidemia. Cancer Mother 58        , lung cancer. Ovarian Cancer Maternal Grandmother         age unknown    Ovarian Cancer Paternal Grandmother         age unknown    Breast Cancer Maternal Aunt         age 48    Other Daughter     Other Daughter         Vitals:    23 0918   BP: 102/60   Site: Left Upper Arm   Position: Sitting   Cuff Size: Medium Adult   Pulse: 90   Temp: 97.7 °F (36.5 °C)   SpO2: 98%   Weight: 208 lb 3.2 oz (94.4 kg)   Height: 5' 2.5\" (1.588 m)         Estimated body mass index is 37.47 kg/m² as calculated from the following:    Height as of this encounter: 5' 2.5\" (1.588 m). Weight as of this encounter: 208 lb 3.2 oz (94.4 kg). Physical Exam  Vitals and nursing note reviewed. Constitutional:       General: She is not in acute distress. Appearance: Normal appearance. She is well-developed. She is obese. Neck:      Vascular: No carotid bruit. Cardiovascular:      Rate and Rhythm: Normal rate and regular rhythm. Pulses: Normal pulses. Heart sounds: Normal heart sounds, S1 normal and S2 normal.   Pulmonary:      Effort: Pulmonary effort is normal.      Breath sounds: Normal breath sounds. Musculoskeletal:         General: Normal range of motion. Cervical back: Normal range of motion and neck supple. Right lower leg: No edema. Left lower leg: No edema. Lymphadenopathy:      Cervical: No cervical adenopathy. Skin:     General: Skin is warm. Neurological:      General: No focal deficit present. Mental Status: She is alert and oriented to person, place, and time. Psychiatric:         Attention and Perception: Attention normal.         Mood and Affect: Affect normal. Mood is anxious and depressed. Speech: Speech normal.         Behavior: Behavior normal.         Thought Content: Thought content normal. Thought content is not paranoid or delusional. Thought content does not include homicidal or suicidal ideation. Thought content does not include homicidal or suicidal plan. Cognition and Memory: Cognition normal.     No results found for this visit on 01/11/23. ASSESSMENT/PLAN:  1. Uncontrolled type 2 diabetes mellitus with hyperglycemia (HCC)  - Last A1C 8.6  - Hemoglobin A1C; Future  - Continue empagliflozin (JARDIANCE) 10 MG tablet; Take 1 tablet by mouth daily  Dispense: 90 tablet; Refill: 0  - Start Tirzepatide [facility]) 5 MG/0.5ML SOPN SC injection; Inject 0.5 mLs into the skin once a week  Dispense: 2 mL; Refill: 2  - Mounjaro increased from 2.5 mg to 5 mg  -POCT microalbumin--> 10/24/2022 was normal <30    2. Moderate episode of recurrent major depressive disorder (HCC)  - Improved.   -PHQ-9 score: 2 (down from 12, 12)  -Continue vilazodone hcl 20 MG TABS; Take 1 tablet by mouth daily Take with food. Take in addition to 40 mg tablet for daily total of 60 mg  Dispense: 90 tablet; Refill: 0  -Continue vilazodone hcl 40 MG TABS; Take 1 tablet by mouth daily with food. Take in addition to 20 mg tablet for daily total of 60 mg  Dispense: 90 tablet; Refill: 0    3. Essential hypertension, benign  -Controlled  -Continue metoprolol succinate (Toprol-XL) 50 mg once daily  - Comprehensive Metabolic Panel, Fasting; Future    4. Hyperlipidemia, unspecified hyperlipidemia type  - Controlled  - 3/17/22: , , HDL 98, LDL 69  - LDL at goal <70.  - Continue Lipitor 80 mg nightly  - Lipid, Fasting; Future  - CK; Future    5. Gastroesophageal reflux disease without esophagitis  -Controlled  -Continue pantoprazole (PROTONIX) 20 MG tablet; TAKE ONE TABLET BY MOUTH TWICE A DAY  Dispense: 180 tablet; Refill: 0    6. Acquired hypothyroidism  -Stable  - Continue levothyroxine 100 mcg every morning  -  3/17/22--> TSH 0.51, T4 Free 1.1  - TSH; Future  - T4, Free; Future    7. Seizure disorder (HCC)  -Stable  -Continue TEGRETOL 200 MG tablet; TAKE ONE TABLET BY MOUTH TWICE A DAY  Dispense: 180 tablet; Refill: 1    8. Inter[facility]  -Slightly worsened  -Followed by pulmonology, Dr. Cinthya Singletary  - Continue oxygen 7-8 liters as directed by pulmonologist    9. Stage 3 chronic kidney disease, unspecified whether stage 3a or 3b CKD ([facility]  - Stable  - Follows nephrologist, Dr. Pratibha Govea    10. Anxiety  -Improved  -LADI-7 score: 1 (down from 11, 14). -Continue ALPRAZolam (XANAX) 0.5 MG tablet; Take 1 tablet by mouth 3 times daily for 90 days. Dispense: 270 tablet; Refill: 0    11. Primary insomnia  -Stable  -Continue mirtazapine (REMERON) 15 MG tablet; TAKE 1/2 TO 1 TABLET BY MOUTH 30-60 MINUTES PRIOR TO BEDTIME  Dispense: 90 tablet;  Refill: 0      Return for HTN, hyperlipidemia, DM, GERD, hypothyroidism, depression/anxiety, seizure disorder- always 60 mins. Discussed use, benefit, and side effects of prescribed medications. Patient's questions answered and concerns addressed. Patient agrees to plan of care. My scheduled days in the office reviewed with patient, and same day appointments available. Encouraged to use [facility] for communication as needed. Electronically signed by CESAR Penny CNP on 1/11/2023 at 10:21 AM       This dictation was generated by voice recognition computer software. Although all attempts are made to edit the dictation for accuracy, there may be errors in the transcription that are not intended.

## 2023-01-31 ENCOUNTER — TELEPHONE (OUTPATIENT)
Dept: PRIMARY CARE CLINIC | Age: 73
End: 2023-01-31

## 2023-01-31 DIAGNOSIS — U07.1 COVID-19 VIRUS INFECTION: Primary | ICD-10-CM

## 2023-01-31 NOTE — TELEPHONE ENCOUNTER
Patient's  called in because he has tested positive for covid and he is thinking the patient will test positive as well. She has had a cough but with her lung condition the  said it's hard to tell. Neither one of them feels real bad.

## 2023-02-02 NOTE — TELEPHONE ENCOUNTER
Patient has tested positive and has a cough and headache. Her  has been giving her cough medication. He would like her to get the antiviral medication too.

## 2023-02-03 NOTE — TELEPHONE ENCOUNTER
1. COVID-19 virus infection  - molnupiravir 200 MG capsule; Take 4 capsules by mouth in the morning and 4 capsules in the evening. Do all this for 5 days. Dispense: 40 capsule;  Refill: 0

## 2023-03-12 DIAGNOSIS — F41.9 ANXIETY: ICD-10-CM

## 2023-03-13 RX ORDER — BUSPIRONE HYDROCHLORIDE 15 MG/1
TABLET ORAL
Qty: 270 TABLET | Refills: 0 | OUTPATIENT
Start: 2023-03-13

## 2023-03-20 RX ORDER — FUROSEMIDE 20 MG/1
TABLET ORAL
Qty: 90 TABLET | Refills: 4 | Status: SHIPPED | OUTPATIENT
Start: 2023-03-20

## 2023-03-21 DIAGNOSIS — E11.65 UNCONTROLLED TYPE 2 DIABETES MELLITUS WITH HYPERGLYCEMIA (HCC): ICD-10-CM

## 2023-03-21 DIAGNOSIS — I10 ESSENTIAL HYPERTENSION, BENIGN: ICD-10-CM

## 2023-03-21 DIAGNOSIS — E78.5 HYPERLIPIDEMIA, UNSPECIFIED HYPERLIPIDEMIA TYPE: ICD-10-CM

## 2023-03-21 DIAGNOSIS — E03.9 ACQUIRED HYPOTHYROIDISM: ICD-10-CM

## 2023-03-21 LAB
ALBUMIN SERPL-MCNC: 4.5 G/DL (ref 3.4–5)
ALBUMIN/GLOB SERPL: 1.4 {RATIO} (ref 1.1–2.2)
ALP SERPL-CCNC: 73 U/L (ref 40–129)
ALT SERPL-CCNC: 10 U/L (ref 10–40)
ANION GAP SERPL CALCULATED.3IONS-SCNC: 16 MMOL/L (ref 3–16)
AST SERPL-CCNC: 15 U/L (ref 15–37)
BILIRUB SERPL-MCNC: <0.2 MG/DL (ref 0–1)
BUN SERPL-MCNC: 24 MG/DL (ref 7–20)
CALCIUM SERPL-MCNC: 9.9 MG/DL (ref 8.3–10.6)
CHLORIDE SERPL-SCNC: 99 MMOL/L (ref 99–110)
CHOLEST SERPL-MCNC: 184 MG/DL (ref 0–199)
CK SERPL-CCNC: 35 U/L (ref 26–192)
CO2 SERPL-SCNC: 28 MMOL/L (ref 21–32)
CREAT SERPL-MCNC: 1.1 MG/DL (ref 0.6–1.2)
GFR SERPLBLD CREATININE-BSD FMLA CKD-EPI: 53 ML/MIN/{1.73_M2}
GLUCOSE P FAST SERPL-MCNC: 158 MG/DL (ref 70–99)
HDLC SERPL-MCNC: 77 MG/DL (ref 40–60)
LDL CHOLESTEROL CALCULATED: 67 MG/DL
POTASSIUM SERPL-SCNC: 5.2 MMOL/L (ref 3.5–5.1)
PROT SERPL-MCNC: 7.8 G/DL (ref 6.4–8.2)
SODIUM SERPL-SCNC: 143 MMOL/L (ref 136–145)
T4 FREE SERPL-MCNC: 1.1 NG/DL (ref 0.9–1.8)
TRIGL SERPL-MCNC: 200 MG/DL (ref 0–150)
TSH SERPL DL<=0.005 MIU/L-ACNC: 0.54 UIU/ML (ref 0.27–4.2)
VLDLC SERPL CALC-MCNC: 40 MG/DL

## 2023-03-22 LAB
EST. AVERAGE GLUCOSE BLD GHB EST-MCNC: 168.6 MG/DL
HBA1C MFR BLD: 7.5 %

## 2023-04-01 DIAGNOSIS — E11.65 UNCONTROLLED TYPE 2 DIABETES MELLITUS WITH HYPERGLYCEMIA (HCC): ICD-10-CM

## 2023-04-03 RX ORDER — TIRZEPATIDE 5 MG/.5ML
INJECTION, SOLUTION SUBCUTANEOUS
Qty: 2 ML | Refills: 2 | Status: SHIPPED | OUTPATIENT
Start: 2023-04-03

## 2023-04-03 NOTE — TELEPHONE ENCOUNTER
Requested Prescriptions     Pending Prescriptions Disp Refills    MOUNJARO 5 MG/0.5ML SOPN SC injection [Pharmacy Med Name: MOUNJARO 5 MG/0.5 ML PEN] 2 mL 2     Sig: INJECT 5 MG UNDER THE SKIN ONCE WEEKLY       Last OV - 1/11/23. Next OV - 4/19/23. Last refill - 1/11/23. Last labs - 3/21/23.

## 2023-04-06 DIAGNOSIS — F41.9 ANXIETY: ICD-10-CM

## 2023-04-06 RX ORDER — BUSPIRONE HYDROCHLORIDE 15 MG/1
TABLET ORAL
Qty: 270 TABLET | Refills: 0 | Status: SHIPPED | OUTPATIENT
Start: 2023-04-06

## 2023-04-06 NOTE — TELEPHONE ENCOUNTER
Requested Prescriptions     Pending Prescriptions Disp Refills    busPIRone (BUSPAR) 15 MG tablet [Pharmacy Med Name: busPIRone HCL 15 MG TABLET] 270 tablet 0     Sig: TAKE ONE TABLET BY MOUTH THREE TIMES A DAY

## 2023-04-19 ENCOUNTER — OFFICE VISIT (OUTPATIENT)
Dept: PRIMARY CARE CLINIC | Age: 73
End: 2023-04-19
Payer: MEDICARE

## 2023-04-19 VITALS
WEIGHT: 200.4 LBS | OXYGEN SATURATION: 95 % | RESPIRATION RATE: 16 BRPM | DIASTOLIC BLOOD PRESSURE: 64 MMHG | HEART RATE: 98 BPM | SYSTOLIC BLOOD PRESSURE: 120 MMHG | TEMPERATURE: 97 F | BODY MASS INDEX: 35.51 KG/M2 | HEIGHT: 63 IN

## 2023-04-19 DIAGNOSIS — K21.9 GASTROESOPHAGEAL REFLUX DISEASE WITHOUT ESOPHAGITIS: ICD-10-CM

## 2023-04-19 DIAGNOSIS — E78.2 MIXED HYPERLIPIDEMIA: ICD-10-CM

## 2023-04-19 DIAGNOSIS — E66.01 CLASS 2 SEVERE OBESITY DUE TO EXCESS CALORIES WITH SERIOUS COMORBIDITY AND BODY MASS INDEX (BMI) OF 36.0 TO 36.9 IN ADULT (HCC): ICD-10-CM

## 2023-04-19 DIAGNOSIS — F41.9 ANXIETY: ICD-10-CM

## 2023-04-19 DIAGNOSIS — F33.1 MODERATE EPISODE OF RECURRENT MAJOR DEPRESSIVE DISORDER (HCC): ICD-10-CM

## 2023-04-19 DIAGNOSIS — E03.9 ACQUIRED HYPOTHYROIDISM: ICD-10-CM

## 2023-04-19 DIAGNOSIS — E11.65 UNCONTROLLED TYPE 2 DIABETES MELLITUS WITH HYPERGLYCEMIA (HCC): ICD-10-CM

## 2023-04-19 DIAGNOSIS — J84.9 INTERSTITIAL LUNG DISEASE (HCC): ICD-10-CM

## 2023-04-19 DIAGNOSIS — Z91.81 AT HIGH RISK FOR FALLS: ICD-10-CM

## 2023-04-19 DIAGNOSIS — F51.01 PRIMARY INSOMNIA: ICD-10-CM

## 2023-04-19 DIAGNOSIS — G40.909 SEIZURE DISORDER (HCC): ICD-10-CM

## 2023-04-19 DIAGNOSIS — N18.30 STAGE 3 CHRONIC KIDNEY DISEASE, UNSPECIFIED WHETHER STAGE 3A OR 3B CKD (HCC): ICD-10-CM

## 2023-04-19 DIAGNOSIS — I10 ESSENTIAL HYPERTENSION, BENIGN: Primary | ICD-10-CM

## 2023-04-19 PROCEDURE — G8417 CALC BMI ABV UP PARAM F/U: HCPCS | Performed by: NURSE PRACTITIONER

## 2023-04-19 PROCEDURE — 2022F DILAT RTA XM EVC RTNOPTHY: CPT | Performed by: NURSE PRACTITIONER

## 2023-04-19 PROCEDURE — 1123F ACP DISCUSS/DSCN MKR DOCD: CPT | Performed by: NURSE PRACTITIONER

## 2023-04-19 PROCEDURE — G8427 DOCREV CUR MEDS BY ELIG CLIN: HCPCS | Performed by: NURSE PRACTITIONER

## 2023-04-19 PROCEDURE — 1036F TOBACCO NON-USER: CPT | Performed by: NURSE PRACTITIONER

## 2023-04-19 PROCEDURE — 1090F PRES/ABSN URINE INCON ASSESS: CPT | Performed by: NURSE PRACTITIONER

## 2023-04-19 PROCEDURE — 99214 OFFICE O/P EST MOD 30 MIN: CPT | Performed by: NURSE PRACTITIONER

## 2023-04-19 PROCEDURE — 3017F COLORECTAL CA SCREEN DOC REV: CPT | Performed by: NURSE PRACTITIONER

## 2023-04-19 PROCEDURE — 3074F SYST BP LT 130 MM HG: CPT | Performed by: NURSE PRACTITIONER

## 2023-04-19 PROCEDURE — 3051F HG A1C>EQUAL 7.0%<8.0%: CPT | Performed by: NURSE PRACTITIONER

## 2023-04-19 PROCEDURE — 3078F DIAST BP <80 MM HG: CPT | Performed by: NURSE PRACTITIONER

## 2023-04-19 PROCEDURE — G8399 PT W/DXA RESULTS DOCUMENT: HCPCS | Performed by: NURSE PRACTITIONER

## 2023-04-19 RX ORDER — PANTOPRAZOLE SODIUM 40 MG/1
40 TABLET, DELAYED RELEASE ORAL
Qty: 180 TABLET | Refills: 1 | Status: SHIPPED | OUTPATIENT
Start: 2023-04-19

## 2023-04-19 RX ORDER — TIRZEPATIDE 7.5 MG/.5ML
7.5 INJECTION, SOLUTION SUBCUTANEOUS WEEKLY
Qty: 4 ADJUSTABLE DOSE PRE-FILLED PEN SYRINGE | Refills: 2 | Status: SHIPPED | OUTPATIENT
Start: 2023-04-19 | End: 2023-07-18

## 2023-04-19 RX ORDER — ALPRAZOLAM 0.5 MG/1
0.5 TABLET ORAL 3 TIMES DAILY
Qty: 270 TABLET | Refills: 0 | Status: SHIPPED | OUTPATIENT
Start: 2023-04-19 | End: 2023-07-18

## 2023-04-19 RX ORDER — ATORVASTATIN CALCIUM 80 MG/1
80 TABLET, FILM COATED ORAL DAILY
Qty: 90 TABLET | Refills: 1 | Status: SHIPPED | OUTPATIENT
Start: 2023-04-19 | End: 2023-07-18

## 2023-04-19 RX ORDER — VILAZODONE HYDROCHLORIDE 40 MG/1
TABLET ORAL
Qty: 90 TABLET | Refills: 0 | Status: SHIPPED | OUTPATIENT
Start: 2023-04-19

## 2023-04-19 RX ORDER — LEVOTHYROXINE SODIUM 0.1 MG/1
TABLET ORAL
Qty: 90 TABLET | Refills: 1 | Status: SHIPPED | OUTPATIENT
Start: 2023-04-19

## 2023-04-19 RX ORDER — VILAZODONE HYDROCHLORIDE 20 MG/1
20 TABLET ORAL DAILY
Qty: 90 TABLET | Refills: 0 | Status: SHIPPED | OUTPATIENT
Start: 2023-04-19 | End: 2023-07-18

## 2023-04-19 RX ORDER — EZETIMIBE 10 MG/1
10 TABLET ORAL NIGHTLY
Qty: 90 TABLET | Refills: 1 | Status: SHIPPED | OUTPATIENT
Start: 2023-04-19 | End: 2023-07-18

## 2023-04-19 SDOH — ECONOMIC STABILITY: INCOME INSECURITY: HOW HARD IS IT FOR YOU TO PAY FOR THE VERY BASICS LIKE FOOD, HOUSING, MEDICAL CARE, AND HEATING?: NOT HARD AT ALL

## 2023-04-19 SDOH — ECONOMIC STABILITY: FOOD INSECURITY: WITHIN THE PAST 12 MONTHS, YOU WORRIED THAT YOUR FOOD WOULD RUN OUT BEFORE YOU GOT MONEY TO BUY MORE.: NEVER TRUE

## 2023-04-19 SDOH — ECONOMIC STABILITY: FOOD INSECURITY: WITHIN THE PAST 12 MONTHS, THE FOOD YOU BOUGHT JUST DIDN'T LAST AND YOU DIDN'T HAVE MONEY TO GET MORE.: NEVER TRUE

## 2023-04-19 SDOH — ECONOMIC STABILITY: HOUSING INSECURITY
IN THE LAST 12 MONTHS, WAS THERE A TIME WHEN YOU DID NOT HAVE A STEADY PLACE TO SLEEP OR SLEPT IN A SHELTER (INCLUDING NOW)?: NO

## 2023-04-19 ASSESSMENT — ENCOUNTER SYMPTOMS
COUGH: 0
ABDOMINAL PAIN: 0
BLURRED VISION: 0
BELCHING: 0
WATER BRASH: 0
NAUSEA: 0
GLOBUS SENSATION: 0
WHEEZING: 0
HEARTBURN: 0
ORTHOPNEA: 0
VISUAL CHANGE: 0
SHORTNESS OF BREATH: 1

## 2023-04-19 NOTE — PROGRESS NOTES
heartburn, no nausea, no water brash or no wheezing. The problem occurs rarely. The problem has been resolved. Nothing aggravates the symptoms. Pertinent negatives include no fatigue or weight loss. Risk factors include obesity and lack of exercise. She has tried a PPI (Protonix 20 mg BID) for the symptoms. The treatment provided significant relief. Diabetes  She presents for her follow-up diabetic visit. She has type 2 diabetes mellitus. Her disease course has been improving (A1C 7.5% (down from 8.6, 7.7). Hypoglycemia symptoms include seizures (well controlled. Over 10 years since last seizure). Pertinent negatives for hypoglycemia include no dizziness, headaches or nervousness/anxiousness (improved). Pertinent negatives for diabetes include no blurred vision, no chest pain, no fatigue, no foot paresthesias, no foot ulcerations, no polydipsia, no polyphagia, no polyuria, no visual change, no weakness and no weight loss. There are no hypoglycemic complications. Symptoms are stable. Risk factors for coronary artery disease include dyslipidemia, diabetes mellitus, obesity, hypertension, post-menopausal, sedentary lifestyle and stress. Current diabetic treatment includes oral agent (dual therapy) and diet (Mounjaro 5.0 mg weekly injection and Jardiance daily). She is compliant with treatment most of the time (struggles with diet. Compliant all the time with taking medications). She is following a generally healthy and diabetic diet. Meal planning includes avoidance of concentrated sweets (trying to limit cookies from the bakery). She has not had a previous visit with a dietitian. She never (due to chronic shortness of breath with chronic lung disease) participates in exercise. Home blood sugar record trend: Not checking regularly. Reports blood sugar has been over 150 (all but once) (Fasting typically in 150s ) An ACE inhibitor/angiotensin II receptor blocker is being taken.  She sees a podiatrist (last appt was in

## 2023-07-02 DIAGNOSIS — F41.9 ANXIETY: ICD-10-CM

## 2023-07-05 RX ORDER — BUSPIRONE HYDROCHLORIDE 15 MG/1
TABLET ORAL
Qty: 270 TABLET | Refills: 0 | Status: SHIPPED | OUTPATIENT
Start: 2023-07-05

## 2023-07-05 NOTE — TELEPHONE ENCOUNTER
Medication:   Requested Prescriptions     Pending Prescriptions Disp Refills    busPIRone (BUSPAR) 15 MG tablet [Pharmacy Med Name: busPIRone HCL 15 MG TABLET] 270 tablet 0     Sig: TAKE ONE TABLET BY MOUTH THREE TIMES A DAY     Last Filled:  # 270 on 04/06/23    Last appt: 4/19/2023   Next appt: 7/19/2023    Last OARRS: No flowsheet data found.

## 2023-07-09 DIAGNOSIS — I10 ESSENTIAL HYPERTENSION, BENIGN: ICD-10-CM

## 2023-07-10 RX ORDER — METOPROLOL SUCCINATE 50 MG/1
TABLET, EXTENDED RELEASE ORAL
Qty: 90 TABLET | Refills: 4 | Status: SHIPPED | OUTPATIENT
Start: 2023-07-10

## 2023-07-10 NOTE — TELEPHONE ENCOUNTER
[facility]     Last ov:2022 [facility]    Last EK2022    Last Refill:2022    Next appointment:2023 TARAK

## 2023-07-11 DIAGNOSIS — F33.1 MODERATE EPISODE OF RECURRENT MAJOR DEPRESSIVE DISORDER (HCC): ICD-10-CM

## 2023-07-12 RX ORDER — VILAZODONE HYDROCHLORIDE 40 MG/1
TABLET ORAL
Qty: 90 TABLET | Refills: 0 | Status: SHIPPED | OUTPATIENT
Start: 2023-07-12

## 2023-07-16 DIAGNOSIS — F51.01 PRIMARY INSOMNIA: ICD-10-CM

## 2023-07-17 RX ORDER — MIRTAZAPINE 15 MG/1
TABLET, FILM COATED ORAL
Qty: 90 TABLET | Refills: 0 | Status: SHIPPED | OUTPATIENT
Start: 2023-07-17

## 2023-07-17 NOTE — TELEPHONE ENCOUNTER
Recent Visits  Date Type Provider Dept   04/19/23 Office Visit Mushtaq Corral, APRN - CNP [facility]   01/11/23 Office Visit Mushtaq Corral, APRN - [facility]   11/14/22 Office Visit Mushtaq Corral, APRN - [facility]   10/24/22 Office Visit Mushtaq Corral, APRN - [facility]   08/18/22 Office Visit Mushtaq Corral, APRN - [facility]   07/06/22 Office Visit Mushtaq Corral, APRN - [facility]   03/24/22 Office Visit Mushtaq Corral, APRN - [facility]   03/09/22 Office Visit Mushtaq Corral, APRN - [facility]   Showing recent visits within past 540 days with a meds authorizing provider and meeting all other requirements  Future Appointments  Date Type Provider Dept   07/19/23 Appointment Mushtaq Corral, APRN - CNP [facility]   Showing future appointments within next 150 days with a meds authorizing provider and meeting all other requirements

## 2023-07-19 ENCOUNTER — OFFICE VISIT (OUTPATIENT)
Dept: PRIMARY CARE CLINIC | Age: 73
End: 2023-07-19
Payer: MEDICARE

## 2023-07-19 VITALS
OXYGEN SATURATION: 93 % | RESPIRATION RATE: 16 BRPM | SYSTOLIC BLOOD PRESSURE: 130 MMHG | HEIGHT: 62 IN | HEART RATE: 93 BPM | TEMPERATURE: 97 F | BODY MASS INDEX: 35.11 KG/M2 | WEIGHT: 190.8 LBS | DIASTOLIC BLOOD PRESSURE: 78 MMHG

## 2023-07-19 DIAGNOSIS — F51.01 PRIMARY INSOMNIA: ICD-10-CM

## 2023-07-19 DIAGNOSIS — F41.9 ANXIETY: ICD-10-CM

## 2023-07-19 DIAGNOSIS — N18.30 STAGE 3 CHRONIC KIDNEY DISEASE, UNSPECIFIED WHETHER STAGE 3A OR 3B CKD (HCC): ICD-10-CM

## 2023-07-19 DIAGNOSIS — G40.909 SEIZURE DISORDER (HCC): ICD-10-CM

## 2023-07-19 DIAGNOSIS — J84.9 INTERSTITIAL LUNG DISEASE (HCC): ICD-10-CM

## 2023-07-19 DIAGNOSIS — Z91.81 AT HIGH RISK FOR FALLS: ICD-10-CM

## 2023-07-19 DIAGNOSIS — F33.1 MODERATE EPISODE OF RECURRENT MAJOR DEPRESSIVE DISORDER (HCC): ICD-10-CM

## 2023-07-19 DIAGNOSIS — E78.2 MIXED HYPERLIPIDEMIA: ICD-10-CM

## 2023-07-19 DIAGNOSIS — E03.9 ACQUIRED HYPOTHYROIDISM: ICD-10-CM

## 2023-07-19 DIAGNOSIS — I10 ESSENTIAL HYPERTENSION, BENIGN: ICD-10-CM

## 2023-07-19 DIAGNOSIS — E11.9 CONTROLLED TYPE 2 DIABETES MELLITUS WITHOUT COMPLICATION, WITHOUT LONG-TERM CURRENT USE OF INSULIN (HCC): Primary | ICD-10-CM

## 2023-07-19 DIAGNOSIS — K21.9 GASTROESOPHAGEAL REFLUX DISEASE WITHOUT ESOPHAGITIS: ICD-10-CM

## 2023-07-19 DIAGNOSIS — E66.09 CLASS 1 OBESITY DUE TO EXCESS CALORIES WITH SERIOUS COMORBIDITY AND BODY MASS INDEX (BMI) OF 34.0 TO 34.9 IN ADULT: ICD-10-CM

## 2023-07-19 LAB — HBA1C MFR BLD: 6.9 %

## 2023-07-19 PROCEDURE — 3044F HG A1C LEVEL LT 7.0%: CPT | Performed by: NURSE PRACTITIONER

## 2023-07-19 PROCEDURE — G8417 CALC BMI ABV UP PARAM F/U: HCPCS | Performed by: NURSE PRACTITIONER

## 2023-07-19 PROCEDURE — 3074F SYST BP LT 130 MM HG: CPT | Performed by: NURSE PRACTITIONER

## 2023-07-19 PROCEDURE — 1036F TOBACCO NON-USER: CPT | Performed by: NURSE PRACTITIONER

## 2023-07-19 PROCEDURE — G8427 DOCREV CUR MEDS BY ELIG CLIN: HCPCS | Performed by: NURSE PRACTITIONER

## 2023-07-19 PROCEDURE — 99214 OFFICE O/P EST MOD 30 MIN: CPT | Performed by: NURSE PRACTITIONER

## 2023-07-19 PROCEDURE — G8399 PT W/DXA RESULTS DOCUMENT: HCPCS | Performed by: NURSE PRACTITIONER

## 2023-07-19 PROCEDURE — 3078F DIAST BP <80 MM HG: CPT | Performed by: NURSE PRACTITIONER

## 2023-07-19 PROCEDURE — 1090F PRES/ABSN URINE INCON ASSESS: CPT | Performed by: NURSE PRACTITIONER

## 2023-07-19 PROCEDURE — 83037 HB GLYCOSYLATED A1C HOME DEV: CPT | Performed by: NURSE PRACTITIONER

## 2023-07-19 PROCEDURE — 1123F ACP DISCUSS/DSCN MKR DOCD: CPT | Performed by: NURSE PRACTITIONER

## 2023-07-19 PROCEDURE — 3017F COLORECTAL CA SCREEN DOC REV: CPT | Performed by: NURSE PRACTITIONER

## 2023-07-19 PROCEDURE — 2022F DILAT RTA XM EVC RTNOPTHY: CPT | Performed by: NURSE PRACTITIONER

## 2023-07-19 RX ORDER — TIRZEPATIDE 10 MG/.5ML
10 INJECTION, SOLUTION SUBCUTANEOUS WEEKLY
Qty: 2 ML | Refills: 2 | Status: SHIPPED | OUTPATIENT
Start: 2023-07-19 | End: 2023-10-17

## 2023-07-19 ASSESSMENT — ENCOUNTER SYMPTOMS
COUGH: 0
WATER BRASH: 0
ABDOMINAL PAIN: 0
GLOBUS SENSATION: 0
WHEEZING: 0
SHORTNESS OF BREATH: 1
NAUSEA: 0
BLURRED VISION: 0
BELCHING: 0
VISUAL CHANGE: 0
ORTHOPNEA: 0

## 2023-07-19 NOTE — PROGRESS NOTES
early satiety, no globus sensation, no heartburn (RESOLVED), no nausea, no water brash or no wheezing. The problem occurs rarely. The problem has been resolved. Nothing aggravates the symptoms. Pertinent negatives include no anemia, fatigue, melena, muscle weakness, orthopnea or weight loss. Risk factors include obesity and lack of exercise. She has tried a PPI (Protonix 20 mg BID) for the symptoms. The treatment provided significant relief. Diabetes  She presents for her follow-up diabetic visit. She has type 2 (CONTROLLED) diabetes mellitus. Her disease course has been improving (A1C 6.9 % in office today (7.5%, 8.6, 7.7). Hypoglycemia symptoms include seizures (well controlled. Over 10 years since last seizure). Pertinent negatives for hypoglycemia include no dizziness, headaches or nervousness/anxiousness (improved). Pertinent negatives for diabetes include no blurred vision, no chest pain, no fatigue, no foot paresthesias, no foot ulcerations, no polydipsia, no polyphagia, no polyuria, no visual change, no weakness and no weight loss. There are no hypoglycemic complications. Symptoms are improving. Risk factors for coronary artery disease include dyslipidemia, diabetes mellitus, obesity, hypertension, post-menopausal, sedentary lifestyle and stress. Current diabetic treatment includes oral agent (dual therapy) and diet (Mounjaro 7.5 mg weekly injection and Jardiance daily). She is compliant with treatment most of the time (struggles with diet. Compliant all the time with taking medications). She is following a generally healthy and diabetic diet. Meal planning includes avoidance of concentrated sweets (trying to limit cookies from the bakery). She has not had a previous visit with a dietitian. She never (due to chronic shortness of breath with chronic lung disease) participates in exercise.  Home blood sugar record trend: blood sugar typically runs around 130s (down from 150) (Fasting typically in 150s ) An

## 2023-07-29 PROBLEM — F33.1 MODERATE EPISODE OF RECURRENT MAJOR DEPRESSIVE DISORDER (HCC): Status: ACTIVE | Noted: 2023-07-29

## 2023-07-29 ASSESSMENT — ENCOUNTER SYMPTOMS: HEARTBURN: 0

## 2023-08-04 ENCOUNTER — OFFICE VISIT (OUTPATIENT)
Dept: PRIMARY CARE CLINIC | Age: 73
End: 2023-08-04

## 2023-08-04 VITALS
OXYGEN SATURATION: 94 % | TEMPERATURE: 98 F | HEIGHT: 62 IN | BODY MASS INDEX: 35.41 KG/M2 | WEIGHT: 192.4 LBS | HEART RATE: 79 BPM | RESPIRATION RATE: 16 BRPM | DIASTOLIC BLOOD PRESSURE: 70 MMHG | SYSTOLIC BLOOD PRESSURE: 120 MMHG

## 2023-08-04 DIAGNOSIS — Z87.440 HISTORY OF UTI: ICD-10-CM

## 2023-08-04 DIAGNOSIS — Z09 HOSPITAL DISCHARGE FOLLOW-UP: Primary | ICD-10-CM

## 2023-08-04 RX ORDER — CEFDINIR 300 MG/1
CAPSULE ORAL 2 TIMES DAILY
COMMUNITY
Start: 2023-07-31

## 2023-08-04 NOTE — PROGRESS NOTES
TABLET BY MOUTH DAILY     mirtazapine 15 MG tablet  Commonly known as: REMERON  TAKE ONE-HALF TO ONE TABLET BY MOUTH 30 TO 60 MINUTES PRIOR TO BEDTIME     Mounjaro 10 MG/0.5ML Sopn SC injection  Generic drug: Tirzepatide  Inject 0.5 mLs into the skin once a week     OneTouch Verio strip  Generic drug: blood glucose test strips  1 each by In Vitro route daily Use to test once daily or as needed     OneTouch Verio w/Device Kit  Test once daily     OXYGEN     pantoprazole 40 MG tablet  Commonly known as: PROTONIX  Take 1 tablet by mouth 2 times daily (before meals)     predniSONE 5 MG tablet  Commonly known as: DELTASONE     TEGretol 200 MG tablet  Generic drug: carBAMazepine  TAKE ONE TABLET BY MOUTH TWICE A DAY     * vilazodone HCl 20 MG Tabs  Commonly known as: vilazodone hcl  Take 1 tablet by mouth daily Take with food. Take in addition to 40 mg tablet for daily total of 60 mg     * vilazodone HCl 40 MG Tabs  Commonly known as: VIIBRYD  TAKE ONE TABLET BY MOUTH DAILY WITH FOOD TAKE IN ADDTION TO THE 20MG FOR DAILY TOTAL OF 60MG     vitamin B-12 500 MCG tablet  Commonly known as: CYANOCOBALAMIN     Zinc 100 MG Tabs           * This list has 2 medication(s) that are the same as other medications prescribed for you. Read the directions carefully, and ask your doctor or other care provider to review them with you.                     Medications marked \"taking\" at this time  Outpatient Medications Marked as Taking for the 8/4/23 encounter (Office Visit) with CESAR Fox CNP   Medication Sig Dispense Refill    cefdinir (OMNICEF) 300 MG capsule 2 times daily      empagliflozin (JARDIANCE) 10 MG tablet Take 1 tablet by mouth daily 90 tablet 0    Tirzepatide (MOUNJARO) 10 MG/0.5ML SOPN SC injection Inject 0.5 mLs into the skin once a week 2 mL 2    mirtazapine (REMERON) 15 MG tablet TAKE ONE-HALF TO ONE TABLET BY MOUTH 30 TO 60 MINUTES PRIOR TO BEDTIME 90 tablet 0    vilazodone HCl (VIIBRYD) 40 MG TABS TAKE ONE

## 2023-08-16 ENCOUNTER — OFFICE VISIT (OUTPATIENT)
Dept: CARDIOLOGY CLINIC | Age: 73
End: 2023-08-16
Payer: MEDICARE

## 2023-08-16 VITALS
DIASTOLIC BLOOD PRESSURE: 70 MMHG | WEIGHT: 185 LBS | BODY MASS INDEX: 34.04 KG/M2 | OXYGEN SATURATION: 94 % | SYSTOLIC BLOOD PRESSURE: 122 MMHG | HEIGHT: 62 IN | HEART RATE: 80 BPM

## 2023-08-16 DIAGNOSIS — E78.2 MIXED HYPERLIPIDEMIA: ICD-10-CM

## 2023-08-16 DIAGNOSIS — I25.83 CORONARY ARTERY DISEASE DUE TO LIPID RICH PLAQUE: ICD-10-CM

## 2023-08-16 DIAGNOSIS — R07.9 CHEST PAIN, UNSPECIFIED TYPE: Primary | ICD-10-CM

## 2023-08-16 DIAGNOSIS — I10 ESSENTIAL HYPERTENSION, BENIGN: ICD-10-CM

## 2023-08-16 DIAGNOSIS — I25.10 CORONARY ARTERY DISEASE DUE TO LIPID RICH PLAQUE: ICD-10-CM

## 2023-08-16 PROCEDURE — 3078F DIAST BP <80 MM HG: CPT | Performed by: INTERNAL MEDICINE

## 2023-08-16 PROCEDURE — G8427 DOCREV CUR MEDS BY ELIG CLIN: HCPCS | Performed by: INTERNAL MEDICINE

## 2023-08-16 PROCEDURE — 1123F ACP DISCUSS/DSCN MKR DOCD: CPT | Performed by: INTERNAL MEDICINE

## 2023-08-16 PROCEDURE — 3017F COLORECTAL CA SCREEN DOC REV: CPT | Performed by: INTERNAL MEDICINE

## 2023-08-16 PROCEDURE — G8399 PT W/DXA RESULTS DOCUMENT: HCPCS | Performed by: INTERNAL MEDICINE

## 2023-08-16 PROCEDURE — 99214 OFFICE O/P EST MOD 30 MIN: CPT | Performed by: INTERNAL MEDICINE

## 2023-08-16 PROCEDURE — G8417 CALC BMI ABV UP PARAM F/U: HCPCS | Performed by: INTERNAL MEDICINE

## 2023-08-16 PROCEDURE — 3074F SYST BP LT 130 MM HG: CPT | Performed by: INTERNAL MEDICINE

## 2023-08-16 PROCEDURE — 1036F TOBACCO NON-USER: CPT | Performed by: INTERNAL MEDICINE

## 2023-08-16 PROCEDURE — 1090F PRES/ABSN URINE INCON ASSESS: CPT | Performed by: INTERNAL MEDICINE

## 2023-08-16 NOTE — PROGRESS NOTES
[facility]  Cardiac Consult     Referring Provider:  CESAR Bal CNP     Chief Complaint   Patient presents with    1 Year Follow Up    Hypertension    Hyperlipidemia        History of Present Illness:   68 y.o. female formally followed by Dr. Rosina Geronimo seen in f/u for mild CAD, hyperlipidemia and HTN. She is on chronic O2 for interstitial lung disease. She was admitted 9/2020 with atypical chest pain. Stress myoview was normal. Another stress obtained 11/2021 for preop and was normal.  She has significant GI issues with GERD on protonix. She was again with recurrent chest pain. Myoview repeated and normal.     She was found to have esophageal issues and  gastroparesis and underwent GI procedures. She is doing better. Occasional pressure lasting about 1 minute. Not exertional.      Past Medical History:   has a past medical history of Abnormal stress test, Arthritis, Chronic headaches, Eczema, Fibromyalgia, GERD (gastroesophageal reflux disease), Hyperlipidemia, Hypertension, Impaired fasting glucose, Interstitial lung disease ([address]), Interstitial lung disease ([address]), Migraine headache, Osteopenia, Osteopenia, Other screening mammogram, Other screening mammogram, Oxygen dependent, Pulmonary hypertension ([address]), Retention of urine, unspecified, Screening mammogram, encounter for, Seizure disorder ([address]), Thyroid disease, Type 2 diabetes mellitus without complication ([address]), Vitamin D deficiency, and Wears glasses. Surgical History:   has a past surgical history that includes Appendectomy; Tubal ligation; Colonoscopy (Oct., 2003 ( 2013 )); Abdominoplasty (Jan., 2012); Cosmetic surgery; Liposuction (Bilateral); Cardiac catheterization (Oct., 2004); Breast reduction surgery (Bilateral); Total hip arthroplasty (July 31, 2013); Colonoscopy (Jan., 2014 ( 2024 )); Toe Surgery (Oct., 2015); bronchoscopy (**Sept.16, 2016); bronchoscopy (09/16/2016); bronchoscopy (*Febr.24, 2017);  Total hip arthroplasty

## 2023-08-20 DIAGNOSIS — K21.9 GASTROESOPHAGEAL REFLUX DISEASE WITHOUT ESOPHAGITIS: ICD-10-CM

## 2023-08-21 RX ORDER — PANTOPRAZOLE SODIUM 40 MG/1
TABLET, DELAYED RELEASE ORAL
Qty: 180 TABLET | Refills: 1 | Status: SHIPPED | OUTPATIENT
Start: 2023-08-21

## 2023-08-21 NOTE — TELEPHONE ENCOUNTER
Recent Visits  Date Type Provider Dept   08/04/23 Office Visit Brook Lott, APRN - [facility]NP [facility]   07/19/23 Office Visit Brook Lott, APRN - [facility]   04/19/23 Office Visit Brook Lott, APRN - [facility]   01/11/23 Office Visit Brook Lott, APRN - [facility]   11/14/22 Office Visit Brook Lott, APRN - [facility]   10/24/22 Office Visit Brook Lott, APRN - [facility]   08/18/22 Office Visit Brook Lott, APRN - [facility]   07/06/22 Office Visit Brook Lott, APRN - [facility]   03/24/22 Office Visit Brook Lott, APRN - [facility]   03/09/22 Office Visit Brook Lott, APRN - [facility]   Showing recent visits within past 540 days with a meds authorizing provider and meeting all other requirements  Future Appointments  Date Type Provider Dept   10/04/23 Appointment Brook Lott APRN - [facility]   10/11/23 Appointment Brook Lott, APRN - [facility]   Showing future appointments within next 150 days with a meds authorizing provider and meeting all other requirements

## 2023-08-27 DIAGNOSIS — F41.9 ANXIETY: Primary | ICD-10-CM

## 2023-08-28 RX ORDER — ALPRAZOLAM 0.5 MG/1
TABLET ORAL
Qty: 270 TABLET | Refills: 0 | Status: SHIPPED | OUTPATIENT
Start: 2023-08-28 | End: 2023-11-26

## 2023-08-28 NOTE — TELEPHONE ENCOUNTER
Recent Visits  Date Type Provider Dept   08/04/23 Office Visit Maco Gallegos, APRN - [facility]   07/19/23 Office Visit Azell El, APRN - [facility]   04/19/23 Office Visit Azell Cobia, APRN - [facility]   01/11/23 Office Visit Azell Cobia, APRN - [facility]   11/14/22 Office Visit Azell Raulia, APRN - [facility]   10/24/22 Office Visit Azell Cobia, APRN - [facility]   08/18/22 Office Visit Azell Cobia, APRN - [facility]   07/06/22 Office Visit Azell Cobia, APRN - [facility]   03/24/22 Office Visit Maco Gallegos, APRN - [facility]   03/09/22 Office Visit Azell Cobia, APRN - [facility]   Showing recent visits within past 540 days with a meds authorizing provider and meeting all other requirements  Future Appointments  Date Type Provider Dept   10/04/23 Appointment Maco Gallegos, APRN - [facility]   10/11/23 Appointment Maco Gallegos, APRN - [facility]   Showing future appointments within next 150 days with a meds authorizing provider and meeting all other requirements

## 2023-08-29 NOTE — TELEPHONE ENCOUNTER
1. Anxiety  - ALPRAZolam (XANAX) 0.5 MG tablet; TAKE ONE TABLET BY MOUTH THREE TIMES A DAY  Dispense: 270 tablet;  Refill: 0    PDMP Monitoring:  Last PDMP Lionel as Reviewed:  Review User Review Instant Review Result   BRANDEN LOMAS 8/28/2023  8:16 PM Reviewed PDMP [1]

## 2023-09-20 ENCOUNTER — TELEPHONE (OUTPATIENT)
Dept: PRIMARY CARE CLINIC | Age: 73
End: 2023-09-20

## 2023-09-20 DIAGNOSIS — G40.909 SEIZURE DISORDER (HCC): Primary | ICD-10-CM

## 2023-09-20 RX ORDER — CARBAMAZEPINE 200 MG
TABLET ORAL
Qty: 180 TABLET | Refills: 1 | Status: SHIPPED | OUTPATIENT
Start: 2023-09-20

## 2023-09-20 NOTE — TELEPHONE ENCOUNTER
[facility] called to ask if Todd Otero could send over a new prescription for the patient's TEGRETOL 200 MG tablet.

## 2023-09-21 NOTE — TELEPHONE ENCOUNTER
1. Seizure disorder ([address])  - TEGRETOL 200 MG tablet; TAKE ONE TABLET BY MOUTH TWICE A DAY  Dispense: 180 tablet;  Refill: 1

## 2023-09-28 ENCOUNTER — TELEPHONE (OUTPATIENT)
Dept: PRIMARY CARE CLINIC | Age: 73
End: 2023-09-28

## 2023-09-28 DIAGNOSIS — R05.1 ACUTE COUGH: Primary | ICD-10-CM

## 2023-09-28 NOTE — TELEPHONE ENCOUNTER
Rhianna Escobar called and wanted to see if Frank Salcedo could prescribe some  Azithromycin 500 mg for Tye Castro to help her with possible pneumonia which was caused by aspiration. Please call to advise.

## 2023-10-04 ENCOUNTER — OFFICE VISIT (OUTPATIENT)
Dept: PRIMARY CARE CLINIC | Age: 73
End: 2023-10-04
Payer: MEDICARE

## 2023-10-04 VITALS
DIASTOLIC BLOOD PRESSURE: 66 MMHG | WEIGHT: 177.6 LBS | SYSTOLIC BLOOD PRESSURE: 118 MMHG | HEART RATE: 89 BPM | OXYGEN SATURATION: 97 % | HEIGHT: 62 IN | BODY MASS INDEX: 32.68 KG/M2

## 2023-10-04 DIAGNOSIS — F51.01 PRIMARY INSOMNIA: ICD-10-CM

## 2023-10-04 DIAGNOSIS — E66.09 CLASS 1 OBESITY DUE TO EXCESS CALORIES WITH SERIOUS COMORBIDITY AND BODY MASS INDEX (BMI) OF 32.0 TO 32.9 IN ADULT: ICD-10-CM

## 2023-10-04 DIAGNOSIS — E03.9 ACQUIRED HYPOTHYROIDISM: ICD-10-CM

## 2023-10-04 DIAGNOSIS — N18.30 STAGE 3 CHRONIC KIDNEY DISEASE, UNSPECIFIED WHETHER STAGE 3A OR 3B CKD (HCC): ICD-10-CM

## 2023-10-04 DIAGNOSIS — I10 ESSENTIAL HYPERTENSION, BENIGN: ICD-10-CM

## 2023-10-04 DIAGNOSIS — G40.909 SEIZURE DISORDER (HCC): ICD-10-CM

## 2023-10-04 DIAGNOSIS — F41.9 ANXIETY: ICD-10-CM

## 2023-10-04 DIAGNOSIS — K21.9 GASTROESOPHAGEAL REFLUX DISEASE WITHOUT ESOPHAGITIS: ICD-10-CM

## 2023-10-04 DIAGNOSIS — E11.9 CONTROLLED TYPE 2 DIABETES MELLITUS WITHOUT COMPLICATION, WITHOUT LONG-TERM CURRENT USE OF INSULIN (HCC): Primary | ICD-10-CM

## 2023-10-04 DIAGNOSIS — J84.9 INTERSTITIAL LUNG DISEASE (HCC): ICD-10-CM

## 2023-10-04 DIAGNOSIS — Z91.81 AT HIGH RISK FOR FALLS: ICD-10-CM

## 2023-10-04 DIAGNOSIS — F33.1 MODERATE EPISODE OF RECURRENT MAJOR DEPRESSIVE DISORDER (HCC): ICD-10-CM

## 2023-10-04 DIAGNOSIS — Z12.31 BREAST CANCER SCREENING BY MAMMOGRAM: ICD-10-CM

## 2023-10-04 DIAGNOSIS — E78.2 MIXED HYPERLIPIDEMIA: ICD-10-CM

## 2023-10-04 LAB — HBA1C MFR BLD: 5.6 %

## 2023-10-04 PROCEDURE — 3074F SYST BP LT 130 MM HG: CPT | Performed by: NURSE PRACTITIONER

## 2023-10-04 PROCEDURE — 3044F HG A1C LEVEL LT 7.0%: CPT | Performed by: NURSE PRACTITIONER

## 2023-10-04 PROCEDURE — G8417 CALC BMI ABV UP PARAM F/U: HCPCS | Performed by: NURSE PRACTITIONER

## 2023-10-04 PROCEDURE — 83036 HEMOGLOBIN GLYCOSYLATED A1C: CPT | Performed by: NURSE PRACTITIONER

## 2023-10-04 PROCEDURE — 1036F TOBACCO NON-USER: CPT | Performed by: NURSE PRACTITIONER

## 2023-10-04 PROCEDURE — 3017F COLORECTAL CA SCREEN DOC REV: CPT | Performed by: NURSE PRACTITIONER

## 2023-10-04 PROCEDURE — G8484 FLU IMMUNIZE NO ADMIN: HCPCS | Performed by: NURSE PRACTITIONER

## 2023-10-04 PROCEDURE — 1123F ACP DISCUSS/DSCN MKR DOCD: CPT | Performed by: NURSE PRACTITIONER

## 2023-10-04 PROCEDURE — 1090F PRES/ABSN URINE INCON ASSESS: CPT | Performed by: NURSE PRACTITIONER

## 2023-10-04 PROCEDURE — 99214 OFFICE O/P EST MOD 30 MIN: CPT | Performed by: NURSE PRACTITIONER

## 2023-10-04 PROCEDURE — 3078F DIAST BP <80 MM HG: CPT | Performed by: NURSE PRACTITIONER

## 2023-10-04 PROCEDURE — G8399 PT W/DXA RESULTS DOCUMENT: HCPCS | Performed by: NURSE PRACTITIONER

## 2023-10-04 PROCEDURE — G8427 DOCREV CUR MEDS BY ELIG CLIN: HCPCS | Performed by: NURSE PRACTITIONER

## 2023-10-04 PROCEDURE — 2022F DILAT RTA XM EVC RTNOPTHY: CPT | Performed by: NURSE PRACTITIONER

## 2023-10-04 RX ORDER — MIRTAZAPINE 15 MG/1
TABLET, FILM COATED ORAL
Qty: 90 TABLET | Refills: 0 | Status: SHIPPED | OUTPATIENT
Start: 2023-10-04

## 2023-10-04 RX ORDER — VILAZODONE HYDROCHLORIDE 20 MG/1
20 TABLET ORAL DAILY
Qty: 90 TABLET | Refills: 0 | Status: SHIPPED | OUTPATIENT
Start: 2023-10-04 | End: 2024-01-02

## 2023-10-04 RX ORDER — EZETIMIBE 10 MG/1
10 TABLET ORAL NIGHTLY
Qty: 90 TABLET | Refills: 0 | Status: SHIPPED | OUTPATIENT
Start: 2023-10-04 | End: 2024-01-02

## 2023-10-04 RX ORDER — TIRZEPATIDE 12.5 MG/.5ML
12.5 INJECTION, SOLUTION SUBCUTANEOUS WEEKLY
Qty: 4 ADJUSTABLE DOSE PRE-FILLED PEN SYRINGE | Refills: 2 | Status: SHIPPED | OUTPATIENT
Start: 2023-10-04 | End: 2024-01-02

## 2023-10-04 RX ORDER — VILAZODONE HYDROCHLORIDE 40 MG/1
TABLET ORAL
Qty: 90 TABLET | Refills: 0 | Status: SHIPPED | OUTPATIENT
Start: 2023-10-04

## 2023-10-04 RX ORDER — BUSPIRONE HYDROCHLORIDE 15 MG/1
15 TABLET ORAL 3 TIMES DAILY
Qty: 270 TABLET | Refills: 0 | Status: SHIPPED | OUTPATIENT
Start: 2023-10-04

## 2023-10-04 RX ORDER — LEVOTHYROXINE SODIUM 0.1 MG/1
TABLET ORAL
Qty: 90 TABLET | Refills: 0 | Status: SHIPPED | OUTPATIENT
Start: 2023-10-04

## 2023-10-04 RX ORDER — ATORVASTATIN CALCIUM 80 MG/1
80 TABLET, FILM COATED ORAL DAILY
Qty: 90 TABLET | Refills: 0 | Status: SHIPPED | OUTPATIENT
Start: 2023-10-04 | End: 2024-01-02

## 2023-10-04 ASSESSMENT — ENCOUNTER SYMPTOMS
VISUAL CHANGE: 0
SHORTNESS OF BREATH: 1
BELCHING: 0
COUGH: 0
WATER BRASH: 0
BLURRED VISION: 0
HEARTBURN: 0
GLOBUS SENSATION: 0
NAUSEA: 0
ABDOMINAL PAIN: 0
ORTHOPNEA: 0
WHEEZING: 0

## 2023-10-04 NOTE — PROGRESS NOTES
by mouth daily  Dispense: 90 tablet; Refill: 0  - Mounjaro increased from 10 mg to 12.5 mg.    2. Essential hypertension, benign  - Controlled  - /66  - Follows with Cardiologist, Dr. Ana Lilia Myers  - Continue metoprolol succinate 50 mg daily    3. Mixed hyperlipidemia  - Controlled  - 3/21/23--> , , HDL 77, LDL 67  - LDL at goal <70 (67)  - Continue atorvastatin (LIPITOR) 80 MG tablet; Take 1 tablet by mouth daily  Dispense: 90 tablet; Refill: 0  - Continue ezetimibe (ZETIA) 10 MG tablet; Take 1 tablet by mouth at bedtime  Dispense: 90 tablet; Refill: 0    4. Acquired hypothyroidism  - Stable  - Continue levothyroxine (SYNTHROID) 100 MCG tablet; TAKE ONE TABLET BY MOUTH DAILY  Dispense: 90 tablet; Refill: 0    5. Gastroesophageal reflux disease without esophagitis  - Stable  - Continue Protonix 40 mg BID (before meals)    6. Moderate episode of recurrent major depressive disorder (HCC)  - Stable  - Continue vilazodone HCl (VIIBRYD) 40 MG TABS; TAKE ONE TABLET BY MOUTH DAILY WITH FOOD TAKE IN ADDTION TO THE 20MG FOR DAILY TOTAL OF 60MG  Dispense: 90 tablet; Refill: 0  - Continue vilazodone HCl (VIIBRYD) 20 MG TABS; Take 1 tablet by mouth daily Take with food. Take in addition to 40 mg tablet for daily total of 60 mg  Dispense: 90 tablet; Refill: 0    7. Anxiety  - Stable   - Continue ALPRAZolam (XANAX) 0.5 MG tablet; Take 1 tablet by mouth 3 times daily as needed  - busPIRone (BUSPAR) 15 MG tablet; Take 15 mg by mouth 3 times daily  Dispense: 270 tablet; Refill: 0    8. Seizure disorder ([address])  - Stable  - Last seizure over 10 years ago. - Continue Tegretol 200 mg BID- filled 9/20/23. 9. Primary insomnia  - Stable  - Continue mirtazapine (REMERON) 15 MG tablet; TAKE ONE-HALF TO ONE TABLET BY MOUTH 30 TO 60 MINUTES PRIOR TO BEDTIME  Dispense: 90 tablet; Refill: 0    10.  Interstitial lung disease ([address])  - Stable  -Followed by pulmonology, Dr. Fer Pina  - Continue oxygen 6-7 liters as directed by

## 2023-10-11 ENCOUNTER — OFFICE VISIT (OUTPATIENT)
Dept: PRIMARY CARE CLINIC | Age: 73
End: 2023-10-11
Payer: MEDICARE

## 2023-10-11 VITALS
BODY MASS INDEX: 32.76 KG/M2 | OXYGEN SATURATION: 98 % | DIASTOLIC BLOOD PRESSURE: 72 MMHG | HEIGHT: 62 IN | WEIGHT: 178 LBS | HEART RATE: 88 BPM | SYSTOLIC BLOOD PRESSURE: 118 MMHG

## 2023-10-11 DIAGNOSIS — Z00.00 MEDICARE ANNUAL WELLNESS VISIT, SUBSEQUENT: Primary | ICD-10-CM

## 2023-10-11 DIAGNOSIS — Z91.81 AT HIGH RISK FOR FALLS: ICD-10-CM

## 2023-10-11 PROCEDURE — G0439 PPPS, SUBSEQ VISIT: HCPCS | Performed by: NURSE PRACTITIONER

## 2023-10-11 PROCEDURE — 3078F DIAST BP <80 MM HG: CPT | Performed by: NURSE PRACTITIONER

## 2023-10-11 PROCEDURE — 3017F COLORECTAL CA SCREEN DOC REV: CPT | Performed by: NURSE PRACTITIONER

## 2023-10-11 PROCEDURE — 1123F ACP DISCUSS/DSCN MKR DOCD: CPT | Performed by: NURSE PRACTITIONER

## 2023-10-11 PROCEDURE — G8484 FLU IMMUNIZE NO ADMIN: HCPCS | Performed by: NURSE PRACTITIONER

## 2023-10-11 PROCEDURE — 3074F SYST BP LT 130 MM HG: CPT | Performed by: NURSE PRACTITIONER

## 2023-10-11 ASSESSMENT — PATIENT HEALTH QUESTIONNAIRE - PHQ9
8. MOVING OR SPEAKING SO SLOWLY THAT OTHER PEOPLE COULD HAVE NOTICED. OR THE OPPOSITE, BEING SO FIGETY OR RESTLESS THAT YOU HAVE BEEN MOVING AROUND A LOT MORE THAN USUAL: 2
SUM OF ALL RESPONSES TO PHQ QUESTIONS 1-9: 10
4. FEELING TIRED OR HAVING LITTLE ENERGY: 1
SUM OF ALL RESPONSES TO PHQ QUESTIONS 1-9: 10
1. LITTLE INTEREST OR PLEASURE IN DOING THINGS: 3
5. POOR APPETITE OR OVEREATING: 1
6. FEELING BAD ABOUT YOURSELF - OR THAT YOU ARE A FAILURE OR HAVE LET YOURSELF OR YOUR FAMILY DOWN: 0
SUM OF ALL RESPONSES TO PHQ QUESTIONS 1-9: 10
3. TROUBLE FALLING OR STAYING ASLEEP: 2
7. TROUBLE CONCENTRATING ON THINGS, SUCH AS READING THE NEWSPAPER OR WATCHING TELEVISION: 1
SUM OF ALL RESPONSES TO PHQ QUESTIONS 1-9: 10
2. FEELING DOWN, DEPRESSED OR HOPELESS: 0
SUM OF ALL RESPONSES TO PHQ9 QUESTIONS 1 & 2: 3
10. IF YOU CHECKED OFF ANY PROBLEMS, HOW DIFFICULT HAVE THESE PROBLEMS MADE IT FOR YOU TO DO YOUR WORK, TAKE CARE OF THINGS AT HOME, OR GET ALONG WITH OTHER PEOPLE: 0

## 2023-12-03 DIAGNOSIS — F41.9 ANXIETY: Primary | ICD-10-CM

## 2023-12-04 RX ORDER — ALPRAZOLAM 0.5 MG/1
TABLET ORAL
Qty: 270 TABLET | OUTPATIENT
Start: 2023-12-04 | End: 2024-03-03

## 2023-12-04 RX ORDER — ALPRAZOLAM 0.5 MG/1
0.5 TABLET ORAL 3 TIMES DAILY PRN
Qty: 30 TABLET | Refills: 2 | Status: SHIPPED | OUTPATIENT
Start: 2023-12-04 | End: 2024-03-03

## 2023-12-04 RX ORDER — ALPRAZOLAM 0.5 MG/1
0.5 TABLET ORAL 3 TIMES DAILY PRN
COMMUNITY
End: 2023-12-04 | Stop reason: SDUPTHER

## 2023-12-04 NOTE — TELEPHONE ENCOUNTER
Spoke to patient's  and let him know that the prescription refill request had been sent to the pharmacy.

## 2023-12-04 NOTE — TELEPHONE ENCOUNTER
Recent Visits  Date Type Provider Dept   10/11/23 Office Visit Jennifer Maciel, APRN - [facility]   10/04/23 Office Visit Jennifer Maciel, APRN - [facility]   08/04/23 Office Visit Pastjeanette Maciel, APRN - [facility]   07/19/23 Office Visit Pastjeanette Maciel, APRN - [facility]   04/19/23 Office Visit Jennifer Maciel, APRN - [facility]   01/11/23 Office Visit Jennifer Maciel, APRN - [facility]   11/14/22 Office Visit Pastjeanette Maciel, APRN - [facility]   10/24/22 Office Visit Jennifer Maciel, APRN - [facility]   08/18/22 Office Visit Jennifer Maciel, APRN - [facility]   07/06/22 Office Visit Pastjeanette Maciel, APRN - [facility]   Showing recent visits within past 540 days with a meds authorizing provider and meeting all other requirements  Future Appointments  Date Type Provider Dept   01/22/24 Appointment Jennifer Maciel, APRN - [facility]   Showing future appointments within next 150 days with a meds authorizing provider and meeting all other requirements

## 2023-12-04 NOTE — TELEPHONE ENCOUNTER
1. Anxiety  - Continue ALPRAZolam (XANAX) 0.5 MG tablet; Take 1 tablet by mouth 3 times daily as needed for Anxiety for up to 90 days. Max Daily Amount: 1.5 mg  Dispense: 30 tablet;  Refill: 2    PDMP Monitoring:  Last PDMP Lionel as Reviewed:  Review User Review Instant Review Result   BRANDEN LOMAS 12/4/2023 10:57 AM Reviewed PDMP [1]

## 2023-12-10 DIAGNOSIS — E78.2 MIXED HYPERLIPIDEMIA: ICD-10-CM

## 2023-12-11 RX ORDER — ATORVASTATIN CALCIUM 80 MG/1
80 TABLET, FILM COATED ORAL DAILY
Qty: 90 TABLET | Refills: 0 | Status: SHIPPED | OUTPATIENT
Start: 2023-12-11

## 2023-12-11 NOTE — TELEPHONE ENCOUNTER
Recent Visits  Date Type Provider Dept   10/11/23 Office Visit [place] Body, APRN - CNP [facility]   10/04/23 Office Visit [place] Body, APRN - [facility]   08/04/23 Office Visit [place] Body, APRN - [facility]   07/19/23 Office Visit Elda Body, APRN - [facility]   04/19/23 Office Visit [place] Body, APRN - [facility]   01/11/23 Office Visit Elda Body, APRN - [facility]   11/14/22 Office Visit [place] Body, APRN - [facility]   10/24/22 Office Visit Elda Body, APRN - [facility]   08/18/22 Office Visit Elda Body, APRN - [facility]   07/06/22 Office Visit Elda Body, APRN - [facility]   Showing recent visits within past 540 days with a meds authorizing provider and meeting all other requirements  Future Appointments  Date Type Provider Dept   01/22/24 Appointment Elda Body, APRN - [facility]   Showing future appointments within next 150 days with a meds authorizing provider and meeting all other requirements

## 2023-12-30 DIAGNOSIS — F41.9 ANXIETY: ICD-10-CM

## 2023-12-30 DIAGNOSIS — F51.01 PRIMARY INSOMNIA: ICD-10-CM

## 2023-12-30 DIAGNOSIS — F33.1 MODERATE EPISODE OF RECURRENT MAJOR DEPRESSIVE DISORDER (HCC): ICD-10-CM

## 2023-12-30 DIAGNOSIS — E03.9 ACQUIRED HYPOTHYROIDISM: ICD-10-CM

## 2024-01-03 NOTE — TELEPHONE ENCOUNTER
LastVisit 10/11/2023   LastLabs   NextVisit 1/22/2024   LastRefilled 10/4/23  Pharmacy:  [facility]   [address]    pharmacy confirmed in EPIC

## 2024-01-04 DIAGNOSIS — F33.1 MODERATE EPISODE OF RECURRENT MAJOR DEPRESSIVE DISORDER (HCC): Primary | ICD-10-CM

## 2024-01-04 DIAGNOSIS — J06.9 BACTERIAL URI: ICD-10-CM

## 2024-01-04 DIAGNOSIS — B96.89 BACTERIAL URI: ICD-10-CM

## 2024-01-04 RX ORDER — VILAZODONE HYDROCHLORIDE 20 MG/1
TABLET ORAL
Qty: 90 TABLET | Refills: 0 | Status: SHIPPED | OUTPATIENT
Start: 2024-01-04

## 2024-01-04 RX ORDER — VILAZODONE HYDROCHLORIDE 40 MG/1
TABLET ORAL
Qty: 90 TABLET | Refills: 0 | Status: SHIPPED | OUTPATIENT
Start: 2024-01-04

## 2024-01-04 RX ORDER — AZITHROMYCIN 250 MG/1
TABLET, FILM COATED ORAL
Qty: 6 TABLET | Refills: 0 | Status: SHIPPED | OUTPATIENT
Start: 2024-01-04

## 2024-01-04 RX ORDER — BUSPIRONE HYDROCHLORIDE 15 MG/1
15 TABLET ORAL 3 TIMES DAILY
Qty: 270 TABLET | Refills: 0 | Status: SHIPPED | OUTPATIENT
Start: 2024-01-04

## 2024-01-04 RX ORDER — VILAZODONE HYDROCHLORIDE 20 MG/1
TABLET ORAL
Qty: 90 TABLET | Refills: 0 | Status: SHIPPED | OUTPATIENT
Start: 2024-01-04 | End: 2024-01-04 | Stop reason: SDUPTHER

## 2024-01-04 RX ORDER — MIRTAZAPINE 15 MG/1
TABLET, FILM COATED ORAL
Qty: 90 TABLET | Refills: 0 | Status: SHIPPED | OUTPATIENT
Start: 2024-01-04

## 2024-01-04 RX ORDER — LEVOTHYROXINE SODIUM 0.1 MG/1
TABLET ORAL
Qty: 90 TABLET | Refills: 0 | Status: SHIPPED | OUTPATIENT
Start: 2024-01-04

## 2024-01-04 NOTE — PROGRESS NOTES
Patient's , Ruben sent [employer] message requesting Viibryd medication refills under his [employer] account instead of patient's.    Going on a cruise, requested [employer] to use as needed.    1. Moderate episode of recurrent major depressive disorder (HCC)  - vilazodone HCl (VIIBRYD) 40 MG TABS; TAKE ONE TABLET BY MOUTH DAILY WITH FOOD TAKE IN ADDTION TO THE 20MG FOR DAILY TOTAL OF 60MG  Dispense: 90 tablet; Refill: 0  - vilazodone HCl (VIIBRYD) 20 MG TABS; TAKE 1 TABLET BY MOUTH DAILY WITH FOOD WITH 40MG TO EQUAL 60MG  Dispense: 90 tablet; Refill: 0    2. Bacterial URI  - azithromycin (ZITHROMAX) 250 MG tablet; Take 2 tablets (500 mg) by mouth on Day 1.  Day 2 - 5 take 1 tablet (250 mg) daily  Dispense: 6 tablet; Refill: 0

## 2024-01-19 DIAGNOSIS — F41.9 ANXIETY: ICD-10-CM

## 2024-01-22 ENCOUNTER — OFFICE VISIT (OUTPATIENT)
Dept: PRIMARY CARE CLINIC | Age: 74
End: 2024-01-22
Payer: MEDICARE

## 2024-01-22 VITALS
HEART RATE: 88 BPM | OXYGEN SATURATION: 98 % | BODY MASS INDEX: 32.02 KG/M2 | SYSTOLIC BLOOD PRESSURE: 118 MMHG | DIASTOLIC BLOOD PRESSURE: 66 MMHG | WEIGHT: 174 LBS | HEIGHT: 62 IN

## 2024-01-22 DIAGNOSIS — Z91.81 AT HIGH RISK FOR FALLS: ICD-10-CM

## 2024-01-22 DIAGNOSIS — F41.9 ANXIETY: ICD-10-CM

## 2024-01-22 DIAGNOSIS — I10 ESSENTIAL HYPERTENSION, BENIGN: Primary | ICD-10-CM

## 2024-01-22 DIAGNOSIS — E78.2 MIXED HYPERLIPIDEMIA: ICD-10-CM

## 2024-01-22 DIAGNOSIS — J84.9 INTERSTITIAL LUNG DISEASE (HCC): ICD-10-CM

## 2024-01-22 DIAGNOSIS — G40.909 SEIZURE DISORDER (HCC): ICD-10-CM

## 2024-01-22 DIAGNOSIS — E66.09 CLASS 1 OBESITY DUE TO EXCESS CALORIES WITH SERIOUS COMORBIDITY AND BODY MASS INDEX (BMI) OF 31.0 TO 31.9 IN ADULT: ICD-10-CM

## 2024-01-22 DIAGNOSIS — E03.9 ACQUIRED HYPOTHYROIDISM: ICD-10-CM

## 2024-01-22 DIAGNOSIS — N18.30 STAGE 3 CHRONIC KIDNEY DISEASE, UNSPECIFIED WHETHER STAGE 3A OR 3B CKD (HCC): ICD-10-CM

## 2024-01-22 DIAGNOSIS — K21.9 GASTROESOPHAGEAL REFLUX DISEASE WITHOUT ESOPHAGITIS: ICD-10-CM

## 2024-01-22 DIAGNOSIS — E11.9 CONTROLLED TYPE 2 DIABETES MELLITUS WITHOUT COMPLICATION, WITHOUT LONG-TERM CURRENT USE OF INSULIN (HCC): ICD-10-CM

## 2024-01-22 DIAGNOSIS — F51.01 PRIMARY INSOMNIA: ICD-10-CM

## 2024-01-22 DIAGNOSIS — F33.1 MODERATE EPISODE OF RECURRENT MAJOR DEPRESSIVE DISORDER (HCC): ICD-10-CM

## 2024-01-22 PROBLEM — E66.811 CLASS 1 OBESITY DUE TO EXCESS CALORIES WITH SERIOUS COMORBIDITY AND BODY MASS INDEX (BMI) OF 31.0 TO 31.9 IN ADULT: Status: ACTIVE | Noted: 2021-06-17

## 2024-01-22 LAB — HBA1C MFR BLD: 7 %

## 2024-01-22 PROCEDURE — G8484 FLU IMMUNIZE NO ADMIN: HCPCS | Performed by: NURSE PRACTITIONER

## 2024-01-22 PROCEDURE — 3051F HG A1C>EQUAL 7.0%<8.0%: CPT | Performed by: NURSE PRACTITIONER

## 2024-01-22 PROCEDURE — 3078F DIAST BP <80 MM HG: CPT | Performed by: NURSE PRACTITIONER

## 2024-01-22 PROCEDURE — G8427 DOCREV CUR MEDS BY ELIG CLIN: HCPCS | Performed by: NURSE PRACTITIONER

## 2024-01-22 PROCEDURE — G8399 PT W/DXA RESULTS DOCUMENT: HCPCS | Performed by: NURSE PRACTITIONER

## 2024-01-22 PROCEDURE — G8417 CALC BMI ABV UP PARAM F/U: HCPCS | Performed by: NURSE PRACTITIONER

## 2024-01-22 PROCEDURE — 3074F SYST BP LT 130 MM HG: CPT | Performed by: NURSE PRACTITIONER

## 2024-01-22 PROCEDURE — 1036F TOBACCO NON-USER: CPT | Performed by: NURSE PRACTITIONER

## 2024-01-22 PROCEDURE — 3017F COLORECTAL CA SCREEN DOC REV: CPT | Performed by: NURSE PRACTITIONER

## 2024-01-22 PROCEDURE — 1123F ACP DISCUSS/DSCN MKR DOCD: CPT | Performed by: NURSE PRACTITIONER

## 2024-01-22 PROCEDURE — 99214 OFFICE O/P EST MOD 30 MIN: CPT | Performed by: NURSE PRACTITIONER

## 2024-01-22 PROCEDURE — 1090F PRES/ABSN URINE INCON ASSESS: CPT | Performed by: NURSE PRACTITIONER

## 2024-01-22 PROCEDURE — 83036 HEMOGLOBIN GLYCOSYLATED A1C: CPT | Performed by: NURSE PRACTITIONER

## 2024-01-22 PROCEDURE — 2022F DILAT RTA XM EVC RTNOPTHY: CPT | Performed by: NURSE PRACTITIONER

## 2024-01-22 RX ORDER — CARBAMAZEPINE 200 MG
TABLET ORAL
Qty: 180 TABLET | Refills: 1 | Status: SHIPPED | OUTPATIENT
Start: 2024-01-22

## 2024-01-22 RX ORDER — ALPRAZOLAM 0.5 MG/1
TABLET ORAL
Qty: 30 TABLET | OUTPATIENT
Start: 2024-01-22

## 2024-01-22 RX ORDER — ALPRAZOLAM 0.5 MG/1
0.5 TABLET ORAL 3 TIMES DAILY PRN
Qty: 30 TABLET | Refills: 2 | Status: SHIPPED | OUTPATIENT
Start: 2024-01-22 | End: 2024-04-21

## 2024-01-22 RX ORDER — EZETIMIBE 10 MG/1
10 TABLET ORAL NIGHTLY
Qty: 90 TABLET | Refills: 0 | Status: SHIPPED | OUTPATIENT
Start: 2024-01-22 | End: 2024-04-21

## 2024-01-22 ASSESSMENT — ENCOUNTER SYMPTOMS
WATER BRASH: 0
HEARTBURN: 0
WHEEZING: 0
ABDOMINAL PAIN: 0
BELCHING: 0
SHORTNESS OF BREATH: 1
BLURRED VISION: 0
ORTHOPNEA: 0
GLOBUS SENSATION: 0
VISUAL CHANGE: 0
COUGH: 0
NAUSEA: 0

## 2024-01-22 NOTE — PROGRESS NOTES
Stable  - Continue vilazodone HCl (VIIBRYD) 40 MG TABS; TAKE ONE TABLET BY MOUTH DAILY WITH FOOD TAKE IN ADDTION TO THE 20MG FOR DAILY TOTAL OF 60MG    - Continue vilazodone HCl (VIIBRYD) 20 MG TABS; Take 1 tablet by mouth daily Take with food. Take in addition to 40 mg tablet for daily total of 60 mg      8. Primary insomnia  - Stable  - Continue mirtazapine (REMERON) 15 MG tablet; TAKE ONE-HALF TO ONE TABLET BY MOUTH 30 TO 60 MINUTES PRIOR TO BEDTIME      9. Seizure disorder (HCC)  - Stable  - Denies recent seizures (prior to 2013)  - Continue TEGRETOL 200 MG tablet; TAKE ONE TABLET BY MOUTH TWICE A DAY  Dispense: 180 tablet; Refill: 1    10. Interstitial lung disease (HCC)  - Stable  -Followed by pulmonology, Dr. Manning  - Continue oxygen 6-7 liters as directed by pulmonologist    11. Stage 3 chronic kidney disease, unspecified whether stage 3a or 3b CKD ([facility])  - Stable  - Follows nephrologist, Dr. Bynum  - Last BMP 7/30/23--> GFR 62, BUN 15, Creatinine 0.97    12. At high risk for falls  - Patient strongly encouraged to use a walker or scooter at all times- was not using either at appointment.   - Declines balance therapy.   - Safety tip provided    13.  Class 1 obesity due to excess calories with serious comorbidity and body mass index (BMI) of 31.0 to 31.9 in adult   - Gradually losing weight  - 41 lb loss since Oct 2022 (215 lbs).  - Weight 174 lbs lbs, BMI 31.83 in office today. (Down from 215 lbs in Oct 2022)    Return in about 3 months (around 4/22/2024) for HTN, DM, GERD, depression, GERD, hypothyroidism, depression,/anxiety, insomnia, seizures- 45 min.       Discussed use, benefit, and side effects of prescribed medications.     Patient's questions answered and concerns addressed.  Patient agrees to plan of care.     My scheduled days in the office reviewed with patient, and same day appointments available. Encouraged to use "[employer] for communication as needed.     Electronically signed by BRANDEN LOMAS

## 2024-01-22 NOTE — TELEPHONE ENCOUNTER
Recent Visits  Date Type Provider Dept   10/11/23 Office Visit Judsonindias Squires, APRN - [facility]NP [facility]   10/04/23 Office Visit Tavia Dias, APRN - [facility]   08/04/23 Office Visit Tavia Dias, APRN - [facility]   07/19/23 Office Visit Tavia Dias, APRN - [facility]   04/19/23 Office Visit Tavia Dias, APRN - [facility]   01/11/23 Office Visit Tavia Dias, APRN - [facility]   11/14/22 Office Visit Tavia Dias, APRN - [facility]   10/24/22 Office Visit Tavia Dias, APRN - [facility]   08/18/22 Office Visit JudsonromanTavia, APRN - CNP [facility]   Showing recent visits within past 540 days with a meds authorizing provider and meeting all other requirements  Today's Visits  Date Type Provider Dept   01/22/24 Appointment Tavia Dias, APRN - CNP [facility]   Showing today's visits with a meds authorizing provider and meeting all other requirements  Future Appointments  No visits were found meeting these conditions.  Showing future appointments within next 150 days with a meds authorizing provider and meeting all other requirements

## 2024-02-07 DIAGNOSIS — F41.9 ANXIETY: Primary | ICD-10-CM

## 2024-02-07 RX ORDER — ALPRAZOLAM 0.5 MG/1
0.5 TABLET ORAL 3 TIMES DAILY
Qty: 90 TABLET | Refills: 2 | Status: SHIPPED | OUTPATIENT
Start: 2024-02-07 | End: 2024-05-07

## 2024-02-07 NOTE — TELEPHONE ENCOUNTER
1. Anxiety  - Continue ALPRAZolam (XANAX) 0.5 MG tablet; Take 1 tablet by mouth 3 times daily for 90 days. Max Daily Amount: 1.5 mg  Dispense: 90 tablet; Refill: 2    PDMP Monitoring:  Last PDMP Lionel as Reviewed:  Review User Review Instant Review Result   BRANDEN LOMAS 2/7/2024  2:07 PM Reviewed PDMP [1]

## 2024-03-15 ENCOUNTER — OFFICE VISIT (OUTPATIENT)
Dept: CARDIOLOGY CLINIC | Age: 74
End: 2024-03-15
Payer: MEDICARE

## 2024-03-15 VITALS
SYSTOLIC BLOOD PRESSURE: 120 MMHG | HEIGHT: 62 IN | HEART RATE: 78 BPM | OXYGEN SATURATION: 97 % | DIASTOLIC BLOOD PRESSURE: 78 MMHG | WEIGHT: 164 LBS | BODY MASS INDEX: 30.18 KG/M2

## 2024-03-15 DIAGNOSIS — R07.89 CHEST TIGHTNESS: ICD-10-CM

## 2024-03-15 DIAGNOSIS — R06.02 SOB (SHORTNESS OF BREATH): ICD-10-CM

## 2024-03-15 DIAGNOSIS — I10 ESSENTIAL HYPERTENSION, BENIGN: ICD-10-CM

## 2024-03-15 DIAGNOSIS — I25.10 ATHEROSCLEROSIS OF NATIVE CORONARY ARTERY OF NATIVE HEART WITHOUT ANGINA PECTORIS: Primary | ICD-10-CM

## 2024-03-15 DIAGNOSIS — E78.2 MIXED HYPERLIPIDEMIA: ICD-10-CM

## 2024-03-15 DIAGNOSIS — Z09 HOSPITAL DISCHARGE FOLLOW-UP: ICD-10-CM

## 2024-03-15 PROCEDURE — 99214 OFFICE O/P EST MOD 30 MIN: CPT | Performed by: INTERNAL MEDICINE

## 2024-03-15 PROCEDURE — G8484 FLU IMMUNIZE NO ADMIN: HCPCS | Performed by: INTERNAL MEDICINE

## 2024-03-15 PROCEDURE — 3078F DIAST BP <80 MM HG: CPT | Performed by: INTERNAL MEDICINE

## 2024-03-15 PROCEDURE — G8417 CALC BMI ABV UP PARAM F/U: HCPCS | Performed by: INTERNAL MEDICINE

## 2024-03-15 PROCEDURE — 1036F TOBACCO NON-USER: CPT | Performed by: INTERNAL MEDICINE

## 2024-03-15 PROCEDURE — 3074F SYST BP LT 130 MM HG: CPT | Performed by: INTERNAL MEDICINE

## 2024-03-15 PROCEDURE — 1123F ACP DISCUSS/DSCN MKR DOCD: CPT | Performed by: INTERNAL MEDICINE

## 2024-03-15 PROCEDURE — 93000 ELECTROCARDIOGRAM COMPLETE: CPT | Performed by: INTERNAL MEDICINE

## 2024-03-15 PROCEDURE — 3017F COLORECTAL CA SCREEN DOC REV: CPT | Performed by: INTERNAL MEDICINE

## 2024-03-15 PROCEDURE — G8399 PT W/DXA RESULTS DOCUMENT: HCPCS | Performed by: INTERNAL MEDICINE

## 2024-03-15 PROCEDURE — 1111F DSCHRG MED/CURRENT MED MERGE: CPT | Performed by: INTERNAL MEDICINE

## 2024-03-15 PROCEDURE — 1090F PRES/ABSN URINE INCON ASSESS: CPT | Performed by: INTERNAL MEDICINE

## 2024-03-15 PROCEDURE — G8427 DOCREV CUR MEDS BY ELIG CLIN: HCPCS | Performed by: INTERNAL MEDICINE

## 2024-03-15 RX ORDER — FUROSEMIDE 20 MG/1
20 TABLET ORAL DAILY
Qty: 90 TABLET | Refills: 4 | Status: SHIPPED | OUTPATIENT
Start: 2024-03-15

## 2024-03-15 RX ORDER — METOPROLOL SUCCINATE 50 MG/1
TABLET, EXTENDED RELEASE ORAL
Qty: 90 TABLET | Refills: 4 | Status: SHIPPED | OUTPATIENT
Start: 2024-03-15

## 2024-03-15 NOTE — PROGRESS NOTES
2016  Normal Myoview 11/2021 and 5/2022  Continue medical therapy  Recheck ECHO with mild trop elevation with sepsis      Hyperlipidemia:  Well controlled   LDL=67  Continue  lipitor and zetia    HTN:  /78 (Site: Right Upper Arm, Position: Sitting, Cuff Size: Medium Adult)   Pulse 78   Ht 1.575 m (5' 2.01\")   Wt 74.4 kg (164 lb)   SpO2 97%   BMI 29.99 kg/m²   Well controlled. Continue metoprolol      PLAN:  F/u few weeks with ECHO    Thank you for allowing me to participate in the care of this individual.      Lionel Oliveira M.D., FACC

## 2024-03-16 DIAGNOSIS — E11.9 CONTROLLED TYPE 2 DIABETES MELLITUS WITHOUT COMPLICATION, WITHOUT LONG-TERM CURRENT USE OF INSULIN (HCC): ICD-10-CM

## 2024-03-18 RX ORDER — TIRZEPATIDE 12.5 MG/.5ML
12.5 INJECTION, SOLUTION SUBCUTANEOUS WEEKLY
Qty: 2 ML | Refills: 2 | Status: SHIPPED | OUTPATIENT
Start: 2024-03-18 | End: 2024-03-20 | Stop reason: SDUPTHER

## 2024-03-18 NOTE — TELEPHONE ENCOUNTER
1. Controlled type 2 diabetes mellitus without complication, without long-term current use of insulin (HCC)  -Continue tirzepatide (MOUNJARO) 12.5 MG/0.5ML SOPN SC injection; Inject 0.5 mLs into the skin once a week  Dispense: 2 mL; Refill: 2

## 2024-03-18 NOTE — TELEPHONE ENCOUNTER
Recent Visits  Date Type Provider Dept   01/22/24 Office Visit Tavia Dias, APRN - [facility]   10/11/23 Office Visit Tavia Dias, APRN - [facility]   10/04/23 Office Visit Tavia iDas, APRN - [facility]   08/04/23 Office Visit Tavia Dias, APRN - [facility]   07/19/23 Office Visit Tavia Dias, APRN - [facility]   04/19/23 Office Visit Tavia Dias, APRN - [facility]   01/11/23 Office Visit Tavia Dias, APRN - [facility]   11/14/22 Office Visit Tavia Dias, APRN - [facility]   10/24/22 Office Visit Tavia Dias, APRN - [facility]   Showing recent visits within past 540 days with a meds authorizing provider and meeting all other requirements  Future Appointments  Date Type Provider Dept   03/20/24 Appointment Tavia Dias, APRN - [facility]   04/22/24 Appointment Tavia Dias, APRN - [facility]   Showing future appointments within next 150 days with a meds authorizing provider and meeting all other requirements

## 2024-03-20 ENCOUNTER — OFFICE VISIT (OUTPATIENT)
Dept: PRIMARY CARE CLINIC | Age: 74
End: 2024-03-20

## 2024-03-20 VITALS
SYSTOLIC BLOOD PRESSURE: 120 MMHG | HEART RATE: 105 BPM | DIASTOLIC BLOOD PRESSURE: 66 MMHG | WEIGHT: 160 LBS | TEMPERATURE: 97.7 F | BODY MASS INDEX: 29.26 KG/M2 | OXYGEN SATURATION: 98 %

## 2024-03-20 DIAGNOSIS — F51.01 PRIMARY INSOMNIA: ICD-10-CM

## 2024-03-20 DIAGNOSIS — F41.9 ANXIETY: ICD-10-CM

## 2024-03-20 DIAGNOSIS — Z09 HOSPITAL DISCHARGE FOLLOW-UP: Primary | ICD-10-CM

## 2024-03-20 DIAGNOSIS — E11.9 CONTROLLED TYPE 2 DIABETES MELLITUS WITHOUT COMPLICATION, WITHOUT LONG-TERM CURRENT USE OF INSULIN (HCC): ICD-10-CM

## 2024-03-20 DIAGNOSIS — J84.9 INTERSTITIAL LUNG DISEASE (HCC): ICD-10-CM

## 2024-03-20 RX ORDER — ALPRAZOLAM 0.5 MG/1
0.5 TABLET ORAL 3 TIMES DAILY
Qty: 90 TABLET | Refills: 2 | Status: SHIPPED | OUTPATIENT
Start: 2024-03-20 | End: 2024-06-18

## 2024-03-20 RX ORDER — TIRZEPATIDE 12.5 MG/.5ML
12.5 INJECTION, SOLUTION SUBCUTANEOUS WEEKLY
Qty: 2 ML | Refills: 2 | Status: SHIPPED | OUTPATIENT
Start: 2024-03-20 | End: 2024-06-18

## 2024-03-20 RX ORDER — MIRTAZAPINE 30 MG/1
TABLET, FILM COATED ORAL
Qty: 90 TABLET | Refills: 0 | Status: SHIPPED | OUTPATIENT
Start: 2024-03-20

## 2024-03-20 ASSESSMENT — ENCOUNTER SYMPTOMS
CHEST TIGHTNESS: 0
GASTROINTESTINAL NEGATIVE: 1
WHEEZING: 0
SHORTNESS OF BREATH: 1

## 2024-03-20 ASSESSMENT — PATIENT HEALTH QUESTIONNAIRE - PHQ9
1. LITTLE INTEREST OR PLEASURE IN DOING THINGS: NOT AT ALL
SUM OF ALL RESPONSES TO PHQ QUESTIONS 1-9: 10
3. TROUBLE FALLING OR STAYING ASLEEP: NEARLY EVERY DAY
2. FEELING DOWN, DEPRESSED OR HOPELESS: MORE THAN HALF THE DAYS
8. MOVING OR SPEAKING SO SLOWLY THAT OTHER PEOPLE COULD HAVE NOTICED. OR THE OPPOSITE, BEING SO FIGETY OR RESTLESS THAT YOU HAVE BEEN MOVING AROUND A LOT MORE THAN USUAL: SEVERAL DAYS
SUM OF ALL RESPONSES TO PHQ QUESTIONS 1-9: 10
9. THOUGHTS THAT YOU WOULD BE BETTER OFF DEAD, OR OF HURTING YOURSELF: SEVERAL DAYS
6. FEELING BAD ABOUT YOURSELF - OR THAT YOU ARE A FAILURE OR HAVE LET YOURSELF OR YOUR FAMILY DOWN: SEVERAL DAYS
SUM OF ALL RESPONSES TO PHQ QUESTIONS 1-9: 9
SUM OF ALL RESPONSES TO PHQ QUESTIONS 1-9: 10
10. IF YOU CHECKED OFF ANY PROBLEMS, HOW DIFFICULT HAVE THESE PROBLEMS MADE IT FOR YOU TO DO YOUR WORK, TAKE CARE OF THINGS AT HOME, OR GET ALONG WITH OTHER PEOPLE: NOT DIFFICULT AT ALL
5. POOR APPETITE OR OVEREATING: SEVERAL DAYS
SUM OF ALL RESPONSES TO PHQ9 QUESTIONS 1 & 2: 2
4. FEELING TIRED OR HAVING LITTLE ENERGY: NOT AT ALL
7. TROUBLE CONCENTRATING ON THINGS, SUCH AS READING THE NEWSPAPER OR WATCHING TELEVISION: SEVERAL DAYS

## 2024-03-20 NOTE — PROGRESS NOTES
BEDTIME 90 tablet 0    ALPRAZolam (XANAX) 0.5 MG tablet Take 1 tablet by mouth 3 times daily for 90 days. Max Daily Amount: 1.5 mg 90 tablet 2    Tirzepatide (MOUNJARO) 12.5 MG/0.5ML SOPN SC injection Inject 0.5 mLs into the skin once a week 2 mL 2    furosemide (LASIX) 20 MG tablet Take 1 tablet by mouth daily 90 tablet 4    metoprolol succinate (TOPROL XL) 50 MG extended release tablet TAKE ONE TABLET BY MOUTH DAILY 90 tablet 4    empagliflozin (JARDIANCE) 10 MG tablet Take 1 tablet by mouth daily 90 tablet 0    ezetimibe (ZETIA) 10 MG tablet Take 1 tablet by mouth at bedtime 90 tablet 0    TEGRETOL 200 MG tablet TAKE ONE TABLET BY MOUTH TWICE A  tablet 1    levothyroxine (SYNTHROID) 100 MCG tablet TAKE 1 TABLET BY MOUTH DAILY 90 tablet 0    busPIRone (BUSPAR) 15 MG tablet TAKE ONE TABLET BY MOUTH THREE TIMES A  tablet 0    vilazodone HCl (VIIBRYD) 40 MG TABS TAKE ONE TABLET BY MOUTH DAILY WITH FOOD TAKE IN ADDTION TO THE 20MG FOR DAILY TOTAL OF 60MG 90 tablet 0    vilazodone HCl (VIIBRYD) 20 MG TABS TAKE 1 TABLET BY MOUTH DAILY WITH FOOD WITH 40MG TO EQUAL 60MG 90 tablet 0    atorvastatin (LIPITOR) 80 MG tablet TAKE ONE TABLET BY MOUTH DAILY 90 tablet 0    pantoprazole (PROTONIX) 40 MG tablet TAKE ONE TABLET BY MOUTH TWICE A DAY BEFORE A MEAL 180 tablet 1    blood glucose test strips (ONETOUCH VERIO) strip 1 each by In Vitro route daily Use to test once daily or as needed 100 each 3    Calcium 600-200 MG-UNIT TABS Take 2 tablets by mouth once daily 180 tablet 3    ascorbic acid (VITAMIN C) 500 MG tablet Take 1 tablet by mouth daily      Zinc 100 MG TABS Take 100 mg by mouth daily      Blood Glucose Monitoring Suppl (ONETOUCH VERIO) w/Device KIT Test once daily 1 kit 0    OXYGEN Inhale 6 L/day into the lungs      vitamin B-12 (CYANOCOBALAMIN) 500 MCG tablet Take 1 tablet by mouth daily      predniSONE (DELTASONE) 5 MG tablet Take 1 tablet by mouth daily      magnesium (MAGNESIUM-OXIDE) 250 MG TABS

## 2024-03-21 ENCOUNTER — TELEPHONE (OUTPATIENT)
Dept: ADMINISTRATIVE | Age: 74
End: 2024-03-21

## 2024-03-21 NOTE — TELEPHONE ENCOUNTER
Submitted PA for Mounjaro 12.5MG/0.5ML pen-injectors   Via CMM Key: [id number] STATUS: PENDING.    Follow up done daily; if no decision with in three days we will refax.  If another three days goes by with no decision will call the insurance for status.

## 2024-03-25 NOTE — TELEPHONE ENCOUNTER
The medication is PREVIOUSLY APPROVED. LETTER UPLOADED IN MEDIA.    If this requires a response please respond to the pool ( [facility] MEDICATION PRE-AUTH).      Thank you please advise patient.

## 2024-04-01 DIAGNOSIS — F51.01 PRIMARY INSOMNIA: ICD-10-CM

## 2024-04-01 DIAGNOSIS — E03.9 ACQUIRED HYPOTHYROIDISM: ICD-10-CM

## 2024-04-01 DIAGNOSIS — F41.9 ANXIETY: ICD-10-CM

## 2024-04-02 RX ORDER — MIRTAZAPINE 15 MG/1
TABLET, FILM COATED ORAL
Qty: 90 TABLET | Refills: 0 | Status: SHIPPED | OUTPATIENT
Start: 2024-04-02

## 2024-04-02 RX ORDER — BUSPIRONE HYDROCHLORIDE 15 MG/1
15 TABLET ORAL 3 TIMES DAILY
Qty: 270 TABLET | Refills: 0 | Status: SHIPPED | OUTPATIENT
Start: 2024-04-02

## 2024-04-02 RX ORDER — LEVOTHYROXINE SODIUM 0.1 MG/1
TABLET ORAL
Qty: 90 TABLET | Refills: 0 | Status: SHIPPED | OUTPATIENT
Start: 2024-04-02

## 2024-04-02 NOTE — TELEPHONE ENCOUNTER
Medication:   Requested Prescriptions     Pending Prescriptions Disp Refills    busPIRone (BUSPAR) 15 MG tablet [Pharmacy Med Name: busPIRone HCL 15 MG TABLET] 270 tablet 0     Sig: TAKE ONE TABLET BY MOUTH THREE TIMES A DAY    levothyroxine (SYNTHROID) 100 MCG tablet [Pharmacy Med Name: LEVOTHYROXINE 100 MCG TABLET] 90 tablet 0     Sig: TAKE 1 TABLET BY MOUTH DAILY    mirtazapine (REMERON) 15 MG tablet [Pharmacy Med Name: MIRTAZAPINE 15 MG TABLET] 90 tablet 0     Sig: TAKE ONE-HALF TO ONE TABLET BY MOUTH 30 TO 60 MINUTES BEFORE BEDTIME      Last Filled:  1/4/24 & 3/20/24    Patient Phone Number: [phone] (home)     Last appt: 3/20/2024   Next appt: 4/22/2024    Last OARRS:        No data to display              PDMP Monitoring:    Last PDMP Lionel as Reviewed ([place]):  Review User Review Instant Review Result   TAVIA DIAS 2/7/2024  2:07 PM Reviewed PDMP [1]     Preferred Pharmacy:   [facility] [id number] [place], [place] - [address] [facility] [phone] -  [phone]  [address] [facility] [address]  Phone: [phone] Fax: [phone]    Recent Visits  Date Type Provider Dept   03/20/24 Office Visit Tavia Dias APRN - CNP [facility]    01/22/24 Office Visit Tavia Dias APRN - [facility]    10/11/23 Office Visit Tavia Dias APRN - [facility]    10/04/23 Office Visit Tavia Dias APRN - [facility]    08/04/23 Office Visit Tavia Dias APRN - [facility]   07/19/23 Office Visit Tavia Dias APRN - [facility]   04/19/23 Office Visit Tavia Dias APRN - [facility]   01/11/23 Office Visit Tavia Dias APRN - [facility]   11/14/22 Office Visit Budrus, Squires, APRN - [facility]NP [facility]   10/24/22 Office Visit Tavia Dias APRN - [facility]   Showing recent visits within past 540 days with a meds authorizing provider and meeting all other requirements  Future Appointments  Date Type Provider Dept   04/22/24 Appointment Tavia Dias APRN - CNP [facility]

## 2024-04-03 ENCOUNTER — TELEPHONE (OUTPATIENT)
Dept: PRIMARY CARE CLINIC | Age: 74
End: 2024-04-03

## 2024-04-03 NOTE — TELEPHONE ENCOUNTER
Patient's  called. Patient is on Tirzepatide (MOUNJARO) 12.5 MG/0.5ML SOPN SC injection but the pharmacy has been out of the 12.5 MG dose. The [facility] pharmacy on [address] In [place] does have the Mounjaro 10.0 MG and they are holding it for the patient. The patient's  asked if a prescription for the 10.0 MG dose could be sent to the [facility] so they don't lose the medication that the pharmacy is holding for them.

## 2024-04-12 ENCOUNTER — PROCEDURE VISIT (OUTPATIENT)
Dept: CARDIOLOGY CLINIC | Age: 74
End: 2024-04-12

## 2024-04-12 ENCOUNTER — OFFICE VISIT (OUTPATIENT)
Dept: CARDIOLOGY CLINIC | Age: 74
End: 2024-04-12
Payer: MEDICARE

## 2024-04-12 VITALS
HEART RATE: 56 BPM | WEIGHT: 157 LBS | DIASTOLIC BLOOD PRESSURE: 60 MMHG | SYSTOLIC BLOOD PRESSURE: 92 MMHG | OXYGEN SATURATION: 98 % | HEIGHT: 60 IN | BODY MASS INDEX: 30.82 KG/M2

## 2024-04-12 DIAGNOSIS — R07.89 CHEST TIGHTNESS: ICD-10-CM

## 2024-04-12 DIAGNOSIS — R06.02 SOB (SHORTNESS OF BREATH): Primary | ICD-10-CM

## 2024-04-12 DIAGNOSIS — R06.02 SOB (SHORTNESS OF BREATH): ICD-10-CM

## 2024-04-12 DIAGNOSIS — I25.10 ATHEROSCLEROSIS OF NATIVE CORONARY ARTERY OF NATIVE HEART WITHOUT ANGINA PECTORIS: ICD-10-CM

## 2024-04-12 DIAGNOSIS — I10 ESSENTIAL HYPERTENSION, BENIGN: ICD-10-CM

## 2024-04-12 DIAGNOSIS — E78.2 MIXED HYPERLIPIDEMIA: ICD-10-CM

## 2024-04-12 PROCEDURE — 3017F COLORECTAL CA SCREEN DOC REV: CPT | Performed by: INTERNAL MEDICINE

## 2024-04-12 PROCEDURE — G8417 CALC BMI ABV UP PARAM F/U: HCPCS | Performed by: INTERNAL MEDICINE

## 2024-04-12 PROCEDURE — G8399 PT W/DXA RESULTS DOCUMENT: HCPCS | Performed by: INTERNAL MEDICINE

## 2024-04-12 PROCEDURE — 99214 OFFICE O/P EST MOD 30 MIN: CPT | Performed by: INTERNAL MEDICINE

## 2024-04-12 PROCEDURE — 1123F ACP DISCUSS/DSCN MKR DOCD: CPT | Performed by: INTERNAL MEDICINE

## 2024-04-12 PROCEDURE — 1090F PRES/ABSN URINE INCON ASSESS: CPT | Performed by: INTERNAL MEDICINE

## 2024-04-12 PROCEDURE — 3074F SYST BP LT 130 MM HG: CPT | Performed by: INTERNAL MEDICINE

## 2024-04-12 PROCEDURE — 3078F DIAST BP <80 MM HG: CPT | Performed by: INTERNAL MEDICINE

## 2024-04-12 PROCEDURE — 1036F TOBACCO NON-USER: CPT | Performed by: INTERNAL MEDICINE

## 2024-04-12 PROCEDURE — G8427 DOCREV CUR MEDS BY ELIG CLIN: HCPCS | Performed by: INTERNAL MEDICINE

## 2024-04-12 NOTE — PROGRESS NOTES
release tablet TAKE ONE TABLET BY MOUTH DAILY Yes Lionel Oliveira MD   empagliflozin (JARDIANCE) 10 MG tablet Take 1 tablet by mouth daily Yes Tavia Dias APRN - CNP   ezetimibe (ZETIA) 10 MG tablet Take 1 tablet by mouth at bedtime Yes Tavia Dias APRN - CNP   TEGRETOL 200 MG tablet TAKE ONE TABLET BY MOUTH TWICE A DAY Yes Tavia Dias APRN - CNP   vilazodone HCl (VIIBRYD) 40 MG TABS TAKE ONE TABLET BY MOUTH DAILY WITH FOOD TAKE IN ADDTION TO THE 20MG FOR DAILY TOTAL OF 60MG Yes Tavia Dias APRN - CNP   vilazodone HCl (VIIBRYD) 20 MG TABS TAKE 1 TABLET BY MOUTH DAILY WITH FOOD WITH 40MG TO EQUAL 60MG Yes Tavia Dias APRN - CNP   atorvastatin (LIPITOR) 80 MG tablet TAKE ONE TABLET BY MOUTH DAILY Yes Tavia Dias APRN - CNP   pantoprazole (PROTONIX) 40 MG tablet TAKE ONE TABLET BY MOUTH TWICE A DAY BEFORE A MEAL Yes Tavia Dias APRN - CNP   blood glucose test strips (ONETOUCH VERIO) strip 1 each by In Vitro route daily Use to test once daily or as needed Yes Tavia Dias APRN - CNP   Calcium 600-200 MG-UNIT TABS Take 2 tablets by mouth once daily Yes Tavia Dias APRN - CNP   ascorbic acid (VITAMIN C) 500 MG tablet Take 1 tablet by mouth daily Yes Cuca Brandt MD   Zinc 100 MG TABS Take 100 mg by mouth daily Yes Cuca Brandt MD   Blood Glucose Monitoring Suppl (ONETOUCH VERIO) w/Device KIT Test once daily Yes Tavia Dias APRN - CNP   OXYGEN Inhale 6 L/day into the lungs Yes Tavia Dias APRN - CNP   vitamin B-12 (CYANOCOBALAMIN) 500 MCG tablet Take 1 tablet by mouth daily Yes Cuca Brandt MD   predniSONE (DELTASONE) 5 MG tablet Take 1 tablet by mouth daily Yes Cuca Brandt MD   magnesium (MAGNESIUM-OXIDE) 250 MG TABS tablet Take 1 tablet by mouth daily Yes Cuca Brandt MD   aspirin 81 MG tablet Take 1 tablet by mouth daily Yes Cuca Brandt MD       [x] Medications and dosages reviewed.    ROS:  [x]Full ROS obtained and negative except

## 2024-04-22 ENCOUNTER — OFFICE VISIT (OUTPATIENT)
Dept: PRIMARY CARE CLINIC | Age: 74
End: 2024-04-22
Payer: MEDICARE

## 2024-04-22 VITALS
HEART RATE: 82 BPM | SYSTOLIC BLOOD PRESSURE: 116 MMHG | HEIGHT: 62 IN | BODY MASS INDEX: 29.44 KG/M2 | OXYGEN SATURATION: 99 % | DIASTOLIC BLOOD PRESSURE: 72 MMHG | TEMPERATURE: 97.2 F | WEIGHT: 160 LBS

## 2024-04-22 DIAGNOSIS — N18.30 STAGE 3 CHRONIC KIDNEY DISEASE, UNSPECIFIED WHETHER STAGE 3A OR 3B CKD (HCC): ICD-10-CM

## 2024-04-22 DIAGNOSIS — E03.9 ACQUIRED HYPOTHYROIDISM: ICD-10-CM

## 2024-04-22 DIAGNOSIS — J84.9 INTERSTITIAL LUNG DISEASE (HCC): ICD-10-CM

## 2024-04-22 DIAGNOSIS — I10 ESSENTIAL HYPERTENSION, BENIGN: ICD-10-CM

## 2024-04-22 DIAGNOSIS — E78.2 MIXED HYPERLIPIDEMIA: ICD-10-CM

## 2024-04-22 DIAGNOSIS — E11.65 UNCONTROLLED TYPE 2 DIABETES MELLITUS WITH HYPERGLYCEMIA (HCC): Primary | ICD-10-CM

## 2024-04-22 DIAGNOSIS — E11.65 UNCONTROLLED TYPE 2 DIABETES MELLITUS WITH HYPERGLYCEMIA (HCC): ICD-10-CM

## 2024-04-22 DIAGNOSIS — F41.9 ANXIETY: ICD-10-CM

## 2024-04-22 DIAGNOSIS — K21.9 GASTROESOPHAGEAL REFLUX DISEASE WITHOUT ESOPHAGITIS: ICD-10-CM

## 2024-04-22 DIAGNOSIS — F51.01 PRIMARY INSOMNIA: ICD-10-CM

## 2024-04-22 DIAGNOSIS — E66.3 OVERWEIGHT WITH BODY MASS INDEX (BMI) OF 29 TO 29.9 IN ADULT: ICD-10-CM

## 2024-04-22 DIAGNOSIS — F33.1 MODERATE EPISODE OF RECURRENT MAJOR DEPRESSIVE DISORDER (HCC): ICD-10-CM

## 2024-04-22 DIAGNOSIS — G40.909 SEIZURE DISORDER (HCC): ICD-10-CM

## 2024-04-22 PROBLEM — E66.09 CLASS 1 OBESITY DUE TO EXCESS CALORIES WITH SERIOUS COMORBIDITY AND BODY MASS INDEX (BMI) OF 31.0 TO 31.9 IN ADULT: Status: RESOLVED | Noted: 2021-06-17 | Resolved: 2024-04-22

## 2024-04-22 PROBLEM — E66.811 CLASS 1 OBESITY DUE TO EXCESS CALORIES WITH SERIOUS COMORBIDITY AND BODY MASS INDEX (BMI) OF 31.0 TO 31.9 IN ADULT: Status: RESOLVED | Noted: 2021-06-17 | Resolved: 2024-04-22

## 2024-04-22 LAB
ALBUMIN SERPL-MCNC: 4.4 G/DL (ref 3.4–5)
ALBUMIN/GLOB SERPL: 1.4 {RATIO} (ref 1.1–2.2)
ALP SERPL-CCNC: 87 U/L (ref 40–129)
ALT SERPL-CCNC: 13 U/L (ref 10–40)
ANION GAP SERPL CALCULATED.3IONS-SCNC: 12 MMOL/L (ref 3–16)
AST SERPL-CCNC: 21 U/L (ref 15–37)
BILIRUB SERPL-MCNC: <0.2 MG/DL (ref 0–1)
BUN SERPL-MCNC: 16 MG/DL (ref 7–20)
CALCIUM SERPL-MCNC: 10.3 MG/DL (ref 8.3–10.6)
CHLORIDE SERPL-SCNC: 97 MMOL/L (ref 99–110)
CHOLEST SERPL-MCNC: 180 MG/DL (ref 0–199)
CO2 SERPL-SCNC: 33 MMOL/L (ref 21–32)
CREAT SERPL-MCNC: 0.8 MG/DL (ref 0.6–1.2)
CREAT UR-MCNC: 136.8 MG/DL (ref 28–259)
GFR SERPLBLD CREATININE-BSD FMLA CKD-EPI: 78 ML/MIN/{1.73_M2}
GLUCOSE P FAST SERPL-MCNC: 120 MG/DL (ref 70–99)
HBA1C MFR BLD: 7.4 %
HDLC SERPL-MCNC: 77 MG/DL (ref 40–60)
LDL CHOLESTEROL CALCULATED: 68 MG/DL
MICROALBUMIN UR DL<=1MG/L-MCNC: <1.2 MG/DL
MICROALBUMIN/CREAT UR: NORMAL MG/G (ref 0–30)
POTASSIUM SERPL-SCNC: 5.3 MMOL/L (ref 3.5–5.1)
PROT SERPL-MCNC: 7.5 G/DL (ref 6.4–8.2)
SODIUM SERPL-SCNC: 142 MMOL/L (ref 136–145)
T4 FREE SERPL-MCNC: 1.4 NG/DL (ref 0.9–1.8)
TRIGL SERPL-MCNC: 176 MG/DL (ref 0–150)
TSH SERPL DL<=0.005 MIU/L-ACNC: 0.6 UIU/ML (ref 0.27–4.2)
VLDLC SERPL CALC-MCNC: 35 MG/DL

## 2024-04-22 PROCEDURE — 3078F DIAST BP <80 MM HG: CPT | Performed by: NURSE PRACTITIONER

## 2024-04-22 PROCEDURE — 3074F SYST BP LT 130 MM HG: CPT | Performed by: NURSE PRACTITIONER

## 2024-04-22 PROCEDURE — 1123F ACP DISCUSS/DSCN MKR DOCD: CPT | Performed by: NURSE PRACTITIONER

## 2024-04-22 PROCEDURE — G8399 PT W/DXA RESULTS DOCUMENT: HCPCS | Performed by: NURSE PRACTITIONER

## 2024-04-22 PROCEDURE — 83036 HEMOGLOBIN GLYCOSYLATED A1C: CPT | Performed by: NURSE PRACTITIONER

## 2024-04-22 PROCEDURE — 3051F HG A1C>EQUAL 7.0%<8.0%: CPT | Performed by: NURSE PRACTITIONER

## 2024-04-22 PROCEDURE — 3017F COLORECTAL CA SCREEN DOC REV: CPT | Performed by: NURSE PRACTITIONER

## 2024-04-22 PROCEDURE — 1090F PRES/ABSN URINE INCON ASSESS: CPT | Performed by: NURSE PRACTITIONER

## 2024-04-22 PROCEDURE — G8427 DOCREV CUR MEDS BY ELIG CLIN: HCPCS | Performed by: NURSE PRACTITIONER

## 2024-04-22 PROCEDURE — G8417 CALC BMI ABV UP PARAM F/U: HCPCS | Performed by: NURSE PRACTITIONER

## 2024-04-22 PROCEDURE — 99214 OFFICE O/P EST MOD 30 MIN: CPT | Performed by: NURSE PRACTITIONER

## 2024-04-22 PROCEDURE — 2022F DILAT RTA XM EVC RTNOPTHY: CPT | Performed by: NURSE PRACTITIONER

## 2024-04-22 PROCEDURE — 1036F TOBACCO NON-USER: CPT | Performed by: NURSE PRACTITIONER

## 2024-04-22 RX ORDER — VILAZODONE HYDROCHLORIDE 40 MG/1
TABLET ORAL
Qty: 90 TABLET | Refills: 0 | Status: SHIPPED | OUTPATIENT
Start: 2024-04-22

## 2024-04-22 RX ORDER — EZETIMIBE 10 MG/1
10 TABLET ORAL NIGHTLY
Qty: 90 TABLET | Refills: 0 | Status: SHIPPED | OUTPATIENT
Start: 2024-04-22 | End: 2024-07-21

## 2024-04-22 RX ORDER — VILAZODONE HYDROCHLORIDE 20 MG/1
TABLET ORAL
Qty: 90 TABLET | Refills: 0 | Status: SHIPPED | OUTPATIENT
Start: 2024-04-22

## 2024-04-22 RX ORDER — TIRZEPATIDE 12.5 MG/.5ML
12.5 INJECTION, SOLUTION SUBCUTANEOUS WEEKLY
Qty: 4 ADJUSTABLE DOSE PRE-FILLED PEN SYRINGE | Refills: 2 | Status: SHIPPED | OUTPATIENT
Start: 2024-04-22 | End: 2024-07-21

## 2024-04-22 RX ORDER — CARBAMAZEPINE 200 MG
TABLET ORAL
Qty: 180 TABLET | Refills: 1 | Status: SHIPPED | OUTPATIENT
Start: 2024-04-22

## 2024-04-22 RX ORDER — MIRTAZAPINE 15 MG/1
TABLET, FILM COATED ORAL
Qty: 90 TABLET | Refills: 0 | Status: SHIPPED | OUTPATIENT
Start: 2024-04-22

## 2024-04-22 RX ORDER — BUSPIRONE HYDROCHLORIDE 15 MG/1
15 TABLET ORAL 3 TIMES DAILY
Qty: 270 TABLET | Refills: 0 | Status: SHIPPED | OUTPATIENT
Start: 2024-04-22

## 2024-04-22 RX ORDER — METOPROLOL SUCCINATE 50 MG/1
TABLET, EXTENDED RELEASE ORAL
Qty: 90 TABLET | Refills: 4 | Status: SHIPPED | OUTPATIENT
Start: 2024-04-22

## 2024-04-22 RX ORDER — ATORVASTATIN CALCIUM 80 MG/1
80 TABLET, FILM COATED ORAL DAILY
Qty: 90 TABLET | Refills: 0 | Status: SHIPPED | OUTPATIENT
Start: 2024-04-22

## 2024-04-22 RX ORDER — ALPRAZOLAM 0.5 MG/1
0.5 TABLET ORAL 3 TIMES DAILY
Qty: 90 TABLET | Refills: 2 | Status: SHIPPED | OUTPATIENT
Start: 2024-04-22 | End: 2024-07-21

## 2024-04-22 RX ORDER — PANTOPRAZOLE SODIUM 40 MG/1
TABLET, DELAYED RELEASE ORAL
Qty: 180 TABLET | Refills: 1 | Status: SHIPPED | OUTPATIENT
Start: 2024-04-22

## 2024-04-22 RX ORDER — LEVOTHYROXINE SODIUM 0.1 MG/1
100 TABLET ORAL DAILY
Qty: 90 TABLET | Refills: 0 | Status: SHIPPED | OUTPATIENT
Start: 2024-04-22

## 2024-04-22 SDOH — ECONOMIC STABILITY: FOOD INSECURITY: WITHIN THE PAST 12 MONTHS, YOU WORRIED THAT YOUR FOOD WOULD RUN OUT BEFORE YOU GOT MONEY TO BUY MORE.: NEVER TRUE

## 2024-04-22 SDOH — ECONOMIC STABILITY: FOOD INSECURITY: WITHIN THE PAST 12 MONTHS, THE FOOD YOU BOUGHT JUST DIDN'T LAST AND YOU DIDN'T HAVE MONEY TO GET MORE.: NEVER TRUE

## 2024-04-22 SDOH — ECONOMIC STABILITY: INCOME INSECURITY: HOW HARD IS IT FOR YOU TO PAY FOR THE VERY BASICS LIKE FOOD, HOUSING, MEDICAL CARE, AND HEATING?: NOT VERY HARD

## 2024-04-22 ASSESSMENT — PATIENT HEALTH QUESTIONNAIRE - PHQ9
3. TROUBLE FALLING OR STAYING ASLEEP: NOT AT ALL
SUM OF ALL RESPONSES TO PHQ QUESTIONS 1-9: 1
SUM OF ALL RESPONSES TO PHQ QUESTIONS 1-9: 1
5. POOR APPETITE OR OVEREATING: NOT AT ALL
10. IF YOU CHECKED OFF ANY PROBLEMS, HOW DIFFICULT HAVE THESE PROBLEMS MADE IT FOR YOU TO DO YOUR WORK, TAKE CARE OF THINGS AT HOME, OR GET ALONG WITH OTHER PEOPLE: NOT DIFFICULT AT ALL
SUM OF ALL RESPONSES TO PHQ9 QUESTIONS 1 & 2: 0
1. LITTLE INTEREST OR PLEASURE IN DOING THINGS: NOT AT ALL
4. FEELING TIRED OR HAVING LITTLE ENERGY: NOT AT ALL
9. THOUGHTS THAT YOU WOULD BE BETTER OFF DEAD, OR OF HURTING YOURSELF: NOT AT ALL
8. MOVING OR SPEAKING SO SLOWLY THAT OTHER PEOPLE COULD HAVE NOTICED. OR THE OPPOSITE, BEING SO FIGETY OR RESTLESS THAT YOU HAVE BEEN MOVING AROUND A LOT MORE THAN USUAL: NOT AT ALL
SUM OF ALL RESPONSES TO PHQ QUESTIONS 1-9: 1
2. FEELING DOWN, DEPRESSED OR HOPELESS: NOT AT ALL
SUM OF ALL RESPONSES TO PHQ QUESTIONS 1-9: 1
6. FEELING BAD ABOUT YOURSELF - OR THAT YOU ARE A FAILURE OR HAVE LET YOURSELF OR YOUR FAMILY DOWN: NOT AT ALL
7. TROUBLE CONCENTRATING ON THINGS, SUCH AS READING THE NEWSPAPER OR WATCHING TELEVISION: SEVERAL DAYS

## 2024-04-22 ASSESSMENT — ENCOUNTER SYMPTOMS
WATER BRASH: 0
NAUSEA: 0
ABDOMINAL PAIN: 0
SHORTNESS OF BREATH: 1
COUGH: 0
BELCHING: 0
VISUAL CHANGE: 0
GLOBUS SENSATION: 0
HEARTBURN: 0
BLURRED VISION: 0
ORTHOPNEA: 0
WHEEZING: 0

## 2024-04-22 NOTE — PROGRESS NOTES
4/22/2024    Chief Complaint   Patient presents with    3 Month Follow-Up     HTN, DM, GERD, depression, GERD, hypothyroidism, depression,/anxiety, insomnia, seizures-     Cyndi Mckeon is a 74 y.o. female, presents today for 3 month follow up of hypertension, hyperlipidemia, diabetes, GERD, hypothyroidism, depression, anxiety, insomnia, seizure disorder.    Deepak is accompanied by her  today.      HPI         Hypertension  The problem is controlled. Associated symptoms include shortness of breath (chronic due to interstital lung disease). Pertinent negatives include no blurred vision, chest pain, headaches, malaise/fatigue, neck pain, orthopnea, palpitations, peripheral edema or PND. There are no associated agents to hypertension. Risk factors for coronary artery disease include diabetes mellitus, dyslipidemia, sedentary lifestyle, obesity and post-menopausal state. Treatments tried: metoprolol. The current treatment provides significant improvement. Compliance problems include exercise (Intolerant to exercise due to chronic shortness of breath).    Hyperlipidemia  The problem is controlled (LDL <70 (67). Recent lipid tests were reviewed and are normal (Last lipids in March 2023--> , , HDL 77, LDL 67). Exacerbating diseases include diabetes, hypothyroidism and obesity. There are no known factors aggravating her hyperlipidemia. Associated symptoms include shortness of breath (chronic due to interstital lung disease). Pertinent negatives include no chest pain, focal sensory loss, focal weakness, leg pain or myalgias. Current antihyperlipidemic treatment includes statins (lipitor). The current treatment provides significant improvement of lipids. Compliance problems include adherence to exercise.  Risk factors for coronary artery disease include diabetes mellitus, dyslipidemia, hypertension, a sedentary lifestyle, post-menopausal and obesity.   Gastroesophageal Reflux  She reports no

## 2024-04-22 NOTE — PATIENT INSTRUCTIONS
Anxiety and Depression  - Practice self care and daily fresh air for 20 minutes.  - Regular exercise program (work up to 150 minutes per week)  - Practice self relaxation with music or meditation etc. Phone apps such as [email or web address]: Mediatation and Relaxation, or Calm.    - The crisis number for [facility] is [phone].  You can use this number at any time to access emergency mental health services.    - Text HOME to [phone] from anywhere in the [place], anytime, about any type of crisis.  Crisis Text Line serves anyone, in any type of crisis, providing access to free, 24/7.  The first two responses are automated. They tell you that you're being connected with a Crisis Counselor, and invite you to share a bit more.  The Crisis Counselor is a trained volunteer, not a professional.  It usually takes less than five minutes to connect you with a Crisis Counselor. The goal of any conversation is to get you to a calm, safe place.

## 2024-04-25 ENCOUNTER — TELEPHONE (OUTPATIENT)
Dept: PRIMARY CARE CLINIC | Age: 74
End: 2024-04-25

## 2024-04-25 NOTE — TELEPHONE ENCOUNTER
Patient's  dropped off a letter from the patient's insurance company stating that a prior authorization will be needed for the Mounjaro starting May 1st. Will put letter on Jamarcus's desk.

## 2024-04-26 ENCOUNTER — TELEPHONE (OUTPATIENT)
Dept: PRIMARY CARE CLINIC | Age: 74
End: 2024-04-26

## 2024-05-29 DIAGNOSIS — E11.65 UNCONTROLLED TYPE 2 DIABETES MELLITUS WITH HYPERGLYCEMIA (HCC): ICD-10-CM

## 2024-05-29 NOTE — TELEPHONE ENCOUNTER
Recent Visits  Date Type Provider Dept   04/22/24 Office Visit Tavia Dias, APRN - [facility]NP [facility]   03/20/24 Office Visit Tavia Dias, APRN - [facility]   01/22/24 Office Visit Tavia Dias, APRN - [facility]   10/11/23 Office Visit Tavia Dias, APRN - [facility]   10/04/23 Office Visit Tavia Dias, APRN - [facility]   08/04/23 Office Visit Tavia Dias, APRN - [facility]   07/19/23 Office Visit Tavia Dias, APRN - [facility]   04/19/23 Office Visit Tavia Dias, APRN - [facility]   01/11/23 Office Visit Tavia Dias, APRN - [facility]   Showing recent visits within past 540 days with a meds authorizing provider and meeting all other requirements  Future Appointments  Date Type Provider Dept   07/24/24 Appointment Tavia Dias, APRN - [facility]   Showing future appointments within next 150 days with a meds authorizing provider and meeting all other requirements

## 2024-05-30 RX ORDER — BLOOD SUGAR DIAGNOSTIC
STRIP MISCELLANEOUS
Qty: 100 STRIP | Refills: 11 | Status: SHIPPED | OUTPATIENT
Start: 2024-05-30

## 2024-06-16 DIAGNOSIS — F51.01 PRIMARY INSOMNIA: Primary | ICD-10-CM

## 2024-06-17 RX ORDER — MIRTAZAPINE 30 MG/1
TABLET, FILM COATED ORAL
Qty: 90 TABLET | Refills: 0 | OUTPATIENT
Start: 2024-06-17

## 2024-06-17 RX ORDER — MIRTAZAPINE 15 MG/1
TABLET, FILM COATED ORAL
Qty: 90 TABLET | Refills: 0 | Status: SHIPPED | OUTPATIENT
Start: 2024-06-17

## 2024-07-10 ENCOUNTER — COMMUNITY OUTREACH (OUTPATIENT)
Dept: PRIMARY CARE CLINIC | Age: 74
End: 2024-07-10

## 2024-07-11 NOTE — PROGRESS NOTES
Patient's HM shows they are overdue for Colorectal Screening.   Care Everywhere and  files searched.   updated with 2021 sigmoidoscopy.

## 2024-07-19 DIAGNOSIS — E78.2 MIXED HYPERLIPIDEMIA: ICD-10-CM

## 2024-07-20 DIAGNOSIS — F33.1 MODERATE EPISODE OF RECURRENT MAJOR DEPRESSIVE DISORDER (HCC): ICD-10-CM

## 2024-07-22 RX ORDER — ATORVASTATIN CALCIUM 80 MG/1
80 TABLET, FILM COATED ORAL DAILY
Qty: 90 TABLET | Refills: 0 | Status: SHIPPED | OUTPATIENT
Start: 2024-07-22 | End: 2024-07-24 | Stop reason: SDUPTHER

## 2024-07-22 RX ORDER — VILAZODONE HYDROCHLORIDE 40 MG/1
TABLET ORAL
Qty: 90 TABLET | Refills: 0 | Status: SHIPPED | OUTPATIENT
Start: 2024-07-22 | End: 2024-07-24 | Stop reason: SDUPTHER

## 2024-07-22 RX ORDER — EZETIMIBE 10 MG/1
10 TABLET ORAL NIGHTLY
Qty: 90 TABLET | Refills: 0 | Status: SHIPPED | OUTPATIENT
Start: 2024-07-22 | End: 2024-07-24 | Stop reason: SDUPTHER

## 2024-07-24 ENCOUNTER — OFFICE VISIT (OUTPATIENT)
Dept: PRIMARY CARE CLINIC | Age: 74
End: 2024-07-24
Payer: MEDICARE

## 2024-07-24 VITALS
TEMPERATURE: 97.7 F | BODY MASS INDEX: 27.9 KG/M2 | SYSTOLIC BLOOD PRESSURE: 126 MMHG | HEIGHT: 62 IN | HEART RATE: 88 BPM | OXYGEN SATURATION: 98 % | DIASTOLIC BLOOD PRESSURE: 72 MMHG | WEIGHT: 151.6 LBS

## 2024-07-24 DIAGNOSIS — Z12.31 BREAST CANCER SCREENING BY MAMMOGRAM: ICD-10-CM

## 2024-07-24 DIAGNOSIS — F33.1 MODERATE EPISODE OF RECURRENT MAJOR DEPRESSIVE DISORDER (HCC): ICD-10-CM

## 2024-07-24 DIAGNOSIS — F41.9 ANXIETY: ICD-10-CM

## 2024-07-24 DIAGNOSIS — E11.65 UNCONTROLLED TYPE 2 DIABETES MELLITUS WITH HYPERGLYCEMIA (HCC): Primary | ICD-10-CM

## 2024-07-24 DIAGNOSIS — R22.1 NODULE OF NECK: ICD-10-CM

## 2024-07-24 DIAGNOSIS — G40.909 SEIZURE DISORDER (HCC): ICD-10-CM

## 2024-07-24 DIAGNOSIS — J84.9 INTERSTITIAL LUNG DISEASE (HCC): ICD-10-CM

## 2024-07-24 DIAGNOSIS — E03.9 ACQUIRED HYPOTHYROIDISM: ICD-10-CM

## 2024-07-24 DIAGNOSIS — E66.3 OVERWEIGHT WITH BODY MASS INDEX (BMI) OF 27 TO 27.9 IN ADULT: ICD-10-CM

## 2024-07-24 DIAGNOSIS — I10 ESSENTIAL HYPERTENSION, BENIGN: ICD-10-CM

## 2024-07-24 DIAGNOSIS — F51.01 PRIMARY INSOMNIA: ICD-10-CM

## 2024-07-24 DIAGNOSIS — K21.9 GASTROESOPHAGEAL REFLUX DISEASE WITHOUT ESOPHAGITIS: ICD-10-CM

## 2024-07-24 DIAGNOSIS — E78.2 MIXED HYPERLIPIDEMIA: ICD-10-CM

## 2024-07-24 PROBLEM — R19.7 DIARRHEA OF PRESUMED INFECTIOUS ORIGIN: Status: RESOLVED | Noted: 2021-08-05 | Resolved: 2024-07-24

## 2024-07-24 PROBLEM — R07.9 CHEST PAIN: Status: RESOLVED | Noted: 2020-09-28 | Resolved: 2024-07-24

## 2024-07-24 PROBLEM — N18.30 STAGE 3 CHRONIC KIDNEY DISEASE (HCC): Status: RESOLVED | Noted: 2021-05-12 | Resolved: 2024-07-24

## 2024-07-24 PROBLEM — Z90.710 S/P HYSTERECTOMY WITH OOPHORECTOMY: Status: RESOLVED | Noted: 2018-11-16 | Resolved: 2024-07-24

## 2024-07-24 PROBLEM — J06.9 ACUTE URI: Status: RESOLVED | Noted: 2021-08-05 | Resolved: 2024-07-24

## 2024-07-24 PROBLEM — Z91.81 AT HIGH RISK FOR FALLS: Status: RESOLVED | Noted: 2021-11-27 | Resolved: 2024-07-24

## 2024-07-24 PROBLEM — Z90.721 S/P HYSTERECTOMY WITH OOPHORECTOMY: Status: RESOLVED | Noted: 2018-11-16 | Resolved: 2024-07-24

## 2024-07-24 PROBLEM — K22.4 ESOPHAGEAL SPASM: Status: RESOLVED | Noted: 2022-10-24 | Resolved: 2024-07-24

## 2024-07-24 PROCEDURE — 1090F PRES/ABSN URINE INCON ASSESS: CPT | Performed by: NURSE PRACTITIONER

## 2024-07-24 PROCEDURE — 3074F SYST BP LT 130 MM HG: CPT | Performed by: NURSE PRACTITIONER

## 2024-07-24 PROCEDURE — 3017F COLORECTAL CA SCREEN DOC REV: CPT | Performed by: NURSE PRACTITIONER

## 2024-07-24 PROCEDURE — 2022F DILAT RTA XM EVC RTNOPTHY: CPT | Performed by: NURSE PRACTITIONER

## 2024-07-24 PROCEDURE — 1036F TOBACCO NON-USER: CPT | Performed by: NURSE PRACTITIONER

## 2024-07-24 PROCEDURE — 3078F DIAST BP <80 MM HG: CPT | Performed by: NURSE PRACTITIONER

## 2024-07-24 PROCEDURE — 99214 OFFICE O/P EST MOD 30 MIN: CPT | Performed by: NURSE PRACTITIONER

## 2024-07-24 PROCEDURE — G8399 PT W/DXA RESULTS DOCUMENT: HCPCS | Performed by: NURSE PRACTITIONER

## 2024-07-24 PROCEDURE — G8427 DOCREV CUR MEDS BY ELIG CLIN: HCPCS | Performed by: NURSE PRACTITIONER

## 2024-07-24 PROCEDURE — 3051F HG A1C>EQUAL 7.0%<8.0%: CPT | Performed by: NURSE PRACTITIONER

## 2024-07-24 PROCEDURE — G8417 CALC BMI ABV UP PARAM F/U: HCPCS | Performed by: NURSE PRACTITIONER

## 2024-07-24 PROCEDURE — 1123F ACP DISCUSS/DSCN MKR DOCD: CPT | Performed by: NURSE PRACTITIONER

## 2024-07-24 RX ORDER — EZETIMIBE 10 MG/1
10 TABLET ORAL NIGHTLY
Qty: 90 TABLET | Refills: 0 | Status: SHIPPED | OUTPATIENT
Start: 2024-07-24 | End: 2024-10-22

## 2024-07-24 RX ORDER — BUSPIRONE HYDROCHLORIDE 15 MG/1
15 TABLET ORAL 3 TIMES DAILY
Qty: 270 TABLET | Refills: 0 | Status: SHIPPED | OUTPATIENT
Start: 2024-07-24

## 2024-07-24 RX ORDER — VILAZODONE HYDROCHLORIDE 20 MG/1
TABLET ORAL
Qty: 90 TABLET | Refills: 0 | Status: SHIPPED | OUTPATIENT
Start: 2024-07-24

## 2024-07-24 RX ORDER — MIRTAZAPINE 15 MG/1
TABLET, FILM COATED ORAL
Qty: 90 TABLET | Refills: 0 | Status: SHIPPED | OUTPATIENT
Start: 2024-07-24

## 2024-07-24 RX ORDER — PANTOPRAZOLE SODIUM 40 MG/1
TABLET, DELAYED RELEASE ORAL
Qty: 180 TABLET | Refills: 1 | Status: SHIPPED | OUTPATIENT
Start: 2024-07-24

## 2024-07-24 RX ORDER — LEVOTHYROXINE SODIUM 0.1 MG/1
100 TABLET ORAL DAILY
Qty: 90 TABLET | Refills: 0 | Status: SHIPPED | OUTPATIENT
Start: 2024-07-24

## 2024-07-24 RX ORDER — ATORVASTATIN CALCIUM 80 MG/1
80 TABLET, FILM COATED ORAL DAILY
Qty: 90 TABLET | Refills: 0 | Status: SHIPPED | OUTPATIENT
Start: 2024-07-24

## 2024-07-24 RX ORDER — VILAZODONE HYDROCHLORIDE 40 MG/1
TABLET ORAL
Qty: 90 TABLET | Refills: 0 | Status: SHIPPED | OUTPATIENT
Start: 2024-07-24

## 2024-07-24 RX ORDER — CARBAMAZEPINE 200 MG
TABLET ORAL
Qty: 180 TABLET | Refills: 1 | Status: SHIPPED | OUTPATIENT
Start: 2024-07-24

## 2024-07-24 RX ORDER — ALPRAZOLAM 0.5 MG/1
0.5 TABLET ORAL 3 TIMES DAILY
Qty: 90 TABLET | Refills: 2 | Status: CANCELLED | OUTPATIENT
Start: 2024-07-24 | End: 2024-10-22

## 2024-07-24 RX ORDER — TIRZEPATIDE 12.5 MG/.5ML
12.5 INJECTION, SOLUTION SUBCUTANEOUS WEEKLY
Qty: 4 ADJUSTABLE DOSE PRE-FILLED PEN SYRINGE | Refills: 2 | Status: SHIPPED | OUTPATIENT
Start: 2024-07-24 | End: 2024-10-22

## 2024-07-24 ASSESSMENT — ENCOUNTER SYMPTOMS
VISUAL CHANGE: 0
HEARTBURN: 0
ABDOMINAL PAIN: 0
WATER BRASH: 0
BLURRED VISION: 0
SHORTNESS OF BREATH: 1
GLOBUS SENSATION: 0
BELCHING: 0
COUGH: 0
WHEEZING: 0
NAUSEA: 0
ORTHOPNEA: 0

## 2024-07-24 NOTE — PROGRESS NOTES
YAMILET Gregory RN.  
include homicidal or suicidal ideation. Thought content does not include homicidal or suicidal plan.         Cognition and Memory: Cognition normal.         ASSESSMENT/PLAN:  1. Uncontrolled type 2 diabetes mellitus with hyperglycemia (HCC)  - Uncontrolled  - A1C April 2024--> 7.4%   -Continue empagliflozin (JARDIANCE) 10 MG tablet; Take 1 tablet by mouth daily  Dispense: 90 tablet; Refill: 0  -Continue tirzepatide (MOUNJARO) 12.5 MG/0.5ML SOPN SC injection; Inject 0.5 mLs into the skin once a week  Dispense: 4 Adjustable Dose Pre-filled Pen Syringe; Refill: 2  - Hemoglobin A1C; Future    2. Essential hypertension, benign  -Follows with cardiologist, Dr. Almeida  -/72 in office today  -Continue metoprolol succinate (TOPROL XL) 50 MG daily  - Comprehensive Metabolic Panel, Fasting; Future    3. Mixed hyperlipidemia  - Controlled  -Lipid panel 4/22/2024--> , trig 176, HDL 77 LDL 68  - LDL at goal <70 (68)  - Lipid, Fasting; Future  -Continue atorvastatin (LIPITOR) 80 MG tablet; Take 1 tablet by mouth daily  Dispense: 90 tablet; Refill: 0  -Continue ezetimibe (ZETIA) 10 MG tablet; Take 1 tablet by mouth at bedtime  Dispense: 90 tablet; Refill: 0    4. Acquired hypothyroidism  -Stable  - T4, Free; Future  - TSH; Future  -Continue levothyroxine (SYNTHROID) 100 MCG tablet; Take 1 tablet by mouth daily  Dispense: 90 tablet; Refill: 0    5. Anxiety  -Controlled  -Continue busPIRone (BUSPAR) 15 MG tablet; Take 15 mg by mouth 3 times daily  Dispense: 270 tablet; Refill: 0  -Continue Xanax 0.5 mg 3 times daily as needed anxiety/panic attacks    6. Moderate episode of recurrent major depressive disorder (HCC)  -Controlled  -Continue vilazodone HCl (VIIBRYD) 20 MG TABS; TAKE 1 TABLET BY MOUTH DAILY WITH FOOD WITH 40MG TO EQUAL 60MG  Dispense: 90 tablet; Refill: 0  -Continue vilazodone HCl (VIIBRYD) 40 MG TABS; TAKE 1 TABLET BY MOUTH DAILY WITH FOOD TAKE IN ADDTION TO 20 MG FOR A TOTAL DOSE OF 60 MG  Dispense: 90

## 2024-07-26 ENCOUNTER — HOSPITAL ENCOUNTER (OUTPATIENT)
Dept: WOMENS IMAGING | Age: 74
Discharge: HOME | End: 2024-07-26
Payer: MEDICARE

## 2024-07-26 ENCOUNTER — HOSPITAL ENCOUNTER (OUTPATIENT)
Dept: ULTRASOUND IMAGING | Age: 74
Discharge: HOME | End: 2024-07-26
Payer: MEDICARE

## 2024-07-26 VITALS — BODY MASS INDEX: 27.79 KG/M2 | WEIGHT: 151 LBS | HEIGHT: 62 IN

## 2024-07-26 DIAGNOSIS — Z12.31 BREAST CANCER SCREENING BY MAMMOGRAM: ICD-10-CM

## 2024-07-26 DIAGNOSIS — R22.1 NODULE OF NECK: ICD-10-CM

## 2024-07-26 PROCEDURE — 76536 US EXAM OF HEAD AND NECK: CPT

## 2024-07-26 PROCEDURE — 77063 BREAST TOMOSYNTHESIS BI: CPT

## 2024-07-30 DIAGNOSIS — E78.2 MIXED HYPERLIPIDEMIA: ICD-10-CM

## 2024-07-30 DIAGNOSIS — E11.65 UNCONTROLLED TYPE 2 DIABETES MELLITUS WITH HYPERGLYCEMIA (HCC): ICD-10-CM

## 2024-07-30 DIAGNOSIS — E03.9 ACQUIRED HYPOTHYROIDISM: ICD-10-CM

## 2024-07-30 DIAGNOSIS — I10 ESSENTIAL HYPERTENSION, BENIGN: ICD-10-CM

## 2024-07-30 LAB
ALBUMIN SERPL-MCNC: 4.1 G/DL (ref 3.4–5)
ALBUMIN/GLOB SERPL: 1.5 {RATIO} (ref 1.1–2.2)
ALP SERPL-CCNC: 78 U/L (ref 40–129)
ALT SERPL-CCNC: 15 U/L (ref 10–40)
ANION GAP SERPL CALCULATED.3IONS-SCNC: 9 MMOL/L (ref 3–16)
AST SERPL-CCNC: 21 U/L (ref 15–37)
BILIRUB SERPL-MCNC: <0.2 MG/DL (ref 0–1)
BUN SERPL-MCNC: 15 MG/DL (ref 7–20)
CALCIUM SERPL-MCNC: 9.8 MG/DL (ref 8.3–10.6)
CHLORIDE SERPL-SCNC: 100 MMOL/L (ref 99–110)
CHOLEST SERPL-MCNC: 155 MG/DL (ref 0–199)
CO2 SERPL-SCNC: 32 MMOL/L (ref 21–32)
CREAT SERPL-MCNC: 0.8 MG/DL (ref 0.6–1.2)
GFR SERPLBLD CREATININE-BSD FMLA CKD-EPI: 77 ML/MIN/{1.73_M2}
GLUCOSE P FAST SERPL-MCNC: 121 MG/DL (ref 70–99)
HDLC SERPL-MCNC: 69 MG/DL (ref 40–60)
LDL CHOLESTEROL: 56 MG/DL
POTASSIUM SERPL-SCNC: 4.1 MMOL/L (ref 3.5–5.1)
PROT SERPL-MCNC: 6.9 G/DL (ref 6.4–8.2)
SODIUM SERPL-SCNC: 141 MMOL/L (ref 136–145)
T4 FREE SERPL-MCNC: 1.5 NG/DL (ref 0.9–1.8)
TRIGL SERPL-MCNC: 148 MG/DL (ref 0–150)
TSH SERPL DL<=0.005 MIU/L-ACNC: 0.39 UIU/ML (ref 0.27–4.2)
VLDLC SERPL CALC-MCNC: 30 MG/DL

## 2024-07-31 LAB
EST. AVERAGE GLUCOSE BLD GHB EST-MCNC: 139.9 MG/DL
HBA1C MFR BLD: 6.5 %

## 2024-08-05 NOTE — RESULT ENCOUNTER NOTE
- Diabetes well-controlled, A1c 6.5% (down from 6.4% in April 2024).  -All other labs within normal ranges.

## 2024-08-06 ENCOUNTER — TELEPHONE (OUTPATIENT)
Dept: PRIMARY CARE CLINIC | Age: 74
End: 2024-08-06

## 2024-08-06 NOTE — TELEPHONE ENCOUNTER
----- Message from CESAR Pena - CNP sent at 8/5/2024  4:47 PM EDT -----  - Diabetes well-controlled, A1c 6.5% (down from 6.4% in April 2024).  -All other labs within normal ranges.

## 2024-08-21 ENCOUNTER — OFFICE VISIT (OUTPATIENT)
Dept: ENT CLINIC | Age: 74
End: 2024-08-21
Payer: MEDICARE

## 2024-08-21 VITALS
SYSTOLIC BLOOD PRESSURE: 136 MMHG | BODY MASS INDEX: 27.79 KG/M2 | WEIGHT: 151 LBS | TEMPERATURE: 97.9 F | DIASTOLIC BLOOD PRESSURE: 77 MMHG | HEIGHT: 62 IN | HEART RATE: 100 BPM | OXYGEN SATURATION: 96 %

## 2024-08-21 DIAGNOSIS — R22.1 NECK MASS: Primary | ICD-10-CM

## 2024-08-21 PROCEDURE — 3075F SYST BP GE 130 - 139MM HG: CPT | Performed by: OTOLARYNGOLOGY

## 2024-08-21 PROCEDURE — 3017F COLORECTAL CA SCREEN DOC REV: CPT | Performed by: OTOLARYNGOLOGY

## 2024-08-21 PROCEDURE — G8417 CALC BMI ABV UP PARAM F/U: HCPCS | Performed by: OTOLARYNGOLOGY

## 2024-08-21 PROCEDURE — G8427 DOCREV CUR MEDS BY ELIG CLIN: HCPCS | Performed by: OTOLARYNGOLOGY

## 2024-08-21 PROCEDURE — 1123F ACP DISCUSS/DSCN MKR DOCD: CPT | Performed by: OTOLARYNGOLOGY

## 2024-08-21 PROCEDURE — 1036F TOBACCO NON-USER: CPT | Performed by: OTOLARYNGOLOGY

## 2024-08-21 PROCEDURE — 3078F DIAST BP <80 MM HG: CPT | Performed by: OTOLARYNGOLOGY

## 2024-08-21 PROCEDURE — 1090F PRES/ABSN URINE INCON ASSESS: CPT | Performed by: OTOLARYNGOLOGY

## 2024-08-21 PROCEDURE — 99204 OFFICE O/P NEW MOD 45 MIN: CPT | Performed by: OTOLARYNGOLOGY

## 2024-08-21 PROCEDURE — G8399 PT W/DXA RESULTS DOCUMENT: HCPCS | Performed by: OTOLARYNGOLOGY

## 2024-08-21 ASSESSMENT — ENCOUNTER SYMPTOMS
TROUBLE SWALLOWING: 0
SORE THROAT: 0
RHINORRHEA: 0
SINUS PAIN: 0
VOICE CHANGE: 0

## 2024-08-21 NOTE — PROGRESS NOTES
[facility]   DIVISION OF OTOLARYNGOLOGY- HEAD & NECK SURGERY  [facility]           NEW PATIENT VISIT      PCP:  Tavia Dias APRN - CNP      REFERRED BY:   Tavia Dias APRN - CNP   R93.89 (ICD-10-CM) - Abnormal ultrasound of neck     CHIEF COMPLAINT    Chief Complaint   Patient presents with    Mass     Anterior neck.        HISTORY OF PRESENT ILLNESS       Cyndi Mckeon is a 74 y.o. female who was referred for evaluation and treatment of an anterior neck mass.  \"Lump in my neck, for 2 to 3 months, sometime it aches, no drainage or bleeding.\"  She was unaware of this mass prior to 2 to 3 months ago.  Patient was accompanied today by her daughter and her .      REVIEW OF SYSTEMS   Review of Systems   Constitutional:  Negative for chills and fever.   HENT:  Negative for congestion, ear discharge, ear pain, hearing loss, rhinorrhea, sinus pain, sore throat, tinnitus, trouble swallowing (off and on, post esophageal surgery for dysphagia, could swallow better after surgery) and voice change.          PAST MEDICAL HISTORY    Past Medical History:   Diagnosis Date    Abnormal stress test *Aug.1, 2016    Coronary angiography by Dr. Vaughan revealed a 60% stenosis of the LAD and  a 50% mid circumflex     Arthritis     At high risk for falls 11/27/2021    Chest pain 09/28/2020    Chronic headaches     Class 1 obesity due to excess calories with serious comorbidity and body mass index (BMI) of 31.0 to 31.9 in adult 06/17/2021    Eczema     Fibromyalgia     GERD (gastroesophageal reflux disease)     Hyperlipidemia     Hypertension     Impaired fasting glucose     Interstitial lung disease (HCC) 2016    Dr. Chapa    Interstitial lung disease (HCC)     Migraine headache     Osteopenia DEXA - March, 2016    Lumbar T score +1.7 and Hip -1.0 FRAX 7.6/0.6    Osteopenia DEXA-Dec., 2017    Lumbar T score 2.3 and hip T score 0.1    Other screening mammogram 07/08/2013    Benign    Other screening

## 2024-08-22 ENCOUNTER — PATIENT MESSAGE (OUTPATIENT)
Dept: CARDIOLOGY CLINIC | Age: 74
End: 2024-08-22

## 2024-08-26 ENCOUNTER — TELEPHONE (OUTPATIENT)
Dept: ENT CLINIC | Age: 74
End: 2024-08-26

## 2024-08-27 ENCOUNTER — PREP FOR PROCEDURE (OUTPATIENT)
Dept: ENT CLINIC | Age: 74
End: 2024-08-27

## 2024-08-27 DIAGNOSIS — R22.1 NECK MASS: ICD-10-CM

## 2024-08-29 ENCOUNTER — OFFICE VISIT (OUTPATIENT)
Dept: PRIMARY CARE CLINIC | Age: 74
End: 2024-08-29
Payer: MEDICARE

## 2024-08-29 ENCOUNTER — TELEPHONE (OUTPATIENT)
Dept: PRIMARY CARE CLINIC | Age: 74
End: 2024-08-29

## 2024-08-29 VITALS
OXYGEN SATURATION: 98 % | SYSTOLIC BLOOD PRESSURE: 122 MMHG | WEIGHT: 149.2 LBS | BODY MASS INDEX: 27.46 KG/M2 | HEART RATE: 98 BPM | HEIGHT: 62 IN | DIASTOLIC BLOOD PRESSURE: 70 MMHG

## 2024-08-29 DIAGNOSIS — Q89.2 THYROGLOSSAL DUCT CYST: ICD-10-CM

## 2024-08-29 DIAGNOSIS — Z01.818 PREOPERATIVE GENERAL PHYSICAL EXAMINATION: Primary | ICD-10-CM

## 2024-08-29 PROCEDURE — G8417 CALC BMI ABV UP PARAM F/U: HCPCS | Performed by: NURSE PRACTITIONER

## 2024-08-29 PROCEDURE — 1036F TOBACCO NON-USER: CPT | Performed by: NURSE PRACTITIONER

## 2024-08-29 PROCEDURE — 3074F SYST BP LT 130 MM HG: CPT | Performed by: NURSE PRACTITIONER

## 2024-08-29 PROCEDURE — 1090F PRES/ABSN URINE INCON ASSESS: CPT | Performed by: NURSE PRACTITIONER

## 2024-08-29 PROCEDURE — 1123F ACP DISCUSS/DSCN MKR DOCD: CPT | Performed by: NURSE PRACTITIONER

## 2024-08-29 PROCEDURE — G8427 DOCREV CUR MEDS BY ELIG CLIN: HCPCS | Performed by: NURSE PRACTITIONER

## 2024-08-29 PROCEDURE — 3078F DIAST BP <80 MM HG: CPT | Performed by: NURSE PRACTITIONER

## 2024-08-29 PROCEDURE — 3017F COLORECTAL CA SCREEN DOC REV: CPT | Performed by: NURSE PRACTITIONER

## 2024-08-29 PROCEDURE — G8399 PT W/DXA RESULTS DOCUMENT: HCPCS | Performed by: NURSE PRACTITIONER

## 2024-08-29 PROCEDURE — 99214 OFFICE O/P EST MOD 30 MIN: CPT | Performed by: NURSE PRACTITIONER

## 2024-08-29 ASSESSMENT — PATIENT HEALTH QUESTIONNAIRE - PHQ9
SUM OF ALL RESPONSES TO PHQ QUESTIONS 1-9: 1
5. POOR APPETITE OR OVEREATING: NOT AT ALL
SUM OF ALL RESPONSES TO PHQ QUESTIONS 1-9: 1
9. THOUGHTS THAT YOU WOULD BE BETTER OFF DEAD, OR OF HURTING YOURSELF: NOT AT ALL
2. FEELING DOWN, DEPRESSED OR HOPELESS: NOT AT ALL
4. FEELING TIRED OR HAVING LITTLE ENERGY: NOT AT ALL
3. TROUBLE FALLING OR STAYING ASLEEP: NOT AT ALL
SUM OF ALL RESPONSES TO PHQ9 QUESTIONS 1 & 2: 1
7. TROUBLE CONCENTRATING ON THINGS, SUCH AS READING THE NEWSPAPER OR WATCHING TELEVISION: NOT AT ALL
8. MOVING OR SPEAKING SO SLOWLY THAT OTHER PEOPLE COULD HAVE NOTICED. OR THE OPPOSITE, BEING SO FIGETY OR RESTLESS THAT YOU HAVE BEEN MOVING AROUND A LOT MORE THAN USUAL: NOT AT ALL
1. LITTLE INTEREST OR PLEASURE IN DOING THINGS: SEVERAL DAYS
6. FEELING BAD ABOUT YOURSELF - OR THAT YOU ARE A FAILURE OR HAVE LET YOURSELF OR YOUR FAMILY DOWN: NOT AT ALL
10. IF YOU CHECKED OFF ANY PROBLEMS, HOW DIFFICULT HAVE THESE PROBLEMS MADE IT FOR YOU TO DO YOUR WORK, TAKE CARE OF THINGS AT HOME, OR GET ALONG WITH OTHER PEOPLE: NOT DIFFICULT AT ALL
SUM OF ALL RESPONSES TO PHQ QUESTIONS 1-9: 1
SUM OF ALL RESPONSES TO PHQ QUESTIONS 1-9: 1

## 2024-08-29 ASSESSMENT — ANXIETY QUESTIONNAIRES
7. FEELING AFRAID AS IF SOMETHING AWFUL MIGHT HAPPEN: SEVERAL DAYS
1. FEELING NERVOUS, ANXIOUS, OR ON EDGE: NOT AT ALL
IF YOU CHECKED OFF ANY PROBLEMS ON THIS QUESTIONNAIRE, HOW DIFFICULT HAVE THESE PROBLEMS MADE IT FOR YOU TO DO YOUR WORK, TAKE CARE OF THINGS AT HOME, OR GET ALONG WITH OTHER PEOPLE: NOT DIFFICULT AT ALL
5. BEING SO RESTLESS THAT IT IS HARD TO SIT STILL: NOT AT ALL
4. TROUBLE RELAXING: NOT AT ALL
2. NOT BEING ABLE TO STOP OR CONTROL WORRYING: SEVERAL DAYS
6. BECOMING EASILY ANNOYED OR IRRITABLE: SEVERAL DAYS
3. WORRYING TOO MUCH ABOUT DIFFERENT THINGS: NOT AT ALL
GAD7 TOTAL SCORE: 3

## 2024-08-29 NOTE — PROGRESS NOTES
ECHOCARDIOGRAM COMPLETE 2D W DOPPLER W COLOR   4/12/2024 - Normal    Imaging  XR CHEST (2 VW) 3/18/24    Narrative  EXAM: XR CHEST PA AND LATERAL  INDICATION: SOB (shortness of breath)  TECHNIQUE: 2 views of the chest.  COMPARISON: 9/7/2023  FINDINGS:  Medical Devices: None.  Heart and Mediastinum: Cardiomediastinal silhouette is within normal limits.  Lungs and Pleura: Stable reticular opacities in the bilateral lung bases suggesting pulmonary fibrosis. No focal consolidation or effusion. No pneumothorax.  Bones and Soft tissues: No acute abnormalities.  IMPRESSION:  Stable appearance of basilar pulmonary fibrosis. No acute disease.  Report Verified by: Luis Lam MD at 3/18/2024 2:52 PM EDT      Lab Review   Comprehensive Metabolic Panel, Fasting   Component  Ref Range & Units 7/30/24 0709 4/22/24 1129 7/31/23 0527 7/30/23 0434 3/21/23 1043 3/17/22 1118 8/11/21 0814   Sodium  136 - 145 mmol/L 141 142 143 R 139 R 143 141 138   Potassium  3.5 - 5.1 mmol/L 4.1 5.3 High  4.3 R 3.7 R 5.2 High  4.6 5.0   Chloride  99 - 110 mmol/L 100 97 Low  104 R 99 R 99 95 Low  100   CO2  21 - 32 mmol/L 32 33 High  34 High  R 30 R 28 29 22   Anion Gap  3 - 16 9 12 5 R 10 R 16 17 High  16   Glucose, Fasting  70 - 99 mg/dL 121 High  120 High    158 High  161 High     BUN  7 - 20 mg/dL 15 16 12 R 15 R 24 High  23 High  48 High    Creatinine  0.6 - 1.2 mg/dL 0.8 0.8 0.71 R 0.97 R 1.1 0.9 1.6 High    Est, Glom Filt Rate  >60 77 78 CM >90 R, CM 62 R, CM 53 Abnormal  CM     Calcium  8.3 - 10.6 mg/dL 9.8 10.3 9.3 R 8.7 R 9.9 10.1 9.5   Total Protein  6.4 - 8.2 g/dL 6.9 7.5   7.8 7.4 6.7   Albumin  3.4 - 5.0 g/dL 4.1 4.4   4.5 4.6 4.2   Albumin/Globulin Ratio  1.1 - 2.2 1.5 1.4   1.4 1.6 1.7   Total Bilirubin  0.0 - 1.0 mg/dL <0.2 <0.2   <0.2 <0.2 <0.2   Alkaline Phosphatase  40 - 129 U/L 78 87   73 98 75   ALT  10 - 40 U/L 15 13   10 17 14   AST  15 - 37 U/L 21 21   15 20 15      Assessment and Plan:   1. Thyroglossal duct

## 2024-09-03 ENCOUNTER — HOSPITAL ENCOUNTER (OUTPATIENT)
Dept: CT IMAGING | Age: 74
Discharge: HOME | End: 2024-09-03
Attending: OTOLARYNGOLOGY
Payer: MEDICARE

## 2024-09-03 DIAGNOSIS — R22.1 NECK MASS: ICD-10-CM

## 2024-09-03 PROCEDURE — 6360000004 HC RX CONTRAST MEDICATION: Performed by: OTOLARYNGOLOGY

## 2024-09-03 PROCEDURE — 70491 CT SOFT TISSUE NECK W/DYE: CPT

## 2024-09-03 RX ORDER — IOPAMIDOL 755 MG/ML
75 INJECTION, SOLUTION INTRAVASCULAR
Status: COMPLETED | OUTPATIENT
Start: 2024-09-03 | End: 2024-09-03

## 2024-09-03 RX ADMIN — IOPAMIDOL 75 ML: 755 INJECTION, SOLUTION INTRAVENOUS at 07:26

## 2024-09-09 ENCOUNTER — PATIENT MESSAGE (OUTPATIENT)
Dept: PRIMARY CARE CLINIC | Age: 74
End: 2024-09-09

## 2024-09-13 RX ORDER — AZATHIOPRINE 50 MG/1
50 TABLET ORAL DAILY
COMMUNITY
Start: 2024-09-01

## 2024-09-13 RX ORDER — CHOLECALCIFEROL (VITAMIN D3) 25 MCG
CAPSULE ORAL DAILY
COMMUNITY

## 2024-09-13 RX ORDER — ALPRAZOLAM 0.5 MG
0.5 TABLET ORAL 3 TIMES DAILY PRN
COMMUNITY

## 2024-09-16 ENCOUNTER — ANESTHESIA EVENT (OUTPATIENT)
Dept: OPERATING ROOM | Age: 74
End: 2024-09-16
Payer: MEDICARE

## 2024-09-17 ENCOUNTER — ANESTHESIA (OUTPATIENT)
Dept: OPERATING ROOM | Age: 74
End: 2024-09-17
Payer: MEDICARE

## 2024-09-17 ENCOUNTER — HOSPITAL ENCOUNTER (OUTPATIENT)
Age: 74
Setting detail: OUTPATIENT SURGERY
Discharge: HOME | End: 2024-09-17
Attending: OTOLARYNGOLOGY | Admitting: OTOLARYNGOLOGY
Payer: MEDICARE

## 2024-09-17 VITALS
HEART RATE: 80 BPM | DIASTOLIC BLOOD PRESSURE: 58 MMHG | HEIGHT: 62 IN | RESPIRATION RATE: 22 BRPM | SYSTOLIC BLOOD PRESSURE: 112 MMHG | BODY MASS INDEX: 27.23 KG/M2 | WEIGHT: 148 LBS | OXYGEN SATURATION: 100 % | TEMPERATURE: 97.1 F

## 2024-09-17 DIAGNOSIS — R22.1 NECK MASS: ICD-10-CM

## 2024-09-17 DIAGNOSIS — Q89.2 THYROGLOSSAL DUCT CYST: Primary | ICD-10-CM

## 2024-09-17 DIAGNOSIS — G89.18 POST-OP PAIN: ICD-10-CM

## 2024-09-17 LAB
ANION GAP SERPL CALCULATED.3IONS-SCNC: 12 MMOL/L (ref 3–16)
BUN SERPL-MCNC: 18 MG/DL (ref 7–20)
CALCIUM SERPL-MCNC: 9 MG/DL (ref 8.3–10.6)
CHLORIDE SERPL-SCNC: 102 MMOL/L (ref 99–110)
CO2 SERPL-SCNC: 28 MMOL/L (ref 21–32)
CREAT SERPL-MCNC: 0.8 MG/DL (ref 0.6–1.2)
GFR SERPLBLD CREATININE-BSD FMLA CKD-EPI: 77 ML/MIN/{1.73_M2}
GLUCOSE BLD-MCNC: 104 MG/DL (ref 70–99)
GLUCOSE BLD-MCNC: 135 MG/DL (ref 70–99)
GLUCOSE SERPL-MCNC: 107 MG/DL (ref 70–99)
PERFORMED ON: ABNORMAL
PERFORMED ON: ABNORMAL
POTASSIUM SERPL-SCNC: 3.5 MMOL/L (ref 3.5–5.1)
SODIUM SERPL-SCNC: 142 MMOL/L (ref 136–145)

## 2024-09-17 PROCEDURE — 6370000000 HC RX 637 (ALT 250 FOR IP): Performed by: STUDENT IN AN ORGANIZED HEALTH CARE EDUCATION/TRAINING PROGRAM

## 2024-09-17 PROCEDURE — 2580000003 HC RX 258: Performed by: OTOLARYNGOLOGY

## 2024-09-17 PROCEDURE — 7100000000 HC PACU RECOVERY - FIRST 15 MIN: Performed by: OTOLARYNGOLOGY

## 2024-09-17 PROCEDURE — 6360000002 HC RX W HCPCS: Performed by: STUDENT IN AN ORGANIZED HEALTH CARE EDUCATION/TRAINING PROGRAM

## 2024-09-17 PROCEDURE — 88305 TISSUE EXAM BY PATHOLOGIST: CPT

## 2024-09-17 PROCEDURE — 7100000011 HC PHASE II RECOVERY - ADDTL 15 MIN: Performed by: OTOLARYNGOLOGY

## 2024-09-17 PROCEDURE — 80048 BASIC METABOLIC PNL TOTAL CA: CPT

## 2024-09-17 PROCEDURE — 60280 REMOVE THYROID DUCT LESION: CPT | Performed by: OTOLARYNGOLOGY

## 2024-09-17 PROCEDURE — 6360000002 HC RX W HCPCS: Performed by: OTOLARYNGOLOGY

## 2024-09-17 PROCEDURE — 3700000000 HC ANESTHESIA ATTENDED CARE: Performed by: OTOLARYNGOLOGY

## 2024-09-17 PROCEDURE — 2500000003 HC RX 250 WO HCPCS: Performed by: OTOLARYNGOLOGY

## 2024-09-17 PROCEDURE — 7100000010 HC PHASE II RECOVERY - FIRST 15 MIN: Performed by: OTOLARYNGOLOGY

## 2024-09-17 PROCEDURE — 3700000001 HC ADD 15 MINUTES (ANESTHESIA): Performed by: OTOLARYNGOLOGY

## 2024-09-17 PROCEDURE — 2709999900 HC NON-CHARGEABLE SUPPLY: Performed by: OTOLARYNGOLOGY

## 2024-09-17 PROCEDURE — 3600000004 HC SURGERY LEVEL 4 BASE: Performed by: OTOLARYNGOLOGY

## 2024-09-17 PROCEDURE — 3600000014 HC SURGERY LEVEL 4 ADDTL 15MIN: Performed by: OTOLARYNGOLOGY

## 2024-09-17 PROCEDURE — 7100000001 HC PACU RECOVERY - ADDTL 15 MIN: Performed by: OTOLARYNGOLOGY

## 2024-09-17 PROCEDURE — 6360000002 HC RX W HCPCS

## 2024-09-17 PROCEDURE — 2580000003 HC RX 258

## 2024-09-17 PROCEDURE — 2500000003 HC RX 250 WO HCPCS

## 2024-09-17 RX ORDER — SODIUM CHLORIDE, SODIUM LACTATE, POTASSIUM CHLORIDE, CALCIUM CHLORIDE 600; 310; 30; 20 MG/100ML; MG/100ML; MG/100ML; MG/100ML
INJECTION, SOLUTION INTRAVENOUS CONTINUOUS
Status: DISCONTINUED | OUTPATIENT
Start: 2024-09-17 | End: 2024-09-17 | Stop reason: HOSPADM

## 2024-09-17 RX ORDER — SODIUM CHLORIDE 0.9 % (FLUSH) 0.9 %
5-40 SYRINGE (ML) INJECTION EVERY 12 HOURS SCHEDULED
Status: DISCONTINUED | OUTPATIENT
Start: 2024-09-17 | End: 2024-09-17 | Stop reason: HOSPADM

## 2024-09-17 RX ORDER — APREPITANT 40 MG/1
40 CAPSULE ORAL ONCE
Status: COMPLETED | OUTPATIENT
Start: 2024-09-17 | End: 2024-09-17

## 2024-09-17 RX ORDER — IPRATROPIUM BROMIDE AND ALBUTEROL SULFATE 2.5; .5 MG/3ML; MG/3ML
1 SOLUTION RESPIRATORY (INHALATION) ONCE
Status: COMPLETED | OUTPATIENT
Start: 2024-09-17 | End: 2024-09-17

## 2024-09-17 RX ORDER — NALOXONE HYDROCHLORIDE 0.4 MG/ML
INJECTION, SOLUTION INTRAMUSCULAR; INTRAVENOUS; SUBCUTANEOUS PRN
Status: DISCONTINUED | OUTPATIENT
Start: 2024-09-17 | End: 2024-09-17 | Stop reason: HOSPADM

## 2024-09-17 RX ORDER — SODIUM CHLORIDE 9 MG/ML
INJECTION, SOLUTION INTRAVENOUS PRN
Status: DISCONTINUED | OUTPATIENT
Start: 2024-09-17 | End: 2024-09-17 | Stop reason: HOSPADM

## 2024-09-17 RX ORDER — ACETAMINOPHEN 325 MG/1
650 TABLET ORAL ONCE
Status: COMPLETED | OUTPATIENT
Start: 2024-09-17 | End: 2024-09-17

## 2024-09-17 RX ORDER — MIDAZOLAM HYDROCHLORIDE 1 MG/ML
INJECTION INTRAMUSCULAR; INTRAVENOUS
Status: DISCONTINUED | OUTPATIENT
Start: 2024-09-17 | End: 2024-09-17 | Stop reason: SDUPTHER

## 2024-09-17 RX ORDER — CEFADROXIL 500 MG/1
500 CAPSULE ORAL 2 TIMES DAILY
Qty: 20 CAPSULE | Refills: 0 | Status: SHIPPED | OUTPATIENT
Start: 2024-09-17 | End: 2024-09-27

## 2024-09-17 RX ORDER — ONDANSETRON 2 MG/ML
4 INJECTION INTRAMUSCULAR; INTRAVENOUS
Status: DISCONTINUED | OUTPATIENT
Start: 2024-09-17 | End: 2024-09-17 | Stop reason: HOSPADM

## 2024-09-17 RX ORDER — OXYCODONE HYDROCHLORIDE 5 MG/1
5 TABLET ORAL
Status: DISCONTINUED | OUTPATIENT
Start: 2024-09-17 | End: 2024-09-17 | Stop reason: HOSPADM

## 2024-09-17 RX ORDER — HYDROCODONE BITARTRATE AND ACETAMINOPHEN 7.5; 325 MG/1; MG/1
1 TABLET ORAL EVERY 6 HOURS PRN
Qty: 24 TABLET | Refills: 0 | Status: SHIPPED | OUTPATIENT
Start: 2024-09-17 | End: 2024-09-23

## 2024-09-17 RX ORDER — FENTANYL CITRATE 50 UG/ML
50 INJECTION, SOLUTION INTRAMUSCULAR; INTRAVENOUS EVERY 5 MIN PRN
Status: DISCONTINUED | OUTPATIENT
Start: 2024-09-17 | End: 2024-09-17 | Stop reason: HOSPADM

## 2024-09-17 RX ORDER — FAMOTIDINE 10 MG/ML
INJECTION, SOLUTION INTRAVENOUS
Status: DISCONTINUED | OUTPATIENT
Start: 2024-09-17 | End: 2024-09-17 | Stop reason: SDUPTHER

## 2024-09-17 RX ORDER — FENTANYL CITRATE 50 UG/ML
INJECTION, SOLUTION INTRAMUSCULAR; INTRAVENOUS
Status: DISCONTINUED | OUTPATIENT
Start: 2024-09-17 | End: 2024-09-17 | Stop reason: SDUPTHER

## 2024-09-17 RX ORDER — ROCURONIUM BROMIDE 10 MG/ML
INJECTION, SOLUTION INTRAVENOUS
Status: DISCONTINUED | OUTPATIENT
Start: 2024-09-17 | End: 2024-09-17 | Stop reason: SDUPTHER

## 2024-09-17 RX ORDER — LIDOCAINE HCL/EPINEPHRINE/PF 2%-1:200K
VIAL (ML) INJECTION
Status: COMPLETED | OUTPATIENT
Start: 2024-09-17 | End: 2024-09-17

## 2024-09-17 RX ORDER — DEXMEDETOMIDINE HYDROCHLORIDE 100 UG/ML
INJECTION, SOLUTION INTRAVENOUS
Status: DISCONTINUED | OUTPATIENT
Start: 2024-09-17 | End: 2024-09-17 | Stop reason: SDUPTHER

## 2024-09-17 RX ORDER — PROMETHAZINE HYDROCHLORIDE 25 MG/1
25 TABLET ORAL EVERY 6 HOURS PRN
Qty: 40 TABLET | Refills: 0 | Status: SHIPPED | OUTPATIENT
Start: 2024-09-17

## 2024-09-17 RX ORDER — VASOPRESSIN 20 U/ML
INJECTION PARENTERAL
Status: DISCONTINUED | OUTPATIENT
Start: 2024-09-17 | End: 2024-09-17 | Stop reason: SDUPTHER

## 2024-09-17 RX ORDER — DEXAMETHASONE SODIUM PHOSPHATE 4 MG/ML
INJECTION, SOLUTION INTRA-ARTICULAR; INTRALESIONAL; INTRAMUSCULAR; INTRAVENOUS; SOFT TISSUE
Status: DISCONTINUED | OUTPATIENT
Start: 2024-09-17 | End: 2024-09-17 | Stop reason: SDUPTHER

## 2024-09-17 RX ORDER — HYDROMORPHONE HYDROCHLORIDE 2 MG/ML
0.5 INJECTION, SOLUTION INTRAMUSCULAR; INTRAVENOUS; SUBCUTANEOUS EVERY 5 MIN PRN
Status: DISCONTINUED | OUTPATIENT
Start: 2024-09-17 | End: 2024-09-17 | Stop reason: HOSPADM

## 2024-09-17 RX ORDER — LIDOCAINE HYDROCHLORIDE 20 MG/ML
INJECTION, SOLUTION INFILTRATION; PERINEURAL
Status: DISCONTINUED | OUTPATIENT
Start: 2024-09-17 | End: 2024-09-17 | Stop reason: SDUPTHER

## 2024-09-17 RX ORDER — SODIUM CHLORIDE 0.9 % (FLUSH) 0.9 %
5-40 SYRINGE (ML) INJECTION PRN
Status: DISCONTINUED | OUTPATIENT
Start: 2024-09-17 | End: 2024-09-17 | Stop reason: HOSPADM

## 2024-09-17 RX ORDER — PROPOFOL 10 MG/ML
INJECTION, EMULSION INTRAVENOUS
Status: DISCONTINUED | OUTPATIENT
Start: 2024-09-17 | End: 2024-09-17 | Stop reason: SDUPTHER

## 2024-09-17 RX ORDER — CALCIUM CHLORIDE 100 MG/ML
INJECTION INTRAVENOUS; INTRAVENTRICULAR
Status: COMPLETED | OUTPATIENT
Start: 2024-09-17 | End: 2024-09-17

## 2024-09-17 RX ORDER — SODIUM CHLORIDE 9 MG/ML
INJECTION, SOLUTION INTRAVENOUS CONTINUOUS
Status: DISCONTINUED | OUTPATIENT
Start: 2024-09-17 | End: 2024-09-17 | Stop reason: HOSPADM

## 2024-09-17 RX ORDER — CALCIUM CHLORIDE 100 MG/ML
INJECTION INTRAVENOUS; INTRAVENTRICULAR
Status: DISCONTINUED | OUTPATIENT
Start: 2024-09-17 | End: 2024-09-17 | Stop reason: SDUPTHER

## 2024-09-17 RX ORDER — EPHEDRINE SULFATE 50 MG/ML
INJECTION INTRAVENOUS
Status: DISCONTINUED | OUTPATIENT
Start: 2024-09-17 | End: 2024-09-17 | Stop reason: SDUPTHER

## 2024-09-17 RX ORDER — ONDANSETRON 2 MG/ML
INJECTION INTRAMUSCULAR; INTRAVENOUS
Status: DISCONTINUED | OUTPATIENT
Start: 2024-09-17 | End: 2024-09-17 | Stop reason: SDUPTHER

## 2024-09-17 RX ADMIN — ROCURONIUM BROMIDE 20 MG: 10 INJECTION, SOLUTION INTRAVENOUS at 09:16

## 2024-09-17 RX ADMIN — EPHEDRINE SULFATE 10 MG: 50 INJECTION, SOLUTION INTRAVENOUS at 08:30

## 2024-09-17 RX ADMIN — EPHEDRINE SULFATE 10 MG: 50 INJECTION, SOLUTION INTRAVENOUS at 08:09

## 2024-09-17 RX ADMIN — ROCURONIUM BROMIDE 10 MG: 10 INJECTION, SOLUTION INTRAVENOUS at 08:22

## 2024-09-17 RX ADMIN — ROCURONIUM BROMIDE 10 MG: 10 INJECTION, SOLUTION INTRAVENOUS at 08:57

## 2024-09-17 RX ADMIN — VASOPRESSIN 1 UNITS: 20 INJECTION INTRAVENOUS at 08:09

## 2024-09-17 RX ADMIN — ACETAMINOPHEN 650 MG: 325 TABLET ORAL at 07:17

## 2024-09-17 RX ADMIN — SODIUM CHLORIDE: 9 INJECTION, SOLUTION INTRAVENOUS at 06:36

## 2024-09-17 RX ADMIN — FENTANYL CITRATE 25 MCG: 50 INJECTION, SOLUTION INTRAMUSCULAR; INTRAVENOUS at 10:03

## 2024-09-17 RX ADMIN — EPHEDRINE SULFATE 10 MG: 50 INJECTION, SOLUTION INTRAVENOUS at 09:33

## 2024-09-17 RX ADMIN — PROPOFOL 100 MG: 10 INJECTION, EMULSION INTRAVENOUS at 07:45

## 2024-09-17 RX ADMIN — FAMOTIDINE 20 MG: 10 INJECTION INTRAVENOUS at 07:34

## 2024-09-17 RX ADMIN — MIDAZOLAM 2 MG: 1 INJECTION INTRAMUSCULAR; INTRAVENOUS at 07:44

## 2024-09-17 RX ADMIN — PHENYLEPHRINE HYDROCHLORIDE 300 MCG: 10 INJECTION INTRAVENOUS at 07:49

## 2024-09-17 RX ADMIN — PROPOFOL 50 MG: 10 INJECTION, EMULSION INTRAVENOUS at 08:52

## 2024-09-17 RX ADMIN — CEFAZOLIN 2000 MG: 2 INJECTION, POWDER, FOR SOLUTION INTRAMUSCULAR; INTRAVENOUS at 07:45

## 2024-09-17 RX ADMIN — DEXAMETHASONE SODIUM PHOSPHATE 8 MG: 4 INJECTION, SOLUTION INTRAMUSCULAR; INTRAVENOUS at 07:50

## 2024-09-17 RX ADMIN — EPHEDRINE SULFATE 10 MG: 50 INJECTION, SOLUTION INTRAVENOUS at 08:06

## 2024-09-17 RX ADMIN — PHENYLEPHRINE HYDROCHLORIDE 30 MCG/MIN: 10 INJECTION INTRAVENOUS at 08:44

## 2024-09-17 RX ADMIN — FENTANYL CITRATE 25 MCG: 50 INJECTION, SOLUTION INTRAMUSCULAR; INTRAVENOUS at 09:09

## 2024-09-17 RX ADMIN — FENTANYL CITRATE 50 MCG: 50 INJECTION, SOLUTION INTRAMUSCULAR; INTRAVENOUS at 07:45

## 2024-09-17 RX ADMIN — APREPITANT 40 MG: 40 CAPSULE ORAL at 07:18

## 2024-09-17 RX ADMIN — LIDOCAINE HYDROCHLORIDE 100 MG: 20 INJECTION, SOLUTION INFILTRATION; PERINEURAL at 07:45

## 2024-09-17 RX ADMIN — SUGAMMADEX 200 MG: 100 INJECTION, SOLUTION INTRAVENOUS at 09:59

## 2024-09-17 RX ADMIN — DEXMEDETOMIDINE HYDROCHLORIDE 4 MCG: 100 INJECTION, SOLUTION INTRAVENOUS at 09:53

## 2024-09-17 RX ADMIN — CALCIUM CHLORIDE 0.3 G: 100 INJECTION INTRAVENOUS; INTRAVENTRICULAR at 08:37

## 2024-09-17 RX ADMIN — CALCIUM CHLORIDE 0.2 G: 100 INJECTION INTRAVENOUS; INTRAVENTRICULAR at 08:39

## 2024-09-17 RX ADMIN — PHENYLEPHRINE HYDROCHLORIDE 200 MCG: 10 INJECTION INTRAVENOUS at 08:40

## 2024-09-17 RX ADMIN — EPHEDRINE SULFATE 10 MG: 50 INJECTION, SOLUTION INTRAVENOUS at 09:01

## 2024-09-17 RX ADMIN — IPRATROPIUM BROMIDE AND ALBUTEROL SULFATE 1 DOSE: .5; 3 SOLUTION RESPIRATORY (INHALATION) at 07:18

## 2024-09-17 RX ADMIN — SODIUM CHLORIDE: 9 INJECTION, SOLUTION INTRAVENOUS at 09:10

## 2024-09-17 RX ADMIN — VASOPRESSIN 1 UNITS: 20 INJECTION INTRAVENOUS at 08:34

## 2024-09-17 RX ADMIN — ROCURONIUM BROMIDE 50 MG: 10 INJECTION, SOLUTION INTRAVENOUS at 07:45

## 2024-09-17 RX ADMIN — ONDANSETRON 4 MG: 2 INJECTION INTRAMUSCULAR; INTRAVENOUS at 09:38

## 2024-09-17 RX ADMIN — PHENYLEPHRINE HYDROCHLORIDE 200 MCG: 10 INJECTION INTRAVENOUS at 07:51

## 2024-09-17 ASSESSMENT — ENCOUNTER SYMPTOMS: SHORTNESS OF BREATH: 1

## 2024-09-17 ASSESSMENT — PAIN - FUNCTIONAL ASSESSMENT
PAIN_FUNCTIONAL_ASSESSMENT: NONE - DENIES PAIN
PAIN_FUNCTIONAL_ASSESSMENT: NONE - DENIES PAIN
PAIN_FUNCTIONAL_ASSESSMENT: 0-10
PAIN_FUNCTIONAL_ASSESSMENT: NONE - DENIES PAIN
PAIN_FUNCTIONAL_ASSESSMENT: NONE - DENIES PAIN

## 2024-09-24 ENCOUNTER — OFFICE VISIT (OUTPATIENT)
Dept: ENT CLINIC | Age: 74
End: 2024-09-24

## 2024-09-24 VITALS
DIASTOLIC BLOOD PRESSURE: 78 MMHG | HEIGHT: 62 IN | BODY MASS INDEX: 27.6 KG/M2 | TEMPERATURE: 98 F | SYSTOLIC BLOOD PRESSURE: 122 MMHG | WEIGHT: 150 LBS | HEART RATE: 73 BPM | OXYGEN SATURATION: 97 %

## 2024-09-24 DIAGNOSIS — Z09 POSTOP CHECK: Primary | ICD-10-CM

## 2024-09-24 PROCEDURE — 99024 POSTOP FOLLOW-UP VISIT: CPT | Performed by: OTOLARYNGOLOGY

## 2024-09-24 NOTE — PROGRESS NOTES
POST-OP NOTE  Chief Complaint   Patient presents with    Post-Op Check       HISTORY:     POD #7, post excision of TGD cyst on 9/17/2024.  Doing well, with no current complaints.      EXAMINATION:        Vitals:    09/24/24 1537   BP: 122/78   Pulse: 73   Temp: 98 °F (36.7 °C)   SpO2: 97%      WDWN, awake and alert, with no evidence of distress.  Neck:  Incision intact with mild subcutaneous induration more in the center of the incision with no erythema or edema or evidence of infection, hematoma or seroma.      PATHOLOGY:  Thyroglossal duct cyst, excision:  - Benign squamous epithelial-lined cyst with fibrosis, patchy chronic  inflammation, and evidence of previous hemorrhage.  - Negative for malignancy.    COMMENT:  The findings are consistent with the clinical impression of  thyroglossal duct cyst.  A portion of benign-appearing bone is also  received (gross examination only).        IMPRESSION / DIAGNOSES / ORDERS   Cyndi \"Deepak\" was seen today for post-op check.    Diagnoses and all orders for this visit:    Postop check    Doing well postop with no evidence of perioperative complication.      RECOMMENDATIONS / PLAN   Cyndi was advised to follow the post-op instructions in the after visit summary given after surgery.  Cyndi was advised to continue post-op medications as prescribed.    Return in about 5 weeks (around 10/28/2024) for post op check, and sooner if any post op problems.

## 2024-09-29 DIAGNOSIS — F41.9 ANXIETY: Primary | ICD-10-CM

## 2024-09-30 RX ORDER — ALPRAZOLAM 0.5 MG
0.5 TABLET ORAL 3 TIMES DAILY
Qty: 90 TABLET | Refills: 0 | Status: SHIPPED | OUTPATIENT
Start: 2024-09-30 | End: 2024-10-30

## 2024-09-30 NOTE — TELEPHONE ENCOUNTER
Recent Visits  Date Type Provider Dept   08/29/24 Office Visit Tavia Dias, APRN - [facility]NP [facility]   07/24/24 Office Visit Tavia Dias, APRN - [facility]   04/22/24 Office Visit Tavia Dias, APRN - [facility]   03/20/24 Office Visit Tavia Dias, APRN - [facility]   01/22/24 Office Visit Tavia Dias, APRN - [facility]   10/11/23 Office Visit Tavia Dias, APRN - [facility]   10/04/23 Office Visit Dipika Diasy, APRN - [facility]   08/04/23 Office Visit Tavia Dias, APRN - [facility]   07/19/23 Office Visit Tavia Dias, APRN - [facility]   04/19/23 Office Visit Tavia Dias, APRN - [facility]   Showing recent visits within past 540 days with a meds authorizing provider and meeting all other requirements  Future Appointments  Date Type Provider Dept   10/24/24 Appointment Tavia Dias, APRN - [facility]   10/30/24 Appointment Tavia Dias, APRN - [facility]   Showing future appointments within next 150 days with a meds authorizing provider and meeting all other requirements

## 2024-09-30 NOTE — TELEPHONE ENCOUNTER
1. Anxiety  -Continue ALPRAZolam (XANAX) 0.5 MG tablet; Take 1 tablet by mouth 3 times daily for 30 days. Max Daily Amount: 1.5 mg  Dispense: 90 tablet; Refill: 0     PDMP Monitoring:  Last PDMP Lionel as Reviewed:  Review User Review Instant Review Result   BRANDEN LOMAS 9/30/2024 11:04 AM Reviewed PDMP [1]

## 2024-10-18 DIAGNOSIS — E11.65 UNCONTROLLED TYPE 2 DIABETES MELLITUS WITH HYPERGLYCEMIA (HCC): ICD-10-CM

## 2024-10-18 RX ORDER — TIRZEPATIDE 12.5 MG/.5ML
INJECTION, SOLUTION SUBCUTANEOUS
Qty: 6 ML | Refills: 0 | Status: SHIPPED | OUTPATIENT
Start: 2024-10-18 | End: 2025-01-16

## 2024-10-24 ENCOUNTER — OFFICE VISIT (OUTPATIENT)
Dept: PRIMARY CARE CLINIC | Age: 74
End: 2024-10-24

## 2024-10-24 DIAGNOSIS — K21.9 GASTROESOPHAGEAL REFLUX DISEASE WITHOUT ESOPHAGITIS: ICD-10-CM

## 2024-10-24 DIAGNOSIS — E66.3 OVERWEIGHT WITH BODY MASS INDEX (BMI) OF 27 TO 27.9 IN ADULT: ICD-10-CM

## 2024-10-24 DIAGNOSIS — E03.9 ACQUIRED HYPOTHYROIDISM: ICD-10-CM

## 2024-10-24 DIAGNOSIS — E11.9 CONTROLLED TYPE 2 DIABETES MELLITUS WITHOUT COMPLICATION, WITHOUT LONG-TERM CURRENT USE OF INSULIN (HCC): Primary | ICD-10-CM

## 2024-10-24 DIAGNOSIS — F41.9 ANXIETY: ICD-10-CM

## 2024-10-24 DIAGNOSIS — I25.10 ATHEROSCLEROSIS OF NATIVE CORONARY ARTERY OF NATIVE HEART WITHOUT ANGINA PECTORIS: ICD-10-CM

## 2024-10-24 DIAGNOSIS — G40.909 SEIZURE DISORDER (HCC): ICD-10-CM

## 2024-10-24 DIAGNOSIS — I10 ESSENTIAL HYPERTENSION, BENIGN: ICD-10-CM

## 2024-10-24 DIAGNOSIS — F51.01 PRIMARY INSOMNIA: ICD-10-CM

## 2024-10-24 DIAGNOSIS — E78.2 MIXED HYPERLIPIDEMIA: ICD-10-CM

## 2024-10-24 DIAGNOSIS — J84.9 INTERSTITIAL LUNG DISEASE (HCC): ICD-10-CM

## 2024-10-24 DIAGNOSIS — F33.1 MODERATE EPISODE OF RECURRENT MAJOR DEPRESSIVE DISORDER (HCC): ICD-10-CM

## 2024-10-24 RX ORDER — METOPROLOL SUCCINATE 50 MG/1
TABLET, EXTENDED RELEASE ORAL
Qty: 90 TABLET | Refills: 4 | Status: SHIPPED | OUTPATIENT
Start: 2024-10-24

## 2024-10-24 RX ORDER — CARBAMAZEPINE 200 MG
TABLET ORAL
Qty: 180 TABLET | Refills: 1 | Status: SHIPPED | OUTPATIENT
Start: 2024-10-24

## 2024-10-24 RX ORDER — VILAZODONE HYDROCHLORIDE 20 MG/1
TABLET ORAL
Qty: 90 TABLET | Refills: 0 | Status: SHIPPED | OUTPATIENT
Start: 2024-10-24

## 2024-10-24 RX ORDER — MIRTAZAPINE 15 MG/1
TABLET, FILM COATED ORAL
Qty: 90 TABLET | Refills: 0 | Status: SHIPPED | OUTPATIENT
Start: 2024-10-24

## 2024-10-24 RX ORDER — VILAZODONE HYDROCHLORIDE 40 MG/1
TABLET ORAL
Qty: 90 TABLET | Refills: 0 | Status: SHIPPED | OUTPATIENT
Start: 2024-10-24

## 2024-10-24 RX ORDER — LEVOTHYROXINE SODIUM 100 UG/1
100 TABLET ORAL DAILY
Qty: 90 TABLET | Refills: 0 | Status: SHIPPED | OUTPATIENT
Start: 2024-10-24

## 2024-10-24 RX ORDER — EZETIMIBE 10 MG/1
10 TABLET ORAL NIGHTLY
Qty: 90 TABLET | Refills: 0 | Status: SHIPPED | OUTPATIENT
Start: 2024-10-24 | End: 2025-01-22

## 2024-10-24 RX ORDER — BUSPIRONE HYDROCHLORIDE 15 MG/1
15 TABLET ORAL 3 TIMES DAILY
Qty: 270 TABLET | Refills: 0 | Status: SHIPPED | OUTPATIENT
Start: 2024-10-24

## 2024-10-24 RX ORDER — ATORVASTATIN CALCIUM 80 MG/1
80 TABLET, FILM COATED ORAL DAILY
Qty: 90 TABLET | Refills: 0 | Status: SHIPPED | OUTPATIENT
Start: 2024-10-24

## 2024-10-24 RX ORDER — ALPRAZOLAM 0.5 MG
0.5 TABLET ORAL 3 TIMES DAILY
Qty: 90 TABLET | Refills: 0 | Status: SHIPPED | OUTPATIENT
Start: 2024-10-24 | End: 2024-11-23

## 2024-10-24 RX ORDER — PANTOPRAZOLE SODIUM 40 MG/1
TABLET, DELAYED RELEASE ORAL
Qty: 180 TABLET | Refills: 1 | Status: SHIPPED | OUTPATIENT
Start: 2024-10-24

## 2024-10-24 ASSESSMENT — ENCOUNTER SYMPTOMS
BLURRED VISION: 0
ABDOMINAL PAIN: 0
HEARTBURN: 0
SHORTNESS OF BREATH: 1
GLOBUS SENSATION: 0
VISUAL CHANGE: 0
WHEEZING: 0
COUGH: 0
NAUSEA: 0
WATER BRASH: 0
ORTHOPNEA: 0
BELCHING: 0

## 2024-10-24 NOTE — PROGRESS NOTES
TELEHEALTH EVALUATION:    10/24/2024    Chief Complaint   Patient presents with    3 Month Follow-Up      HTN, hyperlipidemia, diabetes, GERD, hypothyroidism, depression, anxiety, insomnia     Cyndi Mckeon is a 74 y.o. female, request telehealth visit for 3 month follow up of hypertension, hyperlipidemia, diabetes, GERD, hypothyroidism, depression, anxiety, insomnia, seizure disorder.   HPI         Hypertension  The problem is controlled. Associated symptoms include shortness of breath (chronic due to interstital lung disease). Pertinent negatives include no blurred vision, chest pain, headaches, malaise/fatigue, neck pain, orthopnea, palpitations, peripheral edema or PND. There are no associated agents to hypertension. Risk factors for coronary artery disease include diabetes mellitus, dyslipidemia, sedentary lifestyle, obesity and post-menopausal state. Treatments tried: metoprolol. The current treatment provides significant improvement. Compliance problems include exercise (Intolerant to exercise due to chronic shortness of breath).    Hyperlipidemia  The problem is controlled (LDL <70 (68). Recent lipid tests were reviewed and are normal (Last lipid panel 7/30/2024--> , trig 148, HDL 69, LDL 56). Exacerbating diseases include diabetes, hypothyroidism and obesity. There are no known factors aggravating her hyperlipidemia. Associated symptoms include shortness of breath (chronic due to interstital lung disease). Pertinent negatives include no chest pain, focal sensory loss, focal weakness, leg pain or myalgias. Current antihyperlipidemic treatment includes statins (lipitor). The current treatment provides significant improvement of lipids. Compliance problems include adherence to exercise.  Risk factors for coronary artery disease include diabetes mellitus, dyslipidemia, hypertension, a sedentary lifestyle, post-menopausal and obesity.   Gastroesophageal Reflux  She reports no abdominal pain, no

## 2024-10-28 ENCOUNTER — OFFICE VISIT (OUTPATIENT)
Dept: ENT CLINIC | Age: 74
End: 2024-10-28

## 2024-10-28 VITALS
BODY MASS INDEX: 27.42 KG/M2 | HEIGHT: 62 IN | WEIGHT: 149 LBS | HEART RATE: 83 BPM | TEMPERATURE: 97.9 F | SYSTOLIC BLOOD PRESSURE: 118 MMHG | OXYGEN SATURATION: 98 % | DIASTOLIC BLOOD PRESSURE: 77 MMHG

## 2024-10-28 DIAGNOSIS — Z09 POSTOP CHECK: Primary | ICD-10-CM

## 2024-10-28 PROCEDURE — 99024 POSTOP FOLLOW-UP VISIT: CPT | Performed by: OTOLARYNGOLOGY

## 2024-10-28 NOTE — PROGRESS NOTES
POST-OP NOTE  Chief Complaint   Patient presents with    Post-Op Check       HISTORY:     POD #41, post excision of TGD cyst on 9/17/2024.   Doing doing well, with no current complaints.      EXAMINATION:        Vitals:    10/28/24 0914   BP: 118/77   Pulse: 83   Temp: 97.9 °F (36.6 °C)   SpO2: 98%      WDWN, awake and alert, with no evidence of distress.  Voice normal with no dysarthria.  Neck:  Incision well healed with no evidence of residual mass or infection.  Neck otherwise normal.      PATHOLOGY:  FINAL DIAGNOSIS:    Thyroglossal duct cyst, excision:  - Benign squamous epithelial-lined cyst with fibrosis, patchy chronic  inflammation, and evidence of previous hemorrhage.  - Negative for malignancy.    COMMENT:  The findings are consistent with the clinical impression of  thyroglossal duct cyst.  A portion of benign-appearing bone is also  received (gross examination only).            IMPRESSION / DIAGNOSES / ORDERS   Cyndi \"Deepak\" was seen today for post-op check.    Diagnoses and all orders for this visit:    Postop check       Doing well postop with no evidence of perioperative complication.      RECOMMENDATIONS / PLAN   Return for any further ENT or sinus problems or symptoms.

## 2024-11-18 ENCOUNTER — OFFICE VISIT (OUTPATIENT)
Dept: CARDIOLOGY CLINIC | Age: 74
End: 2024-11-18
Payer: MEDICARE

## 2024-11-18 VITALS
HEIGHT: 62 IN | WEIGHT: 148 LBS | HEART RATE: 63 BPM | OXYGEN SATURATION: 93 % | SYSTOLIC BLOOD PRESSURE: 120 MMHG | BODY MASS INDEX: 27.23 KG/M2 | DIASTOLIC BLOOD PRESSURE: 72 MMHG

## 2024-11-18 DIAGNOSIS — I10 ESSENTIAL HYPERTENSION, BENIGN: ICD-10-CM

## 2024-11-18 DIAGNOSIS — R07.9 CHEST PAIN, UNSPECIFIED TYPE: ICD-10-CM

## 2024-11-18 DIAGNOSIS — E78.2 MIXED HYPERLIPIDEMIA: ICD-10-CM

## 2024-11-18 DIAGNOSIS — I25.10 ATHEROSCLEROSIS OF NATIVE CORONARY ARTERY OF NATIVE HEART WITHOUT ANGINA PECTORIS: Primary | ICD-10-CM

## 2024-11-18 PROCEDURE — 3017F COLORECTAL CA SCREEN DOC REV: CPT | Performed by: INTERNAL MEDICINE

## 2024-11-18 PROCEDURE — 1123F ACP DISCUSS/DSCN MKR DOCD: CPT | Performed by: INTERNAL MEDICINE

## 2024-11-18 PROCEDURE — G8417 CALC BMI ABV UP PARAM F/U: HCPCS | Performed by: INTERNAL MEDICINE

## 2024-11-18 PROCEDURE — 1036F TOBACCO NON-USER: CPT | Performed by: INTERNAL MEDICINE

## 2024-11-18 PROCEDURE — 3074F SYST BP LT 130 MM HG: CPT | Performed by: INTERNAL MEDICINE

## 2024-11-18 PROCEDURE — 1159F MED LIST DOCD IN RCRD: CPT | Performed by: INTERNAL MEDICINE

## 2024-11-18 PROCEDURE — G8484 FLU IMMUNIZE NO ADMIN: HCPCS | Performed by: INTERNAL MEDICINE

## 2024-11-18 PROCEDURE — 1090F PRES/ABSN URINE INCON ASSESS: CPT | Performed by: INTERNAL MEDICINE

## 2024-11-18 PROCEDURE — G8427 DOCREV CUR MEDS BY ELIG CLIN: HCPCS | Performed by: INTERNAL MEDICINE

## 2024-11-18 PROCEDURE — 99214 OFFICE O/P EST MOD 30 MIN: CPT | Performed by: INTERNAL MEDICINE

## 2024-11-18 PROCEDURE — 3078F DIAST BP <80 MM HG: CPT | Performed by: INTERNAL MEDICINE

## 2024-11-18 PROCEDURE — G8399 PT W/DXA RESULTS DOCUMENT: HCPCS | Performed by: INTERNAL MEDICINE

## 2024-11-18 PROCEDURE — 93000 ELECTROCARDIOGRAM COMPLETE: CPT | Performed by: INTERNAL MEDICINE

## 2024-11-18 RX ORDER — FUROSEMIDE 20 MG/1
20 TABLET ORAL DAILY
Qty: 90 TABLET | Refills: 4 | Status: SHIPPED | OUTPATIENT
Start: 2024-11-18

## 2024-11-18 NOTE — PROGRESS NOTES
[facility]  Cardiac Consult     Referring Provider:  Tavia Dias APRN - CNP     Chief Complaint   Patient presents with    6 Month Follow-Up    Hypertension    Hyperlipidemia        History of Present Illness:   74 y.o. female formally followed by Dr. Vaughan seen in f/u for mild CAD, hyperlipidemia and HTN. She is on chronic O2 for interstitial lung disease.    She was admitted 9/2020 with atypical chest pain. Stress myoview was normal. Another stress obtained 11/2021 for preop and was normal.  She has significant GI issues with GERD on protonix.  She was again with recurrent chest pain. Myoview repeated 5/2022 and normal.     She was found to have esophageal issues and  gastroparesis and underwent GI procedures.     She recently went on a cruise and became septic due to pneumonia. She was hospitalized and treated with antibiotics. She had mild troponin elevation felt to likely be secondary to sepsis (0.3=>0.25).     She was seen in f/u and felt well. ECHO with normal LV function.    She now says she is having chest pain again. Severe episodes about once a month/ Sudden onset in center of chest radiating to neck. No exacerbating or relieving factors. Occur at rest. She does not get this with exertion.     Past Medical History:   has a past medical history of Abnormal stress test, Anxiety, Arthritis, At high risk for falls, Chest pain, Chronic headaches, Class 1 obesity due to excess calories with serious comorbidity and body mass index (BMI) of 31.0 to 31.9 in adult, Depression, Eczema, Fibromyalgia, GERD (gastroesophageal reflux disease), History of blood transfusion, Hyperlipidemia, Hypertension, Impaired fasting glucose, Interstitial lung disease (HCC), Interstitial lung disease (HCC), Migraine headache, ERICKA (obstructive sleep apnea), Osteopenia, Osteopenia, Other screening mammogram, Other screening mammogram, Oxygen dependent, Pulmonary hypertension (HCC), Retention of urine, unspecified, S/P 
Attending Only

## 2024-11-24 DIAGNOSIS — F41.9 ANXIETY: Primary | ICD-10-CM

## 2024-11-25 RX ORDER — ALPRAZOLAM 0.5 MG
0.5 TABLET ORAL 3 TIMES DAILY PRN
Qty: 90 TABLET | Refills: 2 | Status: SHIPPED | OUTPATIENT
Start: 2024-11-25 | End: 2025-02-23

## 2024-11-25 NOTE — TELEPHONE ENCOUNTER
Recent Visits  Date Type Provider Dept   10/24/24 Office Visit Tavia Dias, APRN - CNP [facility]   08/29/24 Office Visit Tavia Dias, APRN - CNP [facility]   07/24/24 Office Visit Tavia Dias, APRN - CNP [facility]   04/22/24 Office Visit Tavia Dias, APRN - CNP [facility]   03/20/24 Office Visit Tavia Dais, APRN - CNP [facility]   01/22/24 Office Visit Tavia Dias, APRN - [facility]NP [facility]   10/11/23 Office Visit Tavia Dias, APRN - [facility]NP [facility]   10/04/23 Office Visit Tavia Dias, APRN - [facility]   08/04/23 Office Visit Tavia Dias, APRN - [facility]   07/19/23 Office Visit Tavia Dias, APRN - CNP [facility]   Showing recent visits within past 540 days with a meds authorizing provider and meeting all other requirements  Future Appointments  Date Type Provider Dept   01/06/25 Appointment Tavia Dias, APRN - CNP [facility]   01/24/25 Appointment Tavia Dias, APRN - CNP [facility]   Showing future appointments within next 150 days with a meds authorizing provider and meeting all other requirements

## 2024-11-25 NOTE — TELEPHONE ENCOUNTER
1. Anxiety  -Continue ALPRAZolam (XANAX) 0.5 MG tablet; Take 1 tablet by mouth 3 times daily as needed for Sleep for up to 90 days. Max Daily Amount: 1.5 mg  Dispense: 90 tablet; Refill: 2    PDMP Monitoring:  Last PDMP Lionel as Reviewed:  Review User Review Instant Review Result   BRANDEN LOMAS 9/30/2024 11:04 AM Reviewed PDMP [1]

## 2024-12-03 ENCOUNTER — TELEPHONE (OUTPATIENT)
Dept: CARDIOLOGY CLINIC | Age: 74
End: 2024-12-03

## 2024-12-03 ENCOUNTER — HOSPITAL ENCOUNTER (OUTPATIENT)
Age: 74
Discharge: HOME | End: 2024-12-05
Attending: INTERNAL MEDICINE
Payer: MEDICARE

## 2024-12-03 VITALS
HEIGHT: 62 IN | WEIGHT: 146 LBS | BODY MASS INDEX: 26.87 KG/M2 | DIASTOLIC BLOOD PRESSURE: 86 MMHG | SYSTOLIC BLOOD PRESSURE: 141 MMHG | HEART RATE: 76 BPM

## 2024-12-03 VITALS — HEIGHT: 62 IN | BODY MASS INDEX: 26.87 KG/M2 | WEIGHT: 146 LBS

## 2024-12-03 DIAGNOSIS — I25.10 ATHEROSCLEROSIS OF NATIVE CORONARY ARTERY OF NATIVE HEART WITHOUT ANGINA PECTORIS: ICD-10-CM

## 2024-12-03 DIAGNOSIS — R07.9 CHEST PAIN, UNSPECIFIED TYPE: ICD-10-CM

## 2024-12-03 LAB
ECHO BSA: 1.7 M2
NUC STRESS EJECTION FRACTION: 62 %
NUC STRESS LV EDV: 55 ML (ref 56–104)
NUC STRESS LV ESV: 21 ML (ref 19–49)
NUC STRESS LV MASS: 98 G
STRESS BASELINE DIAS BP: 86 MMHG
STRESS BASELINE HR: 76 BPM
STRESS BASELINE SYS BP: 141 MMHG
STRESS ESTIMATED WORKLOAD: 1 METS
STRESS EXERCISE DUR MIN: 4 MIN
STRESS EXERCISE DUR SEC: 0 SEC
STRESS O2 SAT PEAK: 98 %
STRESS O2 SAT REST: 98 %
STRESS PEAK DIAS BP: 68 MMHG
STRESS PEAK SYS BP: 95 MMHG
STRESS PERCENT HR ACHIEVED: 61 %
STRESS POST PEAK HR: 90 BPM
STRESS RATE PRESSURE PRODUCT: 8550 BPM*MMHG
STRESS TARGET HR: 147 BPM
TID: 0.92

## 2024-12-03 PROCEDURE — 93018 CV STRESS TEST I&R ONLY: CPT | Performed by: INTERNAL MEDICINE

## 2024-12-03 PROCEDURE — 6360000002 HC RX W HCPCS: Performed by: INTERNAL MEDICINE

## 2024-12-03 PROCEDURE — 3430000000 HC RX DIAGNOSTIC RADIOPHARMACEUTICAL: Performed by: INTERNAL MEDICINE

## 2024-12-03 PROCEDURE — A9502 TC99M TETROFOSMIN: HCPCS | Performed by: INTERNAL MEDICINE

## 2024-12-03 PROCEDURE — 78452 HT MUSCLE IMAGE SPECT MULT: CPT

## 2024-12-03 PROCEDURE — 93017 CV STRESS TEST TRACING ONLY: CPT

## 2024-12-03 PROCEDURE — 93016 CV STRESS TEST SUPVJ ONLY: CPT | Performed by: INTERNAL MEDICINE

## 2024-12-03 PROCEDURE — 78452 HT MUSCLE IMAGE SPECT MULT: CPT | Performed by: INTERNAL MEDICINE

## 2024-12-03 RX ORDER — AMINOPHYLLINE 25 MG/ML
100 INJECTION, SOLUTION INTRAVENOUS ONCE
Status: COMPLETED | OUTPATIENT
Start: 2024-12-03 | End: 2024-12-03

## 2024-12-03 RX ORDER — REGADENOSON 0.08 MG/ML
0.4 INJECTION, SOLUTION INTRAVENOUS
Status: COMPLETED | OUTPATIENT
Start: 2024-12-03 | End: 2024-12-03

## 2024-12-03 RX ADMIN — TETROFOSMIN 10.3 MILLICURIE: 1.38 INJECTION, POWDER, LYOPHILIZED, FOR SOLUTION INTRAVENOUS at 08:21

## 2024-12-03 RX ADMIN — TETROFOSMIN 31.8 MILLICURIE: 1.38 INJECTION, POWDER, LYOPHILIZED, FOR SOLUTION INTRAVENOUS at 09:55

## 2024-12-03 RX ADMIN — REGADENOSON 0.4 MG: 0.08 INJECTION, SOLUTION INTRAVENOUS at 09:58

## 2024-12-03 RX ADMIN — AMINOPHYLLINE 100 MG: 25 INJECTION, SOLUTION INTRAVENOUS at 09:58

## 2024-12-03 NOTE — TELEPHONE ENCOUNTER
----- Message from Dr. Lionel Oliveira MD sent at 12/3/2024 12:23 PM EST -----  Stress normal. F/u with GI. See me in January as planned.    MARY

## 2024-12-04 DIAGNOSIS — E78.2 MIXED HYPERLIPIDEMIA: ICD-10-CM

## 2024-12-04 DIAGNOSIS — E03.9 ACQUIRED HYPOTHYROIDISM: ICD-10-CM

## 2024-12-04 DIAGNOSIS — I10 ESSENTIAL HYPERTENSION, BENIGN: ICD-10-CM

## 2024-12-04 DIAGNOSIS — E11.9 CONTROLLED TYPE 2 DIABETES MELLITUS WITHOUT COMPLICATION, WITHOUT LONG-TERM CURRENT USE OF INSULIN (HCC): ICD-10-CM

## 2024-12-05 LAB
ALBUMIN SERPL-MCNC: 4.2 G/DL (ref 3.4–5)
ALBUMIN/GLOB SERPL: 1.6 {RATIO} (ref 1.1–2.2)
ALP SERPL-CCNC: 80 U/L (ref 40–129)
ALT SERPL-CCNC: 34 U/L (ref 10–40)
ANION GAP SERPL CALCULATED.3IONS-SCNC: 10 MMOL/L (ref 3–16)
AST SERPL-CCNC: 33 U/L (ref 15–37)
BILIRUB SERPL-MCNC: <0.2 MG/DL (ref 0–1)
BUN SERPL-MCNC: 19 MG/DL (ref 7–20)
CALCIUM SERPL-MCNC: 10.1 MG/DL (ref 8.3–10.6)
CHLORIDE SERPL-SCNC: 101 MMOL/L (ref 99–110)
CHOLEST SERPL-MCNC: 167 MG/DL (ref 0–199)
CO2 SERPL-SCNC: 32 MMOL/L (ref 21–32)
CREAT SERPL-MCNC: 0.9 MG/DL (ref 0.6–1.2)
EST. AVERAGE GLUCOSE BLD GHB EST-MCNC: 137 MG/DL
GFR SERPLBLD CREATININE-BSD FMLA CKD-EPI: 67 ML/MIN/{1.73_M2}
GLUCOSE P FAST SERPL-MCNC: 108 MG/DL (ref 70–99)
HBA1C MFR BLD: 6.4 %
HDLC SERPL-MCNC: 71 MG/DL (ref 40–60)
LDL CHOLESTEROL: 68 MG/DL
POTASSIUM SERPL-SCNC: 4.6 MMOL/L (ref 3.5–5.1)
PROT SERPL-MCNC: 6.9 G/DL (ref 6.4–8.2)
SODIUM SERPL-SCNC: 143 MMOL/L (ref 136–145)
T4 FREE SERPL-MCNC: 1.3 NG/DL (ref 0.9–1.8)
TRIGL SERPL-MCNC: 142 MG/DL (ref 0–150)
TSH SERPL DL<=0.005 MIU/L-ACNC: 0.36 UIU/ML (ref 0.27–4.2)
VLDLC SERPL CALC-MCNC: 28 MG/DL

## 2025-01-10 ENCOUNTER — OFFICE VISIT (OUTPATIENT)
Dept: CARDIOLOGY CLINIC | Age: 75
End: 2025-01-10
Payer: MEDICARE

## 2025-01-10 VITALS
HEIGHT: 62 IN | WEIGHT: 147 LBS | DIASTOLIC BLOOD PRESSURE: 62 MMHG | HEART RATE: 90 BPM | BODY MASS INDEX: 27.05 KG/M2 | OXYGEN SATURATION: 98 % | SYSTOLIC BLOOD PRESSURE: 114 MMHG

## 2025-01-10 DIAGNOSIS — I25.10 ATHEROSCLEROSIS OF NATIVE CORONARY ARTERY OF NATIVE HEART WITHOUT ANGINA PECTORIS: ICD-10-CM

## 2025-01-10 DIAGNOSIS — I10 ESSENTIAL HYPERTENSION, BENIGN: ICD-10-CM

## 2025-01-10 DIAGNOSIS — R07.9 CHEST PAIN, UNSPECIFIED TYPE: Primary | ICD-10-CM

## 2025-01-10 DIAGNOSIS — E78.2 MIXED HYPERLIPIDEMIA: ICD-10-CM

## 2025-01-10 PROCEDURE — G8399 PT W/DXA RESULTS DOCUMENT: HCPCS | Performed by: INTERNAL MEDICINE

## 2025-01-10 PROCEDURE — 1090F PRES/ABSN URINE INCON ASSESS: CPT | Performed by: INTERNAL MEDICINE

## 2025-01-10 PROCEDURE — 1159F MED LIST DOCD IN RCRD: CPT | Performed by: INTERNAL MEDICINE

## 2025-01-10 PROCEDURE — 1123F ACP DISCUSS/DSCN MKR DOCD: CPT | Performed by: INTERNAL MEDICINE

## 2025-01-10 PROCEDURE — 3078F DIAST BP <80 MM HG: CPT | Performed by: INTERNAL MEDICINE

## 2025-01-10 PROCEDURE — 1036F TOBACCO NON-USER: CPT | Performed by: INTERNAL MEDICINE

## 2025-01-10 PROCEDURE — 3017F COLORECTAL CA SCREEN DOC REV: CPT | Performed by: INTERNAL MEDICINE

## 2025-01-10 PROCEDURE — G8417 CALC BMI ABV UP PARAM F/U: HCPCS | Performed by: INTERNAL MEDICINE

## 2025-01-10 PROCEDURE — 99214 OFFICE O/P EST MOD 30 MIN: CPT | Performed by: INTERNAL MEDICINE

## 2025-01-10 PROCEDURE — 3074F SYST BP LT 130 MM HG: CPT | Performed by: INTERNAL MEDICINE

## 2025-01-10 PROCEDURE — G8427 DOCREV CUR MEDS BY ELIG CLIN: HCPCS | Performed by: INTERNAL MEDICINE

## 2025-01-10 NOTE — PROGRESS NOTES
[facility]  Cardiac Consult     Referring Provider:  Tavia Dias APRN - CNP     Chief Complaint   Patient presents with    Follow-up    Hypertension    Hyperlipidemia        History of Present Illness:   74 y.o. female formally followed by Dr. Vaughan seen in f/u for mild CAD, hyperlipidemia and HTN. She is on chronic O2 for interstitial lung disease.    She was admitted 9/2020 with atypical chest pain. Stress myoview was normal. Another stress obtained 11/2021 for preop and was normal.  She has significant GI issues with GERD on protonix.  She was again with recurrent chest pain. Myoview repeated 5/2022 and normal.     She was found to have esophageal issues and  gastroparesis and underwent GI procedures.     She recently went on a cruise and became septic due to pneumonia. She was hospitalized and treated with antibiotics. She had mild troponin elevation felt to likely be secondary to sepsis (0.3=>0.25).     She was seen in f/u and felt well. ECHO with normal LV function.    She was recently seen with episodes of chest pain.  Stress Myoview was obtained and was normal.  She has had significant GI and esophageal issues in the past.  I suspect that this is the etiology.  She was asked to make an appointment with GI to reevaluate but she has not done that yet.    Past Medical History:   has a past medical history of Abnormal stress test, Anxiety, Arthritis, At high risk for falls, Chest pain, Chronic headaches, Class 1 obesity due to excess calories with serious comorbidity and body mass index (BMI) of 31.0 to 31.9 in adult, Depression, Eczema, Fibromyalgia, GERD (gastroesophageal reflux disease), History of blood transfusion, Hyperlipidemia, Hypertension, Impaired fasting glucose, Interstitial lung disease (HCC), Interstitial lung disease (HCC), Migraine headache, REICKA (obstructive sleep apnea), Osteopenia, Osteopenia, Other screening mammogram, Other screening mammogram, Oxygen dependent, Pulmonary

## 2025-01-24 ENCOUNTER — OFFICE VISIT (OUTPATIENT)
Dept: PRIMARY CARE CLINIC | Age: 75
End: 2025-01-24
Payer: MEDICARE

## 2025-01-24 VITALS
RESPIRATION RATE: 19 BRPM | DIASTOLIC BLOOD PRESSURE: 70 MMHG | TEMPERATURE: 97.1 F | SYSTOLIC BLOOD PRESSURE: 112 MMHG | HEART RATE: 85 BPM | HEIGHT: 62 IN | WEIGHT: 143 LBS | BODY MASS INDEX: 26.31 KG/M2

## 2025-01-24 DIAGNOSIS — G40.909 SEIZURE DISORDER (HCC): ICD-10-CM

## 2025-01-24 DIAGNOSIS — F41.9 ANXIETY: ICD-10-CM

## 2025-01-24 DIAGNOSIS — K21.9 GASTROESOPHAGEAL REFLUX DISEASE WITHOUT ESOPHAGITIS: ICD-10-CM

## 2025-01-24 DIAGNOSIS — J84.9 INTERSTITIAL LUNG DISEASE (HCC): ICD-10-CM

## 2025-01-24 DIAGNOSIS — I25.10 ATHEROSCLEROSIS OF NATIVE CORONARY ARTERY OF NATIVE HEART WITHOUT ANGINA PECTORIS: ICD-10-CM

## 2025-01-24 DIAGNOSIS — E78.2 MIXED HYPERLIPIDEMIA: ICD-10-CM

## 2025-01-24 DIAGNOSIS — F51.01 PRIMARY INSOMNIA: ICD-10-CM

## 2025-01-24 DIAGNOSIS — E03.9 ACQUIRED HYPOTHYROIDISM: ICD-10-CM

## 2025-01-24 DIAGNOSIS — E11.9 CONTROLLED TYPE 2 DIABETES MELLITUS WITHOUT COMPLICATION, WITHOUT LONG-TERM CURRENT USE OF INSULIN (HCC): Primary | ICD-10-CM

## 2025-01-24 DIAGNOSIS — F33.1 MODERATE EPISODE OF RECURRENT MAJOR DEPRESSIVE DISORDER (HCC): ICD-10-CM

## 2025-01-24 DIAGNOSIS — I10 ESSENTIAL HYPERTENSION, BENIGN: ICD-10-CM

## 2025-01-24 PROBLEM — Q89.2 THYROGLOSSAL DUCT CYST: Status: RESOLVED | Noted: 2024-08-29 | Resolved: 2025-01-24

## 2025-01-24 PROBLEM — R22.1 NECK MASS: Status: RESOLVED | Noted: 2024-08-27 | Resolved: 2025-01-24

## 2025-01-24 PROCEDURE — G8417 CALC BMI ABV UP PARAM F/U: HCPCS | Performed by: NURSE PRACTITIONER

## 2025-01-24 PROCEDURE — 3074F SYST BP LT 130 MM HG: CPT | Performed by: NURSE PRACTITIONER

## 2025-01-24 PROCEDURE — 1123F ACP DISCUSS/DSCN MKR DOCD: CPT | Performed by: NURSE PRACTITIONER

## 2025-01-24 PROCEDURE — 99214 OFFICE O/P EST MOD 30 MIN: CPT | Performed by: NURSE PRACTITIONER

## 2025-01-24 PROCEDURE — 1036F TOBACCO NON-USER: CPT | Performed by: NURSE PRACTITIONER

## 2025-01-24 PROCEDURE — 1090F PRES/ABSN URINE INCON ASSESS: CPT | Performed by: NURSE PRACTITIONER

## 2025-01-24 PROCEDURE — 1159F MED LIST DOCD IN RCRD: CPT | Performed by: NURSE PRACTITIONER

## 2025-01-24 PROCEDURE — G8427 DOCREV CUR MEDS BY ELIG CLIN: HCPCS | Performed by: NURSE PRACTITIONER

## 2025-01-24 PROCEDURE — 3017F COLORECTAL CA SCREEN DOC REV: CPT | Performed by: NURSE PRACTITIONER

## 2025-01-24 PROCEDURE — 3078F DIAST BP <80 MM HG: CPT | Performed by: NURSE PRACTITIONER

## 2025-01-24 PROCEDURE — 3046F HEMOGLOBIN A1C LEVEL >9.0%: CPT | Performed by: NURSE PRACTITIONER

## 2025-01-24 PROCEDURE — G8399 PT W/DXA RESULTS DOCUMENT: HCPCS | Performed by: NURSE PRACTITIONER

## 2025-01-24 PROCEDURE — 2022F DILAT RTA XM EVC RTNOPTHY: CPT | Performed by: NURSE PRACTITIONER

## 2025-01-24 RX ORDER — VILAZODONE HYDROCHLORIDE 20 MG/1
TABLET ORAL
Qty: 90 TABLET | Refills: 0 | Status: SHIPPED | OUTPATIENT
Start: 2025-01-24

## 2025-01-24 RX ORDER — CARBAMAZEPINE 200 MG
TABLET ORAL
Qty: 180 TABLET | Refills: 1 | Status: SHIPPED | OUTPATIENT
Start: 2025-01-24

## 2025-01-24 RX ORDER — FUROSEMIDE 20 MG/1
20 TABLET ORAL DAILY
Qty: 90 TABLET | Refills: 4 | Status: SHIPPED | OUTPATIENT
Start: 2025-01-24

## 2025-01-24 RX ORDER — PANTOPRAZOLE SODIUM 40 MG/1
TABLET, DELAYED RELEASE ORAL
Qty: 180 TABLET | Refills: 1 | Status: SHIPPED | OUTPATIENT
Start: 2025-01-24

## 2025-01-24 RX ORDER — EZETIMIBE 10 MG/1
10 TABLET ORAL NIGHTLY
Qty: 90 TABLET | Refills: 0 | Status: SHIPPED | OUTPATIENT
Start: 2025-01-24 | End: 2025-04-24

## 2025-01-24 RX ORDER — MIRTAZAPINE 15 MG/1
TABLET, FILM COATED ORAL
Qty: 90 TABLET | Refills: 0 | Status: SHIPPED | OUTPATIENT
Start: 2025-01-24

## 2025-01-24 RX ORDER — VILAZODONE HYDROCHLORIDE 40 MG/1
TABLET ORAL
Qty: 90 TABLET | Refills: 0 | Status: SHIPPED | OUTPATIENT
Start: 2025-01-24

## 2025-01-24 RX ORDER — LEVOTHYROXINE SODIUM 100 UG/1
100 TABLET ORAL DAILY
Qty: 90 TABLET | Refills: 0 | Status: SHIPPED | OUTPATIENT
Start: 2025-01-24

## 2025-01-24 RX ORDER — BUSPIRONE HYDROCHLORIDE 15 MG/1
15 TABLET ORAL 3 TIMES DAILY
Qty: 270 TABLET | Refills: 0 | Status: SHIPPED | OUTPATIENT
Start: 2025-01-24

## 2025-01-24 RX ORDER — ALPRAZOLAM 0.5 MG
0.5 TABLET ORAL 3 TIMES DAILY PRN
Qty: 90 TABLET | Refills: 2 | Status: SHIPPED | OUTPATIENT
Start: 2025-01-24 | End: 2025-04-24

## 2025-01-24 RX ORDER — METOPROLOL SUCCINATE 50 MG/1
TABLET, EXTENDED RELEASE ORAL
Qty: 90 TABLET | Refills: 4 | Status: SHIPPED | OUTPATIENT
Start: 2025-01-24

## 2025-01-24 RX ORDER — ATORVASTATIN CALCIUM 80 MG/1
80 TABLET, FILM COATED ORAL DAILY
Qty: 90 TABLET | Refills: 0 | Status: SHIPPED | OUTPATIENT
Start: 2025-01-24

## 2025-01-24 SDOH — ECONOMIC STABILITY: FOOD INSECURITY: WITHIN THE PAST 12 MONTHS, YOU WORRIED THAT YOUR FOOD WOULD RUN OUT BEFORE YOU GOT MONEY TO BUY MORE.: NEVER TRUE

## 2025-01-24 SDOH — ECONOMIC STABILITY: FOOD INSECURITY: WITHIN THE PAST 12 MONTHS, THE FOOD YOU BOUGHT JUST DIDN'T LAST AND YOU DIDN'T HAVE MONEY TO GET MORE.: NEVER TRUE

## 2025-01-24 ASSESSMENT — ENCOUNTER SYMPTOMS
SHORTNESS OF BREATH: 1
WATER BRASH: 0
GLOBUS SENSATION: 0
HEARTBURN: 0
BELCHING: 0
ORTHOPNEA: 0
NAUSEA: 0
VISUAL CHANGE: 0
ABDOMINAL PAIN: 0
BLURRED VISION: 0
COUGH: 0
WHEEZING: 0

## 2025-01-24 NOTE — PROGRESS NOTES
Speech: Speech normal.         Behavior: Behavior normal.         Thought Content: Thought content normal. Thought content is not paranoid or delusional. Thought content does not include homicidal or suicidal ideation. Thought content does not include homicidal or suicidal plan.         Cognition and Memory: Cognition normal.         ASSESSMENT/PLAN:  1. Controlled type 2 diabetes mellitus without complication, without long-term current use of insulin (HCC)  - Controlled  - A1C 6.4% in December 2024 (down from April 2024--> 7.4%)  -Continue tirzepatide 15 MG/0.5ML SOAJ; Inject 15 mLs into the skin every 7 days  Dispense: 2 mL; Refill: 2  -Continue empagliflozin (JARDIANCE) 10 MG tablet; Take 1 tablet by mouth daily  Dispense: 90 tablet; Refill: 0  - Hemoglobin A1C; Future    2. Essential hypertension, benign  -Controlled  -Follows with cardiologist, Dr. Oliveira  -/70 in office today  -Continue metoprolol succinate (TOPROL XL) 50 MG extended release tablet; TAKE ONE TABLET BY MOUTH DAILY  Dispense: 90 tablet; Refill: 4  - Comprehensive Metabolic Panel, Fasting; Future    3. Mixed hyperlipidemia  - Controlled  -Follows with cardiologist, Dr. Oliveira  - Last Lipid panel 12/4/2024--> , trig 142, HDL 71, LDL 68  - LDL at goal <70 (68)  -Continue atorvastatin (LIPITOR) 80 MG tablet; Take 1 tablet by mouth daily  Dispense: 90 tablet; Refill: 0  -Continue ezetimibe (ZETIA) 10 MG tablet; Take 1 tablet by mouth at bedtime  Dispense: 90 tablet; Refill: 0  - Lipid, Fasting; Future    4. Acquired hypothyroidism  - Stable  -Continue levothyroxine (SYNTHROID) 100 MCG tablet; Take 1 tablet by mouth daily  Dispense: 90 tablet; Refill: 0  - TSH reflex to FT4,FT3; Future    5. Anxiety  -Controlled  -Continue ALPRAZolam (XANAX) 0.5 MG tablet; Take 1 tablet by mouth 3 times daily as needed for Sleep for up to 90 days. Max Daily Amount: 1.5 mg  Dispense: 90 tablet; Refill: 2  -Continue busPIRone (BUSPAR) 15 MG tablet; Take

## 2025-01-27 DIAGNOSIS — F51.01 PRIMARY INSOMNIA: ICD-10-CM

## 2025-01-27 RX ORDER — MIRTAZAPINE 15 MG/1
TABLET, FILM COATED ORAL
Qty: 90 TABLET | Refills: 0 | Status: SHIPPED | OUTPATIENT
Start: 2025-01-27

## 2025-01-27 NOTE — TELEPHONE ENCOUNTER
Recent Visits  Date Type Provider Dept   01/24/25 Office Visit Tavia Dias, APRN - CNP [facility]   10/24/24 Office Visit Tavia Dias, APRN - [facility]NP [facility]   08/29/24 Office Visit Tavia Dias, APRN - [facility]NP [facility]   07/24/24 Office Visit Tavia Dias, APRN - [facility]NP [facility]   04/22/24 Office Visit Tavia Dias, APRN - [facility]   03/20/24 Office Visit Tavia Dias, APRN - [facility]   01/22/24 Office Visit Tavia Dias, APRN - [facility]   10/11/23 Office Visit Tavia Dias, APRN - [facility]NP [facility]   10/04/23 Office Visit Tavia Dias, APRN - [facility]NP [facility]   Showing recent visits within past 540 days with a meds authorizing provider and meeting all other requirements  Future Appointments  Date Type Provider Dept   05/02/25 Appointment Tavia Dias, APRN - CNP [facility]   Showing future appointments within next 150 days with a meds authorizing provider and meeting all other requirements

## 2025-03-07 ENCOUNTER — CLINICAL DOCUMENTATION (OUTPATIENT)
Dept: OTHER | Facility: CLINIC | Age: 75
End: 2025-03-07

## 2025-03-07 ENCOUNTER — PATIENT MESSAGE (OUTPATIENT)
Dept: PRIMARY CARE CLINIC | Age: 75
End: 2025-03-07

## 2025-03-07 DIAGNOSIS — F41.9 SEVERE ANXIETY: Primary | ICD-10-CM

## 2025-03-07 RX ORDER — LORAZEPAM 0.5 MG/1
0.5 TABLET ORAL EVERY 8 HOURS PRN
Qty: 90 TABLET | Refills: 0 | Status: SHIPPED | OUTPATIENT
Start: 2025-03-07 | End: 2025-04-06

## 2025-04-29 DIAGNOSIS — E78.2 MIXED HYPERLIPIDEMIA: ICD-10-CM

## 2025-04-29 DIAGNOSIS — E11.9 CONTROLLED TYPE 2 DIABETES MELLITUS WITHOUT COMPLICATION, WITHOUT LONG-TERM CURRENT USE OF INSULIN (HCC): ICD-10-CM

## 2025-04-29 DIAGNOSIS — I10 ESSENTIAL HYPERTENSION, BENIGN: ICD-10-CM

## 2025-04-29 DIAGNOSIS — E03.9 ACQUIRED HYPOTHYROIDISM: ICD-10-CM

## 2025-04-29 LAB
ALBUMIN SERPL-MCNC: 4 G/DL (ref 3.4–5)
ALBUMIN/GLOB SERPL: 1.5 {RATIO} (ref 1.1–2.2)
ALP SERPL-CCNC: 104 U/L (ref 40–129)
ALT SERPL-CCNC: 25 U/L (ref 10–40)
ANION GAP SERPL CALCULATED.3IONS-SCNC: 12 MMOL/L (ref 3–16)
AST SERPL-CCNC: 31 U/L (ref 15–37)
BILIRUB SERPL-MCNC: <0.2 MG/DL (ref 0–1)
BUN SERPL-MCNC: 20 MG/DL (ref 7–20)
CALCIUM SERPL-MCNC: 9.7 MG/DL (ref 8.3–10.6)
CHLORIDE SERPL-SCNC: 100 MMOL/L (ref 99–110)
CHOLEST SERPL-MCNC: 171 MG/DL (ref 0–199)
CO2 SERPL-SCNC: 30 MMOL/L (ref 21–32)
CREAT SERPL-MCNC: 0.8 MG/DL (ref 0.6–1.2)
GFR SERPLBLD CREATININE-BSD FMLA CKD-EPI: 77 ML/MIN/{1.73_M2}
GLUCOSE P FAST SERPL-MCNC: 117 MG/DL (ref 70–99)
HDLC SERPL-MCNC: 74 MG/DL (ref 40–60)
LDL CHOLESTEROL: 80 MG/DL
POTASSIUM SERPL-SCNC: 3.7 MMOL/L (ref 3.5–5.1)
PROT SERPL-MCNC: 6.7 G/DL (ref 6.4–8.2)
SODIUM SERPL-SCNC: 142 MMOL/L (ref 136–145)
T3 SERPL-MCNC: 0.85 NG/ML (ref 0.8–2)
T4 FREE SERPL-MCNC: 1.2 NG/DL (ref 0.9–1.8)
TRIGL SERPL-MCNC: 83 MG/DL (ref 0–150)
TSH SERPL DL<=0.005 MIU/L-ACNC: 0.25 UIU/ML (ref 0.27–4.2)
VLDLC SERPL CALC-MCNC: 17 MG/DL

## 2025-04-29 SDOH — ECONOMIC STABILITY: FOOD INSECURITY: WITHIN THE PAST 12 MONTHS, YOU WORRIED THAT YOUR FOOD WOULD RUN OUT BEFORE YOU GOT MONEY TO BUY MORE.: NEVER TRUE

## 2025-04-29 SDOH — ECONOMIC STABILITY: INCOME INSECURITY: IN THE LAST 12 MONTHS, WAS THERE A TIME WHEN YOU WERE NOT ABLE TO PAY THE MORTGAGE OR RENT ON TIME?: NO

## 2025-04-29 SDOH — HEALTH STABILITY: PHYSICAL HEALTH: ON AVERAGE, HOW MANY MINUTES DO YOU ENGAGE IN EXERCISE AT THIS LEVEL?: 20 MIN

## 2025-04-29 SDOH — ECONOMIC STABILITY: FOOD INSECURITY: WITHIN THE PAST 12 MONTHS, THE FOOD YOU BOUGHT JUST DIDN'T LAST AND YOU DIDN'T HAVE MONEY TO GET MORE.: NEVER TRUE

## 2025-04-29 SDOH — HEALTH STABILITY: PHYSICAL HEALTH: ON AVERAGE, HOW MANY DAYS PER WEEK DO YOU ENGAGE IN MODERATE TO STRENUOUS EXERCISE (LIKE A BRISK WALK)?: 3 DAYS

## 2025-04-29 SDOH — ECONOMIC STABILITY: TRANSPORTATION INSECURITY
IN THE PAST 12 MONTHS, HAS THE LACK OF TRANSPORTATION KEPT YOU FROM MEDICAL APPOINTMENTS OR FROM GETTING MEDICATIONS?: NO

## 2025-04-29 SDOH — ECONOMIC STABILITY: TRANSPORTATION INSECURITY
IN THE PAST 12 MONTHS, HAS LACK OF TRANSPORTATION KEPT YOU FROM MEETINGS, WORK, OR FROM GETTING THINGS NEEDED FOR DAILY LIVING?: NO

## 2025-04-29 ASSESSMENT — PATIENT HEALTH QUESTIONNAIRE - PHQ9
SUM OF ALL RESPONSES TO PHQ QUESTIONS 1-9: 1
1. LITTLE INTEREST OR PLEASURE IN DOING THINGS: NOT AT ALL
SUM OF ALL RESPONSES TO PHQ QUESTIONS 1-9: 1
2. FEELING DOWN, DEPRESSED OR HOPELESS: SEVERAL DAYS

## 2025-04-29 ASSESSMENT — LIFESTYLE VARIABLES
HOW OFTEN DO YOU HAVE SIX OR MORE DRINKS ON ONE OCCASION: 1
HOW OFTEN DO YOU HAVE A DRINK CONTAINING ALCOHOL: 2
HOW OFTEN DO YOU HAVE A DRINK CONTAINING ALCOHOL: MONTHLY OR LESS
HOW MANY STANDARD DRINKS CONTAINING ALCOHOL DO YOU HAVE ON A TYPICAL DAY: 1
HOW MANY STANDARD DRINKS CONTAINING ALCOHOL DO YOU HAVE ON A TYPICAL DAY: 1 OR 2

## 2025-04-30 LAB
EST. AVERAGE GLUCOSE BLD GHB EST-MCNC: 125.5 MG/DL
HBA1C MFR BLD: 6 %

## 2025-05-02 ENCOUNTER — OFFICE VISIT (OUTPATIENT)
Dept: PRIMARY CARE CLINIC | Age: 75
End: 2025-05-02
Payer: MEDICARE

## 2025-05-02 ENCOUNTER — RESULTS FOLLOW-UP (OUTPATIENT)
Dept: PRIMARY CARE CLINIC | Age: 75
End: 2025-05-02

## 2025-05-02 VITALS
SYSTOLIC BLOOD PRESSURE: 122 MMHG | DIASTOLIC BLOOD PRESSURE: 74 MMHG | OXYGEN SATURATION: 98 % | WEIGHT: 141 LBS | HEIGHT: 62 IN | BODY MASS INDEX: 25.95 KG/M2 | HEART RATE: 80 BPM

## 2025-05-02 DIAGNOSIS — Z00.00 MEDICARE ANNUAL WELLNESS VISIT, SUBSEQUENT: Primary | ICD-10-CM

## 2025-05-02 DIAGNOSIS — F51.01 PRIMARY INSOMNIA: ICD-10-CM

## 2025-05-02 DIAGNOSIS — I10 ESSENTIAL HYPERTENSION, BENIGN: ICD-10-CM

## 2025-05-02 DIAGNOSIS — E11.9 ENCOUNTER FOR DIABETIC FOOT EXAM (HCC): ICD-10-CM

## 2025-05-02 DIAGNOSIS — K21.9 GASTROESOPHAGEAL REFLUX DISEASE WITHOUT ESOPHAGITIS: ICD-10-CM

## 2025-05-02 DIAGNOSIS — R29.6 FREQUENT FALLS: ICD-10-CM

## 2025-05-02 DIAGNOSIS — I25.10 ATHEROSCLEROSIS OF NATIVE CORONARY ARTERY OF NATIVE HEART WITHOUT ANGINA PECTORIS: ICD-10-CM

## 2025-05-02 DIAGNOSIS — J84.9 INTERSTITIAL LUNG DISEASE (HCC): ICD-10-CM

## 2025-05-02 DIAGNOSIS — R26.89 IMBALANCE: ICD-10-CM

## 2025-05-02 DIAGNOSIS — F33.1 MODERATE EPISODE OF RECURRENT MAJOR DEPRESSIVE DISORDER (HCC): ICD-10-CM

## 2025-05-02 DIAGNOSIS — G40.909 SEIZURE DISORDER (HCC): ICD-10-CM

## 2025-05-02 DIAGNOSIS — F41.9 ANXIETY: ICD-10-CM

## 2025-05-02 DIAGNOSIS — E03.9 ACQUIRED HYPOTHYROIDISM: ICD-10-CM

## 2025-05-02 DIAGNOSIS — E11.9 CONTROLLED TYPE 2 DIABETES MELLITUS WITHOUT COMPLICATION, WITHOUT LONG-TERM CURRENT USE OF INSULIN (HCC): ICD-10-CM

## 2025-05-02 DIAGNOSIS — E78.2 MIXED HYPERLIPIDEMIA: ICD-10-CM

## 2025-05-02 PROBLEM — E66.3 OVERWEIGHT WITH BODY MASS INDEX (BMI) OF 27 TO 27.9 IN ADULT: Status: RESOLVED | Noted: 2024-07-24 | Resolved: 2025-05-02

## 2025-05-02 PROCEDURE — 3017F COLORECTAL CA SCREEN DOC REV: CPT | Performed by: NURSE PRACTITIONER

## 2025-05-02 PROCEDURE — 99214 OFFICE O/P EST MOD 30 MIN: CPT | Performed by: NURSE PRACTITIONER

## 2025-05-02 PROCEDURE — G0439 PPPS, SUBSEQ VISIT: HCPCS | Performed by: NURSE PRACTITIONER

## 2025-05-02 PROCEDURE — 3078F DIAST BP <80 MM HG: CPT | Performed by: NURSE PRACTITIONER

## 2025-05-02 PROCEDURE — 1036F TOBACCO NON-USER: CPT | Performed by: NURSE PRACTITIONER

## 2025-05-02 PROCEDURE — 1159F MED LIST DOCD IN RCRD: CPT | Performed by: NURSE PRACTITIONER

## 2025-05-02 PROCEDURE — G8399 PT W/DXA RESULTS DOCUMENT: HCPCS | Performed by: NURSE PRACTITIONER

## 2025-05-02 PROCEDURE — 3074F SYST BP LT 130 MM HG: CPT | Performed by: NURSE PRACTITIONER

## 2025-05-02 PROCEDURE — G8427 DOCREV CUR MEDS BY ELIG CLIN: HCPCS | Performed by: NURSE PRACTITIONER

## 2025-05-02 PROCEDURE — 2022F DILAT RTA XM EVC RTNOPTHY: CPT | Performed by: NURSE PRACTITIONER

## 2025-05-02 PROCEDURE — 3044F HG A1C LEVEL LT 7.0%: CPT | Performed by: NURSE PRACTITIONER

## 2025-05-02 PROCEDURE — G8417 CALC BMI ABV UP PARAM F/U: HCPCS | Performed by: NURSE PRACTITIONER

## 2025-05-02 PROCEDURE — 1123F ACP DISCUSS/DSCN MKR DOCD: CPT | Performed by: NURSE PRACTITIONER

## 2025-05-02 PROCEDURE — 1090F PRES/ABSN URINE INCON ASSESS: CPT | Performed by: NURSE PRACTITIONER

## 2025-05-02 RX ORDER — LAMOTRIGINE 25 MG/1
50 TABLET ORAL 2 TIMES DAILY
COMMUNITY

## 2025-05-02 RX ORDER — PANTOPRAZOLE SODIUM 40 MG/1
TABLET, DELAYED RELEASE ORAL
Qty: 180 TABLET | Refills: 1 | Status: SHIPPED | OUTPATIENT
Start: 2025-05-02

## 2025-05-02 RX ORDER — CARBAMAZEPINE 200 MG
TABLET ORAL
Qty: 180 TABLET | Refills: 1 | Status: CANCELLED | OUTPATIENT
Start: 2025-05-02

## 2025-05-02 RX ORDER — EZETIMIBE 10 MG/1
10 TABLET ORAL NIGHTLY
Qty: 90 TABLET | Refills: 0 | Status: SHIPPED | OUTPATIENT
Start: 2025-05-02 | End: 2025-07-31

## 2025-05-02 RX ORDER — ATORVASTATIN CALCIUM 80 MG/1
80 TABLET, FILM COATED ORAL DAILY
Qty: 90 TABLET | Refills: 0 | Status: SHIPPED | OUTPATIENT
Start: 2025-05-02

## 2025-05-02 RX ORDER — LORAZEPAM 0.5 MG/1
0.5 TABLET ORAL 2 TIMES DAILY
COMMUNITY
Start: 2025-04-30 | End: 2025-07-29

## 2025-05-02 RX ORDER — VILAZODONE HYDROCHLORIDE 40 MG/1
TABLET ORAL
Qty: 90 TABLET | Refills: 0 | Status: CANCELLED | OUTPATIENT
Start: 2025-05-02

## 2025-05-02 RX ORDER — VILAZODONE HYDROCHLORIDE 20 MG/1
TABLET ORAL
Qty: 90 TABLET | Refills: 0 | Status: CANCELLED | OUTPATIENT
Start: 2025-05-02

## 2025-05-02 RX ORDER — METOPROLOL SUCCINATE 50 MG/1
TABLET, EXTENDED RELEASE ORAL
Qty: 90 TABLET | Refills: 4 | Status: SHIPPED | OUTPATIENT
Start: 2025-05-02

## 2025-05-02 RX ORDER — ESCITALOPRAM OXALATE 10 MG/1
5 TABLET ORAL DAILY
COMMUNITY

## 2025-05-02 RX ORDER — FUROSEMIDE 20 MG/1
20 TABLET ORAL DAILY
Qty: 90 TABLET | Refills: 4 | Status: SHIPPED | OUTPATIENT
Start: 2025-05-02

## 2025-05-02 RX ORDER — LEVOTHYROXINE SODIUM 100 UG/1
100 TABLET ORAL DAILY
Qty: 90 TABLET | Refills: 0 | Status: SHIPPED | OUTPATIENT
Start: 2025-05-02

## 2025-05-02 RX ORDER — BUSPIRONE HYDROCHLORIDE 15 MG/1
15 TABLET ORAL 3 TIMES DAILY
Qty: 270 TABLET | Refills: 0 | Status: CANCELLED | OUTPATIENT
Start: 2025-05-02

## 2025-05-02 RX ORDER — MIRTAZAPINE 15 MG/1
TABLET, FILM COATED ORAL
Qty: 90 TABLET | Refills: 0 | Status: CANCELLED | OUTPATIENT
Start: 2025-05-02

## 2025-05-02 NOTE — RESULT ENCOUNTER NOTE
Results reviewed with patient and her daughter during office visit on 5/2/2025    Resolved stable, continue current medications

## 2025-05-02 NOTE — RESULT ENCOUNTER NOTE
Healthy diabetic foot exam, bilateral feet with results reviewed with patient and daughter during office visit on 5/2/2025 during exam.

## 2025-05-02 NOTE — PROGRESS NOTES
CESAR Squires CNP   busPIRone (BUSPAR) 15 MG tablet Take 15 mg by mouth 3 times daily Yes Tavia Dias APRN - CNP   Cholecalciferol (VITAMIN D-3) 25 MCG (1000 UT) CAPS Take by mouth daily Yes Cuca Brandt MD   blood glucose test strips (ONETOUCH VERIO) strip Use 1 testing strip to test blood sugar daily. Check blood sugar more frequently with with signs of hypoglycemia or hyperglycemia. Yes Tavia Dias APRN - CNP   Calcium 600-200 MG-UNIT TABS Take 2 tablets by mouth once daily Yes Tavia Dias APRN - CNP   ascorbic acid (VITAMIN C) 500 MG tablet Take 1 tablet by mouth daily Yes Cuca Brandt MD   Zinc 100 MG TABS Take 100 mg by mouth daily Yes Cuca Brandt MD   Blood Glucose Monitoring Suppl (ONETOUCH VERIO) w/Device KIT Test once daily Yes Tavia Dias APRN - CNP   OXYGEN Inhale 6 L/day into the lungs Yes Tavia Dias APRN - CNP   vitamin B-12 (CYANOCOBALAMIN) 500 MCG tablet Take 1 tablet by mouth daily Yes Cuca Brandt MD   predniSONE (DELTASONE) 5 MG tablet Take 1 tablet by mouth daily Yes Cuca Brandt MD   magnesium (MAGNESIUM-OXIDE) 250 MG TABS tablet Take 1 tablet by mouth daily Yes Cuca Brandt MD   aspirin 81 MG tablet Take 1 tablet by mouth daily Yes Cuca Brandt MD   TEGRETOL 200 MG tablet TAKE ONE TABLET BY MOUTH TWICE A DAY  Tavia Dias APRN - CNP   azaTHIOprine (IMURAN) 50 MG tablet Take 1 tablet by mouth daily  Cuca Brandt MD CareTeam (Including outside providers/suppliers regularly involved in providing care):   Patient Care Team:  Tavia Dias APRN - CNP as PCP - General (Nurse Practitioner)  Tavia Dias APRN - CNP as PCP - Empaneled Provider     Recommendations for Preventive Services Due: see orders and patient instructions/AVS.  Recommended screening schedule for the next 5-10 years is provided to the patient in written form: see Patient Instructions/AVS.     Reviewed and updated this visit:  Tobacco

## 2025-06-12 ENCOUNTER — PATIENT MESSAGE (OUTPATIENT)
Dept: PRIMARY CARE CLINIC | Age: 75
End: 2025-06-12

## 2025-06-12 DIAGNOSIS — G25.81 RESTLESS LEG SYNDROME: Primary | ICD-10-CM

## 2025-06-13 RX ORDER — ROPINIROLE 2 MG/1
TABLET, FILM COATED ORAL
Qty: 90 TABLET | Refills: 0 | Status: SHIPPED | OUTPATIENT
Start: 2025-06-13

## 2025-06-13 RX ORDER — ROPINIROLE 2 MG/1
TABLET, FILM COATED ORAL
Qty: 90 TABLET | Refills: 0 | Status: SHIPPED | OUTPATIENT
Start: 2025-06-13 | End: 2025-06-13

## 2025-06-13 NOTE — TELEPHONE ENCOUNTER
1. Restless leg syndrome  - Start rOPINIRole (REQUIP) 2 MG tablet; Take 1 tablet by mouth every night, 1-3 hours prior to bedtime.  Dispense: 90 tablet; Refill: 0

## 2025-06-27 ENCOUNTER — TELEPHONE (OUTPATIENT)
Dept: PRIMARY CARE CLINIC | Age: 75
End: 2025-06-27

## 2025-07-10 ENCOUNTER — TELEPHONE (OUTPATIENT)
Dept: ADMINISTRATIVE | Age: 75
End: 2025-07-10

## 2025-07-10 NOTE — TELEPHONE ENCOUNTER
Submitted PA for Mounjaro 15MG/0.5ML auto-injectors  Via CMM Key: [id number] STATUS: Approval    Request Reference Number: [id number]. MOUNJARO INJ 15MG/0.5 is approved through 07/09/2026. Your patient may now fill this prescription and it will be covered.. Authorization Expiration Date: July 9, 2026.     If this requires a response please respond to the pool. (P [facility] MEDICINE Pre-Auth).    Please advise patient thank you.

## 2025-08-05 ENCOUNTER — HOSPITAL ENCOUNTER (OUTPATIENT)
Dept: MAMMOGRAPHY | Age: 75
Discharge: HOME | End: 2025-08-05
Payer: MEDICARE

## 2025-08-05 DIAGNOSIS — Z12.31 ENCOUNTER FOR SCREENING MAMMOGRAM FOR BREAST CANCER: ICD-10-CM

## 2025-08-05 PROCEDURE — 77063 BREAST TOMOSYNTHESIS BI: CPT

## 2025-08-18 ENCOUNTER — TELEPHONE (OUTPATIENT)
Dept: PRIMARY CARE CLINIC | Age: 75
End: 2025-08-18

## 2025-08-18 ENCOUNTER — OFFICE VISIT (OUTPATIENT)
Dept: PRIMARY CARE CLINIC | Age: 75
End: 2025-08-18

## 2025-08-18 VITALS
DIASTOLIC BLOOD PRESSURE: 62 MMHG | SYSTOLIC BLOOD PRESSURE: 112 MMHG | BODY MASS INDEX: 26.39 KG/M2 | OXYGEN SATURATION: 98 % | WEIGHT: 143.4 LBS | HEIGHT: 62 IN | HEART RATE: 80 BPM

## 2025-08-18 DIAGNOSIS — R22.2 SUBCUTANEOUS NODULE OF ABDOMINAL WALL: Primary | ICD-10-CM

## 2025-08-18 SDOH — ECONOMIC STABILITY: FOOD INSECURITY: WITHIN THE PAST 12 MONTHS, YOU WORRIED THAT YOUR FOOD WOULD RUN OUT BEFORE YOU GOT MONEY TO BUY MORE.: NEVER TRUE

## 2025-08-18 SDOH — ECONOMIC STABILITY: FOOD INSECURITY: WITHIN THE PAST 12 MONTHS, THE FOOD YOU BOUGHT JUST DIDN'T LAST AND YOU DIDN'T HAVE MONEY TO GET MORE.: NEVER TRUE

## 2025-08-18 ASSESSMENT — PATIENT HEALTH QUESTIONNAIRE - PHQ9
SUM OF ALL RESPONSES TO PHQ QUESTIONS 1-9: 0
2. FEELING DOWN, DEPRESSED OR HOPELESS: NOT AT ALL
SUM OF ALL RESPONSES TO PHQ QUESTIONS 1-9: 0
SUM OF ALL RESPONSES TO PHQ QUESTIONS 1-9: 0
1. LITTLE INTEREST OR PLEASURE IN DOING THINGS: NOT AT ALL
SUM OF ALL RESPONSES TO PHQ QUESTIONS 1-9: 0

## 2025-08-18 ASSESSMENT — ENCOUNTER SYMPTOMS
GASTROINTESTINAL NEGATIVE: 1
DIARRHEA: 0
CHEST TIGHTNESS: 0
ABDOMINAL DISTENTION: 0
ABDOMINAL PAIN: 0
COUGH: 0
NAUSEA: 0
SHORTNESS OF BREATH: 0
VOMITING: 0
WHEEZING: 0
CONSTIPATION: 0

## 2025-08-21 ENCOUNTER — HOSPITAL ENCOUNTER (OUTPATIENT)
Dept: ULTRASOUND IMAGING | Age: 75
Discharge: HOME | End: 2025-08-21
Payer: MEDICARE

## 2025-08-21 DIAGNOSIS — R22.2 SUBCUTANEOUS NODULE OF ABDOMINAL WALL: ICD-10-CM

## 2025-08-21 PROCEDURE — 76700 US EXAM ABDOM COMPLETE: CPT

## 2025-08-28 ENCOUNTER — TELEPHONE (OUTPATIENT)
Dept: PRIMARY CARE CLINIC | Age: 75
End: 2025-08-28

## 2025-09-03 ENCOUNTER — OFFICE VISIT (OUTPATIENT)
Dept: CARDIOLOGY CLINIC | Age: 75
End: 2025-09-03
Payer: MEDICARE

## 2025-09-03 VITALS
DIASTOLIC BLOOD PRESSURE: 58 MMHG | OXYGEN SATURATION: 99 % | HEIGHT: 62 IN | BODY MASS INDEX: 26.54 KG/M2 | WEIGHT: 144.2 LBS | HEART RATE: 83 BPM | SYSTOLIC BLOOD PRESSURE: 110 MMHG

## 2025-09-03 DIAGNOSIS — E78.2 MIXED HYPERLIPIDEMIA: ICD-10-CM

## 2025-09-03 DIAGNOSIS — I10 ESSENTIAL HYPERTENSION, BENIGN: ICD-10-CM

## 2025-09-03 DIAGNOSIS — I25.10 ATHEROSCLEROSIS OF NATIVE CORONARY ARTERY OF NATIVE HEART WITHOUT ANGINA PECTORIS: ICD-10-CM

## 2025-09-03 DIAGNOSIS — R07.9 CHEST PAIN, UNSPECIFIED TYPE: Primary | ICD-10-CM

## 2025-09-03 PROCEDURE — G8399 PT W/DXA RESULTS DOCUMENT: HCPCS | Performed by: INTERNAL MEDICINE

## 2025-09-03 PROCEDURE — 1036F TOBACCO NON-USER: CPT | Performed by: INTERNAL MEDICINE

## 2025-09-03 PROCEDURE — 3078F DIAST BP <80 MM HG: CPT | Performed by: INTERNAL MEDICINE

## 2025-09-03 PROCEDURE — 1090F PRES/ABSN URINE INCON ASSESS: CPT | Performed by: INTERNAL MEDICINE

## 2025-09-03 PROCEDURE — G8427 DOCREV CUR MEDS BY ELIG CLIN: HCPCS | Performed by: INTERNAL MEDICINE

## 2025-09-03 PROCEDURE — 1159F MED LIST DOCD IN RCRD: CPT | Performed by: INTERNAL MEDICINE

## 2025-09-03 PROCEDURE — 3017F COLORECTAL CA SCREEN DOC REV: CPT | Performed by: INTERNAL MEDICINE

## 2025-09-03 PROCEDURE — G8417 CALC BMI ABV UP PARAM F/U: HCPCS | Performed by: INTERNAL MEDICINE

## 2025-09-03 PROCEDURE — 99214 OFFICE O/P EST MOD 30 MIN: CPT | Performed by: INTERNAL MEDICINE

## 2025-09-03 PROCEDURE — 1123F ACP DISCUSS/DSCN MKR DOCD: CPT | Performed by: INTERNAL MEDICINE

## 2025-09-03 PROCEDURE — 3074F SYST BP LT 130 MM HG: CPT | Performed by: INTERNAL MEDICINE

## 2025-09-03 RX ORDER — METOPROLOL SUCCINATE 25 MG/1
TABLET, EXTENDED RELEASE ORAL
Qty: 90 TABLET | Refills: 3 | Status: SHIPPED | OUTPATIENT
Start: 2025-09-03

## 2025-09-03 RX ORDER — ESCITALOPRAM OXALATE 5 MG/1
TABLET ORAL
COMMUNITY
Start: 2025-07-30

## 2025-09-03 RX ORDER — LORAZEPAM 0.5 MG/1
0.5 TABLET ORAL DAILY PRN
COMMUNITY
Start: 2025-07-30 | End: 2025-10-28

## 2025-09-03 RX ORDER — LAMOTRIGINE 100 MG/1
TABLET ORAL
COMMUNITY
Start: 2025-07-22

## 2025-09-04 ENCOUNTER — HOSPITAL ENCOUNTER (OUTPATIENT)
Dept: CT IMAGING | Age: 75
Discharge: HOME | End: 2025-09-04
Payer: MEDICARE

## 2025-09-04 DIAGNOSIS — R22.2 SUBCUTANEOUS NODULE OF ABDOMINAL WALL: ICD-10-CM

## 2025-09-04 PROCEDURE — 74177 CT ABD & PELVIS W/CONTRAST: CPT

## 2025-09-04 PROCEDURE — 6360000004 HC RX CONTRAST MEDICATION: Performed by: NURSE PRACTITIONER

## 2025-09-04 RX ORDER — IOPAMIDOL 755 MG/ML
75 INJECTION, SOLUTION INTRAVASCULAR
Status: COMPLETED | OUTPATIENT
Start: 2025-09-04 | End: 2025-09-04

## 2025-09-04 RX ADMIN — IOHEXOL 25 ML: 350 INJECTION, SOLUTION INTRAVENOUS at 16:31

## 2025-09-04 RX ADMIN — IOPAMIDOL 75 ML: 755 INJECTION, SOLUTION INTRAVENOUS at 16:31

## (undated) DEVICE — STAPLER SKIN H3.9MM WIRE DIA0.58MM CRWN 6.9MM 35 STPL ROT

## (undated) DEVICE — KIT OR ROOM TURNOVER W/STRAP

## (undated) DEVICE — MASC MINOR: Brand: MEDLINE INDUSTRIES, INC.

## (undated) DEVICE — GAUZE,SPONGE,4"X4",8PLY,STRL,LF,10/TRAY: Brand: MEDLINE

## (undated) DEVICE — SUTURE CHROMIC GUT SZ 2-0 L27IN ABSRB BRN L26MM SH 1/2 CIR G123H

## (undated) DEVICE — TRAY PREP DRY W/ PREM GLV 2 APPL 6 SPNG 2 UNDPD 1 OVERWRAP

## (undated) DEVICE — CANNULA SEAL

## (undated) DEVICE — PENCIL ES L3M BTTN SWCH S STL HEX LOK BLDE ELECTRD HOLSTER

## (undated) DEVICE — SUTURE V-LOC 90 2-0 VL 9 P-14 VLOCM3245

## (undated) DEVICE — INTENDED FOR TISSUE SEPARATION, AND OTHER PROCEDURES THAT REQUIRE A SHARP SURGICAL BLADE TO PUNCTURE OR CUT.: Brand: BARD-PARKER ® STAINLESS STEEL BLADES

## (undated) DEVICE — GAUZE,SPONGE,4"X4",16PLY,XRAY,STRL,LF: Brand: MEDLINE

## (undated) DEVICE — MASTISOL ADHESIVE LIQ 2/3ML

## (undated) DEVICE — APPLICATOR SURG L38CM RIG ATOMIZING SGL USE XL-R FLEXITIP

## (undated) DEVICE — SOLUTION IRRIG 500ML 0.9% SOD CHLO USP POUR PLAS BTL

## (undated) DEVICE — SHEET, T, LAPAROTOMY, STERILE: Brand: MEDLINE

## (undated) DEVICE — STRIP,CLOSURE,WOUND,MEDI-STRIP,1/2X4: Brand: MEDLINE

## (undated) DEVICE — SUTURE MONOCRYL + SZ 4-0 L27IN ABSRB UD L19MM PS-2 3/8 CIR MCP426H

## (undated) DEVICE — SUTURE PROL SZ 0 L30IN NONABSORBABLE BLU MO-6 L26MM 1/2 CIR 8418H

## (undated) DEVICE — SUTURE DEV SZ 2-0 WND CLSR ABSRB GS-22 VLOC COVIDIEN VLOCM2145

## (undated) DEVICE — BLADE ES ELASTOMERIC COAT INSUL DURABLE BEND UPTO 90DEG

## (undated) DEVICE — INVIEW CLEAR LEGGINGS: Brand: CONVERTORS

## (undated) DEVICE — MEDICINE CUP, GRADUATED, STER: Brand: MEDLINE

## (undated) DEVICE — PAD TBL OP RM TRENDELENBURG STATIC TORSO W/STRAPS

## (undated) DEVICE — TRI-LUMEN FILTERED TUBE SET WITH ACTIVATED CHARCOAL FILTER: Brand: AIRSEAL

## (undated) DEVICE — CONTAINER,SPECIMEN,OR STERILE,4OZ: Brand: MEDLINE

## (undated) DEVICE — PAD,ABDOMINAL,8"X10",ST,LF: Brand: MEDLINE

## (undated) DEVICE — TOWEL,OR,DSP,ST,BLUE,STD,4/PK,20PK/CS: Brand: MEDLINE

## (undated) DEVICE — CHLORAPREP 26ML ORANGE

## (undated) DEVICE — PROTECTOR EYE PT SELF ADH NS OPT GRD LF

## (undated) DEVICE — CATHETER TRAY 16 FR 5 CC FOL ANTIREFLX SAMPLING PRT DOVER

## (undated) DEVICE — ELECTRODE PT RET AD L9FT HI MOIST COND ADH HYDRGEL CORDED

## (undated) DEVICE — BLADELESS OBTURATOR: Brand: WECK VISTA

## (undated) DEVICE — 35 ML SYRINGE LUER-LOCK TIP: Brand: MONOJECT

## (undated) DEVICE — 3M™ STERI-DRAPE™ INSTRUMENT POUCH 1018: Brand: STERI-DRAPE™

## (undated) DEVICE — ROBOTIC PK

## (undated) DEVICE — INSUFFLATION NEEDLE TO ESTABLISH PNEUMOPERITONEUM.: Brand: INSUFFLATION NEEDLE

## (undated) DEVICE — STANDARD HYPODERMIC NEEDLE,POLYPROPYLENE HUB: Brand: MONOJECT

## (undated) DEVICE — APPLICATOR MEDICATED 26 CC SOLUTION HI LT ORNG CHLORAPREP

## (undated) DEVICE — ARM DRAPE

## (undated) DEVICE — SYRINGE, LUER LOCK, 10ML: Brand: MEDLINE

## (undated) DEVICE — SKIN AFFIX SURG ADHESIVE 72/CS 0.55ML: Brand: MEDLINE

## (undated) DEVICE — Device

## (undated) DEVICE — DRAPE: MAGNETIC 12X16 30/CS: Brand: MEDICAL ACTION INDUSTRIES

## (undated) DEVICE — AIRSEAL 8 MM ACCESS PORT AND LOW PROFILE OBTURATOR WITH BLADELESS OPTICAL TIP, 120 MM LENGTH: Brand: AIRSEAL

## (undated) DEVICE — CORD,CAUTERY,BIPOLAR,STERILE: Brand: MEDLINE

## (undated) DEVICE — TIP COVER ACCESSORY

## (undated) DEVICE — NEEDLE,HYPODERM,SAFETY, 25GX1.5: Brand: MEDLINE

## (undated) DEVICE — LIGHT HANDLE: Brand: DEVON

## (undated) DEVICE — SOLUTION ANTIFOG VIS SYS CLEARIFY LAPSCP